# Patient Record
Sex: MALE | Race: WHITE | NOT HISPANIC OR LATINO | Employment: OTHER | URBAN - METROPOLITAN AREA
[De-identification: names, ages, dates, MRNs, and addresses within clinical notes are randomized per-mention and may not be internally consistent; named-entity substitution may affect disease eponyms.]

---

## 2017-01-10 ENCOUNTER — ALLSCRIPTS OFFICE VISIT (OUTPATIENT)
Dept: OTHER | Facility: OTHER | Age: 72
End: 2017-01-10

## 2017-05-18 ENCOUNTER — GENERIC CONVERSION - ENCOUNTER (OUTPATIENT)
Dept: OTHER | Facility: OTHER | Age: 72
End: 2017-05-18

## 2017-07-06 ENCOUNTER — GENERIC CONVERSION - ENCOUNTER (OUTPATIENT)
Dept: OTHER | Facility: OTHER | Age: 72
End: 2017-07-06

## 2017-08-24 ENCOUNTER — HOSPITAL ENCOUNTER (OUTPATIENT)
Dept: RADIOLOGY | Facility: HOSPITAL | Age: 72
Discharge: HOME | End: 2017-08-24
Attending: INTERNAL MEDICINE
Payer: MEDICARE

## 2017-08-24 ENCOUNTER — TRANSCRIBE ORDERS (OUTPATIENT)
Dept: ADMINISTRATIVE | Facility: HOSPITAL | Age: 72
End: 2017-08-24

## 2017-08-24 ENCOUNTER — APPOINTMENT (OUTPATIENT)
Dept: LAB | Facility: HOSPITAL | Age: 72
End: 2017-08-24
Attending: INTERNAL MEDICINE
Payer: MEDICARE

## 2017-08-24 DIAGNOSIS — R06.89 HYPOVENTILATION, IDIOPATHIC: ICD-10-CM

## 2017-08-24 DIAGNOSIS — R33.8 BENIGN LOCALIZED HYPERPLASIA OF PROSTATE WITH URINARY RETENTION: ICD-10-CM

## 2017-08-24 DIAGNOSIS — J44.9 OBSTRUCTIVE CHRONIC BRONCHITIS WITHOUT EXACERBATION (HCC): Primary | ICD-10-CM

## 2017-08-24 DIAGNOSIS — N40.1 BENIGN LOCALIZED HYPERPLASIA OF PROSTATE WITH URINARY RETENTION: ICD-10-CM

## 2017-08-24 DIAGNOSIS — Z79.899 ENCOUNTER FOR LONG-TERM (CURRENT) USE OF OTHER MEDICATIONS: ICD-10-CM

## 2017-08-24 LAB
ALBUMIN SERPL BCP-MCNC: 3.5 G/DL (ref 3.5–5)
ALP SERPL-CCNC: 66 U/L (ref 46–116)
ALT SERPL W P-5'-P-CCNC: 41 U/L (ref 12–78)
ANION GAP SERPL CALCULATED.3IONS-SCNC: 9 MMOL/L (ref 4–13)
AST SERPL W P-5'-P-CCNC: 19 U/L (ref 5–45)
BACTERIA UR QL AUTO: ABNORMAL /HPF
BASOPHILS # BLD AUTO: 0 THOUSANDS/ΜL (ref 0–0.1)
BASOPHILS NFR BLD AUTO: 0 % (ref 0–1)
BILIRUB SERPL-MCNC: 0.3 MG/DL (ref 0.2–1)
BILIRUB UR QL STRIP: NEGATIVE
BUN SERPL-MCNC: 18 MG/DL (ref 5–25)
CALCIUM SERPL-MCNC: 9.1 MG/DL (ref 8.3–10.1)
CHLORIDE SERPL-SCNC: 103 MMOL/L (ref 100–108)
CLARITY UR: CLEAR
CO2 SERPL-SCNC: 28 MMOL/L (ref 21–32)
COLOR UR: YELLOW
CREAT SERPL-MCNC: 1.17 MG/DL (ref 0.6–1.3)
EOSINOPHIL # BLD AUTO: 0.3 THOUSAND/ΜL (ref 0–0.61)
EOSINOPHIL NFR BLD AUTO: 4 % (ref 0–6)
ERYTHROCYTE [DISTWIDTH] IN BLOOD BY AUTOMATED COUNT: 14.6 % (ref 11.6–15.1)
GFR SERPL CREATININE-BSD FRML MDRD: 62 ML/MIN/1.73SQ M
GLUCOSE SERPL-MCNC: 142 MG/DL (ref 65–140)
GLUCOSE UR STRIP-MCNC: ABNORMAL MG/DL
HCT VFR BLD AUTO: 40.4 % (ref 42–52)
HGB BLD-MCNC: 13.3 G/DL (ref 14–18)
HGB UR QL STRIP.AUTO: ABNORMAL
KETONES UR STRIP-MCNC: NEGATIVE MG/DL
LEUKOCYTE ESTERASE UR QL STRIP: NEGATIVE
LYMPHOCYTES # BLD AUTO: 2.1 THOUSANDS/ΜL (ref 0.6–4.47)
LYMPHOCYTES NFR BLD AUTO: 28 % (ref 14–44)
MAGNESIUM SERPL-MCNC: 1.8 MG/DL (ref 1.6–2.6)
MCH RBC QN AUTO: 29.8 PG (ref 27–31)
MCHC RBC AUTO-ENTMCNC: 32.9 G/DL (ref 31.4–37.4)
MCV RBC AUTO: 91 FL (ref 82–98)
MONOCYTES # BLD AUTO: 0.7 THOUSAND/ΜL (ref 0.17–1.22)
MONOCYTES NFR BLD AUTO: 9 % (ref 4–12)
NEUTROPHILS # BLD AUTO: 4.4 THOUSANDS/ΜL (ref 1.85–7.62)
NEUTS SEG NFR BLD AUTO: 58 % (ref 43–75)
NITRITE UR QL STRIP: NEGATIVE
NON-SQ EPI CELLS URNS QL MICRO: ABNORMAL /HPF
NRBC BLD AUTO-RTO: 0 /100 WBCS
PH UR STRIP.AUTO: 5.5 [PH] (ref 5–9)
PLATELET # BLD AUTO: 271 THOUSANDS/UL (ref 130–400)
PMV BLD AUTO: 7.8 FL (ref 8.9–12.7)
POTASSIUM SERPL-SCNC: 4.6 MMOL/L (ref 3.5–5.3)
PROT SERPL-MCNC: 7.2 G/DL (ref 6.4–8.2)
PROT UR STRIP-MCNC: NEGATIVE MG/DL
RBC # BLD AUTO: 4.45 MILLION/UL (ref 4.7–6.1)
RBC #/AREA URNS AUTO: ABNORMAL /HPF
SODIUM SERPL-SCNC: 140 MMOL/L (ref 136–145)
SP GR UR STRIP.AUTO: 1.01 (ref 1–1.03)
UROBILINOGEN UR QL STRIP.AUTO: 0.2 E.U./DL
WBC # BLD AUTO: 7.6 THOUSAND/UL (ref 4.8–10.8)
WBC #/AREA URNS AUTO: ABNORMAL /HPF

## 2017-08-24 PROCEDURE — 80053 COMPREHEN METABOLIC PANEL: CPT | Performed by: INTERNAL MEDICINE

## 2017-08-24 PROCEDURE — 87147 CULTURE TYPE IMMUNOLOGIC: CPT | Performed by: INTERNAL MEDICINE

## 2017-08-24 PROCEDURE — 71020 HB CHEST X-RAY 2VW FRONTAL&LATL: CPT

## 2017-08-24 PROCEDURE — 85025 COMPLETE CBC W/AUTO DIFF WBC: CPT | Performed by: INTERNAL MEDICINE

## 2017-08-24 PROCEDURE — 81001 URINALYSIS AUTO W/SCOPE: CPT | Performed by: INTERNAL MEDICINE

## 2017-08-24 PROCEDURE — 87086 URINE CULTURE/COLONY COUNT: CPT | Performed by: INTERNAL MEDICINE

## 2017-08-24 PROCEDURE — 83735 ASSAY OF MAGNESIUM: CPT | Performed by: INTERNAL MEDICINE

## 2017-08-24 PROCEDURE — 93005 ELECTROCARDIOGRAM TRACING: CPT

## 2017-08-24 PROCEDURE — 36415 COLL VENOUS BLD VENIPUNCTURE: CPT | Performed by: INTERNAL MEDICINE

## 2017-08-25 LAB
ATRIAL RATE: 62 BPM
BACTERIA UR CULT: NORMAL
PR INTERVAL: 256 MS
QRS AXIS: -49 DEGREES
QRSD INTERVAL: 90 MS
QT INTERVAL: 390 MS
QTC INTERVAL: 395 MS
T WAVE AXIS: 61 DEGREES
VENTRICULAR RATE: 62 BPM

## 2018-01-12 VITALS
SYSTOLIC BLOOD PRESSURE: 118 MMHG | OXYGEN SATURATION: 96 % | RESPIRATION RATE: 12 BRPM | HEART RATE: 61 BPM | TEMPERATURE: 98.6 F | HEIGHT: 69 IN | WEIGHT: 265 LBS | BODY MASS INDEX: 39.25 KG/M2 | DIASTOLIC BLOOD PRESSURE: 68 MMHG

## 2018-01-12 NOTE — MISCELLANEOUS
Message  Dr Mary Uribe called; he has AV block (sleep related)  He has histoyr of MEHDI and Dr Mary Uribe is concerned   to know if the pressure on CPAP is correct        Plan  Bradycardia    · CPAP Tubing; Status:Complete;   Done: [id number] 03:49PM    Signatures   Electronically signed by : SUMMER Marti; May 18 2017  3:50PM EST                       (Author)

## 2018-01-16 NOTE — MISCELLANEOUS
Message  Reviewed nocturnal pulse oximetry on CPAP  He needs to have titration study to qualify for supplemental oxygeng  He is not agreeable to have repeat titration study        Signatures   Electronically signed by : SUMMER Davidson; Jul 6 2017  2:19PM EST                       (Author)

## 2018-03-19 ENCOUNTER — APPOINTMENT (OUTPATIENT)
Dept: LAB | Facility: HOSPITAL | Age: 73
End: 2018-03-19
Attending: INTERNAL MEDICINE
Payer: MEDICARE

## 2018-03-19 ENCOUNTER — HOSPITAL ENCOUNTER (OUTPATIENT)
Dept: RADIOLOGY | Facility: HOSPITAL | Age: 73
Discharge: HOME | End: 2018-03-19
Attending: INTERNAL MEDICINE
Payer: MEDICARE

## 2018-03-19 ENCOUNTER — TRANSCRIBE ORDERS (OUTPATIENT)
Dept: ADMINISTRATIVE | Facility: HOSPITAL | Age: 73
End: 2018-03-19

## 2018-03-19 DIAGNOSIS — J44.1 OBSTRUCTIVE CHRONIC BRONCHITIS WITH EXACERBATION (HCC): Primary | ICD-10-CM

## 2018-03-19 DIAGNOSIS — I51.7 CARDIOMEGALY: ICD-10-CM

## 2018-03-19 DIAGNOSIS — I51.9 CARDIAC COMPLICATION: ICD-10-CM

## 2018-03-19 LAB
ALBUMIN SERPL BCP-MCNC: 3.5 G/DL (ref 3.5–5)
ALP SERPL-CCNC: 64 U/L (ref 46–116)
ALT SERPL W P-5'-P-CCNC: 49 U/L (ref 12–78)
ANION GAP SERPL CALCULATED.3IONS-SCNC: 8 MMOL/L (ref 4–13)
AST SERPL W P-5'-P-CCNC: 31 U/L (ref 5–45)
ATRIAL RATE: 62 BPM
BACTERIA UR QL AUTO: NORMAL /HPF
BASOPHILS # BLD AUTO: 0 THOUSANDS/ΜL (ref 0–0.1)
BASOPHILS NFR BLD AUTO: 0 % (ref 0–1)
BILIRUB SERPL-MCNC: 0.4 MG/DL (ref 0.2–1)
BILIRUB UR QL STRIP: NEGATIVE
BUN SERPL-MCNC: 21 MG/DL (ref 5–25)
CALCIUM SERPL-MCNC: 9.1 MG/DL (ref 8.3–10.1)
CHLORIDE SERPL-SCNC: 101 MMOL/L (ref 100–108)
CLARITY UR: CLEAR
CO2 SERPL-SCNC: 30 MMOL/L (ref 21–32)
COLOR UR: YELLOW
CREAT SERPL-MCNC: 1.32 MG/DL (ref 0.6–1.3)
DEPRECATED D DIMER PPP: 900 NG/ML (FEU) (ref 190–520)
EOSINOPHIL # BLD AUTO: 0.3 THOUSAND/ΜL (ref 0–0.61)
EOSINOPHIL NFR BLD AUTO: 3 % (ref 0–6)
ERYTHROCYTE [DISTWIDTH] IN BLOOD BY AUTOMATED COUNT: 14.9 % (ref 11.6–15.1)
GFR SERPL CREATININE-BSD FRML MDRD: 54 ML/MIN/1.73SQ M
GLUCOSE SERPL-MCNC: 202 MG/DL (ref 65–140)
GLUCOSE UR STRIP-MCNC: ABNORMAL MG/DL
HCT VFR BLD AUTO: 40.7 % (ref 42–52)
HGB BLD-MCNC: 13.1 G/DL (ref 14–18)
HGB UR QL STRIP.AUTO: ABNORMAL
KETONES UR STRIP-MCNC: NEGATIVE MG/DL
LEUKOCYTE ESTERASE UR QL STRIP: NEGATIVE
LYMPHOCYTES # BLD AUTO: 2.2 THOUSANDS/ΜL (ref 0.6–4.47)
LYMPHOCYTES NFR BLD AUTO: 24 % (ref 14–44)
MAGNESIUM SERPL-MCNC: 1.9 MG/DL (ref 1.6–2.6)
MCH RBC QN AUTO: 28.9 PG (ref 27–31)
MCHC RBC AUTO-ENTMCNC: 32.2 G/DL (ref 31.4–37.4)
MCV RBC AUTO: 90 FL (ref 82–98)
MONOCYTES # BLD AUTO: 0.6 THOUSAND/ΜL (ref 0.17–1.22)
MONOCYTES NFR BLD AUTO: 7 % (ref 4–12)
NEUTROPHILS # BLD AUTO: 5.8 THOUSANDS/ΜL (ref 1.85–7.62)
NEUTS SEG NFR BLD AUTO: 65 % (ref 43–75)
NITRITE UR QL STRIP: NEGATIVE
NON-SQ EPI CELLS URNS QL MICRO: NORMAL /HPF
NRBC BLD AUTO-RTO: 0 /100 WBCS
NT-PROBNP SERPL-MCNC: 147 PG/ML
P AXIS: 18 DEGREES
PH UR STRIP.AUTO: 5.5 [PH] (ref 5–9)
PLATELET # BLD AUTO: 260 THOUSANDS/UL (ref 130–400)
PMV BLD AUTO: 8.4 FL (ref 8.9–12.7)
POTASSIUM SERPL-SCNC: 4.6 MMOL/L (ref 3.5–5.3)
PROT SERPL-MCNC: 7.2 G/DL (ref 6.4–8.2)
PROT UR STRIP-MCNC: ABNORMAL MG/DL
QRS AXIS: -52 DEGREES
QRSD INTERVAL: 92 MS
QT INTERVAL: 394 MS
QTC INTERVAL: 399 MS
RBC # BLD AUTO: 4.53 MILLION/UL (ref 4.7–6.1)
RBC #/AREA URNS AUTO: NORMAL /HPF
SODIUM SERPL-SCNC: 139 MMOL/L (ref 136–145)
SP GR UR STRIP.AUTO: 1.02 (ref 1–1.03)
T WAVE AXIS: 76 DEGREES
UROBILINOGEN UR QL STRIP.AUTO: 0.2 E.U./DL
VENTRICULAR RATE: 62 BPM
WBC # BLD AUTO: 8.9 THOUSAND/UL (ref 4.8–10.8)
WBC #/AREA URNS AUTO: NORMAL /HPF

## 2018-03-19 PROCEDURE — 93010 ELECTROCARDIOGRAM REPORT: CPT | Performed by: INTERNAL MEDICINE

## 2018-03-19 PROCEDURE — 85025 COMPLETE CBC W/AUTO DIFF WBC: CPT | Performed by: INTERNAL MEDICINE

## 2018-03-19 PROCEDURE — 83735 ASSAY OF MAGNESIUM: CPT | Performed by: INTERNAL MEDICINE

## 2018-03-19 PROCEDURE — 36415 COLL VENOUS BLD VENIPUNCTURE: CPT | Performed by: INTERNAL MEDICINE

## 2018-03-19 PROCEDURE — 71046 X-RAY EXAM CHEST 2 VIEWS: CPT

## 2018-03-19 PROCEDURE — 81001 URINALYSIS AUTO W/SCOPE: CPT | Performed by: INTERNAL MEDICINE

## 2018-03-19 PROCEDURE — 85379 FIBRIN DEGRADATION QUANT: CPT | Performed by: INTERNAL MEDICINE

## 2018-03-19 PROCEDURE — 83880 ASSAY OF NATRIURETIC PEPTIDE: CPT | Performed by: INTERNAL MEDICINE

## 2018-03-19 PROCEDURE — 80053 COMPREHEN METABOLIC PANEL: CPT | Performed by: INTERNAL MEDICINE

## 2018-03-19 PROCEDURE — 93005 ELECTROCARDIOGRAM TRACING: CPT

## 2018-03-20 ENCOUNTER — TRANSCRIBE ORDERS (OUTPATIENT)
Dept: ADMINISTRATIVE | Facility: HOSPITAL | Age: 73
End: 2018-03-20

## 2018-03-20 ENCOUNTER — APPOINTMENT (OUTPATIENT)
Dept: LAB | Facility: HOSPITAL | Age: 73
End: 2018-03-20
Attending: INTERNAL MEDICINE
Payer: MEDICARE

## 2018-03-20 DIAGNOSIS — I51.9 CARDIAC COMPLICATION: ICD-10-CM

## 2018-03-20 DIAGNOSIS — J44.1 OBSTRUCTIVE CHRONIC BRONCHITIS WITH EXACERBATION (HCC): ICD-10-CM

## 2018-03-20 DIAGNOSIS — J44.1 OBSTRUCTIVE CHRONIC BRONCHITIS WITH EXACERBATION (HCC): Primary | ICD-10-CM

## 2018-03-20 PROCEDURE — 87070 CULTURE OTHR SPECIMN AEROBIC: CPT | Performed by: INTERNAL MEDICINE

## 2018-03-20 PROCEDURE — 87205 SMEAR GRAM STAIN: CPT | Performed by: INTERNAL MEDICINE

## 2018-03-22 LAB
BACTERIA SPT RESP CULT: NORMAL
GRAM STN SPEC: NORMAL

## 2018-07-31 ENCOUNTER — OFFICE VISIT (OUTPATIENT)
Dept: PULMONOLOGY | Facility: MEDICAL CENTER | Age: 73
End: 2018-07-31
Payer: MEDICARE

## 2018-07-31 VITALS
SYSTOLIC BLOOD PRESSURE: 126 MMHG | TEMPERATURE: 97.9 F | BODY MASS INDEX: 37.94 KG/M2 | OXYGEN SATURATION: 95 % | HEIGHT: 70 IN | WEIGHT: 265 LBS | DIASTOLIC BLOOD PRESSURE: 82 MMHG | HEART RATE: 89 BPM

## 2018-07-31 DIAGNOSIS — G47.33 OBSTRUCTIVE SLEEP APNEA: ICD-10-CM

## 2018-07-31 DIAGNOSIS — G47.33 OSA (OBSTRUCTIVE SLEEP APNEA): Primary | ICD-10-CM

## 2018-07-31 PROCEDURE — 99214 OFFICE O/P EST MOD 30 MIN: CPT | Performed by: NURSE PRACTITIONER

## 2018-07-31 RX ORDER — TRAZODONE HYDROCHLORIDE 100 MG/1
100 TABLET ORAL
COMMUNITY
End: 2021-02-12

## 2018-07-31 RX ORDER — TRAMADOL HYDROCHLORIDE 50 MG/1
50 TABLET ORAL EVERY 6 HOURS PRN
COMMUNITY
End: 2021-02-12

## 2018-07-31 RX ORDER — BICALUTAMIDE 50 MG/1
TABLET, FILM COATED ORAL
COMMUNITY
Start: 2018-07-29 | End: 2019-10-24 | Stop reason: ALTCHOICE

## 2018-07-31 RX ORDER — OMEPRAZOLE 40 MG/1
40 CAPSULE, DELAYED RELEASE ORAL
COMMUNITY
End: 2018-10-19

## 2018-07-31 NOTE — PROGRESS NOTES
Assessment/Plan:     Problem List Items Addressed This Visit        Respiratory    Obstructive sleep apnea     Artie Krishnan is here today for re-establishment of his diagnosis of obstructive sleep apnea  I did review notes from 2017  It appears that he had initial diagnosis in the year of February 2006  He had severe obstructive sleep apnea with overall apneic hypopnea index is 66 and oxygen bala of 80  He weighed 250 lb at that time  He had a diagnostic home sleep study done September 8, 2015  Again this showed severe MEHDI with apneic hypopnea index of 43 and oxygen bala of 75  He likely is using auto CPAP compliance data was reviewed by Dr Chin at his last visit in January 2017  Average CPAP pressure was 10 cm with apneic hypopnea index less than 5  According to patient he feels well rested in the morning  He sleeps approximately 8-10 hours per night with only 1 interruption due to needing to urinate  He feels refreshed in the morning  Plan includes requesting compliance data and I will be ordering supplies for him  Other Visit Diagnoses     MEHDI (obstructive sleep apnea)    -  Primary    Relevant Orders    PAP DME Resupply/Reorder            Return in about 4 weeks (around 8/28/2018)  All questions are answered to the patient's satisfaction and understanding  He verbalizes understanding  He is encouraged to call with any further questions or concerns  Portions of the record may have been created with voice recognition software  Occasional wrong word or "sound a like" substitutions may have occurred due to the inherent limitations of voice recognition software  Read the chart carefully and recognize, using context, where substitutions have occurred  Electronically Signed by NASH Coombs    ______________________________________________________________________    Chief Complaint:   Chief Complaint   Patient presents with    Sleep Apnea       Patient ID: Artie Krishnan is a 68 y o  y o  male has a past medical history of Arthritis; Cancer ([facility] [address] ); Coronary artery disease; Diabetes mellitus ([facility] [address] ); Hypertension; Psoriasis; and Sleep apnea  7/31/2018  Yogi Ferreira is a 72-year-old male who was here today for re-evaluation of obstructive sleep apnea  He has history of vocal cord paralysis and obstructive sleep apnea  He was last seen in the office in January of 2017 by Dr Amrita Pedraza  Review of notes reports that this gentleman has a history of severe obstructive sleep apnea  Initial diagnostic study was done in February 22, 2006  It showed an overall apneic hypopnea index of 66 events for hour and oxygen bala of 80%  He weighed 250 lb then  He had a diagnostic home sleep study done again September 2015 that showed severe obstructive sleep apnea with AHI of 43 and oxygen bala of 75%  It appears that his current CPAP is 10 cm of water pressure and average apneic hypopnea index is 5  I believe that he likely is on auto CPAP as patient states that he has a range between 8 and 15  He is here today as he needs new supplies  Shortness of Breath   This is a chronic problem  The current episode started more than 1 year ago  The problem occurs daily  The problem has been unchanged  The symptoms are aggravated by exercise  The patient has no known risk factors for DVT/PE  The treatment provided mild relief  His past medical history is significant for asthma  Review of Systems   Constitutional: Negative  HENT: Negative  Eyes: Negative  Respiratory: Positive for shortness of breath  Cardiovascular: Negative  Gastrointestinal: Negative  Endocrine: Negative  Genitourinary: Negative  Musculoskeletal: Negative  Skin: Negative  Allergic/Immunologic: Negative  Neurological: Negative  Hematological: Negative  Psychiatric/Behavioral: Negative  Smoking history: He reports that he has never smoked   He does not have any smokeless tobacco history on file     The following portions of the patient's history were reviewed and updated as appropriate: allergies, current medications, past family history, past medical history, past social history, past surgical history and problem list       There is no immunization history on file for this patient  Current Outpatient Prescriptions   Medication Sig Dispense Refill    traMADol (ULTRAM) 50 mg tablet Take 50 mg by mouth every 6 (six) hours as needed for moderate pain      traZODone (DESYREL) 100 mg tablet Take 50 mg by mouth daily at bedtime      Apremilast (OTEZLA) 30 MG TABS Take by mouth 2 (two) times a day   aspirin 81 MG tablet Take 81 mg by mouth daily   atorvastatin (LIPITOR) 40 mg tablet Take 40 mg by mouth daily   bicalutamide (CASODEX) 50 mg tablet       cholecalciferol (VITAMIN D3) 1,000 units tablet Take by mouth      clopidogrel (PLAVIX) 75 mg tablet Take 75 mg by mouth daily   Empagliflozin 25 MG TABS Take 25 mg by mouth      furosemide (LASIX) 40 mg tablet Take 40 mg by mouth 2 (two) times a day   insulin lispro protamine-insulin lispro (HumaLOG 50-50) 100 units/mL Inject 50 Units under the skin 2 (two) times a day before meals   lisinopril (ZESTRIL) 10 mg tablet Take 10 mg by mouth daily   metFORMIN (GLUMETZA) 1000 MG (MOD) 24 hr tablet Take 1,000 mg by mouth 2 (two) times a day   metoprolol tartrate (LOPRESSOR) 100 mg tablet Take 100 mg by mouth once   omeprazole (PriLOSEC) 40 MG capsule Take 40 mg by mouth      sertraline (ZOLOFT) 50 mg tablet Take 50 mg by mouth daily   UNABLE TO FIND 2 (two) times a day  Med Name: arthrotec      UNABLE TO FIND Take by mouth once  Med Name: vitamin d 3       No current facility-administered medications for this visit        Allergies: Bee venom    Objective:  Vitals:    07/31/18 0952   BP: 126/82   BP Location: Left arm   Patient Position: Sitting   Cuff Size: Large   Pulse: 89   Temp: 97 9 °F (36 6 °C) TempSrc: Oral   SpO2: 95%   Weight: 120 kg (265 lb)   Height: 5' 10" (1 778 m)   Oxygen Therapy  SpO2: 95 %    Wt Readings from Last 3 Encounters:   07/31/18 120 kg (265 lb)   01/10/17 120 kg (265 lb)   04/05/16 122 kg (270 lb)     Body mass index is 38 02 kg/m²  Physical Exam   Constitutional: He is oriented to person, place, and time  He appears well-developed and well-nourished  Morbidly obese   HENT:   Head: Normocephalic and atraumatic  Mallampati 4   Eyes: Conjunctivae are normal  Pupils are equal, round, and reactive to light  Neck: Normal range of motion  Neck supple  Cardiovascular: Normal rate and regular rhythm  Pulmonary/Chest: Effort normal and breath sounds normal    Abdominal: Soft  Musculoskeletal: Normal range of motion  Neurological: He is alert and oriented to person, place, and time  Skin: Skin is warm and dry  Psychiatric: He has a normal mood and affect   His behavior is normal  Thought content normal        Lab Review:   Transcribe Orders on 03/19/2018   Component Date Value    WBC 03/19/2018 8 90     RBC 03/19/2018 4 53*    Hemoglobin 03/19/2018 13 1*    Hematocrit 03/19/2018 40 7*    MCV 03/19/2018 90     MCH 03/19/2018 28 9     MCHC 03/19/2018 32 2     RDW 03/19/2018 14 9     MPV 03/19/2018 8 4*    Platelets 89/00/2371 260     nRBC 03/19/2018 0     Neutrophils Relative 03/19/2018 65     Lymphocytes Relative 03/19/2018 24     Monocytes Relative 03/19/2018 7     Eosinophils Relative 03/19/2018 3     Basophils Relative 03/19/2018 0     Neutrophils Absolute 03/19/2018 5 80     Lymphocytes Absolute 03/19/2018 2 20     Monocytes Absolute 03/19/2018 0 60     Eosinophils Absolute 03/19/2018 0 30     Basophils Absolute 03/19/2018 0 00     Sodium 03/19/2018 139     Potassium 03/19/2018 4 6     Chloride 03/19/2018 101     CO2 03/19/2018 30     Anion Gap 03/19/2018 8     BUN 03/19/2018 21     Creatinine 03/19/2018 1 32*    Glucose 03/19/2018 202*    Calcium 03/19/2018 9 1     AST 03/19/2018 31     ALT 03/19/2018 49     Alkaline Phosphatase 03/19/2018 64     Total Protein 03/19/2018 7 2     Albumin 03/19/2018 3 5     Total Bilirubin 03/19/2018 0 40     eGFR 03/19/2018 54     Magnesium 03/19/2018 1 9     Color, UA 03/19/2018 Yellow     Clarity, UA 03/19/2018 Clear     Specific Gravity, UA 03/19/2018 1 020     pH, UA 03/19/2018 5 5     Leukocytes, UA 03/19/2018 Negative     Nitrite, UA 03/19/2018 Negative     Protein, UA 03/19/2018 100 (2+)*    Glucose, UA 03/19/2018 250 (1/4%)*    Ketones, UA 03/19/2018 Negative     Urobilinogen, UA 03/19/2018 0 2     Bilirubin, UA 03/19/2018 Negative     Blood, UA 03/19/2018 Small*    Sputum Culture 03/20/2018 3+ Growth of      Gram Stain Result 03/20/2018 Rare Polys     Gram Stain Result 03/20/2018 1+ Gram positive cocci in pairs, chains and clusters     Gram Stain Result 03/20/2018 Rare Gram negative rods     NT-proBNP 03/19/2018 147*    D-Dimer, Quant 03/19/2018 900*    Ventricular Rate 03/19/2018 62     Atrial Rate 03/19/2018 62     QRSD Interval 03/19/2018 92     QT Interval 03/19/2018 394     QTC Interval 03/19/2018 399     P Axis 03/19/2018 18     QRS Axis 03/19/2018 -46     T Wave Axis 03/19/2018 76     RBC, UA 03/19/2018 None Seen     WBC, UA 03/19/2018 None Seen     Epithelial Cells 03/19/2018 Occasional     Bacteria, UA 03/19/2018 Occasional        Diagnostics:  I have personally reviewed pertinent reports  Office Spirometry Results:     ESS:    No results found

## 2018-07-31 NOTE — ASSESSMENT & PLAN NOTE
Adriel Hartmann is here today for re-establishment of his diagnosis of obstructive sleep apnea  I did review notes from 2017  It appears that he had initial diagnosis in the year of February 2006  He had severe obstructive sleep apnea with overall apneic hypopnea index is 66 and oxygen bala of 80  He weighed 250 lb at that time  He had a diagnostic home sleep study done September 8, 2015  Again this showed severe MEHDI with apneic hypopnea index of 43 and oxygen bala of 75  He likely is using auto CPAP compliance data was reviewed by Dr Chin at his last visit in January 2017  Average CPAP pressure was 10 cm with apneic hypopnea index less than 5  According to patient he feels well rested in the morning  He sleeps approximately 8-10 hours per night with only 1 interruption due to needing to urinate  He feels refreshed in the morning  Plan includes requesting compliance data and I will be ordering supplies for him

## 2018-07-31 NOTE — PATIENT INSTRUCTIONS
You have diagnosis of severe obstructive sleep apnea  I have reordered supplies for you  From [employer]  I would like you to return to see Respiratory therapist from [email or web address]  He will do mask refit for you  I am also requesting compliance data to make sure that your current setting is adequate

## 2018-10-01 ENCOUNTER — TELEPHONE (OUTPATIENT)
Dept: PAIN MEDICINE | Facility: MEDICAL CENTER | Age: 73
End: 2018-10-01

## 2018-10-01 NOTE — TELEPHONE ENCOUNTER
Pt's wife called to schedule an appt for her   States that a friend recommended Dr Ashley Orantes for pain management  Pt has low back pain and r-sided hip and leg pain  He has not seen prior pain management  Verified we are par with his insurance  Pt has not had any recent imaging  CON scheduled with Dr Ashley Orantes on 10/19 @ 8:00 AM  NP paperwork mailed to patient

## 2018-10-19 ENCOUNTER — CONSULT (OUTPATIENT)
Dept: PAIN MEDICINE | Facility: CLINIC | Age: 73
End: 2018-10-19
Payer: MEDICARE

## 2018-10-19 ENCOUNTER — HOSPITAL ENCOUNTER (OUTPATIENT)
Dept: RADIOLOGY | Facility: HOSPITAL | Age: 73
Discharge: HOME | End: 2018-10-19
Attending: ANESTHESIOLOGY
Payer: MEDICARE

## 2018-10-19 ENCOUNTER — APPOINTMENT (OUTPATIENT)
Dept: LAB | Facility: CLINIC | Age: 73
End: 2018-10-19
Payer: MEDICARE

## 2018-10-19 VITALS
BODY MASS INDEX: 36.36 KG/M2 | TEMPERATURE: 98.7 F | DIASTOLIC BLOOD PRESSURE: 70 MMHG | HEART RATE: 86 BPM | WEIGHT: 254 LBS | HEIGHT: 70 IN | SYSTOLIC BLOOD PRESSURE: 118 MMHG

## 2018-10-19 DIAGNOSIS — M48.062 LUMBAR STENOSIS WITH NEUROGENIC CLAUDICATION: Primary | ICD-10-CM

## 2018-10-19 DIAGNOSIS — M48.062 LUMBAR STENOSIS WITH NEUROGENIC CLAUDICATION: ICD-10-CM

## 2018-10-19 DIAGNOSIS — M47.816 LUMBAR SPONDYLOSIS: ICD-10-CM

## 2018-10-19 PROBLEM — M17.12 DEGENERATIVE ARTHRITIS OF LEFT KNEE: Status: ACTIVE | Noted: 2018-04-24

## 2018-10-19 PROBLEM — I10 ESSENTIAL (PRIMARY) HYPERTENSION: Status: ACTIVE | Noted: 2018-07-30

## 2018-10-19 PROBLEM — E78.5 HYPERLIPIDEMIA: Status: ACTIVE | Noted: 2018-10-19

## 2018-10-19 PROBLEM — I25.10 CORONARY ARTERY DISEASE INVOLVING NATIVE CORONARY ARTERY: Status: ACTIVE | Noted: 2018-07-30

## 2018-10-19 PROBLEM — J38.02 VOCAL CORD PARALYSIS, BILATERAL COMPLETE: Status: ACTIVE | Noted: 2017-01-10

## 2018-10-19 PROBLEM — E11.9 TYPE 2 DIABETES MELLITUS (HCC): Status: ACTIVE | Noted: 2018-10-19

## 2018-10-19 LAB
CRP SERPL QL: 8.5 MG/L
ERYTHROCYTE [SEDIMENTATION RATE] IN BLOOD: 27 MM/HOUR (ref 0–10)
RHEUMATOID FACT SER QL LA: NEGATIVE

## 2018-10-19 PROCEDURE — 86140 C-REACTIVE PROTEIN: CPT

## 2018-10-19 PROCEDURE — 86038 ANTINUCLEAR ANTIBODIES: CPT

## 2018-10-19 PROCEDURE — 86430 RHEUMATOID FACTOR TEST QUAL: CPT

## 2018-10-19 PROCEDURE — 72110 X-RAY EXAM L-2 SPINE 4/>VWS: CPT

## 2018-10-19 PROCEDURE — 36415 COLL VENOUS BLD VENIPUNCTURE: CPT

## 2018-10-19 PROCEDURE — 85652 RBC SED RATE AUTOMATED: CPT

## 2018-10-19 PROCEDURE — 99204 OFFICE O/P NEW MOD 45 MIN: CPT | Performed by: ANESTHESIOLOGY

## 2018-10-19 PROCEDURE — 72200 X-RAY EXAM SI JOINTS: CPT

## 2018-10-19 NOTE — PROGRESS NOTES
Assessment:  1  Lumbar stenosis with neurogenic claudication    2  Lumbar spondylosis        Plan:  The patient's symptoms, history/physical are consistent with pain that is multifactorial in origin  He has a history of spinal stenosis which is the likely etiology of his ongoing lower back and leg complaints  However he also has a history of psoriasis and there may be a component of psoriatic arthritis that is contributing to his very low back pain  At this time, I discussed treatment that will be multimodal in approach  I will order updated imaging of the lumbar sacral spine including x-rays of the lumbar spine, sacroiliac joints and an MRI of the lumbar spine to further evaluate  I will also order an inflammatory blood work panel  To provide him symptomatic relief in the interim, I would like him to begin a course of physical therapy  I advised him I will call with the results of all the studies once they have been completed and discuss treatment moving forward  My impressions and treatment recommendations were discussed in detail with the patient who verbalized understanding and had no further questions  Discharge instructions were provided  I personally saw and examined the patient and I agree with the above discussed plan of care  Orders Placed This Encounter   Procedures    XR spine lumbar minimum 4 views non injury     Standing Status:   Future     Standing Expiration Date:   10/19/2022     Scheduling Instructions:      Bring along any outside films relating to this procedure  Order Specific Question:   Reason for Exam:     Answer:   lbp    XR sacroiliac joints < 3 views     Standing Status:   Future     Standing Expiration Date:   10/19/2022     Scheduling Instructions:      Bring along any outside films relating to this procedure             Order Specific Question:   Reason for Exam:     Answer:   lbp    MRI lumbar spine without contrast     Standing Status:   Future Standing Expiration Date:   10/19/2022     Scheduling Instructions: There is no preparation for this test  Please leave your jewelry and valuables at home, wedding rings are the exception  Please bring your insurance cards, a form of photo ID and a list of your medications with you  Arrive 15 minutes prior to your appointment time in order to register  Please bring any prior CT or MRI studies of this area that were not performed at a [facility]  To schedule this appointment, please contact Central Scheduling at [phone]  Order Specific Question:   What is the patient's sedation requirement? Answer:   No Sedation    KENDALL Screen w/ Reflex to Titer/Pattern     Standing Status:   Future     Standing Expiration Date:   10/19/2019    C-reactive protein     Standing Status:   Future     Standing Expiration Date:   10/19/2019    RF Screen w/ Reflex to Titer     Standing Status:   Future     Standing Expiration Date:   10/19/2019    Sedimentation rate, automated     Standing Status:   Future     Standing Expiration Date:   10/19/2019    Ambulatory referral to Physical Therapy     Standing Status:   Future     Standing Expiration Date:   4/19/2019     Referral Priority:   Routine     Referral Type:   Physical Therapy     Referral Reason:   Specialty Services Required     Requested Specialty:   Physical Therapy     Number of Visits Requested:   1     Expiration Date:   10/19/2019     No orders of the defined types were placed in this encounter  History of Present Illness:    Madiha Nicole is a 68 y o  male who presents for consultation in regards to lower back pain as well as bilateral lower extremity pain  Symptoms have been present for 10 years without any precipitating injury or trauma  Symptoms are moderate to severe rated 7-8/10 on a numeric rating scale  Pain is burning in the lower back with numbness  Symptoms are associated with weakness    Pain is aggravated standing, walking, exercise and decreased with relaxation, coughing, sneezing and bowel movements  Treatment history has included prior epidural steroid injections which provided moderate relief in 2017  Physical therapy has provided no relief  Chiropractic manipulations provided moderate relief  He takes tramadol as needed which provides mild relief  I have personally reviewed and/or updated the patient's past medical history, past surgical history, family history, social history, current medications, allergies, and vital signs today  Review of Systems:    Review of Systems   Constitutional: Positive for unexpected weight change  Negative for fever  HENT: Negative for trouble swallowing  Eyes: Negative for visual disturbance  Respiratory: Positive for shortness of breath and wheezing  Cardiovascular: Negative for chest pain and palpitations  Gastrointestinal: Negative for constipation, diarrhea, nausea and vomiting  Endocrine: Negative for cold intolerance, heat intolerance and polydipsia  Genitourinary: Positive for difficulty urinating  Negative for frequency  Musculoskeletal: Positive for gait problem and joint swelling  Negative for arthralgias and myalgias  Skin: Negative for rash  Neurological: Negative for dizziness, seizures, syncope, weakness and headaches  Hematological: Does not bruise/bleed easily  Psychiatric/Behavioral: Negative for dysphoric mood  All other systems reviewed and are negative        Patient Active Problem List   Diagnosis    Obstructive sleep apnea    Coronary artery disease involving native coronary artery    Degenerative arthritis of left knee    Essential (primary) hypertension    Glottic stenosis    Vocal cord paralysis, bilateral complete    Type 2 diabetes mellitus ([address] )    Hyperlipidemia    Lumbar stenosis with neurogenic claudication    Lumbar spondylosis       Past Medical History:   Diagnosis Date    Arthritis     knees    Cancer [facility])     prostate    Coronary artery disease     two coronary stents    Diabetes mellitus ([facility] [address] )     Hyperlipidemia 10/19/2018    Hypertension     Psoriasis     lower extremities and buttocks    Sleep apnea     Type 2 diabetes mellitus ([facility] [address] ) 10/19/2018       Past Surgical History:   Procedure Laterality Date    CATARACT EXTRACTION Bilateral     with iol's    COLONOSCOPY N/A 4/5/2016    Procedure: COLONOSCOPY snare polypectomy;  Surgeon: Antony Johnson MD;  [facility]; Service:     INSERTION PROSTATE RADIATION SEED      REPLACEMENT TOTAL KNEE Right     TRACHEOSTOMY      x 2  up to 5 years ago    UMBILICAL HERNIA REPAIR         No family history on file  Social History     Occupational History    Not on file  Social History Main Topics    Smoking status: Never Smoker    Smokeless tobacco: Not on file    Alcohol use 2 4 oz/week     4 Cans of beer per week      Comment: daily    Drug use: No    Sexual activity: Not on file       Current Outpatient Prescriptions on File Prior to Visit   Medication Sig    Apremilast (OTEZLA) 30 MG TABS Take by mouth 2 (two) times a day   aspirin 81 MG tablet Take 81 mg by mouth daily   bicalutamide (CASODEX) 50 mg tablet     cholecalciferol (VITAMIN D3) 1,000 units tablet Take by mouth    clopidogrel (PLAVIX) 75 mg tablet Take 75 mg by mouth daily   insulin lispro protamine-insulin lispro (HumaLOG 50-50) 100 units/mL Inject 50 Units under the skin 2 (two) times a day before meals   lisinopril (ZESTRIL) 10 mg tablet Take 10 mg by mouth daily   metFORMIN (GLUMETZA) 1000 MG (MOD) 24 hr tablet Take 1,000 mg by mouth 2 (two) times a day   traMADol (ULTRAM) 50 mg tablet Take 50 mg by mouth every 6 (six) hours as needed for moderate pain    traZODone (DESYREL) 100 mg tablet Take 50 mg by mouth daily at bedtime    [DISCONTINUED] atorvastatin (LIPITOR) 40 mg tablet Take 40 mg by mouth daily      [DISCONTINUED] Empagliflozin 25 MG TABS Take 25 mg by mouth    [DISCONTINUED] furosemide (LASIX) 40 mg tablet Take 40 mg by mouth 2 (two) times a day   [DISCONTINUED] metoprolol tartrate (LOPRESSOR) 100 mg tablet Take 100 mg by mouth once   [DISCONTINUED] omeprazole (PriLOSEC) 40 MG capsule Take 40 mg by mouth    [DISCONTINUED] sertraline (ZOLOFT) 50 mg tablet Take 50 mg by mouth daily   [DISCONTINUED] UNABLE TO FIND 2 (two) times a day  Med Name: arthrotec    [DISCONTINUED] UNABLE TO FIND Take by mouth once  Med Name: vitamin d 3     No current facility-administered medications on file prior to visit  Allergies   Allergen Reactions    Bee Venom Shortness Of Breath       Physical Exam:    /70   Pulse 86   Temp 98 7 °F (37 1 °C) (Oral)   Ht 5' 10" (1 778 m)   Wt 115 kg (254 lb)   BMI 36 45 kg/m²     Constitutional: normal, well developed, well nourished, alert, in no distress and non-toxic and no overt pain behavior   and obese  Eyes: anicteric  HEENT: grossly intact  Neck: supple, symmetric, trachea midline and no masses   Pulmonary:even and unlabored  Cardiovascular:No edema or pitting edema present  Skin:Normal without rashes or lesions and well hydrated  Psychiatric:Mood and affect appropriate  Neurologic:Cranial Nerves II-XII grossly intact  Musculoskeletal:stooped posture and steppage gait     Lumbar Spine Exam  Appearance:  Normal lordosis  Palpation/Tenderness:  left lumbar paraspinal tenderness  right lumbar paraspinal tenderness  left sacroiliac joint tenderness  right sacroiliac joint tenderness  Sensory:  no sensory deficits noted  Range of Motion:  Flexion:  Minimally limited  without pain  Extension:  Moderately limited  with pain  Lateral Flexion - Left:  Moderately limited  with pain  Lateral Flexion - Right:  Moderately limited  with pain  Rotation - Left:  No limitation  without pain  Rotation - Right:  No limitation  without pain  Motor Strength:  Left hip flexion:  5/5  Left hip extension: 5/5  Right hip flexion:  5/5  Right hip extension:  5/5  Left knee flexion:  5/5  Left knee extension:  5/5  Right knee flexion:  5/5  Right knee extension:  5/5  Left foot dorsiflexion:  3/5  Left foot plantar flexion:  5/5  Right foot dorsiflexion:  5/5  Right foot plantar flexion:  5/5  Reflexes:  Left Patellar:  1+   Right Patellar:  1+   Left Achilles:  1+   Right Achilles:  1+   Special Tests:  Left Straight Leg Test:  positive  Right Straight Leg Test:  positive  Left Roberto's Maneuver:  negative  Right Roberto's Maneuver:  negative    Imaging    MRI Lumbar Spine (11/2014)    L1-2:  Central disc protrusion impinging on the thecal sac    L2-3:  Moderate facet hypertrophy    L3-4:  Minimal disc bulge causing mild bilateral foraminal narrowing stenosis left greater than right with asymmetric facet hypertrophy  Left-sided facet synovial cyst 3 mm    L4-5:  Left lateral/far lateral disc bulge with asymmetric facet hypertrophy with moderate to severe left lateral recess neural foraminal stenosis  L5-S1:  Central/left paracentral disc protrusion impinging on thecal sac  Mild to moderate neural foraminal stenosis due to asymmetric facet hypertrophy

## 2018-10-22 ENCOUNTER — TELEPHONE (OUTPATIENT)
Dept: RADIOLOGY | Facility: CLINIC | Age: 73
End: 2018-10-22

## 2018-10-22 DIAGNOSIS — M46.1 SACROILIITIS (HCC): ICD-10-CM

## 2018-10-22 DIAGNOSIS — M47.816 LUMBAR SPONDYLOSIS: Primary | ICD-10-CM

## 2018-10-22 LAB — RYE IGE QN: NEGATIVE

## 2018-10-22 NOTE — TELEPHONE ENCOUNTER
Km Pearson MD at 10/22/2018  1:46 PM     Status: Signed      Patient's inflammatory markers of ESR and CRP were elevated  Other rheum tests were negative  Would like him evaluated but rheumatology for possible psoriatic arthritis    Order placed

## 2018-10-22 NOTE — PROGRESS NOTES
Patient's inflammatory markers of ESR and CRP were elevated  Other rheum tests were negative  Would like him evaluated but rheumatology for possible psoriatic arthritis    Order placed

## 2018-10-29 NOTE — TELEPHONE ENCOUNTER
I informed pt of the elevated ESR and CRP and that FQ would like pt to be seen by Dr Xena Duncan of Rheumatology  Pt given office ph # for Dr Xena Duncan and told to call and make appt for possible psoriatic arthritis  Told pt I would also mail him the referral script  Script mailed  Pt asked if he was to still do the MRI that FQ ordered and I told pt yes  I told pt that FQ will call him with the MRI results

## 2018-11-02 ENCOUNTER — TELEPHONE (OUTPATIENT)
Dept: PAIN MEDICINE | Facility: CLINIC | Age: 73
End: 2018-11-02

## 2018-11-02 NOTE — TELEPHONE ENCOUNTER
Patient is going to be seen by FAIZA Stockton for Rheum at he [place] [address]  office because it's closer to his house  Dr Rachelle Stockton has sooner appts then Dr Ray Mitchell  Her  appt are going into MAY 2019

## 2018-11-13 ENCOUNTER — HOSPITAL ENCOUNTER (OUTPATIENT)
Dept: MRI IMAGING | Facility: HOSPITAL | Age: 73
Discharge: HOME | End: 2018-11-13
Attending: ANESTHESIOLOGY
Payer: MEDICARE

## 2018-11-13 DIAGNOSIS — M48.062 LUMBAR STENOSIS WITH NEUROGENIC CLAUDICATION: ICD-10-CM

## 2018-11-13 PROCEDURE — 72148 MRI LUMBAR SPINE W/O DYE: CPT

## 2018-11-20 ENCOUNTER — TELEPHONE (OUTPATIENT)
Dept: RADIOLOGY | Facility: CLINIC | Age: 73
End: 2018-11-20

## 2018-11-20 NOTE — TELEPHONE ENCOUNTER
Spoke to wife and explained that MRI L-spine shows multilevel spinal stenosis  Would like him to come in for office visit with me to go over results and discuss treatment options  Please schedule with me

## 2018-12-17 ENCOUNTER — TELEPHONE (OUTPATIENT)
Dept: RADIOLOGY | Facility: CLINIC | Age: 73
End: 2018-12-17

## 2018-12-17 ENCOUNTER — OFFICE VISIT (OUTPATIENT)
Dept: PAIN MEDICINE | Facility: CLINIC | Age: 73
End: 2018-12-17
Payer: MEDICARE

## 2018-12-17 VITALS
WEIGHT: 259 LBS | HEIGHT: 70 IN | DIASTOLIC BLOOD PRESSURE: 80 MMHG | SYSTOLIC BLOOD PRESSURE: 132 MMHG | RESPIRATION RATE: 18 BRPM | HEART RATE: 80 BPM | BODY MASS INDEX: 37.08 KG/M2

## 2018-12-17 DIAGNOSIS — M48.062 LUMBAR STENOSIS WITH NEUROGENIC CLAUDICATION: Primary | ICD-10-CM

## 2018-12-17 DIAGNOSIS — M47.816 LUMBAR SPONDYLOSIS: ICD-10-CM

## 2018-12-17 PROCEDURE — 99214 OFFICE O/P EST MOD 30 MIN: CPT | Performed by: ANESTHESIOLOGY

## 2018-12-17 NOTE — PROGRESS NOTES
Assessment:  1  Lumbar stenosis with neurogenic claudication    2  Lumbar spondylosis        Plan:  The patient's symptoms, history/physical are consistent with pain that is multifactorial in origin but predominantly the result of his spinal stenosis that is most prominent at L4-5 leading to left-sided radicular symptoms  At this time, I discussed performing a left L4-5 transforaminal epidural steroid injection to help reduce swelling and inflammation which is leading to his pain symptoms  He was apprised of the most common risks and would like to proceed  He will be scheduled for an upcoming Tuesday or Thursday under fluoroscopic guidance once we get permission to have his Plavix held for 7 days  In the interim, I will have him start physical therapy to focus on back/core strengthening as well as like strengthening since he does have weakness in that left foot  My impressions and treatment recommendations were discussed in detail with the patient who verbalized understanding and had no further questions  Discharge instructions were provided  I personally saw and examined the patient and I agree with the above discussed plan of care  Orders Placed This Encounter   Procedures    FL spine and pain procedure     Standing Status:   Future     Standing Expiration Date:   12/17/2022     Order Specific Question:   Reason for Exam:     Answer:   Left L4-5 TF HELEN     Order Specific Question:   Anticoagulant hold needed?      Answer:   Keyshawn Trujillo Ambulatory referral to Physical Therapy     Standing Status:   Future     Standing Expiration Date:   6/17/2019     Referral Priority:   Routine     Referral Type:   Physical Therapy     Referral Reason:   Specialty Services Required     Referred to Provider:   Provider Not In System     Number of Visits Requested:   1     Expiration Date:   12/17/2019     New Medications Ordered This Visit   Medications    Empagliflozin (JARDIANCE) 25 MG TABS     Sig: Take 25 mg by mouth daily       History of Present Illness:  Joslyn Daniels is a 68 y o  male who presents for a follow up office visit in regards to Back Pain  The patient has a history of lumbar spinal stenosis and lumbar spondylosis and returns for follow-up  He was seen for initial consultation October 19th at which time inflammatory blood work was ordered which showed elevated ESR and CRP and so was referred for Rheumatology  An MRI of the lumbar spine was also ordered which old multilevel stenosis  He continues with ongoing intermittent pain down both legs posteriorly described to be burning with numbness  His appointment with the rheumatologist was rescheduled to next week  I have personally reviewed and/or updated the patient's past medical history, past surgical history, family history, social history, current medications, allergies, and vital signs today  Review of Systems   Respiratory: Positive for shortness of breath  Cardiovascular: Negative for chest pain  Gastrointestinal: Negative for constipation, diarrhea, nausea and vomiting  Musculoskeletal: Positive for gait problem  Negative for arthralgias, joint swelling and myalgias  Skin: Negative for rash  Neurological: Negative for dizziness, seizures and weakness  All other systems reviewed and are negative        Patient Active Problem List   Diagnosis    Obstructive sleep apnea    Coronary artery disease involving native coronary artery    Degenerative arthritis of left knee    Essential (primary) hypertension    Glottic stenosis    Vocal cord paralysis, bilateral complete    Type 2 diabetes mellitus ([address] )    Hyperlipidemia    Lumbar stenosis with neurogenic claudication    Lumbar spondylosis       Past Medical History:   Diagnosis Date    Arthritis     knees    Cancer [facility])     prostate    Coronary artery disease     two coronary stents    Diabetes mellitus [facility]     Hyperlipidemia 10/19/2018    Hypertension     Psoriasis     lower extremities and buttocks    Sleep apnea     Type 2 diabetes mellitus ([facility] ) 10/19/2018       Past Surgical History:   Procedure Laterality Date    CATARACT EXTRACTION Bilateral     with iol's    COLONOSCOPY N/A 4/5/2016    Procedure: COLONOSCOPY snare polypectomy;  Surgeon: Hari Benavides MD;  Location: [facility] GI LAB; Service:     INSERTION PROSTATE RADIATION SEED      REPLACEMENT TOTAL KNEE Right     TRACHEOSTOMY      x 2  up to 5 years ago    UMBILICAL HERNIA REPAIR         No family history on file  Social History     Occupational History    Not on file  Social History Main Topics    Smoking status: Never Smoker    Smokeless tobacco: Never Used    Alcohol use 2 4 oz/week     4 Cans of beer per week      Comment: daily    Drug use: No    Sexual activity: Not on file       Current Outpatient Prescriptions on File Prior to Visit   Medication Sig    Apremilast (OTEZLA) 30 MG TABS Take by mouth 2 (two) times a day   aspirin 81 MG tablet Take 81 mg by mouth daily   bicalutamide (CASODEX) 50 mg tablet     cholecalciferol (VITAMIN D3) 1,000 units tablet Take by mouth    clopidogrel (PLAVIX) 75 mg tablet Take 75 mg by mouth daily   insulin lispro protamine-insulin lispro (HumaLOG 50-50) 100 units/mL Inject 50 Units under the skin 2 (two) times a day before meals   lisinopril (ZESTRIL) 10 mg tablet Take 10 mg by mouth daily   metFORMIN (GLUMETZA) 1000 MG (MOD) 24 hr tablet Take 1,000 mg by mouth 2 (two) times a day   traMADol (ULTRAM) 50 mg tablet Take 50 mg by mouth every 6 (six) hours as needed for moderate pain    traZODone (DESYREL) 100 mg tablet Take 50 mg by mouth daily at bedtime     No current facility-administered medications on file prior to visit          Allergies   Allergen Reactions    Bee Venom Shortness Of Breath       Physical Exam:    /80 (BP Location: Left arm, Patient Position: Sitting)   Pulse 80   Resp 18   Ht 5' 10" (1 778 m) Wt 117 kg (259 lb)   BMI 37 16 kg/m²     Constitutional:normal, well developed, well nourished, alert, in no distress and non-toxic and no overt pain behavior  and obese  Eyes:anicteric  HEENT:grossly intact  Neck:supple, symmetric, trachea midline and no masses   Pulmonary:even and unlabored  Cardiovascular:No edema or pitting edema present  Skin:Normal without rashes or lesions and well hydrated  Psychiatric:Mood and affect appropriate  Neurologic:Cranial Nerves II-XII grossly intact  Musculoskeletal:antalgic     Lumbar Spine Exam  Appearance:  Normal lordosis  Palpation/Tenderness:  left lumbar paraspinal tenderness  left sacroiliac joint tenderness  Sensory:  no sensory deficits noted  Range of Motion:  Flexion:  Minimally limited  with pain  Extension:  Moderately limited  with pain  Lateral Flexion - Left:  Moderately limited  with pain  Lateral Flexion - Right:  No limitation  without pain  Rotation - Left:  No limitation  without pain  Rotation - Right:  No limitation  without pain  Motor Strength:  Left hip flexion:  5/5  Left hip extension:  5/5  Right hip flexion:  5/5  Right hip extension:  5/5  Left knee flexion:  5/5  Left knee extension:  5/5  Right knee flexion:  5/5  Right knee extension:  5/5  Left foot dorsiflexion:  3/5  Left foot plantar flexion:  5/5  Right foot dorsiflexion:  5/5  Right foot plantar flexion:  5/5    Imaging  11/13/18 Lumbar MRI:L1-L2:  Loss of disc height  Mild annular bulging  There is a small slightly superiorly extruded central disc herniation  Mild canal stenosis without foraminal nerve impingement  No change      L2-L3:  Normal disc height and signal   No disc herniation, canal stenosis or foraminal narrowing      L3-L4:  Mild diffuse annular bulging  There is mild to moderate facet hypertrophic degenerative change with trace facet effusions  There is a small synovial cyst arising from the medial aspect of the left facet joint, series 6 image 13    Slight canal stenosis and mild foraminal narrowing, stable      L4-L5:  Diffuse annular bulging  There is a broad-based left foraminal disc protrusion with left greater than right facet hypertrophic degenerative change  Slight worsening of canal stenosis with distortion of the left anterolateral aspect of the   thecal sac  There is stable moderate bilateral foraminal narrowing      L5-S1:  Broad-based central and left paracentral disc protrusion abutting the ventral aspect of the thecal sac  Mild left greater than right facet arthropathy  There is mild canal stenosis with mild left foraminal narrowing, unchanged      IMPRESSION:     Lumbar degenerative disc disease  Central disc extrusion at L1-2 is stable        L3-4 and L4-5 degenerative disc disease with disc protrusions and facet hypertrophic change    Slight worsening of canal stenosis at the L4-5 level with stable moderate bilateral foraminal narrowing

## 2018-12-17 NOTE — TELEPHONE ENCOUNTER
Faxed anti coag hold form to Dr Suly Skinner office for permission to hold Plavix  The office number is [phone]  The form is in [address] [place] [address]

## 2018-12-19 NOTE — TELEPHONE ENCOUNTER
Plavix hold approval received from Dr Andrae Mederos dated 12/18/18  Pt needs Left L4-L5 TFESI scheduled  Looks like there are open slots avail for 1/15/19  I left message with pt's wife for pt to c/b and s/w nurse at Dr Alanis Longoria office regarding an inj  C/B # and OH provided

## 2018-12-24 NOTE — TELEPHONE ENCOUNTER
Pt scheduled for left L4-L5 TFESI for 1/10/19 at 8:30, pt to arrive at 8:15,  needed, light breakfast then NPO 1 Hr prior to opro, c/b needed if sick/abx started prior to opro, Plavix to be held for 7 days prior to opro, last day to take plavix is 1/2/19  Pt verbalized understanding of instr

## 2019-01-04 ENCOUNTER — OFFICE VISIT (OUTPATIENT)
Dept: RHEUMATOLOGY | Facility: CLINIC | Age: 74
End: 2019-01-04
Payer: MEDICARE

## 2019-01-04 ENCOUNTER — APPOINTMENT (OUTPATIENT)
Dept: RADIOLOGY | Facility: CLINIC | Age: 74
End: 2019-01-04
Payer: MEDICARE

## 2019-01-04 VITALS
DIASTOLIC BLOOD PRESSURE: 73 MMHG | HEART RATE: 68 BPM | SYSTOLIC BLOOD PRESSURE: 123 MMHG | WEIGHT: 262.2 LBS | HEIGHT: 70 IN | BODY MASS INDEX: 37.54 KG/M2

## 2019-01-04 DIAGNOSIS — R70.0 ELEVATED SED RATE: ICD-10-CM

## 2019-01-04 DIAGNOSIS — M25.552 BILATERAL HIP PAIN: Primary | ICD-10-CM

## 2019-01-04 DIAGNOSIS — M25.552 BILATERAL HIP PAIN: ICD-10-CM

## 2019-01-04 DIAGNOSIS — M25.551 BILATERAL HIP PAIN: Primary | ICD-10-CM

## 2019-01-04 DIAGNOSIS — R79.82 ELEVATED C-REACTIVE PROTEIN (CRP): ICD-10-CM

## 2019-01-04 DIAGNOSIS — M46.1 SACROILIITIS (HCC): ICD-10-CM

## 2019-01-04 DIAGNOSIS — L40.9 PSORIASIS: ICD-10-CM

## 2019-01-04 DIAGNOSIS — M25.551 BILATERAL HIP PAIN: ICD-10-CM

## 2019-01-04 DIAGNOSIS — M47.816 LUMBAR SPONDYLOSIS: ICD-10-CM

## 2019-01-04 PROCEDURE — 73521 X-RAY EXAM HIPS BI 2 VIEWS: CPT

## 2019-01-04 PROCEDURE — 99214 OFFICE O/P EST MOD 30 MIN: CPT | Performed by: INTERNAL MEDICINE

## 2019-01-04 RX ORDER — LISINOPRIL 5 MG/1
5 TABLET ORAL DAILY
COMMUNITY
Start: 2018-12-26

## 2019-01-04 RX ORDER — MULTIVIT WITH MINERALS/LUTEIN
1000 TABLET ORAL DAILY
COMMUNITY

## 2019-01-04 NOTE — PROGRESS NOTES
Assessment and Plan:   Mr Castro Perera is a 66-year-old  male with history significant for psoriasis, osteoarthritis status post bilateral knee replacements and lumbar degenerative disc disease, who presents for further evaluation of elevated inflammatory markers and concern for psoriatic arthritis  He is currently on apremilast 30 mg twice daily for the psoriasis  Mckayla Aviles presents today for further evaluation of arthralgias involving his low back region and bilateral hips  He was also found to have elevation in his inflammatory markers, and in view of history of psoriasis there was concern for underlying psoriatic arthritis as a cause of his arthralgias  He does not report any additional symptoms concerning for an inflammatory arthritis such as swelling or prolonged morning stiffness  He also does not describe features concerning for inflammatory back pain  Based on his physical examination today, I do not appreciate any evidence of synovitis, and he does not have any specific joint tenderness  He does have evidence of diffuse psoriatic plaques  - At this point of time I discussed with him and his wife extensively that his symptoms seem to be secondary to osteoarthritis, and there is no evidence to suggest an underlying psoriatic arthritis  It is possible the bilateral hip pain he is experiencing could be a result of the lumbar DJD, but I would like to obtain bilateral hip x-rays to further evaluate if there could be ongoing osteoarthritis, and consider a referral to Orthopedics  The concern for elevated inflammatory markers could be multifactorial, and even originate from the underlying psoriasis or falsely elevate with obesity  They are only mildly elevated at this time and can probably be monitored periodically by his primary care physician  - I advised him to continue follow-up with Pain Management for further treatment    - In view of the extensive psoriasis without significant improvement noted with apremilast, I advised him to discuss with his dermatologist if he may benefit from alternate treatment with biologics if indicated  - I will call him with the results of x-rays, and see him back in this office on an as-needed basis  Plan:  Diagnoses and all orders for this visit:    Bilateral hip pain  -     XR hips bilateral 2 vw w pelvis if performed; Future    Lumbar spondylosis  -     Ambulatory referral to Rheumatology    Psoriasis    Sacroiliitis ([facility] )  -     Ambulatory referral to Rheumatology    Elevated sed rate    Elevated C-reactive protein (CRP)    Other orders  -     lisinopril (ZESTRIL) 5 mg tablet;   -     Ascorbic Acid (VITAMIN C) 1000 MG tablet; Take 1,000 mg by mouth daily  -     Multiple Vitamins-Minerals (MULTIVITAMIN ADULT PO); Take by mouth      Activities as tolerated    Diet: low carb/low fat, more greens/vegetables, adequate hydration  Exercise: try to maintain a low impact exercise regimen as much as possible  Walk for 30 minutes a day for at least 3 days a week    Encouraged to maintain good sleep hygiene  Continue other medications as prescribed by PCP and other specialists        RTC PRN  HPI  Mr Joseph Esquivel is a 63-year-old  male with history significant for psoriasis, osteoarthritis status post bilateral knee replacements and lumbar degenerative disc disease, who presents for further evaluation of elevated inflammatory markers and concern for psoriatic arthritis  He is currently on apremilast 30 mg twice daily for the psoriasis  Patient states he has been dealing with chronic pain affecting his low back, bilateral hips and bilateral knees for approximately 15 years now  He states the low back pain is aggravated on standing, and improves with resting  He denies pain in his hands, wrists, elbows, shoulders, ankles or feet  He denies any joint swelling  He denies any morning stiffness  He states his symptoms have been gradually progressive over time  He has had both knees replaced, with the most recent left side being done in August 2018  He did note improvement in the bilateral knee pain following the surgeries  He states following the surgery he has noticed mild weakness of his left foot but has not followed up with Orthopedics in view of this yet  He also follows with Dr Evangelista Davey in view of the lumbar degenerative disc disease and had a recent MRI done which shows degenerative disc disease at multiple levels  There is also canal stenosis noted at L4-L5 level with moderate bilateral foraminal narrowing  An x-ray of his sacroiliac joints was also done and was normal   He is due to get a lumbar epidural injection done in the next few weeks  He states he was previously seen by pain management many years ago and had injections done at that time, but no intervention has been done recently  He also reports a history of psoriasis that he has had for many years now, and follows with a dermatologist in [place]  He has been on the apremilast tablets 30 mg twice daily for at least 4-5 years now  He states that initially the apremilast did help with the scalp psoriasis, but he has not noticed any other significant improvement in his skin rash with this medication  Currently he has extensive plaques on his back, abdomen and extremities  He has not been on any other DMARD medications for the psoriasis  He states with the apremilast he has not noticed any improvement in his joint pains  Of note he denies any family history of autoimmune disease  He had additional testing done in view of the joint complaints which showed an elevated ESR and CRP of 27 and 8 5, respectively  KENDALL screen and rheumatoid factor were negative  He presents for further evaluation of the elevated inflammatory markers, and also to assess if his complaints could be related to psoriatic arthritis      The following portions of the patient's history were reviewed and updated as appropriate: allergies, current medications, past family history, past medical history, past social history, past surgical history and problem list       Review of Systems  Constitutional: Negative for weight change, fevers, chills, night sweats, fatigue  ENT/Mouth: Negative for hearing changes, ear pain, nasal congestion, sinus pain, hoarseness, sore throat, rhinorrhea, swallowing difficulty  Eyes: Negative for pain, redness, discharge, vision changes  Cardiovascular: Negative for chest pain, palpitations  Respiratory:  Positive for cough, wheezing, dyspnea  Gastrointestinal: Negative for nausea, vomiting, diarrhea, constipation, pain, heartburn  Genitourinary: Negative for dysuria, hematuria  Positive for urinary frequency  Musculoskeletal: As per HPI  Skin: Negative for color changes  Positive for skin rash  Neuro: Negative for weakness, numbness, tingling, loss of consciousness  Psych: Negative for anxiety, depression  Heme/Lymph: Negative for easy bruising, bleeding, lymphadenopathy  Past Medical History:   Diagnosis Date    Arthritis     knees    Cancer [facility])     prostate    Coronary artery disease     two coronary stents    Diabetes mellitus ([facility] )     Hyperlipidemia 10/19/2018    Hypertension     Psoriasis     lower extremities and buttocks    Sleep apnea     Type 2 diabetes mellitus ([facility] ) 10/19/2018       Past Surgical History:   Procedure Laterality Date    CATARACT EXTRACTION Bilateral     with iol's    COLONOSCOPY N/A 4/5/2016    Procedure: COLONOSCOPY snare polypectomy;  Surgeon: Roger Skelton MD;  Location: [place] [address] GI LAB;   Service:     INSERTION PROSTATE RADIATION SEED      REPLACEMENT TOTAL KNEE Right     TRACHEOSTOMY      x 2  up to 5 years ago    UMBILICAL HERNIA REPAIR         Social History     Social History    Marital status: /Civil Union     Spouse name: N/A    Number of children: N/A    Years of education: N/A     Occupational History  Not on file  Social History Main Topics    Smoking status: Never Smoker    Smokeless tobacco: Never Used    Alcohol use 2 4 oz/week     4 Cans of beer per week      Comment: daily    Drug use: No    Sexual activity: Not on file     Other Topics Concern    Not on file     Social History Narrative    No narrative on file       History reviewed  No pertinent family history  Allergies   Allergen Reactions    Bee Venom Shortness Of Breath       Current Outpatient Prescriptions:     Apremilast (OTEZLA) 30 MG TABS, Take by mouth 2 (two) times a day , Disp: , Rfl:     Ascorbic Acid (VITAMIN C) 1000 MG tablet, Take 1,000 mg by mouth daily, Disp: , Rfl:     aspirin 81 MG tablet, Take 81 mg by mouth daily  , Disp: , Rfl:     bicalutamide (CASODEX) 50 mg tablet, , Disp: , Rfl:     cholecalciferol (VITAMIN D3) 1,000 units tablet, Take by mouth, Disp: , Rfl:     clopidogrel (PLAVIX) 75 mg tablet, Take 75 mg by mouth daily  , Disp: , Rfl:     Empagliflozin (JARDIANCE) 25 MG TABS, Take 25 mg by mouth daily, Disp: , Rfl:     insulin lispro protamine-insulin lispro (HumaLOG 50-50) 100 units/mL, Inject 50 Units under the skin 2 (two) times a day before meals  , Disp: , Rfl:     lisinopril (ZESTRIL) 5 mg tablet, , Disp: , Rfl:     metFORMIN (GLUMETZA) 1000 MG (MOD) 24 hr tablet, Take 1,000 mg by mouth 2 (two) times a day , Disp: , Rfl:     Multiple Vitamins-Minerals (MULTIVITAMIN ADULT PO), Take by mouth, Disp: , Rfl:     traMADol (ULTRAM) 50 mg tablet, Take 50 mg by mouth every 6 (six) hours as needed for moderate pain, Disp: , Rfl:     traZODone (DESYREL) 100 mg tablet, Take 50 mg by mouth daily at bedtime, Disp: , Rfl:       Objective:    Vitals:    01/04/19 1115   BP: 123/73   BP Location: Left arm   Patient Position: Sitting   Cuff Size: Adult   Pulse: 68   Weight: 119 kg (262 lb 3 2 oz)   Height: 5' 10" (1 778 m)       Physical Exam  General: Well appearing, well nourished, in no distress   Oriented x 3, normal mood and affect  Ambulating without difficulty  Skin: Good turgor, no unusual bruising or prominent lesions  Multiple psoriatic plaques present over his lower extremities, abdomen and low back  Nails: Normal color, no deformities  Mild pitting noted at his right hand 5th digit  HEENT:  Head: Normocephalic, atraumatic  Eyes: Conjunctiva clear, sclera non-icteric, EOM intact  Nose: No external lesions, mucosa non-inflamed  Mouth: Mucous membranes moist, no mucosal lesions  Neck: Supple, thyroid non-enlarged and non-tender  No lymphadenopathy  Extremities: No amputations or deformities, cyanosis, edema  Musculoskeletal:  I do not appreciate any evidence of joint soft tissue swelling or tenderness  He is able to make full fists bilaterally  He has good range of motion in all joints  Bilateral knees are status post replacement surgery  Good range of motion with bilateral hips, although he does have discomfort on manipulation  No spinal tenderness appreciated  Normal lumbar flexion  No enthesitis or dactylitis appreciated  Neurologic: Alert and oriented  Psychiatric: Normal mood and affect  ANGELINA Hernández    Rheumatology

## 2019-01-07 ENCOUNTER — TELEPHONE (OUTPATIENT)
Dept: RHEUMATOLOGY | Facility: CLINIC | Age: 74
End: 2019-01-07

## 2019-01-07 NOTE — TELEPHONE ENCOUNTER
----- Message from Sasha Rueda MD sent at 1/7/2019 10:43 AM EST -----  Please let patient know his x-ray showed osteoarthritis, worse on the left hip  Does he have an orthopedic doctor he can follow up with in regards to this, or would he like to be referred to see one of our doctors at [facility]? Thanks

## 2019-01-08 NOTE — TELEPHONE ENCOUNTER
Spoke with Patient about his XR results as advised  And ask if he has an orthopedic provider  Patient states that he has one in [place]  I offer one of our own in office which I explain is closer and is more convinient he states that he is interested  Then patient that he has upcoming appointment with an othorpedic in [place]  I told him if he needed anything else that he may contact us   Patient was pleased and within understanding

## 2019-01-10 ENCOUNTER — TELEPHONE (OUTPATIENT)
Dept: RADIOLOGY | Facility: CLINIC | Age: 74
End: 2019-01-10

## 2019-01-10 ENCOUNTER — HOSPITAL ENCOUNTER (OUTPATIENT)
Dept: RADIOLOGY | Facility: CLINIC | Age: 74
Discharge: HOME | End: 2019-01-10
Attending: ANESTHESIOLOGY

## 2019-01-10 DIAGNOSIS — M48.062 LUMBAR STENOSIS WITH NEUROGENIC CLAUDICATION: ICD-10-CM

## 2019-01-10 NOTE — TELEPHONE ENCOUNTER
Patient was scheduled for today 1/10/19 but told the nurse that he never held his Plavix at all  The appointment was cancelled and I faxed a new form to Dr Jabari Girard office for permission to hold the Plavix  The office phone is [phone]  The form is at [facility] triage  There would not be sufficient time to schedule for the 7 day hold with the old form before the date

## 2019-01-16 NOTE — TELEPHONE ENCOUNTER
Received Plavix approval from Dr Jeniffer Aburto dated 1/14/19  Left  for pt to c/b  C/B # and OH provided  FYIEl Record said there are still some open slots on 1/31/19

## 2019-01-16 NOTE — TELEPHONE ENCOUNTER
S/w pt's wife and scheduled pt for 2/12 at 1020  Aware to hold plavix starting on 2/5 for a 7 day hold

## 2019-02-12 ENCOUNTER — HOSPITAL ENCOUNTER (OUTPATIENT)
Dept: RADIOLOGY | Facility: CLINIC | Age: 74
Discharge: HOME | End: 2019-02-12
Attending: ANESTHESIOLOGY

## 2019-02-19 ENCOUNTER — TELEPHONE (OUTPATIENT)
Dept: OBGYN CLINIC | Facility: HOSPITAL | Age: 74
End: 2019-02-19

## 2019-03-14 ENCOUNTER — TELEPHONE (OUTPATIENT)
Dept: OBGYN CLINIC | Facility: HOSPITAL | Age: 74
End: 2019-03-14

## 2019-03-14 NOTE — TELEPHONE ENCOUNTER
Call from patients wife, Brenda Stanton  Phone # [phone]  Varinder Cox is requesting the patient have an epidural injection  Dr Jabari Girard office needs to be contacted at [phone] for permission to hold the plavix

## 2019-03-14 NOTE — TELEPHONE ENCOUNTER
S/w pt's wife  Calling to reschedule canceled procedure from   Plavix hold   Advised will send new request and then call to schedule

## 2019-03-18 NOTE — TELEPHONE ENCOUNTER
Received Plavix hold approval from Dr Ram Hem dated 3/18/19  Pt needs to R/S his cx'd Left L4-L5 TFESI from 2/12/19  I left vm at number provided for pt or his wife to c/b  C/B # and OH provided  Jaylan said we can offer the pt the 8:30 or 1:40 slot on 4/2/19

## 2019-03-19 NOTE — TELEPHONE ENCOUNTER
I called patient to reschedule patient TFESI, as we are able to get patient in on 4/2/19 at 830 am  I asked pt to return our call at his best convenience  I provided call back number and office hours

## 2019-03-20 NOTE — TELEPHONE ENCOUNTER
S/W pt and scheduled inj for 4/2/19 at 8:30, arriving at 8:15,  needed, light breakfast then NPO after 7:30, c/b needed if sick/abx, plavix to be held for 7 days prior to inj, last day to take plavix is 3/25  Pt wrote instructions down and understood

## 2019-03-20 NOTE — TELEPHONE ENCOUNTER
Left vm on home number for pt or wife to c/b, nurse would like to schedule his inj and provide pre-procedure instruction  C/B # and OH provided  FYI: pt is interested in the 8:30 slot on 4/2/19

## 2019-04-02 ENCOUNTER — HOSPITAL ENCOUNTER (OUTPATIENT)
Dept: RADIOLOGY | Facility: CLINIC | Age: 74
Discharge: HOME | End: 2019-04-02
Attending: ANESTHESIOLOGY
Payer: MEDICARE

## 2019-04-02 VITALS
OXYGEN SATURATION: 98 % | SYSTOLIC BLOOD PRESSURE: 138 MMHG | TEMPERATURE: 98.1 F | DIASTOLIC BLOOD PRESSURE: 68 MMHG | RESPIRATION RATE: 20 BRPM | HEART RATE: 53 BPM

## 2019-04-02 DIAGNOSIS — M51.16 INTERVERTEBRAL DISC DISORDER WITH RADICULOPATHY OF LUMBAR REGION: ICD-10-CM

## 2019-04-02 PROCEDURE — 64484 NJX AA&/STRD TFRM EPI L/S EA: CPT | Performed by: ANESTHESIOLOGY

## 2019-04-02 PROCEDURE — 64483 NJX AA&/STRD TFRM EPI L/S 1: CPT | Performed by: ANESTHESIOLOGY

## 2019-04-02 RX ORDER — BUPIVACAINE HCL/PF 2.5 MG/ML
10 VIAL (ML) INJECTION ONCE
Status: COMPLETED | OUTPATIENT
Start: 2019-04-02 | End: 2019-04-02

## 2019-04-02 RX ORDER — PIOGLITAZONEHYDROCHLORIDE 15 MG/1
15 TABLET ORAL DAILY
COMMUNITY
End: 2019-10-24 | Stop reason: ALTCHOICE

## 2019-04-02 RX ORDER — PRAVASTATIN SODIUM 40 MG
40 TABLET ORAL DAILY
COMMUNITY

## 2019-04-02 RX ORDER — ACITRETIN 25 MG/1
25 CAPSULE ORAL DAILY
COMMUNITY
End: 2019-09-16

## 2019-04-02 RX ORDER — 0.9 % SODIUM CHLORIDE 0.9 %
10 VIAL (ML) INJECTION ONCE
Status: COMPLETED | OUTPATIENT
Start: 2019-04-02 | End: 2019-04-02

## 2019-04-02 RX ORDER — METHYLPREDNISOLONE ACETATE 80 MG/ML
80 INJECTION, SUSPENSION INTRA-ARTICULAR; INTRALESIONAL; INTRAMUSCULAR; PARENTERAL; SOFT TISSUE ONCE
Status: COMPLETED | OUTPATIENT
Start: 2019-04-02 | End: 2019-04-02

## 2019-04-02 RX ADMIN — METHYLPREDNISOLONE ACETATE 80 MG: 80 INJECTION, SUSPENSION INTRA-ARTICULAR; INTRALESIONAL; INTRAMUSCULAR; PARENTERAL; SOFT TISSUE at 08:43

## 2019-04-02 RX ADMIN — SODIUM CHLORIDE 5 ML: 9 INJECTION, SOLUTION INTRAMUSCULAR; INTRAVENOUS; SUBCUTANEOUS at 08:39

## 2019-04-02 RX ADMIN — BUPIVACAINE HYDROCHLORIDE 2 ML: 2.5 INJECTION, SOLUTION EPIDURAL; INFILTRATION; INTRACAUDAL at 08:43

## 2019-04-02 RX ADMIN — Medication 5 ML: at 08:39

## 2019-04-02 RX ADMIN — IOHEXOL 1 ML: 300 INJECTION, SOLUTION INTRAVENOUS at 08:43

## 2019-04-09 ENCOUNTER — TELEPHONE (OUTPATIENT)
Dept: PAIN MEDICINE | Facility: CLINIC | Age: 74
End: 2019-04-09

## 2019-05-29 ENCOUNTER — TELEPHONE (OUTPATIENT)
Dept: RADIOLOGY | Facility: CLINIC | Age: 74
End: 2019-05-29

## 2019-05-29 ENCOUNTER — TELEPHONE (OUTPATIENT)
Dept: PAIN MEDICINE | Facility: MEDICAL CENTER | Age: 74
End: 2019-05-29

## 2019-06-05 ENCOUNTER — OFFICE VISIT (OUTPATIENT)
Dept: PAIN MEDICINE | Facility: CLINIC | Age: 74
End: 2019-06-05
Payer: MEDICARE

## 2019-06-05 ENCOUNTER — TELEPHONE (OUTPATIENT)
Dept: RADIOLOGY | Facility: CLINIC | Age: 74
End: 2019-06-05

## 2019-06-05 VITALS
WEIGHT: 262 LBS | SYSTOLIC BLOOD PRESSURE: 142 MMHG | BODY MASS INDEX: 37.51 KG/M2 | HEIGHT: 70 IN | DIASTOLIC BLOOD PRESSURE: 70 MMHG | HEART RATE: 48 BPM | RESPIRATION RATE: 16 BRPM

## 2019-06-05 DIAGNOSIS — M48.062 SPINAL STENOSIS OF LUMBAR REGION WITH NEUROGENIC CLAUDICATION: Primary | ICD-10-CM

## 2019-06-05 DIAGNOSIS — M47.816 LUMBAR SPONDYLOSIS: ICD-10-CM

## 2019-06-05 PROCEDURE — 99214 OFFICE O/P EST MOD 30 MIN: CPT | Performed by: NURSE PRACTITIONER

## 2019-07-26 ENCOUNTER — HOSPITAL ENCOUNTER (OUTPATIENT)
Dept: RADIOLOGY | Facility: CLINIC | Age: 74
Discharge: HOME | End: 2019-07-26
Attending: ANESTHESIOLOGY | Admitting: ANESTHESIOLOGY
Payer: MEDICARE

## 2019-07-26 VITALS
DIASTOLIC BLOOD PRESSURE: 60 MMHG | HEART RATE: 39 BPM | RESPIRATION RATE: 18 BRPM | OXYGEN SATURATION: 97 % | TEMPERATURE: 98.2 F | SYSTOLIC BLOOD PRESSURE: 152 MMHG

## 2019-07-26 DIAGNOSIS — M48.062 SPINAL STENOSIS, LUMBAR REGION, WITH NEUROGENIC CLAUDICATION: ICD-10-CM

## 2019-07-26 PROCEDURE — 64484 NJX AA&/STRD TFRM EPI L/S EA: CPT | Performed by: ANESTHESIOLOGY

## 2019-07-26 PROCEDURE — 64483 NJX AA&/STRD TFRM EPI L/S 1: CPT | Performed by: ANESTHESIOLOGY

## 2019-07-26 RX ORDER — USTEKINUMAB 90 MG/ML
INJECTION, SOLUTION SUBCUTANEOUS
COMMUNITY
Start: 2019-07-03

## 2019-07-26 RX ORDER — 0.9 % SODIUM CHLORIDE 0.9 %
10 VIAL (ML) INJECTION ONCE
Status: COMPLETED | OUTPATIENT
Start: 2019-07-26 | End: 2019-07-26

## 2019-07-26 RX ORDER — BUPIVACAINE HCL/PF 2.5 MG/ML
10 VIAL (ML) INJECTION ONCE
Status: COMPLETED | OUTPATIENT
Start: 2019-07-26 | End: 2019-07-26

## 2019-07-26 RX ORDER — METHYLPREDNISOLONE ACETATE 80 MG/ML
80 INJECTION, SUSPENSION INTRA-ARTICULAR; INTRALESIONAL; INTRAMUSCULAR; PARENTERAL; SOFT TISSUE ONCE
Status: COMPLETED | OUTPATIENT
Start: 2019-07-26 | End: 2019-07-26

## 2019-07-26 RX ADMIN — BUPIVACAINE HYDROCHLORIDE 2 ML: 2.5 INJECTION, SOLUTION EPIDURAL; INFILTRATION; INTRACAUDAL at 09:24

## 2019-07-26 RX ADMIN — Medication 5 ML: at 09:21

## 2019-07-26 RX ADMIN — IOHEXOL 1 ML: 300 INJECTION, SOLUTION INTRAVENOUS at 09:23

## 2019-07-26 RX ADMIN — SODIUM CHLORIDE 5 ML: 9 INJECTION, SOLUTION INTRAMUSCULAR; INTRAVENOUS; SUBCUTANEOUS at 09:21

## 2019-07-26 RX ADMIN — METHYLPREDNISOLONE ACETATE 80 MG: 80 INJECTION, SUSPENSION INTRA-ARTICULAR; INTRALESIONAL; INTRAMUSCULAR; PARENTERAL; SOFT TISSUE at 09:24

## 2019-07-26 NOTE — DISCHARGE INSTR - LAB
Epidural Steroid Injection   WHAT YOU NEED TO KNOW:   An epidural steroid injection (HELEN) is a procedure to inject steroid medicine into the epidural space  The epidural space is between your spinal cord and vertebrae  Steroids reduce inflammation and fluid buildup in your spine that may be causing pain  You may be given pain medicine along with the steroids  ACTIVITY  · Do not drive or operate machinery today  · No strenuous activity today - bending, lifting, etc   · You may resume normal activites starting tomorrow - start slowly and as tolerated  · You may shower today, but no tub baths or hot tubs  · You may have numbness for several hours from the local anesthetic  Please use caution and common sense, especially with weight-bearing activities  CARE OF THE INJECTION SITE  · If you have soreness or pain, apply ice to the area today (20 minutes on/20 minutes off)  · Starting tomorrow, you may use warm, moist heat or ice if needed  · You may have an increase or change in your discomfort for 36-48 hours after your treatment  · Apply ice and continue with any pain medication you have been prescribed  · Notify [facility] if you have any of the following: redness, drainage, swelling, headache, stiff neck or fever above 100°F     SPECIAL INSTRUCTIONS  · Our office will contact you in approximately 7 days for a progress report  MEDICATIONS  · Continue to take all routine medications  · Our office may have instructed you to hold some medications  If you have a problem specifically related to your procedure, please call our office at [phone]  Problems not related to your procedure should be directed to your primary care physician

## 2019-07-26 NOTE — PROGRESS NOTES
Pt's HR after procedure, very low 33-41  Pulse irregular  FQ recommended pt go to ER, pt refusing   Will call Dr Xi Crane, pts cardiologist

## 2019-07-26 NOTE — H&P
History of Present Illness: The patient is a 76 y o  male who presents with complaints of left lower back and leg pain secondary to spinal stenosis and is here today for left L4 and left L5 transforaminal epidural steroid injection  Patient Active Problem List   Diagnosis    Obstructive sleep apnea    Coronary artery disease involving native coronary artery    Degenerative arthritis of left knee    Essential (primary) hypertension    Glottic stenosis    Vocal cord paralysis, bilateral complete    Type 2 diabetes mellitus (HCC)    Hyperlipidemia    Lumbar stenosis with neurogenic claudication    Lumbar spondylosis    Intervertebral disc disorder with radiculopathy of lumbar region       Past Medical History:   Diagnosis Date    Arthritis     knees    Cancer ([facility] [address] )     prostate    Coronary artery disease     two coronary stents    Diabetes mellitus ([facility] [address] )     Hyperlipidemia 10/19/2018    Hypertension     Psoriasis     lower extremities and buttocks    Sleep apnea     Type 2 diabetes mellitus ([facility] [address] ) 10/19/2018       Past Surgical History:   Procedure Laterality Date    CATARACT EXTRACTION Bilateral     with iol's    COLONOSCOPY N/A 4/5/2016    Procedure: COLONOSCOPY snare polypectomy;  Surgeon: Hilda Browne MD;  Location: [facility] GI LAB; Service:     INSERTION PROSTATE RADIATION SEED      REPLACEMENT TOTAL KNEE Right     TRACHEOSTOMY      x 2  up to 5 years ago    UMBILICAL HERNIA REPAIR           Current Outpatient Medications:     acitretin (SORIATANE) 25 MG capsule, Take 25 mg by mouth daily, Disp: , Rfl:     Ascorbic Acid (VITAMIN C) 1000 MG tablet, Take 1,000 mg by mouth daily, Disp: , Rfl:     aspirin 81 MG tablet, Take 81 mg by mouth daily  , Disp: , Rfl:     bicalutamide (CASODEX) 50 mg tablet, , Disp: , Rfl:     cholecalciferol (VITAMIN D3) 1,000 units tablet, Take by mouth, Disp: , Rfl:     clopidogrel (PLAVIX) 75 mg tablet, Take 75 mg by mouth daily  , Disp: , Rfl:    Empagliflozin (JARDIANCE) 25 MG TABS, Take 25 mg by mouth daily, Disp: , Rfl:     insulin lispro protamine-insulin lispro (HumaLOG 50-50) 100 units/mL, Inject 50 Units under the skin 2 (two) times a day before meals  , Disp: , Rfl:     lisinopril (ZESTRIL) 5 mg tablet, , Disp: , Rfl:     metFORMIN (GLUMETZA) 1000 MG (MOD) 24 hr tablet, Take 750 mg by mouth 2 (two) times a day , Disp: , Rfl:     Multiple Vitamins-Minerals (MULTIVITAMIN ADULT PO), Take by mouth, Disp: , Rfl:     pioglitazone (ACTOS) 15 mg tablet, Take 15 mg by mouth daily, Disp: , Rfl:     pravastatin (PRAVACHOL) 40 mg tablet, Take 40 mg by mouth daily, Disp: , Rfl:     STELARA 90 MG/ML subcutaneous injection, , Disp: , Rfl:     traMADol (ULTRAM) 50 mg tablet, Take 50 mg by mouth every 6 (six) hours as needed for moderate pain, Disp: , Rfl:     traZODone (DESYREL) 100 mg tablet, Take 50 mg by mouth daily at bedtime, Disp: , Rfl:     Current Facility-Administered Medications:     bupivacaine (PF) (MARCAINE) 0 25 % injection 10 mL, 10 mL, Epidural, Once, Kim Sorenson MD    iohexol (OMNIPAQUE) 300 mg/mL injection 50 mL, 50 mL, Epidural, Once, Kim Sorenson MD    lidocaine (PF) (XYLOCAINE-MPF) 2 % injection 5 mL, 5 mL, Infiltration, Once, Kim Sorenson MD    methylPREDNISolone acetate (DEPO-MEDROL) injection 80 mg, 80 mg, Epidural, Once, Kim Sorenson MD    sodium chloride (PF) 0 9 % injection 10 mL, 10 mL, Infiltration, Once, Kim Sorenson MD    Allergies   Allergen Reactions    Bee Venom Shortness Of Breath       Physical Exam:   Vitals:    07/26/19 0902   BP: 140/59   Pulse: (!) 41   Resp: 20   Temp: 98 2 °F (36 8 °C)   SpO2: 96%     General: Awake, Alert, Oriented x 3, Mood and affect appropriate  Respiratory: Respirations even and unlabored  Cardiovascular: Peripheral pulses intact; no edema  Musculoskeletal Exam:   Left lower back tenderness    ASA Score: 3    Patient/Chart Verification  Patient ID Verified: Verbal  Consents Confirmed: To be obtained in the Pre-Procedure area  H&P( within 30 days) Verified: To be obtained in the Pre-Procedure area  Allergies Reviewed: Yes  Anticoag/NSAID held?: Yes(plavic held 7 days and ASA held 6 days)  Currently on antibiotics?: No    Assessment:   1   Spinal stenosis, lumbar region, with neurogenic claudication        Plan: L L4-L5 TFESI

## 2019-07-26 NOTE — PROGRESS NOTES
Pt runs a low HR, normally in the 40's, cardiologist Dr Chel Guerin of [facility] is aware  Pt asymptomatic

## 2019-07-26 NOTE — PROGRESS NOTES
S/w Dr French Arzate directly, explained situation  He said pt can come to office for an EKG, as long as he comes right there and is not symptomatic

## 2019-09-12 ENCOUNTER — TELEPHONE (OUTPATIENT)
Dept: PAIN MEDICINE | Facility: CLINIC | Age: 74
End: 2019-09-12

## 2019-09-12 NOTE — TELEPHONE ENCOUNTER
S/w pt, he said after his last L L4-5 TFESI #2 in July, he said Dr Gamaliel Tejeda told him that if this injection did not work we can re-group in the office  Pt said the injection only helped for a month  Do you want pt booked on your schedule?

## 2019-09-12 NOTE — TELEPHONE ENCOUNTER
Pts wife Christopher Floyd is calling to request a f/u appt with dr Manriquez  (not with NP)  OK to schedule f/u for possiblly another procedure?  Call back# [phone]

## 2019-09-16 ENCOUNTER — OFFICE VISIT (OUTPATIENT)
Dept: PAIN MEDICINE | Facility: CLINIC | Age: 74
End: 2019-09-16
Payer: MEDICARE

## 2019-09-16 VITALS
HEART RATE: 68 BPM | DIASTOLIC BLOOD PRESSURE: 82 MMHG | HEIGHT: 70 IN | BODY MASS INDEX: 37.51 KG/M2 | TEMPERATURE: 98.6 F | SYSTOLIC BLOOD PRESSURE: 178 MMHG | WEIGHT: 262 LBS

## 2019-09-16 DIAGNOSIS — M51.16 INTERVERTEBRAL DISC DISORDER WITH RADICULOPATHY OF LUMBAR REGION: Primary | ICD-10-CM

## 2019-09-16 DIAGNOSIS — M48.062 LUMBAR STENOSIS WITH NEUROGENIC CLAUDICATION: ICD-10-CM

## 2019-09-16 DIAGNOSIS — M47.816 LUMBAR SPONDYLOSIS: ICD-10-CM

## 2019-09-16 PROCEDURE — 99214 OFFICE O/P EST MOD 30 MIN: CPT | Performed by: ANESTHESIOLOGY

## 2019-09-16 NOTE — PROGRESS NOTES
Assessment:  1  Intervertebral disc disorder with radiculopathy of lumbar region    2  Lumbar stenosis with neurogenic claudication    3  Lumbar spondylosis        Plan:  The patient's symptoms, history/physical are consistent with ongoing pain from his underlying spinal stenosis  Given that he has already undergone 2 lumbar epidural steroid injections with minimal lasting relief, I will order an updated CT of the lumbar spine in anticipation of referral for surgical evaluation  I advised him I will call with the results  In addition, we did discuss about possibly doing medial branch blocks in anticipation of radiofrequency ablation but we will decide after the CT scan  [place] Prescription Drug Monitoring Program report was reviewed and was appropriate     My impressions and treatment recommendations were discussed in detail with the patient who verbalized understanding and had no further questions  Discharge instructions were provided  I personally saw and examined the patient and I agree with the above discussed plan of care  Orders Placed This Encounter   Procedures    CT lumbar spine without contrast     Standing Status:   Future     Standing Expiration Date:   9/16/2023     Scheduling Instructions: There is no prep for this study  Please bring your insurance cards, a form of photo ID and a list of your medications with you  Arrive 15 minutes prior to your appointment time to register  On the day of your test, please bring any prior CT or MRI studies of this area with you that were not performed at a [facility]  To schedule this appointment, please contact Central Scheduling at [phone]  Order Specific Question:   What is the patient's sedation requirement? Answer:   No Sedation     No orders of the defined types were placed in this encounter        History of Present Illness:  Danii Berry is a 76 y o  male who presents for a follow up office visit in regards to Back Pain and Leg Pain  The patient has a history of lumbar spinal stenosis and is here for follow-up  He has undergone an epidural steroid injection in April and then again in July with moderate relief for about a month each with symptoms reccurring  Pain is located in the lower back and radiates into the bilateral buttocks posteriorly into the thighs and legs described to be burning, dull, aching with pins and needles  Of note, the patient had placement of a [employer] pacemaker in August     I have personally reviewed and/or updated the patient's past medical history, past surgical history, family history, social history, current medications, allergies, and vital signs today  Review of Systems   Respiratory: Positive for shortness of breath  Cardiovascular: Negative for chest pain  Gastrointestinal: Negative for constipation, diarrhea, nausea and vomiting  Musculoskeletal: Positive for gait problem and joint swelling  Negative for arthralgias and myalgias  Skin: Negative for rash  Neurological: Negative for dizziness, seizures and weakness  All other systems reviewed and are negative        Patient Active Problem List   Diagnosis    Obstructive sleep apnea    Coronary artery disease involving native coronary artery    Degenerative arthritis of left knee    Essential (primary) hypertension    Glottic stenosis    Vocal cord paralysis, bilateral complete    Type 2 diabetes mellitus ([address] )    Hyperlipidemia    Lumbar stenosis with neurogenic claudication    Lumbar spondylosis    Intervertebral disc disorder with radiculopathy of lumbar region       Past Medical History:   Diagnosis Date    Arthritis     knees    Cancer ([address] )     prostate    Coronary artery disease     two coronary stents    Diabetes mellitus ([address] )     Hyperlipidemia 10/19/2018    Hypertension     Psoriasis     lower extremities and buttocks    Sleep apnea     Type 2 diabetes mellitus ([facility] [address] ) 10/19/2018       Past Surgical History:   Procedure Laterality Date    CATARACT EXTRACTION Bilateral     with iol's    COLONOSCOPY N/A 4/5/2016    Procedure: COLONOSCOPY snare polypectomy;  Surgeon: Olvin Page MD;  Location: [facility] GI LAB; Service:     INSERTION PROSTATE RADIATION SEED      REPLACEMENT TOTAL KNEE Right     TRACHEOSTOMY      x 2  up to 5 years ago    UMBILICAL HERNIA REPAIR         No family history on file  Social History     Occupational History    Not on file   Tobacco Use    Smoking status: Never Smoker    Smokeless tobacco: Never Used   Substance and Sexual Activity    Alcohol use: Yes     Alcohol/week: 4 0 standard drinks     Types: 4 Cans of beer per week     Comment: daily    Drug use: No    Sexual activity: Not on file       Current Outpatient Medications on File Prior to Visit   Medication Sig    Ascorbic Acid (VITAMIN C) 1000 MG tablet Take 1,000 mg by mouth daily    aspirin 81 MG tablet Take 81 mg by mouth daily   bicalutamide (CASODEX) 50 mg tablet     cholecalciferol (VITAMIN D3) 1,000 units tablet Take by mouth    clopidogrel (PLAVIX) 75 mg tablet Take 75 mg by mouth daily   Empagliflozin (JARDIANCE) 25 MG TABS Take 25 mg by mouth daily    insulin lispro protamine-insulin lispro (HumaLOG 50-50) 100 units/mL Inject 50 Units under the skin 2 (two) times a day before meals      lisinopril (ZESTRIL) 5 mg tablet     metFORMIN (GLUMETZA) 1000 MG (MOD) 24 hr tablet Take 750 mg by mouth 2 (two) times a day     Multiple Vitamins-Minerals (MULTIVITAMIN ADULT PO) Take by mouth    pioglitazone (ACTOS) 15 mg tablet Take 15 mg by mouth daily    pravastatin (PRAVACHOL) 40 mg tablet Take 40 mg by mouth daily    STELARA 90 MG/ML subcutaneous injection     traMADol (ULTRAM) 50 mg tablet Take 50 mg by mouth every 6 (six) hours as needed for moderate pain    traZODone (DESYREL) 100 mg tablet Take 50 mg by mouth daily at bedtime    [DISCONTINUED] acitretin (SORIATANE) 25 MG capsule Take 25 mg by mouth daily     No current facility-administered medications on file prior to visit  Allergies   Allergen Reactions    Bee Venom Shortness Of Breath    Lidocaine Shortness Of Breath       Physical Exam:    BP (!) 178/82   Pulse 68   Temp 98 6 °F (37 °C) (Oral)   Ht 5' 10" (1 778 m)   Wt 119 kg (262 lb)   BMI 37 59 kg/m²     Constitutional:normal, well developed, well nourished, alert, in no distress and non-toxic and no overt pain behavior  and obese  Eyes:anicteric  HEENT:grossly intact  Neck:supple, symmetric, trachea midline and no masses   Pulmonary:even and unlabored  Cardiovascular:No edema or pitting edema present  Skin:Normal without rashes or lesions and well hydrated  Psychiatric:Mood and affect appropriate  Neurologic:Cranial Nerves II-XII grossly intact  Musculoskeletal:antalgic     Lumbar Spine Exam  Appearance:  Normal lordosis  Palpation/Tenderness:  left lumbar paraspinal tenderness  right lumbar paraspinal tenderness  Bilateral lumbar facet tenderness at L4-5, L5-S1 with positive facet  Sensory:  no sensory deficits noted  Range of Motion:  Flexion:   Moderately limited  with pain  Extension:  Moderately limited  with pain  Lateral Flexion - Left:  Moderately limited  with pain  Lateral Flexion - Right:  Moderately limited  with pain  Rotation - Left:  Moderately limited  with pain  Rotation - Right:  Moderately limited  with pain  Motor Strength:  Left hip flexion:  5/5  Left hip extension:  5/5  Right hip flexion:  5/5  Right hip extension:  5/5  Left knee flexion:  5/5  Left knee extension:  5/5  Right knee flexion:  5/5  Right knee extension:  5/5  Left foot dorsiflexion:  4/5  Left foot plantar flexion:  5/5  Right foot dorsiflexion:  5/5  Right foot plantar flexion:  5/5    Imaging    MRI LUMBAR SPINE WITHOUT CONTRAST (11/13/2018)     INDICATION: [id number]: Spinal stenosis, lumbar region with neurogenic claudication      COMPARISON: 11/11/2014      TECHNIQUE:  Sagittal T1, sagittal T2, sagittal inversion recovery, axial T1 and axial T2, coronal T2        IMAGE QUALITY:  Diagnostic     FINDINGS:     ALIGNMENT:  Normal alignment of the lumbar spine  No compression fracture  No spondylolysis or spondylolisthesis  No scoliosis      MARROW SIGNAL:  Small L2 hemangioma is are noted within the vertebral body anteriorly      DISTAL CORD AND CONUS:  Normal size and signal within the distal cord and conus  The conus ends at the L1-L2 level      PARASPINAL SOFT TISSUES:  Paraspinal soft tissues are unremarkable      SACRUM:  Normal signal within the sacrum  No evidence of insufficiency or stress fracture      LOWER THORACIC DISC SPACES:  Normal disc height and signal   No disc herniation, canal stenosis or foraminal narrowing      LUMBAR DISC SPACES:     L1-L2:  Loss of disc height  Mild annular bulging  There is a small slightly superiorly extruded central disc herniation  Mild canal stenosis without foraminal nerve impingement  No change      L2-L3:  Normal disc height and signal   No disc herniation, canal stenosis or foraminal narrowing      L3-L4:  Mild diffuse annular bulging  There is mild to moderate facet hypertrophic degenerative change with trace facet effusions  There is a small synovial cyst arising from the medial aspect of the left facet joint, series 6 image 13  Slight canal   stenosis and mild foraminal narrowing, stable      L4-L5:  Diffuse annular bulging  There is a broad-based left foraminal disc protrusion with left greater than right facet hypertrophic degenerative change  Slight worsening of canal stenosis with distortion of the left anterolateral aspect of the   thecal sac  There is stable moderate bilateral foraminal narrowing      L5-S1:  Broad-based central and left paracentral disc protrusion abutting the ventral aspect of the thecal sac  Mild left greater than right facet arthropathy    There is mild canal stenosis with mild left foraminal narrowing, unchanged

## 2019-09-24 ENCOUNTER — TELEPHONE (OUTPATIENT)
Dept: PAIN MEDICINE | Facility: CLINIC | Age: 74
End: 2019-09-24

## 2019-09-24 DIAGNOSIS — M48.062 LUMBAR STENOSIS WITH NEUROGENIC CLAUDICATION: Primary | ICD-10-CM

## 2019-09-24 DIAGNOSIS — M51.16 INTERVERTEBRAL DISC DISORDER WITH RADICULOPATHY OF LUMBAR REGION: ICD-10-CM

## 2019-09-24 DIAGNOSIS — M47.816 LUMBAR SPONDYLOSIS: ICD-10-CM

## 2019-09-24 NOTE — TELEPHONE ENCOUNTER
Pts wife Alaina Bowman is calling to ask Dr Deandra Galvan if he received the patients CT scan results?  It was done at [facility]  Call back# [phone]

## 2019-09-24 NOTE — TELEPHONE ENCOUNTER
Left detailed MOM on [facility] medical records VM for results  Left Christian procedure line to c/b today  Medical records phone # is [phone]

## 2019-09-25 NOTE — TELEPHONE ENCOUNTER
I s/w Gayle Ramos at the radiology dept at [facility] and asked to have lumbar CT report faxed to [phone]

## 2019-09-25 NOTE — TELEPHONE ENCOUNTER
I s/w Katja Alvarez at [address] [place] [address] and said we called twice yest for the CT report and we still haven't received it  I provided [employer] # [phone]  Katja Alvarez said she will f/u with Logan Mehta about our request    I provided the [place] procedure # [phone] to c/b with any questions

## 2019-09-25 NOTE — TELEPHONE ENCOUNTER
I received call from Dragan Seals at [address] [place] [address], she has tried twice yest to fax report to [phone] but it didn't go through  I provided alternat fax # to try [phone]  Dragan Seals can be reached at [phone] if report is not received

## 2019-09-25 NOTE — TELEPHONE ENCOUNTER
CT lumbar spine reviewed with patient which shows multilevel lumbar spondylosis as well as stenosis most prominent at L4-5  Recommended proceeding with bilateral L3-5 medial branch blocks to assess for facet mediated pain  If he gets significant relief he would be a candidate for medial branch radiofrequency ablation  He would like to try the medial branch blocks    Order placed

## 2019-10-02 NOTE — TELEPHONE ENCOUNTER
Pt's wife(Sunita)  Called and would like to schedule her husbands procedure     Pt's wife can be reached at        [phone]

## 2019-10-11 NOTE — TELEPHONE ENCOUNTER
Pt is scheduled for B/L L3-L5 MBB for 10/24  I told wife blood thinners such as ASA and Plavix are not held for this specific procedure so he does not need to stop either of them  She said what should he do if his pain level is < 5 that day? I explained that he should not take any pain medication that morning and if his pain level is low he should do things that normally aggravate it and cause the pain to be increased  Wife said it's only with standing that he gets the pain  I advised then he would need to do a lot of standing that morning until he comes in and if he still doesn't have much pain to contact the office to discuss what to do, I explained the block can't be done if he isn't having pain and we would maybe have to postpone block until the pain is most constant  Wife understood al explanations

## 2019-10-11 NOTE — TELEPHONE ENCOUNTER
Patient's wife Lashawn Villarreal called stating that patient received paperwork in the mail pertaining to his procedure  She states that patient currently takes aspirin & Plavix medication & needs to know instructions on when he needs to stop taking them prior to his procedure date   Please advise, thx    Call back# [phone]

## 2019-10-24 ENCOUNTER — HOSPITAL ENCOUNTER (OUTPATIENT)
Dept: RADIOLOGY | Facility: CLINIC | Age: 74
Discharge: HOME | End: 2019-10-24
Attending: ANESTHESIOLOGY
Payer: MEDICARE

## 2019-10-24 VITALS
RESPIRATION RATE: 20 BRPM | DIASTOLIC BLOOD PRESSURE: 74 MMHG | HEART RATE: 61 BPM | OXYGEN SATURATION: 96 % | SYSTOLIC BLOOD PRESSURE: 124 MMHG | TEMPERATURE: 98.2 F

## 2019-10-24 DIAGNOSIS — M47.816 LUMBAR SPONDYLOSIS: ICD-10-CM

## 2019-10-24 PROCEDURE — 64494 INJ PARAVERT F JNT L/S 2 LEV: CPT | Performed by: ANESTHESIOLOGY

## 2019-10-24 PROCEDURE — 64493 INJ PARAVERT F JNT L/S 1 LEV: CPT | Performed by: ANESTHESIOLOGY

## 2019-10-24 RX ORDER — METFORMIN HYDROCHLORIDE 750 MG/1
750 TABLET, EXTENDED RELEASE ORAL 2 TIMES DAILY
COMMUNITY
Start: 2019-10-22 | End: 2020-02-13 | Stop reason: DRUGHIGH

## 2019-10-24 RX ORDER — BUPIVACAINE HCL/PF 2.5 MG/ML
10 VIAL (ML) INJECTION ONCE
Status: COMPLETED | OUTPATIENT
Start: 2019-10-24 | End: 2019-10-24

## 2019-10-24 RX ADMIN — BUPIVACAINE HYDROCHLORIDE 3 ML: 2.5 INJECTION, SOLUTION EPIDURAL; INFILTRATION; INTRACAUDAL at 09:34

## 2019-10-24 NOTE — H&P
History of Present Illness: The patient is a 76 y o  male who presents with complaints of lower back pain secondary lumbar spondylosis and is here today for bilateral L3-5 medial branch blocks  Patient Active Problem List   Diagnosis    Obstructive sleep apnea    Coronary artery disease involving native coronary artery    Degenerative arthritis of left knee    Essential (primary) hypertension    Glottic stenosis    Vocal cord paralysis, bilateral complete    Type 2 diabetes mellitus (HCC)    Hyperlipidemia    Lumbar stenosis with neurogenic claudication    Lumbar spondylosis    Intervertebral disc disorder with radiculopathy of lumbar region       Past Medical History:   Diagnosis Date    Arthritis     knees    [facility] [address] [facility]     prostate    Coronary artery disease     two coronary stents    Diabetes mellitus [facility] [address] [facility]     Hyperlipidemia 10/19/2018    Hypertension     Psoriasis     lower extremities and buttocks    Sleep apnea     Type 2 diabetes mellitus ([facility] [address] ) 10/19/2018       Past Surgical History:   Procedure Laterality Date    CATARACT EXTRACTION Bilateral     with iol's    COLONOSCOPY N/A 4/5/2016    Procedure: COLONOSCOPY snare polypectomy;  Surgeon: Antonia Washington MD;  [facility]: [facility]; Service:     INSERTION PROSTATE RADIATION SEED      REPLACEMENT TOTAL KNEE Right     TRACHEOSTOMY      x 2  up to 5 years ago    UMBILICAL HERNIA REPAIR           Current Outpatient Medications:     Ascorbic Acid (VITAMIN C) 1000 MG tablet, Take 1,000 mg by mouth daily, Disp: , Rfl:     aspirin 81 MG tablet, Take 81 mg by mouth daily  , Disp: , Rfl:     cholecalciferol (VITAMIN D3) 1,000 units tablet, Take by mouth, Disp: , Rfl:     clopidogrel (PLAVIX) 75 mg tablet, Take 75 mg by mouth daily  , Disp: , Rfl:     Empagliflozin (JARDIANCE) 25 MG TABS, Take 25 mg by mouth daily, Disp: , Rfl:     insulin lispro protamine-insulin lispro (HumaLOG 50-50) 100 units/mL, Inject 50 Units under the skin 2 (two) times a day before meals  , Disp: , Rfl:     lisinopril (ZESTRIL) 5 mg tablet, , Disp: , Rfl:     metFORMIN (GLUCOPHAGE-XR) 750 mg 24 hr tablet, , Disp: , Rfl:     Multiple Vitamins-Minerals (MULTIVITAMIN ADULT PO), Take by mouth, Disp: , Rfl:     pravastatin (PRAVACHOL) 40 mg tablet, Take 40 mg by mouth daily, Disp: , Rfl:     STELARA 90 MG/ML subcutaneous injection, , Disp: , Rfl:     traMADol (ULTRAM) 50 mg tablet, Take 50 mg by mouth every 6 (six) hours as needed for moderate pain, Disp: , Rfl:     traZODone (DESYREL) 100 mg tablet, Take 50 mg by mouth daily at bedtime, Disp: , Rfl:     Current Facility-Administered Medications:     bupivacaine (PF) (MARCAINE) 0 25 % injection 10 mL, 10 mL, Perineural, Once, Summer Severe, MD    Allergies   Allergen Reactions    Bee Venom Shortness Of Breath    Lidocaine Shortness Of Breath       Physical Exam:   Vitals:    10/24/19 0909   BP: 127/71   Pulse: 60   Resp: 20   Temp: 98 2 °F (36 8 °C)   SpO2: 93%     General: Awake, Alert, Oriented x 3, Mood and affect appropriate  Respiratory: Respirations even and unlabored  Cardiovascular: Peripheral pulses intact; no edema  Musculoskeletal Exam:   Lower back tenderness    ASA Score: 3    Patient/Chart Verification  Patient ID Verified: Verbal  ID Band Applied: No  Consents Confirmed: Procedural, To be obtained in the Pre-Procedure area  H&P( within 30 days) Verified: To be obtained in the Pre-Procedure area  Interval H&P(within 24 hr) Complete (required for Outpatients and Surgery Admit only): To be obtained in the Pre-Procedure area  Allergies Reviewed: Yes  Anticoag/NSAID held?: NA  Currently on antibiotics?: No    Assessment:   1   Lumbar spondylosis        Plan: Bilateral L3-5 MBB

## 2019-10-24 NOTE — DISCHARGE INSTR - LAB

## 2019-11-25 ENCOUNTER — TELEPHONE (OUTPATIENT)
Dept: RADIOLOGY | Facility: CLINIC | Age: 74
End: 2019-11-25

## 2019-11-25 ENCOUNTER — HOSPITAL ENCOUNTER (OUTPATIENT)
Dept: RADIOLOGY | Facility: CLINIC | Age: 74
Discharge: HOME | End: 2019-11-25
Attending: ANESTHESIOLOGY | Admitting: ANESTHESIOLOGY
Payer: MEDICARE

## 2019-11-25 VITALS
HEART RATE: 60 BPM | DIASTOLIC BLOOD PRESSURE: 80 MMHG | SYSTOLIC BLOOD PRESSURE: 131 MMHG | OXYGEN SATURATION: 96 % | TEMPERATURE: 97.7 F | RESPIRATION RATE: 20 BRPM

## 2019-11-25 DIAGNOSIS — M47.812 SPONDYLOSIS OF CERVICAL REGION WITHOUT MYELOPATHY OR RADICULOPATHY: ICD-10-CM

## 2019-11-25 PROCEDURE — 64635 DESTROY LUMB/SAC FACET JNT: CPT | Performed by: ANESTHESIOLOGY

## 2019-11-25 PROCEDURE — 64636 DESTROY L/S FACET JNT ADDL: CPT | Performed by: ANESTHESIOLOGY

## 2019-11-25 RX ORDER — BUPIVACAINE HCL/PF 2.5 MG/ML
10 VIAL (ML) INJECTION ONCE
Status: COMPLETED | OUTPATIENT
Start: 2019-11-25 | End: 2019-11-25

## 2019-11-25 RX ORDER — METHYLPREDNISOLONE ACETATE 80 MG/ML
80 INJECTION, SUSPENSION INTRA-ARTICULAR; INTRALESIONAL; INTRAMUSCULAR; PARENTERAL; SOFT TISSUE ONCE
Status: COMPLETED | OUTPATIENT
Start: 2019-11-25 | End: 2019-11-25

## 2019-11-25 RX ORDER — 0.9 % SODIUM CHLORIDE 0.9 %
10 VIAL (ML) INJECTION ONCE
Status: COMPLETED | OUTPATIENT
Start: 2019-11-25 | End: 2019-11-25

## 2019-11-25 RX ADMIN — Medication 8 ML: at 08:39

## 2019-11-25 RX ADMIN — SODIUM CHLORIDE 8 ML: 9 INJECTION, SOLUTION INTRAMUSCULAR; INTRAVENOUS; SUBCUTANEOUS at 08:39

## 2019-11-25 RX ADMIN — BUPIVACAINE HYDROCHLORIDE 3 ML: 2.5 INJECTION, SOLUTION EPIDURAL; INFILTRATION; INTRACAUDAL at 08:51

## 2019-11-25 RX ADMIN — METHYLPREDNISOLONE ACETATE 20 MG: 80 INJECTION, SUSPENSION INTRA-ARTICULAR; INTRALESIONAL; INTRAMUSCULAR; PARENTERAL; SOFT TISSUE at 08:51

## 2019-11-25 NOTE — H&P
History of Present Illness: The patient is a 76 y o  male who presents with complaints of left lower back pain secondary lumbar spondylosis and is here today for left L3-5 medial branch radiofrequency ablation  Patient Active Problem List   Diagnosis    Obstructive sleep apnea    Coronary artery disease involving native coronary artery    Degenerative arthritis of left knee    Essential (primary) hypertension    Glottic stenosis    Vocal cord paralysis, bilateral complete    Type 2 diabetes mellitus (HCC)    Hyperlipidemia    Lumbar stenosis with neurogenic claudication    Lumbar spondylosis    Intervertebral disc disorder with radiculopathy of lumbar region       Past Medical History:   Diagnosis Date    Arthritis     knees    [facility] [address] [facility]     prostate    Coronary artery disease     two coronary stents    Diabetes mellitus ([facility] [address] [facility]     Hyperlipidemia 10/19/2018    Hypertension     Psoriasis     lower extremities and buttocks    Sleep apnea     Type 2 diabetes mellitus ([facility] [address] ) 10/19/2018       Past Surgical History:   Procedure Laterality Date    CATARACT EXTRACTION Bilateral     with iol's    COLONOSCOPY N/A 4/5/2016    Procedure: COLONOSCOPY snare polypectomy;  Surgeon: Violet Jung MD;  Location: [facility]; Service:     INSERTION PROSTATE RADIATION SEED      REPLACEMENT TOTAL KNEE Right     TRACHEOSTOMY      x 2  up to 5 years ago    UMBILICAL HERNIA REPAIR           Current Outpatient Medications:     Ascorbic Acid (VITAMIN C) 1000 MG tablet, Take 1,000 mg by mouth daily, Disp: , Rfl:     aspirin 81 MG tablet, Take 81 mg by mouth daily  , Disp: , Rfl:     cholecalciferol (VITAMIN D3) 1,000 units tablet, Take by mouth, Disp: , Rfl:     clopidogrel (PLAVIX) 75 mg tablet, Take 75 mg by mouth daily  , Disp: , Rfl:     Empagliflozin (JARDIANCE) 25 MG TABS, Take 25 mg by mouth daily, Disp: , Rfl:     insulin lispro protamine-insulin lispro (HumaLOG 50-50) 100 units/mL, Inject 50 Units under the skin 2 (two) times a day before meals  , Disp: , Rfl:     lisinopril (ZESTRIL) 5 mg tablet, , Disp: , Rfl:     metFORMIN (GLUCOPHAGE-XR) 750 mg 24 hr tablet, , Disp: , Rfl:     Multiple Vitamins-Minerals (MULTIVITAMIN ADULT PO), Take by mouth, Disp: , Rfl:     pravastatin (PRAVACHOL) 40 mg tablet, Take 40 mg by mouth daily, Disp: , Rfl:     STELARA 90 MG/ML subcutaneous injection, , Disp: , Rfl:     traMADol (ULTRAM) 50 mg tablet, Take 50 mg by mouth every 6 (six) hours as needed for moderate pain, Disp: , Rfl:     traZODone (DESYREL) 100 mg tablet, Take 50 mg by mouth daily at bedtime, Disp: , Rfl:     Current Facility-Administered Medications:     bupivacaine (PF) (MARCAINE) 0 25 % injection 10 mL, 10 mL, Perineural, Once, Chance Albright MD    lidocaine (PF) (XYLOCAINE-MPF) 2 % injection 10 mL, 10 mL, Infiltration, Once, Chance Albright MD    methylPREDNISolone acetate (DEPO-MEDROL) injection 80 mg, 80 mg, Perineural, Once, Chance Albright MD    sodium chloride (PF) 0 9 % injection 10 mL, 10 mL, Infiltration, Once, Chance Albright MD    Allergies   Allergen Reactions    Bee Venom Shortness Of Breath    Lidocaine Shortness Of Breath       Physical Exam:   Vitals:    11/25/19 0817   BP: 128/60   Pulse: 60   Resp: 20   Temp: 97 7 °F (36 5 °C)   SpO2: 94%     General: Awake, Alert, Oriented x 3, Mood and affect appropriate  Respiratory: Respirations even and unlabored  Cardiovascular: Peripheral pulses intact; no edema  Musculoskeletal Exam:   Left lower back tenderness    ASA Score: 3    Patient/Chart Verification  Patient ID Verified: Verbal  Consents Confirmed: To be obtained in the Pre-Procedure area  H&P( within 30 days) Verified: To be obtained in the Pre-Procedure area  Allergies Reviewed: Yes  Anticoag/NSAID held?: NA  Currently on antibiotics?: No    Assessment:   1   Spondylosis of cervical region without myelopathy or radiculopathy        Plan: L L3-5 RFA

## 2019-11-25 NOTE — DISCHARGE INSTR - LAB

## 2019-11-26 NOTE — TELEPHONE ENCOUNTER
BENJA    S/w pt, he said he is doing really good after his injection and has no pain, just some mild soreness  Pt said he has not had his wife look at his injection sites yet, but will check them after he showers and call us with any problems  Pt denies fevers or sunburn sensation  Confirmed next procedure

## 2019-12-03 LAB — HBA1C MFR BLD HPLC: 7.6 %

## 2019-12-09 ENCOUNTER — TELEPHONE (OUTPATIENT)
Dept: PAIN MEDICINE | Facility: CLINIC | Age: 74
End: 2019-12-09

## 2019-12-09 NOTE — TELEPHONE ENCOUNTER
Pt wife Antonia Lion is returning call and can be reached at [phone]    Attempted to reach procedure   Pt would like to cx appt

## 2019-12-09 NOTE — TELEPHONE ENCOUNTER
Spoke with patient's spouse, requesting to cancel 12/10/19 procedure, attempted to transfer call to procedure ,  assisting another patient      Please follow up with patient cb #  [phone]

## 2020-01-10 ENCOUNTER — TELEPHONE (OUTPATIENT)
Dept: PAIN MEDICINE | Facility: CLINIC | Age: 75
End: 2020-01-10

## 2020-01-10 NOTE — TELEPHONE ENCOUNTER
Patient   [phone]  Dr Dimitris Jimenez    Patient and his wife are calling asking for a call back  They would like to get him scheduled for a procedure

## 2020-01-10 NOTE — TELEPHONE ENCOUNTER
I routed this message to Dr Daja Chu and am waiting for an answer  I called the patient and spoke with Bentley Acosta, his wife and I let her know that I will call her when I hear back from him

## 2020-01-10 NOTE — TELEPHONE ENCOUNTER
PT CALLED NEEDS TO RE-SCHEDULE AN PROCEDURE AS SOON AS POSSIBLE WITH DR COHEN    PT CAN BE REACHED [phone]

## 2020-01-13 NOTE — TELEPHONE ENCOUNTER
Patient's wife is calling back in checking on the status of her  being scheduled for an injection  I let patient's wife know that we are waiting for the Dr to answer us  Then we will place a cll back out to her thank you

## 2020-01-28 ENCOUNTER — TELEPHONE (OUTPATIENT)
Dept: RADIOLOGY | Facility: CLINIC | Age: 75
End: 2020-01-28

## 2020-01-28 ENCOUNTER — HOSPITAL ENCOUNTER (OUTPATIENT)
Dept: RADIOLOGY | Facility: CLINIC | Age: 75
Discharge: HOME | End: 2020-01-28
Attending: ANESTHESIOLOGY
Payer: MEDICARE

## 2020-01-28 VITALS
OXYGEN SATURATION: 95 % | RESPIRATION RATE: 20 BRPM | SYSTOLIC BLOOD PRESSURE: 166 MMHG | TEMPERATURE: 98.1 F | DIASTOLIC BLOOD PRESSURE: 77 MMHG | HEART RATE: 74 BPM

## 2020-01-28 DIAGNOSIS — M47.816 SPONDYLOSIS OF LUMBAR REGION WITHOUT MYELOPATHY OR RADICULOPATHY: ICD-10-CM

## 2020-01-28 PROCEDURE — 64636 DESTROY L/S FACET JNT ADDL: CPT | Performed by: ANESTHESIOLOGY

## 2020-01-28 PROCEDURE — 64635 DESTROY LUMB/SAC FACET JNT: CPT | Performed by: ANESTHESIOLOGY

## 2020-01-28 RX ORDER — BUPIVACAINE HCL/PF 2.5 MG/ML
10 VIAL (ML) INJECTION ONCE
Status: COMPLETED | OUTPATIENT
Start: 2020-01-28 | End: 2020-01-28

## 2020-01-28 RX ORDER — 0.9 % SODIUM CHLORIDE 0.9 %
10 VIAL (ML) INJECTION ONCE
Status: COMPLETED | OUTPATIENT
Start: 2020-01-28 | End: 2020-01-28

## 2020-01-28 RX ORDER — METHYLPREDNISOLONE ACETATE 80 MG/ML
80 INJECTION, SUSPENSION INTRA-ARTICULAR; INTRALESIONAL; INTRAMUSCULAR; PARENTERAL; SOFT TISSUE ONCE
Status: COMPLETED | OUTPATIENT
Start: 2020-01-28 | End: 2020-01-28

## 2020-01-28 RX ADMIN — SODIUM CHLORIDE 8 ML: 9 INJECTION, SOLUTION INTRAMUSCULAR; INTRAVENOUS; SUBCUTANEOUS at 09:07

## 2020-01-28 RX ADMIN — BUPIVACAINE HYDROCHLORIDE 3 ML: 2.5 INJECTION, SOLUTION EPIDURAL; INFILTRATION; INTRACAUDAL at 09:18

## 2020-01-28 RX ADMIN — METHYLPREDNISOLONE ACETATE 20 MG: 80 INJECTION, SUSPENSION INTRA-ARTICULAR; INTRALESIONAL; INTRAMUSCULAR; PARENTERAL; SOFT TISSUE at 09:18

## 2020-01-28 RX ADMIN — Medication 8 ML: at 09:07

## 2020-01-28 NOTE — DISCHARGE INSTR - LAB

## 2020-01-28 NOTE — H&P
History of Present Illness: The patient is a 76 y o  male who presents with complaints of right lower back pain secondary lumbar spondylosis and is here today for right L3-5 medial branch radiofrequency ablation  Patient Active Problem List   Diagnosis    Obstructive sleep apnea    Coronary artery disease involving native coronary artery    Degenerative arthritis of left knee    Essential (primary) hypertension    Glottic stenosis    Vocal cord paralysis, bilateral complete    Type 2 diabetes mellitus (HCC)    Hyperlipidemia    Lumbar stenosis with neurogenic claudication    Lumbar spondylosis    Intervertebral disc disorder with radiculopathy of lumbar region       Past Medical History:   Diagnosis Date    Arthritis     knees    [facility] [address]     prostate    Coronary artery disease     two coronary stents    Diabetes mellitus ([facility] [address] [facility]     Hyperlipidemia 10/19/2018    Hypertension     Psoriasis     lower extremities and buttocks    Sleep apnea     Type 2 diabetes mellitus ([facility] [address] [facility] 10/19/2018       Past Surgical History:   Procedure Laterality Date    CATARACT EXTRACTION Bilateral     with iol's    COLONOSCOPY N/A 4/5/2016    Procedure: COLONOSCOPY snare polypectomy;  Surgeon: Luis Felipe Zee MD;  Location: [facility]; Service:     INSERTION PROSTATE RADIATION SEED      REPLACEMENT TOTAL KNEE Right     TRACHEOSTOMY      x 2  up to 5 years ago    UMBILICAL HERNIA REPAIR           Current Outpatient Medications:     Ascorbic Acid (VITAMIN C) 1000 MG tablet, Take 1,000 mg by mouth daily, Disp: , Rfl:     aspirin 81 MG tablet, Take 81 mg by mouth daily  , Disp: , Rfl:     cholecalciferol (VITAMIN D3) 1,000 units tablet, Take by mouth, Disp: , Rfl:     clopidogrel (PLAVIX) 75 mg tablet, Take 75 mg by mouth daily  , Disp: , Rfl:     Empagliflozin (JARDIANCE) 25 MG TABS, Take 25 mg by mouth daily, Disp: , Rfl:     fluticasone (FLONASE) 50 mcg/act nasal spray, USE 4 SPRAYS DAILY (2 SPRAYS IN EACH NOSTRIL DAILY), Disp: 48 g, Rfl: 4    insulin lispro protamine-insulin lispro (HumaLOG 50-50) 100 units/mL, Inject 50 Units under the skin 2 (two) times a day before meals  , Disp: , Rfl:     lisinopril (ZESTRIL) 5 mg tablet, , Disp: , Rfl:     metFORMIN (GLUCOPHAGE-XR) 750 mg 24 hr tablet, , Disp: , Rfl:     Multiple Vitamins-Minerals (MULTIVITAMIN ADULT PO), Take by mouth, Disp: , Rfl:     pravastatin (PRAVACHOL) 40 mg tablet, Take 40 mg by mouth daily, Disp: , Rfl:     STELARA 90 MG/ML subcutaneous injection, , Disp: , Rfl:     traMADol (ULTRAM) 50 mg tablet, Take 50 mg by mouth every 6 (six) hours as needed for moderate pain, Disp: , Rfl:     traZODone (DESYREL) 100 mg tablet, Take 50 mg by mouth daily at bedtime, Disp: , Rfl:     Current Facility-Administered Medications:     bupivacaine (PF) (MARCAINE) 0 25 % injection 10 mL, 10 mL, Perineural, Once, Lien Talley MD    lidocaine (PF) (XYLOCAINE-MPF) 2 % injection 10 mL, 10 mL, Infiltration, Once, Lien Talley MD    methylPREDNISolone acetate (DEPO-MEDROL) injection 80 mg, 80 mg, Perineural, Once, Lien Talley MD    sodium chloride (PF) 0 9 % injection 10 mL, 10 mL, Infiltration, Once, Lien Talley MD    Allergies   Allergen Reactions    Bee Venom Shortness Of Breath    Lidocaine Shortness Of Breath       Physical Exam:   Vitals:    01/28/20 0844   BP: 169/78   Pulse: 74   Resp: 20   Temp: 98 1 °F (36 7 °C)   SpO2: 95%     General: Awake, Alert, Oriented x 3, Mood and affect appropriate  Respiratory: Respirations even and unlabored  Cardiovascular: Peripheral pulses intact; no edema  Musculoskeletal Exam:   Right lower back tenderness    ASA Score: 3    Patient/Chart Verification  Patient ID Verified: Verbal  ID Band Applied: No  Consents Confirmed: Procedural, To be obtained in the Pre-Procedure area  H&P( within 30 days) Verified: To be obtained in the Pre-Procedure area  Allergies Reviewed:  Yes  Anticoag/NSAID held?: No(plavix not held)  Currently on antibiotics?: No    Assessment:   1   Spondylosis of lumbar region without myelopathy or radiculopathy        Plan: R L3-5 RFA

## 2020-01-29 NOTE — TELEPHONE ENCOUNTER
Pt said he has just a little pain this AM, denies any sunburn like sensations  The band aids are still in place  Pt told it takes 6 wks to see full effect from the ablation  Pt given 6 wk ov post RFA for 3/9/20 at 8:30 w/ FQ

## 2020-02-13 ENCOUNTER — OFFICE VISIT (OUTPATIENT)
Dept: ENDOCRINOLOGY | Facility: CLINIC | Age: 75
End: 2020-02-13
Payer: MEDICARE

## 2020-02-13 VITALS
SYSTOLIC BLOOD PRESSURE: 140 MMHG | WEIGHT: 270.2 LBS | HEART RATE: 86 BPM | DIASTOLIC BLOOD PRESSURE: 72 MMHG | BODY MASS INDEX: 38.68 KG/M2 | HEIGHT: 70 IN

## 2020-02-13 DIAGNOSIS — E78.2 MIXED HYPERLIPIDEMIA: ICD-10-CM

## 2020-02-13 DIAGNOSIS — E11.22 TYPE 2 DIABETES MELLITUS WITH STAGE 2 CHRONIC KIDNEY DISEASE, WITH LONG-TERM CURRENT USE OF INSULIN (HCC): Primary | ICD-10-CM

## 2020-02-13 DIAGNOSIS — G47.33 OBSTRUCTIVE SLEEP APNEA: ICD-10-CM

## 2020-02-13 DIAGNOSIS — N18.2 TYPE 2 DIABETES MELLITUS WITH STAGE 2 CHRONIC KIDNEY DISEASE, WITH LONG-TERM CURRENT USE OF INSULIN (HCC): Primary | ICD-10-CM

## 2020-02-13 DIAGNOSIS — I25.10 CORONARY ARTERY DISEASE INVOLVING NATIVE CORONARY ARTERY OF NATIVE HEART WITHOUT ANGINA PECTORIS: ICD-10-CM

## 2020-02-13 DIAGNOSIS — I10 ESSENTIAL (PRIMARY) HYPERTENSION: ICD-10-CM

## 2020-02-13 DIAGNOSIS — Z79.4 TYPE 2 DIABETES MELLITUS WITH STAGE 2 CHRONIC KIDNEY DISEASE, WITH LONG-TERM CURRENT USE OF INSULIN (HCC): Primary | ICD-10-CM

## 2020-02-13 PROCEDURE — 99204 OFFICE O/P NEW MOD 45 MIN: CPT | Performed by: INTERNAL MEDICINE

## 2020-02-13 RX ORDER — INSULIN LISPRO 100 [IU]/ML
47 INJECTION, SUSPENSION SUBCUTANEOUS 2 TIMES DAILY WITH MEALS
COMMUNITY
Start: 2019-11-14 | End: 2020-04-28 | Stop reason: SDUPTHER

## 2020-02-13 RX ORDER — ICOSAPENT ETHYL 1000 MG/1
CAPSULE ORAL DAILY
COMMUNITY
Start: 2019-12-11

## 2020-02-13 RX ORDER — PIOGLITAZONEHYDROCHLORIDE 30 MG/1
30 TABLET ORAL DAILY
COMMUNITY
Start: 2019-12-11 | End: 2020-02-13

## 2020-02-13 RX ORDER — METFORMIN HYDROCHLORIDE 750 MG/1
TABLET, EXTENDED RELEASE ORAL
COMMUNITY
End: 2021-01-21 | Stop reason: SDUPTHER

## 2020-02-13 NOTE — PATIENT INSTRUCTIONS
Please continue current medications  We will replace  jardiance with Willem Gene when you finish your current prescription   please keep well hydrated  Please do not skip meals as he will be at risk of having low blood sugars   If you notice frequent low blood sugars, please let me know so that we can make adjustments to her medications   Follow-up in 3 months with repeat labs

## 2020-02-13 NOTE — PROGRESS NOTES
Daniel Crain 76 y o  male MRN: [id number]    Encounter: [id number]  Referring Provider  Giovanni Moss [address]    Assessment/Plan     Assessment: This is a 76y o -year-old male with type 2 diabetes mellitus, hypertension, hyperlipidemia, CAD, MEHDI, obesity, neuropathy    Plan:  1  Type 2 diabetes mellitus long-term insulin therapy with hypoglycemia  2  Neuropathy  Well controlled based on blood sugar log  States that he had labs last month at [facility] - results have been requested  Occasional hypoglycemia in the afternoon, likely explained , on days that has either skipped lunch/been more active     Recommend the following at this time  Continue current regimen  - will replace  jardiance with [place] when patient finishes his current prescription  - advised to keep well hydrated  -repeat A1c, BMP, fasting lipid panel in 3 months    - Recommended a consistent carbohydrate diet   - weight control and exercise as discussed ( ideal is 30 min, at least 5 times a week)   - home glucose monitoring and goals emphasized, requested patient bring in glucose monitor or log sheets to next visit   - discussed signs and symptoms of hypoglycemia and how to correct them appropriately  - counseled about the long term complications of uncontrolled diabetes, including, Nephropathy, Neuropathy, CVD, Retinopathy and importance  of adherence to diet, treatment plan and life style modifications   - importance of following up with Opthalmology and podiatry   - glycohemoglobin and other lab monitoring discussed, A1c goal value reviewed  - long term diabetic complications discussed    3  Hyperlipidemia  - continue statin therapy  Repeat fasting lipid panel in 3    4  Hypertension  Blood pressure at goal  - continue ACE-I/ ARB    5  CAD-follow-up with cardiology  6  MEHDI  7   Obesity-diet and lifestyle as discussed    CC: Diabetes    History of Present Illness     HPI:  Daniel Crain is a 76 y o  male presents for a new visit regarding diabetes management  Also has a h/o hypertension, hyperlipidemia, CAD, MEHDI, obesity     DM history:   Diagnosed 30 years ago, on insulin for years  No complications of CVA/CKD  Last Eye exam: 2019- no DR     Current regimen:   Metformin  mg twice a day  Jardiance 25 mg orally daily  Humalog 75/25 47 units before breakfast and right after dinner     Not taking Actos   Jardiance will not be covered by his insurance anymore  Statin:  Pravastatin   ACE-I/ARB:  Lisinopril     RFA L3-L5 on  - had pain since then which he feels is worsening his BG  Energy levels are variable   Appetite is good  Denies recent weight gain/ loss  Has neuropathy in the feet - tingling in the feet  No pain    Denies changes in vision  No known retinopathy  No chest pain  Gets SOB on exertion - h/o tracheostomy and vocal cord paralysis, has had surgeries for it   On stool softners    Exercise:  Walks - hip has been limiting factors    Home glucose monitorin-3 times a day   Before breakfast:   Before lunch:   Before dinner: 118-161  Bedtime: -- -  Lows Few times a month   Hypoglycemia, usually early afternoon, maybe on days that he has skipped lunch   Symptoms of hypoglycemia :  sweating     All other systems were reviewed and are negative  Review of Systems      Historical Information   Past Medical History:   Diagnosis Date    Arthritis     knees    Cancer [facility])     prostate    Coronary artery disease     two coronary stents    [facility]     Hyperlipidemia 10/19/2018    Hypertension     Psoriasis     lower extremities and buttocks    Sleep apnea     Type 2 diabetes mellitus ([facility] [address] [facility] 10/19/2018     Past Surgical History:   Procedure Laterality Date    CATARACT EXTRACTION Bilateral     with iol's    COLONOSCOPY N/A 2016    Procedure: COLONOSCOPY snare polypectomy;  Surgeon: Antonia Washington MD;  Location: [place] [address] GI LAB;   Service:  INSERTION PROSTATE RADIATION SEED      REPLACEMENT TOTAL KNEE Right     TRACHEOSTOMY      x 2  up to 5 years ago    UMBILICAL HERNIA REPAIR       Social History   Social History     Substance and Sexual Activity   Alcohol Use Yes    Alcohol/week: 4 0 standard drinks    Types: 4 Cans of beer per week    Comment: daily     Social History     Substance and Sexual Activity   Drug Use No     Social History     Tobacco Use   Smoking Status Never Smoker   Smokeless Tobacco Never Used     Family History: History reviewed  No pertinent family history  Meds/Allergies   Current Outpatient Medications   Medication Sig Dispense Refill    Ascorbic Acid (VITAMIN C) 1000 MG tablet Take 1,000 mg by mouth daily      aspirin 81 MG tablet Take 81 mg by mouth daily   cholecalciferol (VITAMIN D3) 1,000 units tablet Take 2,000 Units by mouth daily       clopidogrel (PLAVIX) 75 mg tablet Take 75 mg by mouth daily        Empagliflozin (JARDIANCE) 25 MG TABS Take 25 mg by mouth daily      fluticasone (FLONASE) 50 mcg/act nasal spray USE 4 SPRAYS DAILY (2 SPRAYS IN EACH NOSTRIL DAILY) 48 g 4    HUMALOG MIX 75/25 KWIKPEN (75-25) 100 units/mL injection pen Inject 47 Units under the skin 2 (two) times a day with meals       lisinopril (ZESTRIL) 5 mg tablet Take 5 mg by mouth daily       metFORMIN (GLUCOPHAGE-XR) 750 mg 24 hr tablet Take 750 mg by mouth 2 (two) times a day       Multiple Vitamins-Minerals (MULTIVITAMIN ADULT PO) Take by mouth      pioglitazone (ACTOS) 30 mg tablet Take 30 mg by mouth daily      pravastatin (PRAVACHOL) 40 mg tablet Take 40 mg by mouth daily      STELARA 90 MG/ML subcutaneous injection       traMADol (ULTRAM) 50 mg tablet Take 50 mg by mouth every 6 (six) hours as needed for moderate pain      traZODone (DESYREL) 100 mg tablet Take 100 mg by mouth daily at bedtime       VASCEPA 1 g CAPS Take by mouth daily       insulin lispro protamine-insulin lispro (HumaLOG 50-50) 100 units/mL Inject 50 Units under the skin 2 (two) times a day before meals  No current facility-administered medications for this visit  Allergies   Allergen Reactions    Bee Venom Shortness Of Breath    Lidocaine Shortness Of Breath       Objective   Vitals: Blood pressure 140/72, pulse 86, height 5' 10" (1 778 m), weight 123 kg (270 lb 3 2 oz)  Physical Exam   Constitutional: He is oriented to person, place, and time  He appears well-developed and well-nourished  No distress  HENT:   Head: Normocephalic and atraumatic  Eyes: Pupils are equal, round, and reactive to light  Conjunctivae are normal    Neck: Normal range of motion  Neck supple  Cardiovascular: Normal rate, regular rhythm and normal heart sounds  No murmur heard  Pulmonary/Chest: Effort normal and breath sounds normal  No respiratory distress  He has no wheezes  Abdominal: Soft  He exhibits no distension  There is no tenderness  There is no guarding  Obese   Musculoskeletal: He exhibits no edema  Neurological: He is alert and oriented to person, place, and time  Skin: Skin is warm and dry  No rash noted  He is not diaphoretic  No erythema  Psychiatric: He has a normal mood and affect  His behavior is normal  Thought content normal    Vitals reviewed  The history was obtained from the review of the chart, patient and family  Lab Results:            Imaging Studies: I have personally reviewed pertinent reports  Portions of the record may have been created with voice recognition software  Occasional wrong word or "sound a like" substitutions may have occurred due to the inherent limitations of voice recognition software  Read the chart carefully and recognize, using context, where substitutions have occurred

## 2020-03-09 ENCOUNTER — OFFICE VISIT (OUTPATIENT)
Dept: PAIN MEDICINE | Facility: CLINIC | Age: 75
End: 2020-03-09
Payer: MEDICARE

## 2020-03-09 ENCOUNTER — HOSPITAL ENCOUNTER (OUTPATIENT)
Dept: RADIOLOGY | Facility: HOSPITAL | Age: 75
Discharge: HOME | End: 2020-03-09
Attending: ANESTHESIOLOGY
Payer: MEDICARE

## 2020-03-09 VITALS
WEIGHT: 271 LBS | SYSTOLIC BLOOD PRESSURE: 152 MMHG | HEART RATE: 61 BPM | DIASTOLIC BLOOD PRESSURE: 86 MMHG | TEMPERATURE: 98.6 F | BODY MASS INDEX: 38.88 KG/M2

## 2020-03-09 DIAGNOSIS — M16.0 BILATERAL PRIMARY OSTEOARTHRITIS OF HIP: ICD-10-CM

## 2020-03-09 DIAGNOSIS — M51.16 INTERVERTEBRAL DISC DISORDER WITH RADICULOPATHY OF LUMBAR REGION: ICD-10-CM

## 2020-03-09 DIAGNOSIS — M47.816 LUMBAR SPONDYLOSIS: Primary | ICD-10-CM

## 2020-03-09 PROCEDURE — 73502 X-RAY EXAM HIP UNI 2-3 VIEWS: CPT

## 2020-03-09 PROCEDURE — 99214 OFFICE O/P EST MOD 30 MIN: CPT | Performed by: ANESTHESIOLOGY

## 2020-03-09 NOTE — PROGRESS NOTES
Assessment:  1  Lumbar spondylosis    2  Intervertebral disc disorder with radiculopathy of lumbar region    3  Bilateral primary osteoarthritis of hip        Plan:  The patient's low back symptoms are much better following the radiofrequency ablation  However, he is experiencing worsening pain in his right hip  At this time, I will order updated x-rays of the right hip and pelvis to evaluate for worsening arthritis  Depending on what that shows, I advised him that I will likely schedule him for a right hip intra-articular steroid injection to help reduce swelling inflammation from the osteoarthritis  My impressions and treatment recommendations were discussed in detail with the patient who verbalized understanding and had no further questions  Discharge instructions were provided  I personally saw and examined the patient and I agree with the above discussed plan of care  Orders Placed This Encounter   Procedures    XR hip/pelv 2-3 vws right if performed     Worsening right hip pain with (+) KACIE     Standing Status:   Future     Number of Occurrences:   1     Standing Expiration Date:   3/9/2024     Scheduling Instructions:      Bring along any outside films relating to this procedure  No orders of the defined types were placed in this encounter  History of Present Illness:  Jordi Armando is a 76 y o  male who presents for a follow up office visit in regards to Leg Pain (right side/RFA 1/28/20)  The patient has a history of lumbar spondylosis as well as lumbar disc disorder with radiculopathy and stenosis returns for follow-up  He is status post left L3-5 medial branch radiofrequency ablation on November 25th and right L3-5 medial branch radiofrequency ablation on January 28th  He reports significant relief on the left side but continues with right lateral and posterior thigh pain that is intermittently burning and sharp    Symptoms are worse in the morning described to be stiff as well   He states that initially starts to walk with a limp and then is able to walk that off  I have personally reviewed and/or updated the patient's past medical history, past surgical history, family history, social history, current medications, allergies, and vital signs today  Review of Systems   Respiratory: Negative for shortness of breath  Cardiovascular: Negative for chest pain  Gastrointestinal: Negative for constipation, diarrhea, nausea and vomiting  Musculoskeletal: Positive for gait problem  Negative for arthralgias, joint swelling and myalgias  Skin: Negative for rash  Neurological: Negative for dizziness, seizures and weakness  All other systems reviewed and are negative  Patient Active Problem List   Diagnosis    Obstructive sleep apnea    Coronary artery disease involving native coronary artery    Degenerative arthritis of left knee    Essential (primary) hypertension    Glottic stenosis    Vocal cord paralysis, bilateral complete    Type 2 diabetes mellitus (HCC)    Hyperlipidemia    Lumbar stenosis with neurogenic claudication    Lumbar spondylosis    Intervertebral disc disorder with radiculopathy of lumbar region       Past Medical History:   Diagnosis Date    Arthritis     knees    Cancer ([facility] [address] )     prostate    Coronary artery disease     two coronary stents    Diabetes mellitus ([facility] )     Hyperlipidemia 10/19/2018    Hypertension     Psoriasis     lower extremities and buttocks    Sleep apnea     Type 2 diabetes mellitus ([facility] [address] ) 10/19/2018       Past Surgical History:   Procedure Laterality Date    CATARACT EXTRACTION Bilateral     with iol's    COLONOSCOPY N/A 4/5/2016    Procedure: COLONOSCOPY snare polypectomy;  Surgeon: Kristine Feldman MD;  Location: [facility];   Service:     INSERTION PROSTATE RADIATION SEED      REPLACEMENT TOTAL KNEE Right     TRACHEOSTOMY      x 2  up to 5 years ago    UMBILICAL HERNIA REPAIR         No family history on file  Social History     Occupational History    Not on file   Tobacco Use    Smoking status: Never Smoker    Smokeless tobacco: Never Used   Substance and Sexual Activity    Alcohol use: Yes     Alcohol/week: 4 0 standard drinks     Types: 4 Cans of beer per week     Comment: daily    Drug use: No    Sexual activity: Not on file       Current Outpatient Medications on File Prior to Visit   Medication Sig    Ascorbic Acid (VITAMIN C) 1000 MG tablet Take 1,000 mg by mouth daily    aspirin 81 MG tablet Take 81 mg by mouth daily   cholecalciferol (VITAMIN D3) 1,000 units tablet Take 2,000 Units by mouth daily     clopidogrel (PLAVIX) 75 mg tablet Take 75 mg by mouth daily   Dapagliflozin Propanediol (FARXIGA) 10 MG TABS Take 1 tablet (10 mg total) by mouth daily    fluticasone (FLONASE) 50 mcg/act nasal spray USE 4 SPRAYS DAILY (2 SPRAYS IN EACH NOSTRIL DAILY)    HUMALOG MIX 75/25 KWIKPEN (75-25) 100 units/mL injection pen Inject 47 Units under the skin 2 (two) times a day with meals     lisinopril (ZESTRIL) 5 mg tablet Take 5 mg by mouth daily     metFORMIN (GLUCOPHAGE-XR) 750 mg 24 hr tablet Take 750 mg by mouth 2 (two) times a day    Multiple Vitamins-Minerals (MULTIVITAMIN ADULT PO) Take by mouth    pravastatin (PRAVACHOL) 40 mg tablet Take 40 mg by mouth daily    STELARA 90 MG/ML subcutaneous injection     traMADol (ULTRAM) 50 mg tablet Take 50 mg by mouth every 6 (six) hours as needed for moderate pain    traZODone (DESYREL) 100 mg tablet Take 100 mg by mouth daily at bedtime     VASCEPA 1 g CAPS Take by mouth daily      No current facility-administered medications on file prior to visit          Allergies   Allergen Reactions    Bee Venom Shortness Of Breath    Lidocaine Shortness Of Breath       Physical Exam:    /86   Pulse 61   Temp 98 6 °F (37 °C) (Oral)   Wt 123 kg (271 lb)   BMI 38 88 kg/m²     Constitutional:normal, well developed, well nourished, alert, in no distress and non-toxic and no overt pain behavior  and obese  Eyes:anicteric  HEENT:grossly intact  Neck:supple, symmetric, trachea midline and no masses   Pulmonary:even and unlabored  Cardiovascular:No edema or pitting edema present  Skin:Normal without rashes or lesions and well hydrated  Psychiatric:Mood and affect appropriate  Neurologic:Cranial Nerves II-XII grossly intact  Musculoskeletal:normal     Lumbar Spine Exam  Appearance:  Normal lordosis  Palpation/Tenderness:  no tenderness or spasm  Range of Motion:  Flexion:  Minimally limited  with pain  Extension:  Minimally limited  with pain  Lateral Flexion - Left:  No limitation  without pain  Lateral Flexion - Right:  No limitation  without pain  Rotation - Left:  No limitation  without pain  Rotation - Right:  No limitation  without pain  Motor Strength:  Left hip flexion:  5/5  Left hip extension:  5/5  Right hip flexion:  5/5  Right hip extension:  5/5  Left knee flexion:  5/5  Left knee extension:  5/5  Right knee flexion:  5/5  Right knee extension:  5/5  Left foot dorsiflexion:  5/5  Left foot plantar flexion:  5/5  Right foot dorsiflexion:  5/5  Right foot plantar flexion:  5/5  Special Tests:  Left Straight Leg Test:  negative  Right Straight Leg Test:  negative   Left KACIE: negative  Right KACIE: positive    Imaging    MRI LUMBAR SPINE WITHOUT CONTRAST (11/13/2018)     INDICATION: [id number]: Spinal stenosis, lumbar region with neurogenic claudication      COMPARISON:  11/11/2014      TECHNIQUE:  Sagittal T1, sagittal T2, sagittal inversion recovery, axial T1 and axial T2, coronal T2        IMAGE QUALITY:  Diagnostic     FINDINGS:     ALIGNMENT:  Normal alignment of the lumbar spine  No compression fracture  No spondylolysis or spondylolisthesis  No scoliosis      MARROW SIGNAL:  Small L2 hemangioma is are noted within the vertebral body anteriorly      DISTAL CORD AND CONUS:  Normal size and signal within the distal cord and conus    The conus ends at the L1-L2 level      PARASPINAL SOFT TISSUES:  Paraspinal soft tissues are unremarkable      SACRUM:  Normal signal within the sacrum  No evidence of insufficiency or stress fracture      LOWER THORACIC DISC SPACES:  Normal disc height and signal   No disc herniation, canal stenosis or foraminal narrowing      LUMBAR DISC SPACES:     L1-L2:  Loss of disc height  Mild annular bulging  There is a small slightly superiorly extruded central disc herniation  Mild canal stenosis without foraminal nerve impingement  No change      L2-L3:  Normal disc height and signal   No disc herniation, canal stenosis or foraminal narrowing      L3-L4:  Mild diffuse annular bulging  There is mild to moderate facet hypertrophic degenerative change with trace facet effusions  There is a small synovial cyst arising from the medial aspect of the left facet joint, series 6 image 13  Slight canal   stenosis and mild foraminal narrowing, stable      L4-L5:  Diffuse annular bulging  There is a broad-based left foraminal disc protrusion with left greater than right facet hypertrophic degenerative change  Slight worsening of canal stenosis with distortion of the left anterolateral aspect of the   thecal sac  There is stable moderate bilateral foraminal narrowing      L5-S1:  Broad-based central and left paracentral disc protrusion abutting the ventral aspect of the thecal sac  Mild left greater than right facet arthropathy    There is mild canal stenosis with mild left foraminal narrowing, unchanged

## 2020-03-13 ENCOUNTER — TELEPHONE (OUTPATIENT)
Dept: PAIN MEDICINE | Facility: CLINIC | Age: 75
End: 2020-03-13

## 2020-03-13 DIAGNOSIS — M16.11 PRIMARY OSTEOARTHRITIS OF RIGHT HIP: Primary | ICD-10-CM

## 2020-03-13 NOTE — TELEPHONE ENCOUNTER
Pt informed of hip xray results and FQ's rec for Rt hip steroid inj  Pt is agreeable to inj    Pt told  will call him today or early next week to schedule inj

## 2020-03-13 NOTE — TELEPHONE ENCOUNTER
Please let patient know that xrays hip show mild OA bilaterally  Recommend proceeding with right hip intra-articular steroid injection   If amenable, I will place order

## 2020-03-16 ENCOUNTER — TRANSCRIBE ORDERS (OUTPATIENT)
Dept: ADMINISTRATIVE | Facility: HOSPITAL | Age: 75
End: 2020-03-16

## 2020-03-16 DIAGNOSIS — R60.0 LOCALIZED EDEMA: Primary | ICD-10-CM

## 2020-04-28 DIAGNOSIS — E11.22 TYPE 2 DIABETES MELLITUS WITH STAGE 2 CHRONIC KIDNEY DISEASE, WITH LONG-TERM CURRENT USE OF INSULIN (HCC): Primary | ICD-10-CM

## 2020-04-28 DIAGNOSIS — N18.2 TYPE 2 DIABETES MELLITUS WITH STAGE 2 CHRONIC KIDNEY DISEASE, WITH LONG-TERM CURRENT USE OF INSULIN (HCC): Primary | ICD-10-CM

## 2020-04-28 DIAGNOSIS — Z79.4 TYPE 2 DIABETES MELLITUS WITH STAGE 2 CHRONIC KIDNEY DISEASE, WITH LONG-TERM CURRENT USE OF INSULIN (HCC): Primary | ICD-10-CM

## 2020-04-28 RX ORDER — INSULIN LISPRO 100 [IU]/ML
INJECTION, SUSPENSION SUBCUTANEOUS
Qty: 5 PEN | Refills: 3 | Status: SHIPPED | OUTPATIENT
Start: 2020-04-28 | End: 2020-05-18 | Stop reason: SDUPTHER

## 2020-04-28 RX ORDER — PEN NEEDLE, DIABETIC 31 GX5/16"
NEEDLE, DISPOSABLE MISCELLANEOUS
COMMUNITY
Start: 2020-03-10 | End: 2020-12-08 | Stop reason: SDUPTHER

## 2020-04-28 RX ORDER — DOXYCYCLINE HYCLATE 100 MG/1
100 CAPSULE ORAL DAILY
COMMUNITY
Start: 2020-03-11

## 2020-04-29 ENCOUNTER — TELEPHONE (OUTPATIENT)
Dept: ENDOCRINOLOGY | Facility: CLINIC | Age: 75
End: 2020-04-29

## 2020-05-04 NOTE — TELEPHONE ENCOUNTER
Pt called stating he is in pain and we cx his injection   He would like to schedule an injection as soon as possible    Pt can be reached at [phone]

## 2020-05-08 ENCOUNTER — OFFICE VISIT (OUTPATIENT)
Dept: PAIN MEDICINE | Facility: CLINIC | Age: 75
End: 2020-05-08
Payer: MEDICARE

## 2020-05-08 VITALS
BODY MASS INDEX: 39.03 KG/M2 | WEIGHT: 272 LBS | DIASTOLIC BLOOD PRESSURE: 68 MMHG | TEMPERATURE: 98.4 F | HEART RATE: 71 BPM | SYSTOLIC BLOOD PRESSURE: 144 MMHG

## 2020-05-08 DIAGNOSIS — M47.816 LUMBAR SPONDYLOSIS: ICD-10-CM

## 2020-05-08 DIAGNOSIS — M46.1 SACROILIITIS (HCC): Primary | ICD-10-CM

## 2020-05-08 DIAGNOSIS — M70.61 TROCHANTERIC BURSITIS OF RIGHT HIP: ICD-10-CM

## 2020-05-08 PROCEDURE — 99214 OFFICE O/P EST MOD 30 MIN: CPT | Performed by: ANESTHESIOLOGY

## 2020-05-09 LAB
ALBUMIN SERPL-MCNC: 4.4 G/DL (ref 3.7–4.7)
ALBUMIN/GLOB SERPL: 1.7 {RATIO} (ref 1.2–2.2)
ALP SERPL-CCNC: 71 IU/L (ref 39–117)
ALT SERPL-CCNC: 27 IU/L (ref 0–44)
AST SERPL-CCNC: 24 IU/L (ref 0–40)
BILIRUB SERPL-MCNC: 0.4 MG/DL (ref 0–1.2)
BUN SERPL-MCNC: 17 MG/DL (ref 8–27)
BUN/CREAT SERPL: 16 (ref 10–24)
CALCIUM SERPL-MCNC: 9.5 MG/DL (ref 8.6–10.2)
CHLORIDE SERPL-SCNC: 96 MMOL/L (ref 96–106)
CHOLEST SERPL-MCNC: 181 MG/DL (ref 100–199)
CO2 SERPL-SCNC: 26 MMOL/L (ref 20–29)
CREAT SERPL-MCNC: 1.09 MG/DL (ref 0.76–1.27)
EST. AVERAGE GLUCOSE BLD GHB EST-MCNC: 177 MG/DL
GLOBULIN SER-MCNC: 2.6 G/DL (ref 1.5–4.5)
GLUCOSE SERPL-MCNC: 134 MG/DL (ref 65–99)
HBA1C MFR BLD: 7.8 % (ref 4.8–5.6)
HDLC SERPL-MCNC: 44 MG/DL
LDLC SERPL CALC-MCNC: 97 MG/DL (ref 0–99)
POTASSIUM SERPL-SCNC: 4.4 MMOL/L (ref 3.5–5.2)
PROT SERPL-MCNC: 7 G/DL (ref 6–8.5)
SL AMB EGFR AFRICAN AMERICAN: 76 ML/MIN/1.73
SL AMB EGFR NON AFRICAN AMERICAN: 66 ML/MIN/1.73
SL AMB VLDL CHOLESTEROL CALC: 40 MG/DL (ref 5–40)
SODIUM SERPL-SCNC: 138 MMOL/L (ref 134–144)
TRIGL SERPL-MCNC: 200 MG/DL (ref 0–149)

## 2020-05-18 ENCOUNTER — TELEMEDICINE (OUTPATIENT)
Dept: ENDOCRINOLOGY | Facility: CLINIC | Age: 75
End: 2020-05-18
Payer: MEDICARE

## 2020-05-18 DIAGNOSIS — Z79.4 TYPE 2 DIABETES MELLITUS WITH STAGE 2 CHRONIC KIDNEY DISEASE, WITH LONG-TERM CURRENT USE OF INSULIN (HCC): Primary | ICD-10-CM

## 2020-05-18 DIAGNOSIS — I10 ESSENTIAL (PRIMARY) HYPERTENSION: ICD-10-CM

## 2020-05-18 DIAGNOSIS — G47.33 OBSTRUCTIVE SLEEP APNEA: ICD-10-CM

## 2020-05-18 DIAGNOSIS — E11.22 TYPE 2 DIABETES MELLITUS WITH STAGE 2 CHRONIC KIDNEY DISEASE, WITH LONG-TERM CURRENT USE OF INSULIN (HCC): Primary | ICD-10-CM

## 2020-05-18 DIAGNOSIS — I25.10 CORONARY ARTERY DISEASE INVOLVING NATIVE CORONARY ARTERY OF NATIVE HEART WITHOUT ANGINA PECTORIS: ICD-10-CM

## 2020-05-18 DIAGNOSIS — N18.2 TYPE 2 DIABETES MELLITUS WITH STAGE 2 CHRONIC KIDNEY DISEASE, WITH LONG-TERM CURRENT USE OF INSULIN (HCC): Primary | ICD-10-CM

## 2020-05-18 DIAGNOSIS — E78.2 MIXED HYPERLIPIDEMIA: ICD-10-CM

## 2020-05-18 PROCEDURE — 99214 OFFICE O/P EST MOD 30 MIN: CPT | Performed by: INTERNAL MEDICINE

## 2020-05-18 RX ORDER — INSULIN LISPRO 100 [IU]/ML
INJECTION, SUSPENSION SUBCUTANEOUS
Qty: 30 PEN | Refills: 3 | Status: SHIPPED | OUTPATIENT
Start: 2020-05-18 | End: 2021-03-31

## 2020-05-26 ENCOUNTER — HOSPITAL ENCOUNTER (OUTPATIENT)
Dept: RADIOLOGY | Facility: CLINIC | Age: 75
Discharge: HOME | End: 2020-05-26
Attending: ANESTHESIOLOGY | Admitting: ANESTHESIOLOGY
Payer: MEDICARE

## 2020-05-26 VITALS
OXYGEN SATURATION: 96 % | HEART RATE: 61 BPM | RESPIRATION RATE: 20 BRPM | DIASTOLIC BLOOD PRESSURE: 74 MMHG | SYSTOLIC BLOOD PRESSURE: 147 MMHG | TEMPERATURE: 98.4 F

## 2020-05-26 DIAGNOSIS — M70.61 TROCHANTERIC BURSITIS OF RIGHT HIP: ICD-10-CM

## 2020-05-26 DIAGNOSIS — M46.1 SACROILIITIS (HCC): ICD-10-CM

## 2020-05-26 PROCEDURE — 27096 INJECT SACROILIAC JOINT: CPT | Performed by: ANESTHESIOLOGY

## 2020-05-26 PROCEDURE — 20610 DRAIN/INJ JOINT/BURSA W/O US: CPT | Performed by: ANESTHESIOLOGY

## 2020-05-26 PROCEDURE — 77002 NEEDLE LOCALIZATION BY XRAY: CPT | Performed by: ANESTHESIOLOGY

## 2020-05-26 PROCEDURE — 77002 NEEDLE LOCALIZATION BY XRAY: CPT

## 2020-05-26 RX ORDER — METHYLPREDNISOLONE ACETATE 80 MG/ML
80 INJECTION, SUSPENSION INTRA-ARTICULAR; INTRALESIONAL; INTRAMUSCULAR; PARENTERAL; SOFT TISSUE ONCE
Status: COMPLETED | OUTPATIENT
Start: 2020-05-26 | End: 2020-05-26

## 2020-05-26 RX ORDER — BUPIVACAINE HCL/PF 2.5 MG/ML
10 VIAL (ML) INJECTION ONCE
Status: COMPLETED | OUTPATIENT
Start: 2020-05-26 | End: 2020-05-26

## 2020-05-26 RX ORDER — 0.9 % SODIUM CHLORIDE 0.9 %
10 VIAL (ML) INJECTION ONCE
Status: COMPLETED | OUTPATIENT
Start: 2020-05-26 | End: 2020-05-26

## 2020-05-26 RX ADMIN — SODIUM CHLORIDE 5 ML: 9 INJECTION, SOLUTION INTRAMUSCULAR; INTRAVENOUS; SUBCUTANEOUS at 08:20

## 2020-05-26 RX ADMIN — BUPIVACAINE HYDROCHLORIDE 7 ML: 2.5 INJECTION, SOLUTION EPIDURAL; INFILTRATION; INTRACAUDAL at 08:24

## 2020-05-26 RX ADMIN — IOHEXOL 1 ML: 300 INJECTION, SOLUTION INTRAVENOUS at 08:21

## 2020-05-26 RX ADMIN — Medication 5 ML: at 08:20

## 2020-05-26 RX ADMIN — METHYLPREDNISOLONE ACETATE 80 MG: 80 INJECTION, SUSPENSION INTRA-ARTICULAR; INTRALESIONAL; INTRAMUSCULAR; PARENTERAL; SOFT TISSUE at 08:24

## 2020-06-02 ENCOUNTER — TELEPHONE (OUTPATIENT)
Dept: PAIN MEDICINE | Facility: CLINIC | Age: 75
End: 2020-06-02

## 2020-07-19 LAB
BUN SERPL-MCNC: 15 MG/DL (ref 8–27)
BUN/CREAT SERPL: 15 (ref 10–24)
CALCIUM SERPL-MCNC: 9.2 MG/DL (ref 8.6–10.2)
CHLORIDE SERPL-SCNC: 102 MMOL/L (ref 96–106)
CHOLEST SERPL-MCNC: 165 MG/DL (ref 100–199)
CO2 SERPL-SCNC: 25 MMOL/L (ref 20–29)
CREAT SERPL-MCNC: 0.99 MG/DL (ref 0.76–1.27)
EST. AVERAGE GLUCOSE BLD GHB EST-MCNC: 166 MG/DL
GLUCOSE SERPL-MCNC: 134 MG/DL (ref 65–99)
HBA1C MFR BLD: 7.4 % (ref 4.8–5.6)
HDLC SERPL-MCNC: 42 MG/DL
LDLC SERPL CALC-MCNC: 96 MG/DL (ref 0–99)
POTASSIUM SERPL-SCNC: 4.6 MMOL/L (ref 3.5–5.2)
SL AMB EGFR AFRICAN AMERICAN: 86 ML/MIN/1.73
SL AMB EGFR NON AFRICAN AMERICAN: 74 ML/MIN/1.73
SL AMB VLDL CHOLESTEROL CALC: 27 MG/DL (ref 5–40)
SODIUM SERPL-SCNC: 141 MMOL/L (ref 134–144)
TRIGL SERPL-MCNC: 135 MG/DL (ref 0–149)

## 2020-08-06 ENCOUNTER — OFFICE VISIT (OUTPATIENT)
Dept: ENDOCRINOLOGY | Facility: CLINIC | Age: 75
End: 2020-08-06
Payer: MEDICARE

## 2020-08-06 VITALS
HEIGHT: 70 IN | BODY MASS INDEX: 38.74 KG/M2 | WEIGHT: 270.6 LBS | DIASTOLIC BLOOD PRESSURE: 80 MMHG | SYSTOLIC BLOOD PRESSURE: 142 MMHG | HEART RATE: 65 BPM | TEMPERATURE: 97.6 F

## 2020-08-06 DIAGNOSIS — N18.2 TYPE 2 DIABETES MELLITUS WITH STAGE 2 CHRONIC KIDNEY DISEASE, WITH LONG-TERM CURRENT USE OF INSULIN (HCC): Primary | ICD-10-CM

## 2020-08-06 DIAGNOSIS — E11.22 TYPE 2 DIABETES MELLITUS WITH STAGE 2 CHRONIC KIDNEY DISEASE, WITH LONG-TERM CURRENT USE OF INSULIN (HCC): Primary | ICD-10-CM

## 2020-08-06 DIAGNOSIS — I25.10 CORONARY ARTERY DISEASE INVOLVING NATIVE CORONARY ARTERY OF NATIVE HEART WITHOUT ANGINA PECTORIS: ICD-10-CM

## 2020-08-06 DIAGNOSIS — E11.42 TYPE 2 DIABETES MELLITUS WITH PERIPHERAL NEUROPATHY (HCC): ICD-10-CM

## 2020-08-06 DIAGNOSIS — I10 ESSENTIAL (PRIMARY) HYPERTENSION: ICD-10-CM

## 2020-08-06 DIAGNOSIS — Z79.4 TYPE 2 DIABETES MELLITUS WITH STAGE 2 CHRONIC KIDNEY DISEASE, WITH LONG-TERM CURRENT USE OF INSULIN (HCC): Primary | ICD-10-CM

## 2020-08-06 DIAGNOSIS — E78.2 MIXED HYPERLIPIDEMIA: ICD-10-CM

## 2020-08-06 PROCEDURE — 99214 OFFICE O/P EST MOD 30 MIN: CPT | Performed by: INTERNAL MEDICINE

## 2020-08-06 NOTE — PROGRESS NOTES
Moncho Solano 76 y o  male MRN: [id number]    Encounter: [id number]      Assessment/Plan     Assessment: This is a 76y o -year-old male with type 2 diabetes mellitus, hypertension, hyperlipidemia, CAD, MEHDI, obesity, neuropathy    Plan:  1  Type 2 diabetes mellitus long-term insulin therapy with hypoglycemia  2  Neuropathy  Well controlled based on A1c of 7 4% which is an improvement from 7 8% however it is concerning that the patient has had a few episodes of hypoglycemia  Variability in blood sugars is likely diet related and dose of insulin dose administered  Recommend the following at this time  - continue current regimen   Advised to not take more extra insulin based purely on hyperglycemia/eating a high carbohydrate meal as the patient would be at a higher risk of low blood sugars  Ideally to manage hyperglycemia, I recommend short-acting insulin  Patient does not want to switch to long-short-acting insulin as it would mean more injections per day  Advised patient to send blood sugar log for review after he gets his next dose of Stelara cervical decide what appropriate changes need to be made to his medication regimen  -follow-up in 3 months with repeat A1c, BMP    Refused referral to the balance center    3  Hyperlipidemia  - continue statin therapy  Repeat fasting lipid panel in 3 months    4  Hypertension  Blood pressure close to goal  - continue current medication    5  CKD-stable    CC: Diabetes    History of Present Illness     HPI:  Moncho Solano is a 76 y o  male presents for a follow-up visit regarding diabetes management        DM history:   Diagnosed approximately 30 years ago  has complications of CAD  No known CVA/CKD    Current regimen:   farxiga 10 mg orally daily  Metformin  mg twice a day  Humalog 75/25  47 units before breakfast, dinner    Says that he gets stelara once in 3 months for psoriasis, usually notices BG trend up and at that time takes insulin 75/25 47-54 units    Statin:  Pravastatin  ACE-I/ARB: lisinopril     Appetite is good  Denies recent weight gain/ loss  feels the neuroptahy is getting worse, balance issues, no pain  refuses referral to the balance center  Denies changes in vision  No known retinopathy  No chest pain  SOB on exertion since tracheostomy, vocal cord paralysis  No diarrhea/ constipation  No urinary complaints  Home glucose monitoring:  Does not check every day  When he checks 1-2 times  Ranging 113-240 6 mg/dL over the last 2 weeks  And review of blood sugars in June, noted to have a few lows, 66 mg/dL, 78 mg/dL    Symptoms of hypoglycemia :  Sweaty, hungry, eats with improvement   Usually when he has eaten less for the meal before  May also be related to times when he took extra insulin for high blood sugars    All other systems were reviewed and are negative  Review of Systems      Historical Information   Past Medical History:   Diagnosis Date    Arthritis     knees    Cancer [facility])     prostate    Coronary artery disease     two coronary stents    [facility] mellitus ([facility] )     Hyperlipidemia 10/19/2018    Hypertension     Psoriasis     lower extremities and buttocks    Sleep apnea     Type 2 diabetes mellitus ([facility] ) 10/19/2018     Past Surgical History:   Procedure Laterality Date    CATARACT EXTRACTION Bilateral     with iol's    COLONOSCOPY N/A 4/5/2016    Procedure: COLONOSCOPY snare polypectomy;  Surgeon: Luisa Weber MD;  Location: [place] [address] GI LAB;   Service:     INSERTION PROSTATE RADIATION SEED      REPLACEMENT TOTAL KNEE Right     TRACHEOSTOMY      x 2  up to 5 years ago    UMBILICAL HERNIA REPAIR       Social History   Social History     Substance and Sexual Activity   Alcohol Use Yes    Alcohol/week: 4 0 standard drinks    Types: 4 Cans of beer per week    Comment: daily     Social History     Substance and Sexual Activity   Drug Use No     Social History     Tobacco Use   Smoking Status Never Smoker   Smokeless Tobacco Never Used     Family History: No family history on file  Meds/Allergies   Current Outpatient Medications   Medication Sig Dispense Refill    Ascorbic Acid (VITAMIN C) 1000 MG tablet Take 1,000 mg by mouth daily      aspirin 81 MG tablet Take 81 mg by mouth daily   B-D ULTRAFINE III SHORT PEN 31G X 8 MM MISC       cholecalciferol (VITAMIN D3) 1,000 units tablet Take 2,000 Units by mouth daily       clopidogrel (PLAVIX) 75 mg tablet Take 75 mg by mouth daily   Dapagliflozin Propanediol (FARXIGA) 10 MG TABS Take 1 tablet (10 mg total) by mouth daily 90 tablet 3    doxycycline hyclate (VIBRAMYCIN) 100 mg capsule       fluticasone (FLONASE) 50 mcg/act nasal spray USE 4 SPRAYS DAILY (2 SPRAYS IN EACH NOSTRIL DAILY) 48 g 4    HUMALOG MIX 75/25 KWIKPEN (75-25) 100 units/mL injection pen Inject under the skin 47 units before breakfast and dinner 30 pen 3    lisinopril (ZESTRIL) 5 mg tablet Take 5 mg by mouth daily       metFORMIN (GLUCOPHAGE-XR) 750 mg 24 hr tablet Take 750 mg by mouth 2 (two) times a day      Multiple Vitamins-Minerals (MULTIVITAMIN ADULT PO) Take by mouth      pravastatin (PRAVACHOL) 40 mg tablet Take 40 mg by mouth daily      STELARA 90 MG/ML subcutaneous injection       traMADol (ULTRAM) 50 mg tablet Take 50 mg by mouth every 6 (six) hours as needed for moderate pain      traZODone (DESYREL) 100 mg tablet Take 100 mg by mouth daily at bedtime       VASCEPA 1 g CAPS Take by mouth daily        No current facility-administered medications for this visit  Allergies   Allergen Reactions    Bee Venom Shortness Of Breath    Lidocaine Shortness Of Breath     Pt reports he was tested and does not have allergy to Lidocaine       Objective   Vitals: Temperature 97 6 °F (36 4 °C), height 5' 10" (1 778 m), weight 123 kg (270 lb 9 6 oz)  Physical Exam   Constitutional: He is oriented to person, place, and time  He appears well-developed  No distress  HENT:   Head: Normocephalic and atraumatic  Eyes: Pupils are equal, round, and reactive to light  Conjunctivae are normal    Neck: Normal range of motion  Neck supple  Cardiovascular: Normal rate, regular rhythm and normal heart sounds  No murmur heard  Pulmonary/Chest: Effort normal and breath sounds normal  No respiratory distress  He has no wheezes  Abdominal: Soft  He exhibits no distension  There is no abdominal tenderness  There is no guarding  Neurological: He is alert and oriented to person, place, and time  Skin: Skin is warm and dry  No rash noted  He is not diaphoretic  No erythema  Psychiatric: His behavior is normal  Thought content normal    Vitals reviewed  The history was obtained from the review of the chart, patient  Lab Results:   Lab Results   Component Value Date/Time    Hemoglobin A1C 7 4 (H) 07/17/2020 08:48 AM    Hemoglobin A1C 7 8 (H) 05/08/2020 07:11 AM    Hemoglobin A1C 7 6 12/03/2019    Hemoglobin A1C 8 1 (H) 08/08/2019 04:20 AM    BUN 15 07/17/2020 08:48 AM    BUN 17 05/08/2020 07:11 AM    Potassium 4 6 07/17/2020 08:48 AM    Potassium 4 4 05/08/2020 07:11 AM    Chloride 102 07/17/2020 08:48 AM    Chloride 96 05/08/2020 07:11 AM    CO2 25 07/17/2020 08:48 AM    CO2 26 05/08/2020 07:11 AM    Creatinine 0 99 07/17/2020 08:48 AM    Creatinine 1 09 05/08/2020 07:11 AM    AST 24 05/08/2020 07:11 AM    ALT 27 05/08/2020 07:11 AM    Albumin 4 4 05/08/2020 07:11 AM    Globulin, Total 2 6 05/08/2020 07:11 AM    HDL 42 07/17/2020 08:48 AM    HDL 44 05/08/2020 07:11 AM    Triglycerides 135 07/17/2020 08:48 AM    Triglycerides 200 (H) 05/08/2020 07:11 AM         Imaging Studies: I have personally reviewed pertinent reports  Portions of the record may have been created with voice recognition software  Occasional wrong word or "sound a like" substitutions may have occurred due to the inherent limitations of voice recognition software   Read the chart carefully and recognize, using context, where substitutions have occurred

## 2020-08-06 NOTE — PATIENT INSTRUCTIONS
Continue farxiga 10 mg orally daily   continue Metformin  mg twice a day   continue Humalog 75/25  47 units before breakfast, dinner     please try to eat consistent meals as you would be at higher risk of having both high, low blood sugars  If you know you are eating less carbohydrates, take less insulin example Humalog 75/25 43-44 units   When you get your medication for psoriasis, if you notice high blood sugars, unless it is greater than 200 mg/dL, please avoid increasing the dose of insulin    Please send ostial blood sugar log for review so that we can decide if you need any adjustments to your current medications or additional short-acting     follow-up in 3 months with repeat labs

## 2020-08-17 DIAGNOSIS — N18.2 TYPE 2 DIABETES MELLITUS WITH STAGE 2 CHRONIC KIDNEY DISEASE, WITH LONG-TERM CURRENT USE OF INSULIN (HCC): Primary | ICD-10-CM

## 2020-08-17 DIAGNOSIS — E11.22 TYPE 2 DIABETES MELLITUS WITH STAGE 2 CHRONIC KIDNEY DISEASE, WITH LONG-TERM CURRENT USE OF INSULIN (HCC): Primary | ICD-10-CM

## 2020-08-17 DIAGNOSIS — Z79.4 TYPE 2 DIABETES MELLITUS WITH STAGE 2 CHRONIC KIDNEY DISEASE, WITH LONG-TERM CURRENT USE OF INSULIN (HCC): Primary | ICD-10-CM

## 2020-08-17 RX ORDER — BLOOD SUGAR DIAGNOSTIC
STRIP MISCELLANEOUS
Qty: 300 EACH | Refills: 3 | Status: SHIPPED | OUTPATIENT
Start: 2020-08-17 | End: 2020-08-26 | Stop reason: SDUPTHER

## 2020-08-26 ENCOUNTER — TELEPHONE (OUTPATIENT)
Dept: ENDOCRINOLOGY | Facility: CLINIC | Age: 75
End: 2020-08-26

## 2020-08-26 DIAGNOSIS — Z79.4 TYPE 2 DIABETES MELLITUS WITH STAGE 2 CHRONIC KIDNEY DISEASE, WITH LONG-TERM CURRENT USE OF INSULIN (HCC): ICD-10-CM

## 2020-08-26 DIAGNOSIS — N18.2 TYPE 2 DIABETES MELLITUS WITH STAGE 2 CHRONIC KIDNEY DISEASE, WITH LONG-TERM CURRENT USE OF INSULIN (HCC): ICD-10-CM

## 2020-08-26 DIAGNOSIS — E11.22 TYPE 2 DIABETES MELLITUS WITH STAGE 2 CHRONIC KIDNEY DISEASE, WITH LONG-TERM CURRENT USE OF INSULIN (HCC): ICD-10-CM

## 2020-08-26 RX ORDER — BLOOD SUGAR DIAGNOSTIC
STRIP MISCELLANEOUS
Qty: 300 EACH | Refills: 3 | Status: SHIPPED | OUTPATIENT
Start: 2020-08-26 | End: 2021-01-07 | Stop reason: SDUPTHER

## 2020-08-26 NOTE — TELEPHONE ENCOUNTER
Pt called and stated that he did not receive his onetouch verio test strips  [facility] and was told they never received a script  I checked chart and strips were sent to [address] [place] [address] prescription to [facility] and called [facility] and cancelled the prescription

## 2020-12-08 DIAGNOSIS — E11.22 TYPE 2 DIABETES MELLITUS WITH STAGE 2 CHRONIC KIDNEY DISEASE, WITH LONG-TERM CURRENT USE OF INSULIN (HCC): Primary | ICD-10-CM

## 2020-12-08 DIAGNOSIS — N18.2 TYPE 2 DIABETES MELLITUS WITH STAGE 2 CHRONIC KIDNEY DISEASE, WITH LONG-TERM CURRENT USE OF INSULIN (HCC): Primary | ICD-10-CM

## 2020-12-08 DIAGNOSIS — Z79.4 TYPE 2 DIABETES MELLITUS WITH STAGE 2 CHRONIC KIDNEY DISEASE, WITH LONG-TERM CURRENT USE OF INSULIN (HCC): Primary | ICD-10-CM

## 2020-12-08 RX ORDER — PEN NEEDLE, DIABETIC 31 GX5/16"
NEEDLE, DISPOSABLE MISCELLANEOUS
Qty: 100 EACH | Refills: 3 | Status: SHIPPED | OUTPATIENT
Start: 2020-12-08 | End: 2021-01-07 | Stop reason: SDUPTHER

## 2020-12-23 LAB
BUN SERPL-MCNC: 17 MG/DL (ref 8–27)
BUN/CREAT SERPL: 15 (ref 10–24)
CALCIUM SERPL-MCNC: 9.3 MG/DL (ref 8.6–10.2)
CHLORIDE SERPL-SCNC: 100 MMOL/L (ref 96–106)
CHOLEST SERPL-MCNC: 142 MG/DL (ref 100–199)
CO2 SERPL-SCNC: 24 MMOL/L (ref 20–29)
CREAT SERPL-MCNC: 1.16 MG/DL (ref 0.76–1.27)
EST. AVERAGE GLUCOSE BLD GHB EST-MCNC: 194 MG/DL
GLUCOSE SERPL-MCNC: 135 MG/DL (ref 65–99)
HBA1C MFR BLD: 8.4 % (ref 4.8–5.6)
HDLC SERPL-MCNC: 46 MG/DL
LDLC SERPL CALC-MCNC: 72 MG/DL (ref 0–99)
POTASSIUM SERPL-SCNC: 4.6 MMOL/L (ref 3.5–5.2)
SL AMB EGFR AFRICAN AMERICAN: 71 ML/MIN/1.73
SL AMB EGFR NON AFRICAN AMERICAN: 61 ML/MIN/1.73
SL AMB VLDL CHOLESTEROL CALC: 24 MG/DL (ref 5–40)
SODIUM SERPL-SCNC: 139 MMOL/L (ref 134–144)
TRIGL SERPL-MCNC: 136 MG/DL (ref 0–149)

## 2020-12-28 DIAGNOSIS — E11.22 TYPE 2 DIABETES MELLITUS WITH STAGE 2 CHRONIC KIDNEY DISEASE, WITH LONG-TERM CURRENT USE OF INSULIN (HCC): ICD-10-CM

## 2020-12-28 DIAGNOSIS — Z79.4 TYPE 2 DIABETES MELLITUS WITH STAGE 2 CHRONIC KIDNEY DISEASE, WITH LONG-TERM CURRENT USE OF INSULIN (HCC): ICD-10-CM

## 2020-12-28 DIAGNOSIS — N18.2 TYPE 2 DIABETES MELLITUS WITH STAGE 2 CHRONIC KIDNEY DISEASE, WITH LONG-TERM CURRENT USE OF INSULIN (HCC): ICD-10-CM

## 2020-12-28 RX ORDER — DAPAGLIFLOZIN 10 MG/1
TABLET, FILM COATED ORAL
Qty: 90 TABLET | Refills: 0 | Status: SHIPPED | OUTPATIENT
Start: 2020-12-28 | End: 2021-03-29

## 2021-01-07 ENCOUNTER — OFFICE VISIT (OUTPATIENT)
Dept: ENDOCRINOLOGY | Facility: CLINIC | Age: 76
End: 2021-01-07
Payer: MEDICARE

## 2021-01-07 VITALS
HEIGHT: 70 IN | SYSTOLIC BLOOD PRESSURE: 140 MMHG | TEMPERATURE: 96 F | HEART RATE: 60 BPM | BODY MASS INDEX: 39.08 KG/M2 | WEIGHT: 273 LBS | DIASTOLIC BLOOD PRESSURE: 70 MMHG

## 2021-01-07 DIAGNOSIS — E11.22 TYPE 2 DIABETES MELLITUS WITH STAGE 2 CHRONIC KIDNEY DISEASE, WITH LONG-TERM CURRENT USE OF INSULIN (HCC): ICD-10-CM

## 2021-01-07 DIAGNOSIS — E78.2 MIXED HYPERLIPIDEMIA: Primary | ICD-10-CM

## 2021-01-07 DIAGNOSIS — Z79.4 TYPE 2 DIABETES MELLITUS WITH STAGE 2 CHRONIC KIDNEY DISEASE, WITH LONG-TERM CURRENT USE OF INSULIN (HCC): ICD-10-CM

## 2021-01-07 DIAGNOSIS — N18.2 TYPE 2 DIABETES MELLITUS WITH STAGE 2 CHRONIC KIDNEY DISEASE, WITH LONG-TERM CURRENT USE OF INSULIN (HCC): ICD-10-CM

## 2021-01-07 DIAGNOSIS — I10 HYPERTENSION GOAL BP (BLOOD PRESSURE) < 140/90: ICD-10-CM

## 2021-01-07 PROCEDURE — 99214 OFFICE O/P EST MOD 30 MIN: CPT | Performed by: INTERNAL MEDICINE

## 2021-01-07 RX ORDER — CLOPIDOGREL BISULFATE 75 MG/1
TABLET ORAL
COMMUNITY
Start: 2020-11-12

## 2021-01-07 RX ORDER — INSULIN LISPRO 100 [IU]/ML
47 INJECTION, SUSPENSION SUBCUTANEOUS 2 TIMES DAILY
COMMUNITY

## 2021-01-07 RX ORDER — TRAZODONE HYDROCHLORIDE 100 MG/1
TABLET ORAL
COMMUNITY
Start: 2020-11-12

## 2021-01-07 RX ORDER — CODEINE PHOSPHATE AND GUAIFENESIN 10; 100 MG/5ML; MG/5ML
SOLUTION ORAL
COMMUNITY
Start: 2020-11-10 | End: 2021-02-12

## 2021-01-07 RX ORDER — METHYLPREDNISOLONE 4 MG/1
TABLET ORAL
COMMUNITY
Start: 2020-11-10 | End: 2021-02-12

## 2021-01-07 RX ORDER — PRAVASTATIN SODIUM 80 MG/1
TABLET ORAL
COMMUNITY
Start: 2020-10-13

## 2021-01-07 RX ORDER — LEVOCETIRIZINE DIHYDROCHLORIDE 5 MG/1
5 TABLET, FILM COATED ORAL
COMMUNITY
Start: 2020-11-10

## 2021-01-07 RX ORDER — UREA 40 %
CREAM (GRAM) TOPICAL AS NEEDED
COMMUNITY

## 2021-01-07 RX ORDER — LISINOPRIL 5 MG/1
TABLET ORAL
COMMUNITY
Start: 2020-09-17 | End: 2021-01-07 | Stop reason: SDUPTHER

## 2021-01-07 RX ORDER — PEN NEEDLE, DIABETIC 31 GX5/16"
NEEDLE, DISPOSABLE MISCELLANEOUS
Qty: 100 EACH | Refills: 3 | Status: SHIPPED | OUTPATIENT
Start: 2021-01-07 | End: 2021-01-15 | Stop reason: SDUPTHER

## 2021-01-07 RX ORDER — CLOBETASOL PROPIONATE 0.5 MG/G
AEROSOL, FOAM TOPICAL AS NEEDED
COMMUNITY
Start: 2020-11-18

## 2021-01-07 RX ORDER — LEVOCETIRIZINE DIHYDROCHLORIDE 5 MG/1
5 TABLET, FILM COATED ORAL EVERY EVENING
COMMUNITY
Start: 2020-11-10

## 2021-01-07 RX ORDER — TRIAMCINOLONE ACETONIDE 1 MG/G
CREAM TOPICAL AS NEEDED
COMMUNITY
Start: 2020-11-18

## 2021-01-07 RX ORDER — IBUPROFEN 600 MG/1
TABLET ORAL
Qty: 1 EACH | Refills: 0 | Status: SHIPPED | OUTPATIENT
Start: 2021-01-07

## 2021-01-07 RX ORDER — EZETIMIBE 10 MG/1
10 TABLET ORAL
COMMUNITY
Start: 2020-10-07 | End: 2021-01-07 | Stop reason: SDUPTHER

## 2021-01-07 RX ORDER — IPRATROPIUM/ALBUTEROL SULFATE 20-100 MCG
MIST INHALER (GRAM) INHALATION
COMMUNITY
End: 2022-03-08 | Stop reason: ALTCHOICE

## 2021-01-07 RX ORDER — BLOOD SUGAR DIAGNOSTIC
STRIP MISCELLANEOUS
Qty: 300 EACH | Refills: 3 | Status: SHIPPED | OUTPATIENT
Start: 2021-01-07 | End: 2021-02-12

## 2021-01-07 RX ORDER — EZETIMIBE 10 MG/1
TABLET ORAL
COMMUNITY
Start: 2020-12-28

## 2021-01-07 RX ORDER — FLUTICASONE PROPIONATE 220 UG/1
AEROSOL, METERED RESPIRATORY (INHALATION)
COMMUNITY
Start: 2021-01-06 | End: 2021-01-07 | Stop reason: SDUPTHER

## 2021-01-07 RX ORDER — LEVOFLOXACIN 750 MG/1
TABLET ORAL
COMMUNITY
Start: 2020-10-30 | End: 2021-02-12

## 2021-01-07 RX ORDER — FLUTICASONE PROPIONATE 220 UG/1
2 AEROSOL, METERED RESPIRATORY (INHALATION) AS NEEDED
COMMUNITY
Start: 2020-11-10 | End: 2021-11-10

## 2021-01-07 RX ORDER — USTEKINUMAB 90 MG/ML
INJECTION, SOLUTION SUBCUTANEOUS
COMMUNITY
Start: 2020-08-31 | End: 2021-07-13 | Stop reason: ALTCHOICE

## 2021-01-07 RX ORDER — CIPROFLOXACIN AND DEXAMETHASONE 3; 1 MG/ML; MG/ML
SUSPENSION/ DROPS AURICULAR (OTIC)
COMMUNITY
Start: 2020-10-30

## 2021-01-07 NOTE — PROGRESS NOTES
ENDOCRINOLOGY  FOLLOW UP VISIT      Reason for Endocrine Consult/Chief Complaint:  Diabetes follow-up    ? Medical Decision Making:     Impression  1  Type 2 DM  2  HTN  3  HLD      Recommendations:    Reported blood sugars are stable, however A1c increased to 8 4% December 2020, continue 75/25 insulin 47 units before breakfast and dinner and Farxiga 10 mg taken once a day and metformin extended release 750 mg taken twice a day (instructed him to check about the recall with his pharmacist)    Instructed him to send me his blood sugar log in 4 weeks to see if he is having any hyperglycemia throughout the day    Will check fructosamine at next visit given elevated A1c and reportedly good blood sugars at home    Counseled on how to treat hypoglycemia, he has glucose tablets at home, prescribed glucagon kit  Counseled on adverse side effects of Farxiga therapy including urinary frequency, urinary tract infection, yeast infection, Nicole gangrene  He understood these risks and wished to continue therapy  Hyperlipidemia-LDL 72, triglycerides 136 December 2020 continue statin therapy and prescription fish oil and zetia     Hypertension-controlled continue ACE-inhibitor    Return to clinic in 3 months    Homa KENDALL  Endocrinology        History of Present Illness:  Mr Jazmin Diaz is a 79-year-old male who presents for diabetes follow-up    Last saw Dr Katie Henry in August of 2020 for diabetes and was maintained on Farxiga 10 mg once a day, metformin extended release 750 mg taken twice a day and Humalog 75/25 47 units before breakfast and dinner    Also has hyperlipidemia, hypertension, CKD    ? Events since last visit:    remains on above regimen    FBG-reported 120 in the morning  Premeal/qHS BG-checks before dinner 120-130s reports  Hypoglycemia- having low once or twice a month 60s (10PM-2AM)  ?   Review of Systems:     Review of Systems   Constitutional: Negative for appetite change, chills, diaphoresis, fatigue, fever and unexpected weight change  HENT: Negative for congestion, ear pain, hearing loss, rhinorrhea, sinus pressure, sinus pain, sore throat, trouble swallowing and voice change  Eyes: Negative for photophobia, redness and visual disturbance  Respiratory: Negative for apnea, cough, chest tightness, shortness of breath, wheezing and stridor  Cardiovascular: Negative for chest pain, palpitations and leg swelling  Gastrointestinal: Negative for abdominal distention, abdominal pain, constipation, diarrhea, nausea and vomiting  Endocrine: Negative for cold intolerance, heat intolerance, polydipsia, polyphagia and polyuria  Genitourinary: Negative for difficulty urinating, dysuria, flank pain, frequency, hematuria and urgency  Musculoskeletal: Negative for arthralgias, back pain, gait problem, joint swelling and myalgias  Skin: Negative for color change, pallor, rash and wound  Allergic/Immunologic: Negative for immunocompromised state  Neurological: Negative for dizziness, tremors, syncope, weakness, light-headedness and headaches  Hematological: Negative for adenopathy  Does not bruise/bleed easily  Psychiatric/Behavioral: Negative for confusion and sleep disturbance  The patient is not nervous/anxious  ? Patient History:     Past Medical History:   Diagnosis Date    Arthritis     knees    Cancer [facility])     prostate    Coronary artery disease     two coronary stents    [facility]     Hyperlipidemia 10/19/2018    Hypertension     Psoriasis     lower extremities and buttocks    Sleep apnea     Type 2 diabetes mellitus [facility] ) 10/19/2018     Past Surgical History:   Procedure Laterality Date    CATARACT EXTRACTION Bilateral     with iol's    COLONOSCOPY N/A 4/5/2016    Procedure: COLONOSCOPY snare polypectomy;  Surgeon: Ignacio Oconnor MD;  Location: [facility] GI LAB;   Service:     INSERTION PROSTATE RADIATION SEED      REPLACEMENT TOTAL KNEE Right     TRACHEOSTOMY      x 2  up to 5 years ago    UMBILICAL HERNIA REPAIR       Social History     Socioeconomic History    Marital status: /Civil Union     Spouse name: Not on file    Number of children: Not on file    Years of education: Not on file    Highest education level: Not on file   Occupational History    Not on file   Social Needs    Financial resource strain: Not on file    Food insecurity     Worry: Not on file     Inability: Not on file   [employer] needs     Medical: Not on file     Non-medical: Not on file   Tobacco Use    Smoking status: Never Smoker    Smokeless tobacco: Never Used   Substance and Sexual Activity    Alcohol use: Yes     Alcohol/week: 4 0 standard drinks     Types: 4 Cans of beer per week     Comment: daily    Drug use: No    Sexual activity: Not on file   Lifestyle    Physical activity     Days per week: Not on file     Minutes per session: Not on file    Stress: Not on file   Relationships    Social connections     Talks on phone: Not on file     Gets together: Not on file     Attends [employer] service: Not on file     Active member of club or organization: Not on file     Attends meetings of clubs or organizations: Not on file     Relationship status: Not on file    Intimate partner violence     Fear of current or ex partner: Not on file     Emotionally abused: Not on file     Physically abused: Not on file     Forced sexual activity: Not on file   Other Topics Concern    Not on file   Social History Narrative    Not on file     No family history on file  Current Medications: At the time this note was written these were the medications the patient was on  Current Outpatient Medications   Medication Sig Dispense Refill    Ascorbic Acid (VITAMIN C) 1000 MG tablet Take 1,000 mg by mouth daily      aspirin 81 MG tablet Take 81 mg by mouth daily        B-D ULTRAFINE III SHORT PEN 31G X 8 MM MISC Use daily with insulin 100 each 3    cholecalciferol (VITAMIN D3) 1,000 units tablet Take 2,000 Units by mouth daily       clopidogrel (PLAVIX) 75 mg tablet Take 75 mg by mouth daily   doxycycline hyclate (VIBRAMYCIN) 100 mg capsule Take 100 mg by mouth daily       Farxiga 10 MG TABS TAKE 1 TABLET DAILY 90 tablet 0    fluticasone (FLONASE) 50 mcg/act nasal spray USE 4 SPRAYS DAILY (2 SPRAYS IN EACH NOSTRIL DAILY) (Patient taking differently: into each nostril as needed ) 48 g 4    glucose blood (OneTouch Verio) test strip Use to test blood sugar 3 times a day 300 each 3    HUMALOG MIX 75/25 KWIKPEN (75-25) 100 units/mL injection pen Inject under the skin 47 units before breakfast and dinner 30 pen 3    lisinopril (ZESTRIL) 5 mg tablet Take 5 mg by mouth daily       metFORMIN (GLUCOPHAGE-XR) 750 mg 24 hr tablet Take 750 mg by mouth 2 (two) times a day      Multiple Vitamins-Minerals (MULTIVITAMIN ADULT PO) Take by mouth      pravastatin (PRAVACHOL) 40 mg tablet Take 40 mg by mouth daily      STELARA 90 MG/ML subcutaneous injection       traMADol (ULTRAM) 50 mg tablet Take 50 mg by mouth every 6 (six) hours as needed for moderate pain      traZODone (DESYREL) 100 mg tablet Take 100 mg by mouth daily at bedtime       VASCEPA 1 g CAPS Take by mouth daily        No current facility-administered medications for this visit  Allergies: Bee venom and Lidocaine    Physical Exam:   Vital Signs:   /70   Pulse 60   Temp (!) 96 °F (35 6 °C)   Ht 5' 10" (1 778 m)   Wt 124 kg (273 lb)   BMI 39 17 kg/m²     Physical Exam  Vitals signs reviewed  Constitutional:       General: He is not in acute distress  Appearance: Normal appearance  He is not ill-appearing, toxic-appearing or diaphoretic  HENT:      Head: Normocephalic and atraumatic  Right Ear: External ear normal       Left Ear: External ear normal       Nose: Nose normal    Eyes:      General: No scleral icterus  Extraocular Movements: Extraocular movements intact  Conjunctiva/sclera: Conjunctivae normal    Neck:      Musculoskeletal: Normal range of motion and neck supple  No muscular tenderness  Comments: No thyromegaly or nodules  Cardiovascular:      Rate and Rhythm: Normal rate and regular rhythm  Heart sounds: Murmur present  No friction rub  No gallop  Comments: +2/6 FINA aortic area  Pulmonary:      Effort: Pulmonary effort is normal  No respiratory distress  Breath sounds: Normal breath sounds  No stridor  No wheezing, rhonchi or rales  Abdominal:      General: Bowel sounds are normal  There is no distension  Palpations: Abdomen is soft  There is no mass  Tenderness: There is no abdominal tenderness  There is no guarding or rebound  Hernia: No hernia is present  Musculoskeletal: Normal range of motion  General: No swelling  Lymphadenopathy:      Cervical: No cervical adenopathy  Skin:     General: Skin is warm and dry  Coloration: Skin is not pale  Findings: No erythema or rash  Neurological:      General: No focal deficit present  Mental Status: He is alert and oriented to person, place, and time  Psychiatric:         Mood and Affect: Mood normal          Behavior: Behavior normal          Thought Content:  Thought content normal          Judgment: Judgment normal             Labs and Imaging:      Component      Latest Ref Rng & Units 12/22/2020   Glucose, Random      65 - 99 mg/dL 135 (H)   BUN      8 - 27 mg/dL 17   Creatinine      0 76 - 1 27 mg/dL 1 16   eGFR Non       >59 mL/min/1 73 61   eGFR       >59 mL/min/1 73 71   SL AMB BUN/CREATININE RATIO      10 - 24 15   Sodium      134 - 144 mmol/L 139   Potassium      3 5 - 5 2 mmol/L 4 6   Chloride      96 - 106 mmol/L 100   CO2      20 - 29 mmol/L 24   CALCIUM      8 6 - 10 2 mg/dL 9 3   Cholesterol      100 - 199 mg/dL 142   Triglycerides      0 - 149 mg/dL 136   HDL      >39 mg/dL 46   VLDL Cholesterol Alejandro 5 - 40 mg/dL 24   LDL Calculated      0 - 99 mg/dL 72   Hemoglobin A1C      4 8 - 5 6 % 8 4 (H)   eAG, EST AVG Glucose      mg/dL 194

## 2021-01-11 DIAGNOSIS — I10 HYPERTENSION GOAL BP (BLOOD PRESSURE) < 140/90: ICD-10-CM

## 2021-01-11 DIAGNOSIS — E78.2 MIXED HYPERLIPIDEMIA: ICD-10-CM

## 2021-01-11 DIAGNOSIS — Z79.4 TYPE 2 DIABETES MELLITUS WITH STAGE 2 CHRONIC KIDNEY DISEASE, WITH LONG-TERM CURRENT USE OF INSULIN (HCC): ICD-10-CM

## 2021-01-11 DIAGNOSIS — E11.22 TYPE 2 DIABETES MELLITUS WITH STAGE 2 CHRONIC KIDNEY DISEASE, WITH LONG-TERM CURRENT USE OF INSULIN (HCC): ICD-10-CM

## 2021-01-11 DIAGNOSIS — N18.2 TYPE 2 DIABETES MELLITUS WITH STAGE 2 CHRONIC KIDNEY DISEASE, WITH LONG-TERM CURRENT USE OF INSULIN (HCC): ICD-10-CM

## 2021-01-12 RX ORDER — IBUPROFEN 600 MG/1
TABLET ORAL
Qty: 1 EACH | Refills: 13 | OUTPATIENT
Start: 2021-01-12

## 2021-01-15 DIAGNOSIS — Z79.4 TYPE 2 DIABETES MELLITUS WITH STAGE 2 CHRONIC KIDNEY DISEASE, WITH LONG-TERM CURRENT USE OF INSULIN (HCC): ICD-10-CM

## 2021-01-15 DIAGNOSIS — E78.2 MIXED HYPERLIPIDEMIA: ICD-10-CM

## 2021-01-15 DIAGNOSIS — E11.22 TYPE 2 DIABETES MELLITUS WITH STAGE 2 CHRONIC KIDNEY DISEASE, WITH LONG-TERM CURRENT USE OF INSULIN (HCC): ICD-10-CM

## 2021-01-15 DIAGNOSIS — I10 HYPERTENSION GOAL BP (BLOOD PRESSURE) < 140/90: ICD-10-CM

## 2021-01-15 DIAGNOSIS — N18.2 TYPE 2 DIABETES MELLITUS WITH STAGE 2 CHRONIC KIDNEY DISEASE, WITH LONG-TERM CURRENT USE OF INSULIN (HCC): ICD-10-CM

## 2021-01-15 RX ORDER — PEN NEEDLE, DIABETIC 31 GX5/16"
NEEDLE, DISPOSABLE MISCELLANEOUS
Qty: 100 EACH | Refills: 3 | Status: SHIPPED | OUTPATIENT
Start: 2021-01-15 | End: 2021-03-29 | Stop reason: SDUPTHER

## 2021-01-15 NOTE — TELEPHONE ENCOUNTER
Pt's wife called and requested refill for pen needles, refill that was sent on 1/11 for qty: 100 was not enough    He uses 2 needles a day so he needs another 100

## 2021-01-21 DIAGNOSIS — E11.22 TYPE 2 DIABETES MELLITUS WITH STAGE 2 CHRONIC KIDNEY DISEASE, WITH LONG-TERM CURRENT USE OF INSULIN (HCC): Primary | ICD-10-CM

## 2021-01-21 DIAGNOSIS — Z79.4 TYPE 2 DIABETES MELLITUS WITH STAGE 2 CHRONIC KIDNEY DISEASE, WITH LONG-TERM CURRENT USE OF INSULIN (HCC): Primary | ICD-10-CM

## 2021-01-21 DIAGNOSIS — N18.2 TYPE 2 DIABETES MELLITUS WITH STAGE 2 CHRONIC KIDNEY DISEASE, WITH LONG-TERM CURRENT USE OF INSULIN (HCC): Primary | ICD-10-CM

## 2021-01-21 RX ORDER — METFORMIN HYDROCHLORIDE 750 MG/1
TABLET, EXTENDED RELEASE ORAL
Qty: 180 TABLET | Refills: 0 | Status: SHIPPED | OUTPATIENT
Start: 2021-01-21 | End: 2021-04-05

## 2021-01-21 NOTE — TELEPHONE ENCOUNTER
Please remind him to check with the pharmacist about the metformin extended release recall when he picks up his batch

## 2021-02-05 ENCOUNTER — HOSPITAL ENCOUNTER (OUTPATIENT)
Dept: RADIOLOGY | Facility: HOSPITAL | Age: 76
Discharge: HOME | End: 2021-02-05
Attending: OTOLARYNGOLOGY
Payer: MEDICARE

## 2021-02-05 DIAGNOSIS — H92.03 OTALGIA OF BOTH EARS: ICD-10-CM

## 2021-02-05 PROCEDURE — G1004 CDSM NDSC: HCPCS

## 2021-02-05 PROCEDURE — 70486 CT MAXILLOFACIAL W/O DYE: CPT

## 2021-02-12 ENCOUNTER — OFFICE VISIT (OUTPATIENT)
Dept: PAIN MEDICINE | Facility: CLINIC | Age: 76
End: 2021-02-12
Payer: MEDICARE

## 2021-02-12 VITALS
HEART RATE: 75 BPM | BODY MASS INDEX: 38.74 KG/M2 | WEIGHT: 270 LBS | SYSTOLIC BLOOD PRESSURE: 140 MMHG | DIASTOLIC BLOOD PRESSURE: 78 MMHG

## 2021-02-12 DIAGNOSIS — M70.61 TROCHANTERIC BURSITIS OF RIGHT HIP: ICD-10-CM

## 2021-02-12 DIAGNOSIS — M46.1 SACROILIITIS (HCC): Primary | ICD-10-CM

## 2021-02-12 DIAGNOSIS — M47.816 LUMBAR SPONDYLOSIS: ICD-10-CM

## 2021-02-12 PROCEDURE — 99214 OFFICE O/P EST MOD 30 MIN: CPT | Performed by: ANESTHESIOLOGY

## 2021-02-12 NOTE — PROGRESS NOTES
Assessment:  1  Sacroiliitis ([address] )    2  Trochanteric bursitis of right hip    3  Lumbar spondylosis        Plan:   the patient is currently experiencing exacerbation pain from underlying right-sided sacroiliitis and trochanteric bursitis  At this time, I discussed repeating the right-sided sacroiliac joint right-sided trochanteric bursa injection that provided significant relief for more than 6 months  He was apprised of the most common risks and would like to proceed  He will be scheduled for next week under fluoroscopic guidance  He was advised that he will need to wait at least 2 weeks after the injection to have his COVID-19 vaccine  In addition, he may be experiencing recurrence of pain from his underlying lumbar spondylosis  The ablation was performed 1 year ago and that may be wearing off  She does not get substantial relief following the injection then we will repeat the ablation  Complete risks and benefits including bleeding, infection, tissue reaction, nerve injury and allergic reaction were discussed  The approach was demonstrated using models and literature was provided  Verbal and written consent was obtained  My impressions and treatment recommendations were discussed in detail with the patient who verbalized understanding and had no further questions  Discharge instructions were provided  I personally saw and examined the patient and I agree with the above discussed plan of care  Orders Placed This Encounter   Procedures    FL spine and pain procedure     Standing Status:   Future     Standing Expiration Date:   2/12/2025     Order Specific Question:   Reason for Exam:     Answer:   Right SIJ Injection/Right Trochanteric Bursa Tenderness     Order Specific Question:   Anticoagulant hold needed? Answer:   No     No orders of the defined types were placed in this encounter        History of Present Illness:  Greg Hernandez is a 76 y o  male who presents for a follow up office visit in regards to Back Pain and Hip Pain (right hip)  The patient has a history of sacroiliitis, trochanteric bursitis and lumbar spondylosis returns for follow-up  He was last seen in May 2020 at which time he underwent right-sided sacroiliac joint and right-sided trochanteric bursa injection  Prior to that he undergone right L3-5 medial branch radiofrequency ablation in February 2020  He reports primarily right very low back and right-sided hip pain that is dull /aching / throbbing rated 6/10 on numeric rating scale and aggravated by walking and standing  I have personally reviewed and/or updated the patient's past medical history, past surgical history, family history, social history, current medications, allergies, and vital signs today  Review of Systems   Respiratory: Negative for shortness of breath  Cardiovascular: Negative for chest pain  Gastrointestinal: Negative for constipation, diarrhea, nausea and vomiting  Musculoskeletal: Positive for back pain, gait problem and joint swelling  Negative for arthralgias and myalgias  Skin: Negative for rash  Neurological: Positive for weakness  Negative for dizziness and seizures  All other systems reviewed and are negative        Patient Active Problem List   Diagnosis    Obstructive sleep apnea    Coronary artery disease involving native coronary artery    Degenerative arthritis of left knee    Essential (primary) hypertension    Glottic stenosis    Vocal cord paralysis, bilateral complete    Type 2 diabetes mellitus with peripheral neuropathy (HCC)    Mixed hyperlipidemia    Lumbar stenosis with neurogenic claudication    Lumbar spondylosis    Intervertebral disc disorder with radiculopathy of lumbar region    Sacroiliitis (HCC)    Trochanteric bursitis of right hip       Past Medical History:   Diagnosis Date    Arthritis     knees    [facility]     prostate    Coronary artery disease     two coronary stents  Diabetes mellitus ([facility] )     Hyperlipidemia 10/19/2018    Hypertension     Psoriasis     lower extremities and buttocks    Sleep apnea     [facility] 10[facility]19[facility]2018       Past Surgical History:   Procedure Laterality Date    CATARACT EXTRACTION Bilateral     with iol's    COLONOSCOPY N/A 4/5/2016    Procedure: COLONOSCOPY snare polypectomy;  Surgeon: Ann-Marie Bowens MD;  Location: [place] [address] GI LAB; Service:     INSERTION PROSTATE RADIATION SEED      REPLACEMENT TOTAL KNEE Right     TRACHEOSTOMY      x 2  up to 5 years ago    UMBILICAL HERNIA REPAIR         No family history on file  Social History     Occupational History    Not on file   Tobacco Use    Smoking status: Never Smoker    Smokeless tobacco: Never Used   Substance and Sexual Activity    Alcohol use: Yes     Alcohol/week: 4 0 standard drinks     Types: 4 Cans of beer per week     Comment: daily    Drug use: No    Sexual activity: Not on file       Current Outpatient Medications on File Prior to Visit   Medication Sig    Ascorbic Acid (VITAMIN C) 1000 MG tablet Take 1,000 mg by mouth daily    aspirin 81 MG tablet Take 81 mg by mouth daily      B-D ULTRAFINE III SHORT PEN 31G X 8 MM MISC Use daily with insulin    cholecalciferol (VITAMIN D3) 1,000 units tablet Take 2,000 Units by mouth daily     ciprofloxacin-dexamethasone (CIPRODEX) otic suspension INSTILL 4 DROPS INTO EACH EAR TWICE DAILY FOR 7 DAYS    clobetasol (OLUX) 0 05 % topical foam     clopidogrel (PLAVIX) 75 mg tablet TAKE 1 TABLET DAILY    doxycycline hyclate (VIBRAMYCIN) 100 mg capsule Take 100 mg by mouth daily     ezetimibe (ZETIA) 10 mg tablet TAKE 1 TABLET BY MOUTH TIMES A WEEK ON MONDAY WEDNESDAY AND FRIDAY AT 6PM    Farxiga 10 MG TABS TAKE 1 TABLET DAILY    fluticasone (FLONASE) 50 mcg/act nasal spray USE 4 SPRAYS DAILY (2 SPRAYS IN EACH NOSTRIL DAILY) (Patient taking differently: into each nostril as needed )    fluticasone (Flovent HFA) 220 mcg/act inhaler Inhale 2 puffs 2 (two) times a day    Glucagon, rDNA, (Glucagon Emergency) 1 MG KIT Use glucagon kit if you have a low blood sugar and unconscious    HUMALOG MIX 75/25 KWIKPEN (75-25) 100 units/mL injection pen Inject under the skin 47 units before breakfast and dinner    Insulin Lispro Prot & Lispro (Insulin Lispro Prot & Lispro) (75-25) 100 units/mL injection pen Inject 47 Units under the skin 2 (two) times a day    ipratropium-albuterol (Combivent Respimat) inhaler INHALE 1 PUFF 4 TIMES DAILY (MAXIMUM OF 6 PUFFS IN 24 HOURS)    levocetirizine (XYZAL) 5 MG tablet Take 5 mg by mouth    levocetirizine (XYZAL) 5 MG tablet Take 5 mg by mouth every evening    lisinopril (ZESTRIL) 5 mg tablet Take 5 mg by mouth daily     metFORMIN (GLUCOPHAGE-XR) 750 mg 24 hr tablet Take 750 mg by mouth 2 (two) times a day    Multiple Vitamins-Minerals (MULTIVITAMIN ADULT PO) Take by mouth    omeprazole (PriLOSEC) 20 mg delayed release capsule Take 1 capsule (20 mg total) by mouth daily    pravastatin (PRAVACHOL) 40 mg tablet Take 40 mg by mouth daily    pravastatin (PRAVACHOL) 80 mg tablet     STELARA 90 MG/ML subcutaneous injection     traZODone (DESYREL) 100 mg tablet TAKE 1 TABLET AT BEDTIME AS NEEDED FOR SLEEP    triamcinolone (KENALOG) 0 1 % cream     urea (CARMOL) 40 % Apply topically Three times a day    ustekinumab (Stelara) 90 mg/mL subcutaneous injection     VASCEPA 1 g CAPS Take by mouth daily     [DISCONTINUED] glucose blood (OneTouch Verio) test strip Use to test blood sugar 3 times a day    [DISCONTINUED] guaiFENesin-codeine (ROBITUSSIN AC) 100-10 mg/5 mL oral solution TAKE 10 ML BY MOUTH TWICE DAILY AS NEEDED FOR COUGH    [DISCONTINUED] levofloxacin (LEVAQUIN) 750 mg tablet TAKE 1 TABLET BY MOUTH ONCE DAILY FOR 7 DAYS    [DISCONTINUED] methylPREDNISolone 4 MG tablet therapy pack TAKE BY MOUTH AS DIRECTED ON INSIDE OF PACKAGE    [DISCONTINUED] traMADol (ULTRAM) 50 mg tablet Take 50 mg by mouth every 6 (six) hours as needed for moderate pain    [DISCONTINUED] traZODone (DESYREL) 100 mg tablet Take 100 mg by mouth daily at bedtime      No current facility-administered medications on file prior to visit  Allergies   Allergen Reactions    Bee Venom Shortness Of Breath    Lidocaine Shortness Of Breath     Pt reports he was tested and does not have allergy to Lidocaine       Physical Exam:    /78   Pulse 75   Wt 122 kg (270 lb)   BMI 38 74 kg/m²     Constitutional:normal, well developed, well nourished, alert, in no distress and non-toxic and no overt pain behavior   and Morbidly obese  Eyes:anicteric  HEENT:grossly intact  Neck:supple, symmetric, trachea midline and no masses   Pulmonary:even and unlabored  Cardiovascular:No edema or pitting edema present  Skin:Normal without rashes or lesions and well hydrated  Psychiatric:Mood and affect appropriate  Neurologic:Cranial Nerves II-XII grossly intact  Musculoskeletal:antalgic     Lumbar Spine Exam  Appearance:  Normal lordosis  Palpation/Tenderness:  right lumbar paraspinal tenderness  right sacroiliac joint tenderness  Right trochanteric bursa tenderness  Range of Motion:  Flexion:  Minimally limited  with pain  Extension:  Minimally limited  with pain  Lateral Flexion - Left:  Minimally limited  with pain  Lateral Flexion - Right:  Minimally limited  with pain  Motor Strength:  Left hip flexion:  5/5  Left hip extension:  5/5  Right hip flexion:  5/5  Right hip extension:  5/5  Left knee flexion:  5/5  Left knee extension:  5/5  Right knee flexion:  5/5  Right knee extension:  5/5  Left foot dorsiflexion:  4/5  Left foot plantar flexion:  5/5  Right foot dorsiflexion:  5/5  Right foot plantar flexion:  5/5    Imaging    MRI LUMBAR SPINE WITHOUT CONTRAST (11/13/2018)     INDICATION: [id number]: Spinal stenosis, lumbar region with neurogenic claudication      COMPARISON:  11/11/2014      TECHNIQUE:  Sagittal T1, sagittal T2, sagittal inversion recovery, axial T1 and axial T2, coronal T2        IMAGE QUALITY:  Diagnostic     FINDINGS:     ALIGNMENT:  Normal alignment of the lumbar spine  No compression fracture  No spondylolysis or spondylolisthesis  No scoliosis      MARROW SIGNAL:  Small L2 hemangioma is are noted within the vertebral body anteriorly      DISTAL CORD AND CONUS:  Normal size and signal within the distal cord and conus  The conus ends at the L1-L2 level      PARASPINAL SOFT TISSUES:  Paraspinal soft tissues are unremarkable      SACRUM:  Normal signal within the sacrum  No evidence of insufficiency or stress fracture      LOWER THORACIC DISC SPACES:  Normal disc height and signal   No disc herniation, canal stenosis or foraminal narrowing      LUMBAR DISC SPACES:     L1-L2:  Loss of disc height  Mild annular bulging  There is a small slightly superiorly extruded central disc herniation  Mild canal stenosis without foraminal nerve impingement  No change      L2-L3:  Normal disc height and signal   No disc herniation, canal stenosis or foraminal narrowing      L3-L4:  Mild diffuse annular bulging  There is mild to moderate facet hypertrophic degenerative change with trace facet effusions  There is a small synovial cyst arising from the medial aspect of the left facet joint, series 6 image 13  Slight canal   stenosis and mild foraminal narrowing, stable      L4-L5:  Diffuse annular bulging  There is a broad-based left foraminal disc protrusion with left greater than right facet hypertrophic degenerative change  Slight worsening of canal stenosis with distortion of the left anterolateral aspect of the   thecal sac  There is stable moderate bilateral foraminal narrowing      L5-S1:  Broad-based central and left paracentral disc protrusion abutting the ventral aspect of the thecal sac  Mild left greater than right facet arthropathy  There is mild canal stenosis with mild left foraminal narrowing, unchanged

## 2021-02-15 ENCOUNTER — TELEPHONE (OUTPATIENT)
Dept: PAIN MEDICINE | Facility: CLINIC | Age: 76
End: 2021-02-15

## 2021-02-15 NOTE — TELEPHONE ENCOUNTER
Patient is aware he is on the list and he will get a call back to  a procedure soon       Thank you    [phone]

## 2021-02-16 ENCOUNTER — HOSPITAL ENCOUNTER (OUTPATIENT)
Dept: RADIOLOGY | Facility: CLINIC | Age: 76
Discharge: HOME | End: 2021-02-16
Attending: ANESTHESIOLOGY
Payer: MEDICARE

## 2021-02-16 VITALS
SYSTOLIC BLOOD PRESSURE: 137 MMHG | RESPIRATION RATE: 20 BRPM | DIASTOLIC BLOOD PRESSURE: 75 MMHG | OXYGEN SATURATION: 94 % | TEMPERATURE: 97.3 F | HEART RATE: 69 BPM

## 2021-02-16 DIAGNOSIS — M70.61 TROCHANTERIC BURSITIS OF RIGHT HIP: ICD-10-CM

## 2021-02-16 DIAGNOSIS — M46.1 SACROILIITIS (HCC): ICD-10-CM

## 2021-02-16 PROCEDURE — 77002 NEEDLE LOCALIZATION BY XRAY: CPT | Performed by: ANESTHESIOLOGY

## 2021-02-16 PROCEDURE — 20610 DRAIN/INJ JOINT/BURSA W/O US: CPT | Performed by: ANESTHESIOLOGY

## 2021-02-16 PROCEDURE — 27096 INJECT SACROILIAC JOINT: CPT | Performed by: ANESTHESIOLOGY

## 2021-02-16 RX ORDER — 0.9 % SODIUM CHLORIDE 0.9 %
10 VIAL (ML) INJECTION ONCE
Status: COMPLETED | OUTPATIENT
Start: 2021-02-16 | End: 2021-02-16

## 2021-02-16 RX ORDER — METHYLPREDNISOLONE ACETATE 80 MG/ML
80 INJECTION, SUSPENSION INTRA-ARTICULAR; INTRALESIONAL; INTRAMUSCULAR; PARENTERAL; SOFT TISSUE ONCE
Status: COMPLETED | OUTPATIENT
Start: 2021-02-16 | End: 2021-02-16

## 2021-02-16 RX ORDER — BUPIVACAINE HCL/PF 2.5 MG/ML
10 VIAL (ML) INJECTION ONCE
Status: COMPLETED | OUTPATIENT
Start: 2021-02-16 | End: 2021-02-16

## 2021-02-16 RX ADMIN — SODIUM CHLORIDE 5 ML: 9 INJECTION, SOLUTION INTRAMUSCULAR; INTRAVENOUS; SUBCUTANEOUS at 10:51

## 2021-02-16 RX ADMIN — IOHEXOL 1 ML: 300 INJECTION, SOLUTION INTRAVENOUS at 10:52

## 2021-02-16 RX ADMIN — METHYLPREDNISOLONE ACETATE 80 MG: 80 INJECTION, SUSPENSION INTRA-ARTICULAR; INTRALESIONAL; INTRAMUSCULAR; PARENTERAL; SOFT TISSUE at 10:52

## 2021-02-16 RX ADMIN — Medication 5 ML: at 10:51

## 2021-02-16 RX ADMIN — BUPIVACAINE HYDROCHLORIDE 7 ML: 2.5 INJECTION, SOLUTION EPIDURAL; INFILTRATION; INTRACAUDAL at 10:52

## 2021-02-16 NOTE — H&P
History of Present Illness: The patient is a 76 y o  male who presents with complaints of Right lower back and hip pain secondary to sacroiliitis and trochanteric bursitis and is here today for right-sided sacroiliac joint and right-sided trochanteric bursa injection  Patient Active Problem List   Diagnosis    Obstructive sleep apnea    Coronary artery disease involving native coronary artery    Degenerative arthritis of left knee    Essential (primary) hypertension    Glottic stenosis    Vocal cord paralysis, bilateral complete    Type 2 diabetes mellitus with peripheral neuropathy (HCC)    Mixed hyperlipidemia    Lumbar stenosis with neurogenic claudication    Lumbar spondylosis    Intervertebral disc disorder with radiculopathy of lumbar region    Sacroiliitis (HCC)    Trochanteric bursitis of right hip       Past Medical History:   Diagnosis Date    Arthritis     knees    Cancer ([facility] [address] )     prostate    Coronary artery disease     two coronary stents    Diabetes mellitus ([facility] [address] )     Hyperlipidemia 10/19/2018    Hypertension     Psoriasis     lower extremities and buttocks    Sleep apnea     Type 2 diabetes mellitus ([facility] [address] ) 10/19/2018       Past Surgical History:   Procedure Laterality Date    CATARACT EXTRACTION Bilateral     with iol's    COLONOSCOPY N/A 4/5/2016    Procedure: COLONOSCOPY snare polypectomy;  Surgeon: Velia Ennis MD;  Location: [place] [address] GI LAB; Service:     INSERTION PROSTATE RADIATION SEED      REPLACEMENT TOTAL KNEE Right     TRACHEOSTOMY      x 2  up to 5 years ago    UMBILICAL HERNIA REPAIR           Current Outpatient Medications:     Ascorbic Acid (VITAMIN C) 1000 MG tablet, Take 1,000 mg by mouth daily, Disp: , Rfl:     aspirin 81 MG tablet, Take 81 mg by mouth daily  , Disp: , Rfl:     B-D ULTRAFINE III SHORT PEN 31G X 8 MM MISC, Use daily with insulin, Disp: 100 each, Rfl: 3    cholecalciferol (VITAMIN D3) 1,000 units tablet, Take 2,000 Units by mouth daily , Disp: , Rfl:     ciprofloxacin-dexamethasone (CIPRODEX) otic suspension, INSTILL 4 DROPS INTO EACH EAR TWICE DAILY FOR 7 DAYS, Disp: , Rfl:     clobetasol (OLUX) 0 05 % topical foam, , Disp: , Rfl:     clopidogrel (PLAVIX) 75 mg tablet, TAKE 1 TABLET DAILY, Disp: , Rfl:     doxycycline hyclate (VIBRAMYCIN) 100 mg capsule, Take 100 mg by mouth daily , Disp: , Rfl:     ezetimibe (ZETIA) 10 mg tablet, TAKE 1 TABLET BY MOUTH TIMES A WEEK ON MONDAY WEDNESDAY AND FRIDAY AT 6PM, Disp: , Rfl:     Farxiga 10 MG TABS, TAKE 1 TABLET DAILY, Disp: 90 tablet, Rfl: 0    fluticasone (FLONASE) 50 mcg/act nasal spray, USE 4 SPRAYS DAILY (2 SPRAYS IN EACH NOSTRIL DAILY) (Patient taking differently: into each nostril as needed ), Disp: 48 g, Rfl: 4    fluticasone (Flovent HFA) 220 mcg/act inhaler, Inhale 2 puffs 2 (two) times a day, Disp: , Rfl:     Glucagon, rDNA, (Glucagon Emergency) 1 MG KIT, Use glucagon kit if you have a low blood sugar and unconscious, Disp: 1 each, Rfl: 0    HUMALOG MIX 75/25 KWIKPEN (75-25) 100 units/mL injection pen, Inject under the skin 47 units before breakfast and dinner, Disp: 30 pen, Rfl: 3    Insulin Lispro Prot & Lispro (Insulin Lispro Prot & Lispro) (75-25) 100 units/mL injection pen, Inject 47 Units under the skin 2 (two) times a day, Disp: , Rfl:     ipratropium-albuterol (Combivent Respimat) inhaler, INHALE 1 PUFF 4 TIMES DAILY (MAXIMUM OF 6 PUFFS IN 24 HOURS), Disp: , Rfl:     levocetirizine (XYZAL) 5 MG tablet, Take 5 mg by mouth, Disp: , Rfl:     levocetirizine (XYZAL) 5 MG tablet, Take 5 mg by mouth every evening, Disp: , Rfl:     lisinopril (ZESTRIL) 5 mg tablet, Take 5 mg by mouth daily , Disp: , Rfl:     metFORMIN (GLUCOPHAGE-XR) 750 mg 24 hr tablet, Take 750 mg by mouth 2 (two) times a day, Disp: 180 tablet, Rfl: 0    Multiple Vitamins-Minerals (MULTIVITAMIN ADULT PO), Take by mouth, Disp: , Rfl:     omeprazole (PriLOSEC) 20 mg delayed release capsule, Take 1 capsule (20 mg total) by mouth daily, Disp: 30 capsule, Rfl: 11    pravastatin (PRAVACHOL) 40 mg tablet, Take 40 mg by mouth daily, Disp: , Rfl:     pravastatin (PRAVACHOL) 80 mg tablet, , Disp: , Rfl:     STELARA 90 MG/ML subcutaneous injection, , Disp: , Rfl:     traZODone (DESYREL) 100 mg tablet, TAKE 1 TABLET AT BEDTIME AS NEEDED FOR SLEEP, Disp: , Rfl:     triamcinolone (KENALOG) 0 1 % cream, , Disp: , Rfl:     urea (CARMOL) 40 %, Apply topically Three times a day, Disp: , Rfl:     ustekinumab (Stelara) 90 mg/mL subcutaneous injection, , Disp: , Rfl:     VASCEPA 1 g CAPS, Take by mouth daily , Disp: , Rfl:     Current Facility-Administered Medications:     bupivacaine (PF) (MARCAINE) 0 25 % injection 10 mL, 10 mL, Intra-articular, Once, Jj Morales MD    iohexol (OMNIPAQUE) 300 mg/mL injection 50 mL, 50 mL, Intra-articular, Once, Jj Morales MD    lidocaine (PF) (XYLOCAINE-MPF) 2 % injection 5 mL, 5 mL, Infiltration, Once, Jj Morales MD    methylPREDNISolone acetate (DEPO-MEDROL) injection 80 mg, 80 mg, Intra-articular, Once, Jj Morales MD    sodium chloride (PF) 0 9 % injection 10 mL, 10 mL, Infiltration, Once, Jj Morales MD    Allergies   Allergen Reactions    Bee Venom Shortness Of Breath    Lidocaine Shortness Of Breath     Pt reports he was tested and does not have allergy to Lidocaine       Physical Exam:   Vitals:    02/16/21 1031   BP: 146/76   Pulse: 70   Resp: 20   Temp: (!) 97 3 °F (36 3 °C)   SpO2: 94%     General: Awake, Alert, Oriented x 3, Mood and affect appropriate  Respiratory: Respirations even and unlabored  Cardiovascular: Peripheral pulses intact; no edema  Musculoskeletal Exam:   Right lower back tenderness    ASA Score: 3    Patient/Chart Verification  Patient ID Verified: Verbal  Consents Confirmed: To be obtained in the Pre-Procedure area  H&P( within 30 days) Verified:  To be obtained in the Pre-Procedure area  Allergies Reviewed: Yes  Anticoag/NSAID held?: NA  Currently on antibiotics?: No    Assessment:   1  Sacroiliitis ([address] )    2   Trochanteric bursitis of right hip        Plan: Right SIJ Injection/Right Trochanteric Bursa Injection

## 2021-02-16 NOTE — DISCHARGE INSTR - LAB
Steroid Joint Injection   WHAT YOU NEED TO KNOW:   A steroid joint injection is a procedure to inject steroid medicine into a joint  Steroid medicine decreases pain and inflammation  The injection may also contain an anesthetic (numbing medicine) to decrease pain  It may be done to treat conditions such as arthritis, gout, or carpal tunnel syndrome  The injections may be given in your knee, ankle, shoulder, elbow, wrist, ankle or sacroiliac joint  1  Do not apply heat to any area that is numb  If you have discomfort or soreness at the injection site, you may apply ice today, 20 minutes on and 20 minutes off  Tomorrow you may use ice or warm, moist heat  Do not apply ice or heat directly to the skin  2  You may have an increase or change in the discomfort for 36-48 hours after your treatment  Apply ice and continue with any pain medicine you have been prescribed  3  Do not do anything strenuous today  You may shower, but no tub baths or hot tubs today  You may resume your normal activities tomorrow, but do not overdo it  Resume normal activities slowly when you are feeling better  4  If you experience redness, drainage or swelling at the injection site, or if you develop a fever above 100 degrees, please call [facility] at [phone] or go to the Emergency Room  5  Continue to take all routine medicines prescribed by your primary care physician unless otherwise instructed by our staff  Most blood thinners should be started again according to your regularly scheduled dosing  If you have any questions, please give our office a call  If you have a problem specifically related to your procedure, please call our office at [phone]  Problems not related to your procedure should be directed to your primary care physician

## 2021-02-16 NOTE — TELEPHONE ENCOUNTER
Scheduled pt for Rt SIJ & Rt Trochanteric Bursa Injection for 2/16/21  Went over pre-procedure instructions below:  Nothing to eat or drink 1 hr prior to procedure  Need to arrange transportation  Proper clothing for procedure  If ill or placed on antibiotics please call to reschedule  Covid/travel/ and vaccine instructions

## 2021-02-23 ENCOUNTER — TELEPHONE (OUTPATIENT)
Dept: PAIN MEDICINE | Facility: CLINIC | Age: 76
End: 2021-02-23

## 2021-03-11 ENCOUNTER — TRANSCRIBE ORDERS (OUTPATIENT)
Dept: ADMINISTRATIVE | Facility: HOSPITAL | Age: 76
End: 2021-03-11

## 2021-03-11 DIAGNOSIS — C61 PROSTATE CANCER (HCC): Primary | ICD-10-CM

## 2021-03-19 ENCOUNTER — HOSPITAL ENCOUNTER (OUTPATIENT)
Dept: RADIOLOGY | Facility: HOSPITAL | Age: 76
Discharge: HOME | End: 2021-03-19
Attending: UROLOGY
Payer: MEDICARE

## 2021-03-19 DIAGNOSIS — C61 PROSTATE CANCER (HCC): ICD-10-CM

## 2021-03-19 PROCEDURE — G1004 CDSM NDSC: HCPCS

## 2021-03-19 PROCEDURE — A9503 TC99M MEDRONATE: HCPCS

## 2021-03-19 PROCEDURE — 78306 BONE IMAGING WHOLE BODY: CPT

## 2021-03-28 DIAGNOSIS — N18.2 TYPE 2 DIABETES MELLITUS WITH STAGE 2 CHRONIC KIDNEY DISEASE, WITH LONG-TERM CURRENT USE OF INSULIN (HCC): ICD-10-CM

## 2021-03-28 DIAGNOSIS — E11.22 TYPE 2 DIABETES MELLITUS WITH STAGE 2 CHRONIC KIDNEY DISEASE, WITH LONG-TERM CURRENT USE OF INSULIN (HCC): ICD-10-CM

## 2021-03-28 DIAGNOSIS — Z79.4 TYPE 2 DIABETES MELLITUS WITH STAGE 2 CHRONIC KIDNEY DISEASE, WITH LONG-TERM CURRENT USE OF INSULIN (HCC): ICD-10-CM

## 2021-03-29 DIAGNOSIS — I10 HYPERTENSION GOAL BP (BLOOD PRESSURE) < 140/90: ICD-10-CM

## 2021-03-29 DIAGNOSIS — Z79.4 TYPE 2 DIABETES MELLITUS WITH STAGE 2 CHRONIC KIDNEY DISEASE, WITH LONG-TERM CURRENT USE OF INSULIN (HCC): ICD-10-CM

## 2021-03-29 DIAGNOSIS — E11.22 TYPE 2 DIABETES MELLITUS WITH STAGE 2 CHRONIC KIDNEY DISEASE, WITH LONG-TERM CURRENT USE OF INSULIN (HCC): ICD-10-CM

## 2021-03-29 DIAGNOSIS — E78.2 MIXED HYPERLIPIDEMIA: ICD-10-CM

## 2021-03-29 DIAGNOSIS — N18.2 TYPE 2 DIABETES MELLITUS WITH STAGE 2 CHRONIC KIDNEY DISEASE, WITH LONG-TERM CURRENT USE OF INSULIN (HCC): ICD-10-CM

## 2021-03-29 RX ORDER — DAPAGLIFLOZIN 10 MG/1
TABLET, FILM COATED ORAL
Qty: 90 TABLET | Refills: 3 | Status: SHIPPED | OUTPATIENT
Start: 2021-03-29 | End: 2022-03-24

## 2021-03-29 RX ORDER — PEN NEEDLE, DIABETIC 31 GX5/16"
NEEDLE, DISPOSABLE MISCELLANEOUS
Qty: 200 EACH | Refills: 3 | Status: SHIPPED | OUTPATIENT
Start: 2021-03-29 | End: 2022-04-04

## 2021-03-30 LAB
ALBUMIN SERPL-MCNC: 4.4 G/DL (ref 3.7–4.7)
ALBUMIN/GLOB SERPL: 1.6 {RATIO} (ref 1.2–2.2)
ALP SERPL-CCNC: 64 IU/L (ref 39–117)
ALT SERPL-CCNC: 32 IU/L (ref 0–44)
AST SERPL-CCNC: 27 IU/L (ref 0–40)
BILIRUB SERPL-MCNC: 0.3 MG/DL (ref 0–1.2)
BUN SERPL-MCNC: 14 MG/DL (ref 8–27)
BUN/CREAT SERPL: 15 (ref 10–24)
CALCIUM SERPL-MCNC: 9.5 MG/DL (ref 8.6–10.2)
CHLORIDE SERPL-SCNC: 99 MMOL/L (ref 96–106)
CHOLEST SERPL-MCNC: 164 MG/DL (ref 100–199)
CO2 SERPL-SCNC: 25 MMOL/L (ref 20–29)
CREAT SERPL-MCNC: 0.95 MG/DL (ref 0.76–1.27)
FRUCTOSAMINE SERPL-SCNC: 253 UMOL/L (ref 0–285)
GLOBULIN SER-MCNC: 2.8 G/DL (ref 1.5–4.5)
GLUCOSE SERPL-MCNC: 145 MG/DL (ref 65–99)
HBA1C MFR BLD: 7.8 % (ref 4.8–5.6)
HDLC SERPL-MCNC: 65 MG/DL
LDLC SERPL CALC-MCNC: 74 MG/DL (ref 0–99)
POTASSIUM SERPL-SCNC: 4.4 MMOL/L (ref 3.5–5.2)
PROT SERPL-MCNC: 7.2 G/DL (ref 6–8.5)
SL AMB EGFR AFRICAN AMERICAN: 90 ML/MIN/1.73
SL AMB EGFR NON AFRICAN AMERICAN: 78 ML/MIN/1.73
SL AMB VLDL CHOLESTEROL CALC: 25 MG/DL (ref 5–40)
SODIUM SERPL-SCNC: 137 MMOL/L (ref 134–144)
TRIGL SERPL-MCNC: 148 MG/DL (ref 0–149)

## 2021-03-31 DIAGNOSIS — N18.2 TYPE 2 DIABETES MELLITUS WITH STAGE 2 CHRONIC KIDNEY DISEASE, WITH LONG-TERM CURRENT USE OF INSULIN (HCC): ICD-10-CM

## 2021-03-31 DIAGNOSIS — E11.22 TYPE 2 DIABETES MELLITUS WITH STAGE 2 CHRONIC KIDNEY DISEASE, WITH LONG-TERM CURRENT USE OF INSULIN (HCC): ICD-10-CM

## 2021-03-31 DIAGNOSIS — Z79.4 TYPE 2 DIABETES MELLITUS WITH STAGE 2 CHRONIC KIDNEY DISEASE, WITH LONG-TERM CURRENT USE OF INSULIN (HCC): ICD-10-CM

## 2021-03-31 RX ORDER — INSULIN LISPRO 100 [IU]/ML
INJECTION, SUSPENSION SUBCUTANEOUS
Qty: 90 ML | Refills: 2 | Status: SHIPPED | OUTPATIENT
Start: 2021-03-31 | End: 2022-01-13

## 2021-04-03 DIAGNOSIS — Z79.4 TYPE 2 DIABETES MELLITUS WITH STAGE 2 CHRONIC KIDNEY DISEASE, WITH LONG-TERM CURRENT USE OF INSULIN (HCC): ICD-10-CM

## 2021-04-03 DIAGNOSIS — N18.2 TYPE 2 DIABETES MELLITUS WITH STAGE 2 CHRONIC KIDNEY DISEASE, WITH LONG-TERM CURRENT USE OF INSULIN (HCC): ICD-10-CM

## 2021-04-03 DIAGNOSIS — E11.22 TYPE 2 DIABETES MELLITUS WITH STAGE 2 CHRONIC KIDNEY DISEASE, WITH LONG-TERM CURRENT USE OF INSULIN (HCC): ICD-10-CM

## 2021-04-05 RX ORDER — METFORMIN HYDROCHLORIDE 750 MG/1
TABLET, EXTENDED RELEASE ORAL
Qty: 180 TABLET | Refills: 3 | Status: SHIPPED | OUTPATIENT
Start: 2021-04-05 | End: 2022-04-01

## 2021-04-05 RX ORDER — GUSELKUMAB 100 MG/ML
INJECTION SUBCUTANEOUS
COMMUNITY
Start: 2021-03-18

## 2021-04-09 ENCOUNTER — OFFICE VISIT (OUTPATIENT)
Dept: ENDOCRINOLOGY | Facility: CLINIC | Age: 76
End: 2021-04-09
Payer: MEDICARE

## 2021-04-09 VITALS
DIASTOLIC BLOOD PRESSURE: 76 MMHG | HEART RATE: 61 BPM | TEMPERATURE: 99 F | BODY MASS INDEX: 39.37 KG/M2 | HEIGHT: 70 IN | WEIGHT: 275 LBS | SYSTOLIC BLOOD PRESSURE: 140 MMHG

## 2021-04-09 DIAGNOSIS — I25.10 CORONARY ARTERY DISEASE INVOLVING NATIVE CORONARY ARTERY OF NATIVE HEART WITHOUT ANGINA PECTORIS: ICD-10-CM

## 2021-04-09 DIAGNOSIS — E11.22 TYPE 2 DIABETES MELLITUS WITH STAGE 2 CHRONIC KIDNEY DISEASE, WITH LONG-TERM CURRENT USE OF INSULIN (HCC): Primary | ICD-10-CM

## 2021-04-09 DIAGNOSIS — E55.9 VITAMIN D DEFICIENCY: ICD-10-CM

## 2021-04-09 DIAGNOSIS — I10 ESSENTIAL (PRIMARY) HYPERTENSION: ICD-10-CM

## 2021-04-09 DIAGNOSIS — E78.2 MIXED HYPERLIPIDEMIA: ICD-10-CM

## 2021-04-09 DIAGNOSIS — Z79.4 TYPE 2 DIABETES MELLITUS WITH STAGE 2 CHRONIC KIDNEY DISEASE, WITH LONG-TERM CURRENT USE OF INSULIN (HCC): Primary | ICD-10-CM

## 2021-04-09 DIAGNOSIS — R25.2 CRAMPS OF LOWER EXTREMITY: ICD-10-CM

## 2021-04-09 DIAGNOSIS — N18.2 TYPE 2 DIABETES MELLITUS WITH STAGE 2 CHRONIC KIDNEY DISEASE, WITH LONG-TERM CURRENT USE OF INSULIN (HCC): Primary | ICD-10-CM

## 2021-04-09 PROCEDURE — 99215 OFFICE O/P EST HI 40 MIN: CPT | Performed by: INTERNAL MEDICINE

## 2021-04-09 NOTE — PATIENT INSTRUCTIONS
Decrease insulin 75/25 to 45 units at breakfast, continue 47 units with dinner   continue farxiga and metformin at current dose   Check Blood sugars at lunch   Send log for review in the next 2-3 weeks     Follow up in 3 months with repeat labs

## 2021-04-09 NOTE — PROGRESS NOTES
Nadeen Casey 76 y o  male MRN: [id number]    Encounter: [id number]      Assessment/Plan     1  Type 2 diabetes mellitus long-term insulin therapy   Well-controlled based on fructosamine level of 253  which corresponds to a1c of 6% and review of blood sugar log measured A1c 7 8%   occasionally tight blood sugars at dinner    Recommend the following at this time  Decrease insulin 75/25 to 45 units at breakfast, continue 47 units with dinner   continue farxiga and metformin at current dose   Check Blood sugars at lunch   Send log for review in the next 2-3 weeks   -  Repeat A1c, BMP, fructosamine level in 3 months    2  Hyperlipidemia -  At goal   LDL 74, total cholesterol 164  - continue statin therapy   repeat fasting lipid panel in 6 months    3  Hypertension  Blood pressure close to goal  - continue  Current medications     4  Vitamin-D deficiency- continue supplementation   Check vitamin-D level     5  History of CAD-follow up with Cardiology    CC: Diabetes    History of Present Illness     HPI:  Nadeen Casey is a 76 y o  male presents for a follow-up visit regarding diabetes management  Met Dr Gregorio Edmonds in 2021    DM history:   Diagnosed approx 30 years ago    Has complications of CAD  No CVA/CKD  Last Eye exam: has DR and following up with retina specialists    Current regimen:   Insulin 75/25 47 units before breakfast, dinner  Farxiga 10 mg orally daily   Metformin  mg twice a day       Statin: Zetia, pravastatin, fish oil  ACE-I/ARB:  lisinopril    Appetite is good  Denies recent weight gain/ loss  Denies numbness or tingling in the hands or feet  Gets occasional cramps in the leg   Denies changes in vision  No known retinopathy  No chest pain  Has SOB from vocal cord injury  No diarrhea/ constipation  No urinary complaints  Exercise:  walking    Home glucose monitorin-2 times a day, does not check beofre lunch     105-149 mg/dl   Had a low BG to 60 mg/dl - not often, once a month or less frequently  Usually overnight   Symptoms of hypoglycemia :  Queasy feeling in the stomach, sweating   Taking vitamin D supplements      All other systems were reviewed and are negative  Review of Systems      Historical Information   Past Medical History:   Diagnosis Date    Arthritis     knees    Cancer [facility])     prostate    Coronary artery disease     two coronary stents    Diabetes mellitus ([facility] [address] )     Hyperlipidemia 10/19/2018    Hypertension     Psoriasis     lower extremities and buttocks    Sleep apnea     [facility] 10/19/2018     Past Surgical History:   Procedure Laterality Date    CATARACT EXTRACTION Bilateral     with iol's    COLONOSCOPY N/A 4/5/2016    Procedure: COLONOSCOPY snare polypectomy;  Surgeon: Geno Machuca MD;  Location: [facility]; Service:     INSERTION PROSTATE RADIATION SEED      REPLACEMENT TOTAL KNEE Right     TRACHEOSTOMY      x 2  up to 5 years ago    UMBILICAL HERNIA REPAIR       Social History   Social History     Substance and Sexual Activity   Alcohol Use Yes    Alcohol/week: 4 0 standard drinks    Types: 4 Cans of beer per week    Comment: daily     Social History     Substance and Sexual Activity   Drug Use No     Social History     Tobacco Use   Smoking Status Never Smoker   Smokeless Tobacco Never Used     Family History: No family history on file  Meds/Allergies   Current Outpatient Medications   Medication Sig Dispense Refill    Ascorbic Acid (VITAMIN C) 1000 MG tablet Take 1,000 mg by mouth daily      aspirin 81 MG tablet Take 81 mg by mouth daily        B-D ULTRAFINE III SHORT PEN 31G X 8 MM MISC Use with insulin twice a day 200 each 3    cholecalciferol (VITAMIN D3) 1,000 units tablet Take 2,000 Units by mouth daily       ciprofloxacin-dexamethasone (CIPRODEX) otic suspension INSTILL 4 DROPS INTO EACH EAR TWICE DAILY FOR 7 DAYS      clobetasol (OLUX) 0 05 % topical foam       clopidogrel (PLAVIX) 75 mg tablet TAKE 1 TABLET DAILY      doxycycline hyclate (VIBRAMYCIN) 100 mg capsule Take 100 mg by mouth daily       ezetimibe (ZETIA) 10 mg tablet TAKE 1 TABLET BY MOUTH TIMES A WEEK ON MONDAY WEDNESDAY AND FRIDAY AT 6PM      Farxiga 10 MG TABS TAKE 1 TABLET DAILY 90 tablet 3    fluticasone (FLONASE) 50 mcg/act nasal spray USE 4 SPRAYS DAILY (2 SPRAYS IN EACH NOSTRIL DAILY) 48 g 3    fluticasone (Flovent HFA) 220 mcg/act inhaler Inhale 2 puffs 2 (two) times a day      Glucagon, rDNA, (Glucagon Emergency) 1 MG KIT Use glucagon kit if you have a low blood sugar and unconscious 1 each 0    HumaLOG Mix 75/25 KwikPen (75-25) 100 units/mL injection pen INJECT 47 UNITS UNDER THE SKIN BEFORE BREAKFAST AND DINNER 90 mL 2    Insulin Lispro Prot & Lispro (Insulin Lispro Prot & Lispro) (75-25) 100 units/mL injection pen Inject 47 Units under the skin 2 (two) times a day      ipratropium-albuterol (Combivent Respimat) inhaler INHALE 1 PUFF 4 TIMES DAILY (MAXIMUM OF 6 PUFFS IN 24 HOURS)      levocetirizine (XYZAL) 5 MG tablet Take 5 mg by mouth      levocetirizine (XYZAL) 5 MG tablet Take 5 mg by mouth every evening      lisinopril (ZESTRIL) 5 mg tablet Take 5 mg by mouth daily       metFORMIN (GLUCOPHAGE-XR) 750 mg 24 hr tablet TAKE 1 TABLET TWICE A  tablet 3    Multiple Vitamins-Minerals (MULTIVITAMIN ADULT PO) Take by mouth      omeprazole (PriLOSEC) 20 mg delayed release capsule Take 1 capsule (20 mg total) by mouth daily 30 capsule 11    pravastatin (PRAVACHOL) 40 mg tablet Take 40 mg by mouth daily      pravastatin (PRAVACHOL) 80 mg tablet       STELARA 90 MG/ML subcutaneous injection       traZODone (DESYREL) 100 mg tablet TAKE 1 TABLET AT BEDTIME AS NEEDED FOR SLEEP      Tremfya 100 MG/ML SOSY       triamcinolone (KENALOG) 0 1 % cream       urea (CARMOL) 40 % Apply topically Three times a day      ustekinumab (Stelara) 90 mg/mL subcutaneous injection       VASCEPA 1 g CAPS Take by mouth daily No current facility-administered medications for this visit  Allergies   Allergen Reactions    Bee Venom Shortness Of Breath    Lidocaine Shortness Of Breath     Pt reports he was tested and does not have allergy to Lidocaine       Objective   Vitals: There were no vitals taken for this visit  Physical Exam  Constitutional:       General: He is not in acute distress  Appearance: He is well-developed  He is not diaphoretic  HENT:      Head: Normocephalic and atraumatic  Eyes:      Conjunctiva/sclera: Conjunctivae normal       Pupils: Pupils are equal, round, and reactive to light  Neck:      Musculoskeletal: Normal range of motion and neck supple  Cardiovascular:      Rate and Rhythm: Normal rate and regular rhythm  Heart sounds: Normal heart sounds  No murmur  Pulmonary:      Effort: Pulmonary effort is normal  No respiratory distress  Breath sounds: Normal breath sounds  No wheezing  Abdominal:      General: There is no distension  Palpations: Abdomen is soft  Tenderness: There is no abdominal tenderness  There is no guarding  Musculoskeletal:      Comments: Trace edema bilaterally   Skin:     General: Skin is warm and dry  Findings: No erythema or rash  Neurological:      Mental Status: He is alert and oriented to person, place, and time  Psychiatric:         Behavior: Behavior normal          Thought Content: Thought content normal          The history was obtained from the review of the chart, patient      Lab Results:   Lab Results   Component Value Date/Time    Hemoglobin A1C 7 8 (H) 03/29/2021 07:26 AM    Hemoglobin A1C 8 4 (H) 12/22/2020 07:10 AM    Hemoglobin A1C 7 4 (H) 07/17/2020 08:48 AM    BUN 14 03/29/2021 07:26 AM    BUN 17 12/22/2020 07:10 AM    BUN 15 07/17/2020 08:48 AM    Potassium 4 4 03/29/2021 07:26 AM    Potassium 4 6 12/22/2020 07:10 AM    Potassium 4 6 07/17/2020 08:48 AM    Chloride 99 03/29/2021 07:26 AM    Chloride 100 12/22/2020 07:10 AM    Chloride 102 07/17/2020 08:48 AM    CO2 25 03/29/2021 07:26 AM    CO2 24 12/22/2020 07:10 AM    CO2 25 07/17/2020 08:48 AM    Creatinine 0 95 03/29/2021 07:26 AM    Creatinine 1 16 12/22/2020 07:10 AM    Creatinine 0 99 07/17/2020 08:48 AM    AST 27 03/29/2021 07:26 AM    AST 24 05/08/2020 07:11 AM    ALT 32 03/29/2021 07:26 AM    ALT 27 05/08/2020 07:11 AM    Albumin 4 4 03/29/2021 07:26 AM    Albumin 4 4 05/08/2020 07:11 AM    Globulin, Total 2 8 03/29/2021 07:26 AM    Globulin, Total 2 6 05/08/2020 07:11 AM    HDL 65 03/29/2021 07:26 AM    HDL 46 12/22/2020 07:10 AM    HDL 42 07/17/2020 08:48 AM    Triglycerides 148 03/29/2021 07:26 AM    Triglycerides 136 12/22/2020 07:10 AM    Triglycerides 135 07/17/2020 08:48 AM     Component      Latest Ref Rng & Units 7/17/2020 12/22/2020 3/29/2021   Hemoglobin A1C      4 8 - 5 6 % 7 4 (H) 8 4 (H) 7 8 (H)   eAG, EST AVG Glucose      mg/dL 166 194    FRUCTOSAMINE      0 - 285 umol/L   253       Imaging Studies: I have personally reviewed pertinent reports  Portions of the record may have been created with voice recognition software  Occasional wrong word or "sound a like" substitutions may have occurred due to the inherent limitations of voice recognition software  Read the chart carefully and recognize, using context, where substitutions have occurred

## 2021-05-17 ENCOUNTER — TELEPHONE (OUTPATIENT)
Dept: PAIN MEDICINE | Facility: CLINIC | Age: 76
End: 2021-05-17

## 2021-05-18 NOTE — TELEPHONE ENCOUNTER
S/w pt, states he is having pain in BL hips, legs, worse on right side  Pt states pain is the same as what he had experienced prior to R troch bursa, R SIJ inj on 2/16/21  Pt reports he did receive relief from these injections and would like to know if repeating is a possibility  Please advise, thank you

## 2021-05-19 NOTE — TELEPHONE ENCOUNTER
Scheduled pt for Rt SIJ and Rt Trochanteric bursa injection for 7/13/21  Pt is having another injection withanotheer provider on 5/23/21 so asked to space them out since last time he "blew a blood vessel"  Pt had J&J vaccine in April  Went over pre-procedure instructions below:  Nothing to eat or drink 1 hr prior to procedure  Need to arrange transportation  Proper clothing for procedure  If ill or placed on antibiotics please call to reschedule  Covid/travel/ and vaccine instructions

## 2021-06-07 ENCOUNTER — TRANSCRIBE ORDERS (OUTPATIENT)
Dept: ADMINISTRATIVE | Facility: HOSPITAL | Age: 76
End: 2021-06-07

## 2021-06-07 ENCOUNTER — HOSPITAL ENCOUNTER (OUTPATIENT)
Dept: RADIOLOGY | Facility: HOSPITAL | Age: 76
Discharge: HOME | End: 2021-06-07
Payer: MEDICARE

## 2021-06-07 DIAGNOSIS — J41.0 SIMPLE CHRONIC BRONCHITIS (HCC): Primary | ICD-10-CM

## 2021-06-07 DIAGNOSIS — J41.0 SIMPLE CHRONIC BRONCHITIS (HCC): ICD-10-CM

## 2021-06-07 PROCEDURE — 71046 X-RAY EXAM CHEST 2 VIEWS: CPT

## 2021-07-01 ENCOUNTER — TELEPHONE (OUTPATIENT)
Dept: ENDOCRINOLOGY | Facility: CLINIC | Age: 76
End: 2021-07-01

## 2021-07-01 NOTE — TELEPHONE ENCOUNTER
----- Message from Vira Townsend sent at 7/1/2021  9:03 AM EDT -----    ----- Message -----  From: Master Barnes  Sent: 7/1/2021   8:37 AM EDT  To: , #

## 2021-07-06 LAB
25(OH)D3+25(OH)D2 SERPL-MCNC: 36.6 NG/ML (ref 30–100)
BUN SERPL-MCNC: 20 MG/DL (ref 8–27)
BUN/CREAT SERPL: 18 (ref 10–24)
CALCIUM SERPL-MCNC: 9.2 MG/DL (ref 8.6–10.2)
CHLORIDE SERPL-SCNC: 100 MMOL/L (ref 96–106)
CO2 SERPL-SCNC: 24 MMOL/L (ref 20–29)
CREAT SERPL-MCNC: 1.11 MG/DL (ref 0.76–1.27)
EST. AVERAGE GLUCOSE BLD GHB EST-MCNC: 194 MG/DL
FRUCTOSAMINE SERPL-SCNC: 270 UMOL/L (ref 0–285)
GLUCOSE SERPL-MCNC: 191 MG/DL (ref 65–99)
HBA1C MFR BLD: 8.4 % (ref 4.8–5.6)
POTASSIUM SERPL-SCNC: 4.7 MMOL/L (ref 3.5–5.2)
SL AMB EGFR AFRICAN AMERICAN: 74 ML/MIN/1.73
SL AMB EGFR NON AFRICAN AMERICAN: 64 ML/MIN/1.73
SODIUM SERPL-SCNC: 137 MMOL/L (ref 134–144)

## 2021-07-09 ENCOUNTER — OFFICE VISIT (OUTPATIENT)
Dept: ENDOCRINOLOGY | Facility: CLINIC | Age: 76
End: 2021-07-09
Payer: MEDICARE

## 2021-07-09 VITALS
DIASTOLIC BLOOD PRESSURE: 76 MMHG | HEIGHT: 70 IN | WEIGHT: 274 LBS | SYSTOLIC BLOOD PRESSURE: 130 MMHG | TEMPERATURE: 98.4 F | BODY MASS INDEX: 39.22 KG/M2 | HEART RATE: 68 BPM

## 2021-07-09 DIAGNOSIS — E78.2 MIXED HYPERLIPIDEMIA: ICD-10-CM

## 2021-07-09 DIAGNOSIS — E11.22 TYPE 2 DIABETES MELLITUS WITH STAGE 2 CHRONIC KIDNEY DISEASE, WITH LONG-TERM CURRENT USE OF INSULIN (HCC): Primary | ICD-10-CM

## 2021-07-09 DIAGNOSIS — I25.10 CORONARY ARTERY DISEASE INVOLVING NATIVE CORONARY ARTERY OF NATIVE HEART WITHOUT ANGINA PECTORIS: ICD-10-CM

## 2021-07-09 DIAGNOSIS — I10 ESSENTIAL (PRIMARY) HYPERTENSION: ICD-10-CM

## 2021-07-09 DIAGNOSIS — G47.33 OBSTRUCTIVE SLEEP APNEA: ICD-10-CM

## 2021-07-09 DIAGNOSIS — N18.2 TYPE 2 DIABETES MELLITUS WITH STAGE 2 CHRONIC KIDNEY DISEASE, WITH LONG-TERM CURRENT USE OF INSULIN (HCC): Primary | ICD-10-CM

## 2021-07-09 DIAGNOSIS — E66.01 CLASS 2 SEVERE OBESITY DUE TO EXCESS CALORIES WITH SERIOUS COMORBIDITY AND BODY MASS INDEX (BMI) OF 38.0 TO 38.9 IN ADULT (HCC): ICD-10-CM

## 2021-07-09 DIAGNOSIS — N18.2 STAGE 2 CHRONIC KIDNEY DISEASE: ICD-10-CM

## 2021-07-09 DIAGNOSIS — Z79.4 TYPE 2 DIABETES MELLITUS WITH STAGE 2 CHRONIC KIDNEY DISEASE, WITH LONG-TERM CURRENT USE OF INSULIN (HCC): Primary | ICD-10-CM

## 2021-07-09 PROBLEM — E66.812 CLASS 2 SEVERE OBESITY DUE TO EXCESS CALORIES WITH SERIOUS COMORBIDITY AND BODY MASS INDEX (BMI) OF 38.0 TO 38.9 IN ADULT (HCC): Status: ACTIVE | Noted: 2021-07-09

## 2021-07-09 PROCEDURE — 99214 OFFICE O/P EST MOD 30 MIN: CPT | Performed by: INTERNAL MEDICINE

## 2021-07-09 NOTE — PATIENT INSTRUCTIONS
Continue current regimen   If your blood sugars go up after you get the epidural, for mild elevations, increase insulin 75/25 to 49 units with meals twice a day  Continue metformin, Farxiga at current dose   If sugars are higher, please let me know and I will prescribe a short-acting insulin to correct the high blood sugars   Follow-up in 3 months with repeat labs

## 2021-07-09 NOTE — PROGRESS NOTES
Konrad Busby 68 y o  male MRN: [id number]    Encounter: [id number]      Assessment/Plan     1  Type 2 diabetes mellitus on long term insulin therapy  2  CKD -stable  Last A1c 8 4% However fructosamine level within normal limits, blood sugars at goal on review    Recommend the following at this time   -continue current regimen   -repeat A1c, fructosamine, BMP in 3 months    Advised patient to let me know if blood sugars post of Adilene epidural injection are high, will prescribe short-acting insulin  May use additional 2 units of insulin 75/25 for few days    3  Hyperlipidemia  4  CAD   5  Hypertension  - continue statin therapy, Zetia, vascepa   check fasting lipid panel in 3 months  Blood pressure at goal  - continue ACE-I/ ARB     6  Vitamin-D deficiency -vitamin-D level at goal   Continue supplementation  Follow-up in 3-4 months      CC: Diabetes    History of Present Illness     HPI:  Konrad Busby is a 68 y o  male presents for a follow-up visit regarding diabetes management  Also has  History of CAD, hypertension, hyperlipidemia, retinopathy, neuropathy, MEHDI    Last seen in 2021  Last Eye exam: 2021- goes every 3 months  Podiatry - goes every 3 months     - had a URI , took antibiotics for 10 days   During that time had high BG , now feels well   no SOB/CP/ fevers  Will be getting epidural injection on Tuesday for the back   Even eith epidirals in the past BG max upto 210 mg/dl     No exercise   No diarrhea/ constipation   No urinary complaints     Current regimen:    insulin 75/25  47 units at breakfast , 47 with dinner    Farxiga 10 mg orally daily   Metformin  mg twice a day    Statin:  Pravastatin, vascepa, zetia   ACE-I/ARB: lisinopril     Home glucose monitorin-175 mg/dl,  Occasional 200s  No hypoglycemia      Taking D3 100 IU daily     All other systems were reviewed and are negative      Review of Systems      Historical Information   Past Medical History: Diagnosis Date    Arthritis     knees    Cancer [facility])     prostate    Coronary artery disease     two coronary stents    Diabetes mellitus ([facility] [address] [facility]     Hyperlipidemia 10/19/2018    Hypertension     Psoriasis     lower extremities and buttocks    Sleep apnea     Type 2 diabetes mellitus ([facility] ) 10/19/2018     Past Surgical History:   Procedure Laterality Date    CATARACT EXTRACTION Bilateral     with iol's    COLONOSCOPY N/A 4/5/2016    Procedure: COLONOSCOPY snare polypectomy;  Surgeon: Alanna Anderson MD;  Location: [facility] GI LAB; Service:     INSERTION PROSTATE RADIATION SEED      REPLACEMENT TOTAL KNEE Right     TRACHEOSTOMY      x 2  up to 5 years ago    UMBILICAL HERNIA REPAIR       Social History   Social History     Substance and Sexual Activity   Alcohol Use Yes    Alcohol/week: 4 0 standard drinks    Types: 4 Cans of beer per week    Comment: daily     Social History     Substance and Sexual Activity   Drug Use No     Social History     Tobacco Use   Smoking Status Never Smoker   Smokeless Tobacco Never Used     Family History: History reviewed  No pertinent family history  Meds/Allergies   Current Outpatient Medications   Medication Sig Dispense Refill    Ascorbic Acid (VITAMIN C) 1000 MG tablet Take 1,000 mg by mouth daily      aspirin 81 MG tablet Take 81 mg by mouth daily        B-D ULTRAFINE III SHORT PEN 31G X 8 MM MISC Use with insulin twice a day 200 each 3    cholecalciferol (VITAMIN D3) 1,000 units tablet Take 2,000 Units by mouth daily       clobetasol (OLUX) 0 05 % topical foam       clopidogrel (PLAVIX) 75 mg tablet TAKE 1 TABLET DAILY      doxycycline hyclate (VIBRAMYCIN) 100 mg capsule Take 100 mg by mouth daily       ezetimibe (ZETIA) 10 mg tablet TAKE 1 TABLET BY MOUTH TIMES A WEEK ON MONDAY WEDNESDAY AND FRIDAY AT 6PM      Farxiga 10 MG TABS TAKE 1 TABLET DAILY 90 tablet 3    fluticasone (Flovent HFA) 220 mcg/act inhaler Inhale 2 puffs as needed       Glucagon, rDNA, (Glucagon Emergency) 1 MG KIT Use glucagon kit if you have a low blood sugar and unconscious 1 each 0    HumaLOG Mix 75/25 KwikPen (75-25) 100 units/mL injection pen INJECT 47 UNITS UNDER THE SKIN BEFORE BREAKFAST AND DINNER 90 mL 2    ipratropium-albuterol (Combivent Respimat) inhaler INHALE 1 PUFF 4 TIMES DAILY (MAXIMUM OF 6 PUFFS IN 24 HOURS)      levocetirizine (XYZAL) 5 MG tablet Take 5 mg by mouth      lisinopril (ZESTRIL) 5 mg tablet Take 5 mg by mouth daily       metFORMIN (GLUCOPHAGE-XR) 750 mg 24 hr tablet TAKE 1 TABLET TWICE A  tablet 3    Multiple Vitamins-Minerals (MULTIVITAMIN ADULT PO) Take by mouth      omeprazole (PriLOSEC) 20 mg delayed release capsule Take 1 capsule (20 mg total) by mouth daily 30 capsule 11    pravastatin (PRAVACHOL) 80 mg tablet       STELARA 90 MG/ML subcutaneous injection       traZODone (DESYREL) 100 mg tablet TAKE 1 TABLET AT BEDTIME AS NEEDED FOR SLEEP      Tremfya 100 MG/ML SOSY       triamcinolone (KENALOG) 0 1 % cream Apply topically as needed Twice a week      urea (CARMOL) 40 % Apply topically as needed       VASCEPA 1 g CAPS Take by mouth daily       ciprofloxacin-dexamethasone (CIPRODEX) otic suspension INSTILL 4 DROPS INTO EACH EAR TWICE DAILY FOR 7 DAYS (Patient not taking: Reported on 7/9/2021)      fluticasone (FLONASE) 50 mcg/act nasal spray USE 4 SPRAYS DAILY (2 SPRAYS IN EACH NOSTRIL DAILY) (Patient not taking: Reported on 4/9/2021) 48 g 3    Insulin Lispro Prot & Lispro (Insulin Lispro Prot & Lispro) (75-25) 100 units/mL injection pen Inject 47 Units under the skin 2 (two) times a day      levocetirizine (XYZAL) 5 MG tablet Take 5 mg by mouth every evening      pravastatin (PRAVACHOL) 40 mg tablet Take 40 mg by mouth daily (Patient not taking: Reported on 7/9/2021)      ustekinumab (Stelara) 90 mg/mL subcutaneous injection  (Patient not taking: Reported on 7/9/2021)       No current facility-administered medications for this visit  Allergies   Allergen Reactions    Bee Venom Shortness Of Breath    Lidocaine Shortness Of Breath     Pt reports he was tested and does not have allergy to Lidocaine       Objective   Vitals: Temperature 98 4 °F (36 9 °C), height 5' 10" (1 778 m), weight 124 kg (274 lb)  Physical Exam  Constitutional:       General: He is not in acute distress  Appearance: He is well-developed  He is not diaphoretic  HENT:      Head: Normocephalic and atraumatic  Eyes:      Conjunctiva/sclera: Conjunctivae normal       Pupils: Pupils are equal, round, and reactive to light  Cardiovascular:      Rate and Rhythm: Normal rate and regular rhythm  Heart sounds: Normal heart sounds  No murmur heard  Pulmonary:      Effort: Pulmonary effort is normal  No respiratory distress  Breath sounds: Normal breath sounds  No wheezing  Abdominal:      General: There is no distension  Palpations: Abdomen is soft  Tenderness: There is no abdominal tenderness  There is no guarding  Musculoskeletal:      Cervical back: Normal range of motion and neck supple  Right lower leg: No edema  Left lower leg: No edema  Skin:     General: Skin is warm and dry  Findings: No erythema or rash  Neurological:      Mental Status: He is alert and oriented to person, place, and time  Psychiatric:         Behavior: Behavior normal          Thought Content: Thought content normal          The history was obtained from the review of the chart, patient      Lab Results:   Lab Results   Component Value Date/Time    Hemoglobin A1C 8 4 (H) 06/30/2021 07:26 AM    Hemoglobin A1C 8 4 06/30/2021 12:00 AM    Hemoglobin A1C 7 8 (H) 03/29/2021 07:26 AM    Hemoglobin A1C 8 4 (H) 12/22/2020 07:10 AM    BUN 20 06/30/2021 07:26 AM    BUN 14 03/29/2021 07:26 AM    BUN 17 12/22/2020 07:10 AM    Potassium 4 7 06/30/2021 07:26 AM    Potassium 4 4 03/29/2021 07:26 AM    Potassium 4 6 12/22/2020 07:10 AM Chloride 100 06/30/2021 07:26 AM    Chloride 99 03/29/2021 07:26 AM    Chloride 100 12/22/2020 07:10 AM    CO2 24 06/30/2021 07:26 AM    CO2 25 03/29/2021 07:26 AM    CO2 24 12/22/2020 07:10 AM    Creatinine 1 11 06/30/2021 07:26 AM    Creatinine 0 95 03/29/2021 07:26 AM    Creatinine 1 16 12/22/2020 07:10 AM    AST 27 03/29/2021 07:26 AM    ALT 32 03/29/2021 07:26 AM    Albumin 4 4 03/29/2021 07:26 AM    Globulin, Total 2 8 03/29/2021 07:26 AM    HDL 65 03/29/2021 07:26 AM    HDL 46 12/22/2020 07:10 AM    HDL 42 07/17/2020 08:48 AM    Triglycerides 148 03/29/2021 07:26 AM    Triglycerides 136 12/22/2020 07:10 AM    Triglycerides 135 07/17/2020 08:48 AM         Imaging Studies: I have personally reviewed pertinent reports  Portions of the record may have been created with voice recognition software  Occasional wrong word or "sound a like" substitutions may have occurred due to the inherent limitations of voice recognition software  Read the chart carefully and recognize, using context, where substitutions have occurred

## 2021-07-12 NOTE — TELEPHONE ENCOUNTER
Please reach back out to patients wife  She would like to know if her  needs to stop his Plavxis due to him having an injection  Tomorrow   Please follow up

## 2021-07-12 NOTE — TELEPHONE ENCOUNTER
RN informed pt's wife that for the type of inj her husb is scheduled for tomorrow it does not require him to stop his Plavix  Pt is scheduled for SIJ and Bursa inj

## 2021-07-13 ENCOUNTER — HOSPITAL ENCOUNTER (OUTPATIENT)
Dept: RADIOLOGY | Facility: CLINIC | Age: 76
Discharge: HOME | End: 2021-07-13
Admitting: ANESTHESIOLOGY
Payer: MEDICARE

## 2021-07-13 VITALS
TEMPERATURE: 98.1 F | OXYGEN SATURATION: 95 % | RESPIRATION RATE: 20 BRPM | DIASTOLIC BLOOD PRESSURE: 78 MMHG | SYSTOLIC BLOOD PRESSURE: 145 MMHG | HEART RATE: 61 BPM

## 2021-07-13 DIAGNOSIS — M46.1 SACROILIITIS, NOT ELSEWHERE CLASSIFIED (HCC): ICD-10-CM

## 2021-07-13 DIAGNOSIS — M70.61 TROCHANTERIC BURSITIS OF RIGHT HIP: ICD-10-CM

## 2021-07-13 PROCEDURE — 27096 INJECT SACROILIAC JOINT: CPT | Performed by: ANESTHESIOLOGY

## 2021-07-13 PROCEDURE — 77002 NEEDLE LOCALIZATION BY XRAY: CPT | Performed by: ANESTHESIOLOGY

## 2021-07-13 PROCEDURE — 20610 DRAIN/INJ JOINT/BURSA W/O US: CPT | Performed by: ANESTHESIOLOGY

## 2021-07-13 RX ORDER — BUPIVACAINE HCL/PF 2.5 MG/ML
10 VIAL (ML) INJECTION ONCE
Status: COMPLETED | OUTPATIENT
Start: 2021-07-13 | End: 2021-07-13

## 2021-07-13 RX ORDER — 0.9 % SODIUM CHLORIDE 0.9 %
10 VIAL (ML) INJECTION ONCE
Status: COMPLETED | OUTPATIENT
Start: 2021-07-13 | End: 2021-07-13

## 2021-07-13 RX ORDER — METHYLPREDNISOLONE ACETATE 80 MG/ML
80 INJECTION, SUSPENSION INTRA-ARTICULAR; INTRALESIONAL; INTRAMUSCULAR; PARENTERAL; SOFT TISSUE ONCE
Status: COMPLETED | OUTPATIENT
Start: 2021-07-13 | End: 2021-07-13

## 2021-07-13 RX ADMIN — IOHEXOL 2 ML: 300 INJECTION, SOLUTION INTRAVENOUS at 08:39

## 2021-07-13 RX ADMIN — METHYLPREDNISOLONE ACETATE 80 MG: 80 INJECTION, SUSPENSION INTRA-ARTICULAR; INTRALESIONAL; INTRAMUSCULAR; PARENTERAL; SOFT TISSUE at 08:41

## 2021-07-13 RX ADMIN — SODIUM CHLORIDE 4 ML: 9 INJECTION, SOLUTION INTRAMUSCULAR; INTRAVENOUS; SUBCUTANEOUS at 08:41

## 2021-07-13 RX ADMIN — BUPIVACAINE HYDROCHLORIDE 7 ML: 2.5 INJECTION, SOLUTION EPIDURAL; INFILTRATION; INTRACAUDAL at 08:39

## 2021-07-13 RX ADMIN — Medication 4 ML: at 08:41

## 2021-07-13 NOTE — H&P
History of Present Illness: The patient is a 68 y o  male who presents with complaints of right lower back and hip pain secondary to sacroiliitis trochanteric bursitis and is here today for right-sided sacroiliac joint right-sided trochanteric bursa injection    Patient Active Problem List   Diagnosis    Obstructive sleep apnea    Coronary artery disease involving native coronary artery    Degenerative arthritis of left knee    Essential (primary) hypertension    Glottic stenosis    Vocal cord paralysis, bilateral complete    Type 2 diabetes mellitus with peripheral neuropathy (HCC)    Mixed hyperlipidemia    Lumbar stenosis with neurogenic claudication    Lumbar spondylosis    Intervertebral disc disorder with radiculopathy of lumbar region    Sacroiliitis (HCC)    Trochanteric bursitis of right hip    Class 2 severe obesity due to excess calories with serious comorbidity and body mass index (BMI) of 38 0 to 38 9 in [facility])    Stage 2 chronic kidney disease       Past Medical History:   Diagnosis Date    Arthritis     knees    Cancer ([facility] [address] )     prostate    Coronary artery disease     two coronary stents    Diabetes mellitus ([facility] [address] )     Hyperlipidemia 10/19/2018    Hypertension     Psoriasis     lower extremities and buttocks    Sleep apnea     Type 2 diabetes mellitus ([facility] [address] ) 10/19/2018       Past Surgical History:   Procedure Laterality Date    CATARACT EXTRACTION Bilateral     with iol's    COLONOSCOPY N/A 4/5/2016    Procedure: COLONOSCOPY snare polypectomy;  Surgeon: Gwendolyn Cheadle, MD;  Location: [place] [address] GI LAB; Service:     INSERTION PROSTATE RADIATION SEED      REPLACEMENT TOTAL KNEE Right     TRACHEOSTOMY      x 2  up to 5 years ago    UMBILICAL HERNIA REPAIR           Current Outpatient Medications:     Ascorbic Acid (VITAMIN C) 1000 MG tablet, Take 1,000 mg by mouth daily, Disp: , Rfl:     aspirin 81 MG tablet, Take 81 mg by mouth daily  , Disp: , Rfl:     B-D ULTRAFINE III SHORT PEN 31G X 8 MM MISC, Use with insulin twice a day, Disp: 200 each, Rfl: 3    cholecalciferol (VITAMIN D3) 1,000 units tablet, Take 2,000 Units by mouth daily , Disp: , Rfl:     ciprofloxacin-dexamethasone (CIPRODEX) otic suspension, INSTILL 4 DROPS INTO EACH EAR TWICE DAILY FOR 7 DAYS (Patient not taking: Reported on 7/9/2021), Disp: , Rfl:     clobetasol (OLUX) 0 05 % topical foam, , Disp: , Rfl:     clopidogrel (PLAVIX) 75 mg tablet, TAKE 1 TABLET DAILY, Disp: , Rfl:     doxycycline hyclate (VIBRAMYCIN) 100 mg capsule, Take 100 mg by mouth daily , Disp: , Rfl:     ezetimibe (ZETIA) 10 mg tablet, TAKE 1 TABLET BY MOUTH TIMES A WEEK ON MONDAY WEDNESDAY AND FRIDAY AT 6PM, Disp: , Rfl:     Farxiga 10 MG TABS, TAKE 1 TABLET DAILY, Disp: 90 tablet, Rfl: 3    fluticasone (FLONASE) 50 mcg/act nasal spray, USE 4 SPRAYS DAILY (2 SPRAYS IN EACH NOSTRIL DAILY) (Patient not taking: Reported on 4/9/2021), Disp: 48 g, Rfl: 3    fluticasone (Flovent HFA) 220 mcg/act inhaler, Inhale 2 puffs as needed , Disp: , Rfl:     Glucagon, rDNA, (Glucagon Emergency) 1 MG KIT, Use glucagon kit if you have a low blood sugar and unconscious, Disp: 1 each, Rfl: 0    HumaLOG Mix 75/25 KwikPen (75-25) 100 units/mL injection pen, INJECT 47 UNITS UNDER THE SKIN BEFORE BREAKFAST AND DINNER, Disp: 90 mL, Rfl: 2    Insulin Lispro Prot & Lispro (Insulin Lispro Prot & Lispro) (75-25) 100 units/mL injection pen, Inject 47 Units under the skin 2 (two) times a day, Disp: , Rfl:     ipratropium-albuterol (Combivent Respimat) inhaler, INHALE 1 PUFF 4 TIMES DAILY (MAXIMUM OF 6 PUFFS IN 24 HOURS), Disp: , Rfl:     levocetirizine (XYZAL) 5 MG tablet, Take 5 mg by mouth, Disp: , Rfl:     levocetirizine (XYZAL) 5 MG tablet, Take 5 mg by mouth every evening, Disp: , Rfl:     lisinopril (ZESTRIL) 5 mg tablet, Take 5 mg by mouth daily , Disp: , Rfl:     metFORMIN (GLUCOPHAGE-XR) 750 mg 24 hr tablet, TAKE 1 TABLET TWICE A DAY, Disp: 180 tablet, Rfl: 3    Multiple Vitamins-Minerals (MULTIVITAMIN ADULT PO), Take by mouth, Disp: , Rfl:     omeprazole (PriLOSEC) 20 mg delayed release capsule, Take 1 capsule (20 mg total) by mouth daily, Disp: 30 capsule, Rfl: 11    pravastatin (PRAVACHOL) 40 mg tablet, Take 40 mg by mouth daily (Patient not taking: Reported on 7/9/2021), Disp: , Rfl:     pravastatin (PRAVACHOL) 80 mg tablet, , Disp: , Rfl:     STELARA 90 MG/ML subcutaneous injection, , Disp: , Rfl:     traZODone (DESYREL) 100 mg tablet, TAKE 1 TABLET AT BEDTIME AS NEEDED FOR SLEEP, Disp: , Rfl:     Tremfya 100 MG/ML SOSY, , Disp: , Rfl:     triamcinolone (KENALOG) 0 1 % cream, Apply topically as needed Twice a week, Disp: , Rfl:     urea (CARMOL) 40 %, Apply topically as needed , Disp: , Rfl:     VASCEPA 1 g CAPS, Take by mouth daily , Disp: , Rfl:     Current Facility-Administered Medications:     bupivacaine (PF) (MARCAINE) 0 25 % injection 10 mL, 10 mL, Intra-articular, Once, Cas Orlando MD    iohexol (OMNIPAQUE) 300 mg/mL injection 50 mL, 50 mL, Intra-articular, Once, Cas Orlando MD    lidocaine (PF) (XYLOCAINE-MPF) 2 % injection 5 mL, 5 mL, Infiltration, Once, Cas Orlando MD    methylPREDNISolone acetate (DEPO-MEDROL) injection 80 mg, 80 mg, Intra-articular, Once, Cas Orlando MD    sodium chloride (PF) 0 9 % injection 10 mL, 10 mL, Infiltration, Once, Cas Orlando MD    Allergies   Allergen Reactions    Bee Venom Shortness Of Breath    Lidocaine Shortness Of Breath     Pt reports he was tested and does not have allergy to Lidocaine       Physical Exam:   Vitals:    07/13/21 0807   BP: 125/74   Pulse: 62   Resp: 20   Temp: 98 1 °F (36 7 °C)   SpO2: 93%     General: Awake, Alert, Oriented x 3, Mood and affect appropriate  Respiratory: Respirations even and unlabored  Cardiovascular: Peripheral pulses intact; no edema  Musculoskeletal Exam:  Right lower back and hip pain    ASA Score: 3    Patient/Chart Verification  Patient ID Verified: Verbal  Consents Confirmed: To be obtained in the Pre-Procedure area  H&P( within 30 days) Verified: To be obtained in the Pre-Procedure area  Interval H&P(within 24 hr) Complete (required for Outpatients and Surgery Admit only): To be obtained in the Pre-Procedure area  Allergies Reviewed: Yes  Anticoag/NSAID held?: NA  Currently on antibiotics?: No    Assessment:   1  Trochanteric bursitis of right hip    2   Sacroiliitis, not elsewhere classified ([facility] )        Plan: Rt trochanteric bursa inj, Rt SIJ

## 2021-07-13 NOTE — DISCHARGE INSTR - LAB

## 2021-07-20 ENCOUNTER — TELEPHONE (OUTPATIENT)
Dept: PAIN MEDICINE | Facility: CLINIC | Age: 76
End: 2021-07-20

## 2021-08-03 NOTE — TELEPHONE ENCOUNTER
Patient states that he is feeling better. Patient states that he has a lot of pain when he stands.     40% relief    Pain scale is 2/10

## 2021-09-24 ENCOUNTER — OFFICE VISIT (OUTPATIENT)
Dept: PAIN MEDICINE | Facility: CLINIC | Age: 76
End: 2021-09-24
Payer: MEDICARE

## 2021-09-24 VITALS
DIASTOLIC BLOOD PRESSURE: 66 MMHG | BODY MASS INDEX: 39.31 KG/M2 | WEIGHT: 274 LBS | HEART RATE: 63 BPM | SYSTOLIC BLOOD PRESSURE: 165 MMHG

## 2021-09-24 DIAGNOSIS — M70.61 TROCHANTERIC BURSITIS OF RIGHT HIP: ICD-10-CM

## 2021-09-24 DIAGNOSIS — M46.1 SACROILIITIS (HCC): ICD-10-CM

## 2021-09-24 DIAGNOSIS — M48.062 LUMBAR STENOSIS WITH NEUROGENIC CLAUDICATION: Primary | ICD-10-CM

## 2021-09-24 DIAGNOSIS — M51.16 INTERVERTEBRAL DISC DISORDER WITH RADICULOPATHY OF LUMBAR REGION: ICD-10-CM

## 2021-09-24 PROCEDURE — 99214 OFFICE O/P EST MOD 30 MIN: CPT | Performed by: ANESTHESIOLOGY

## 2021-09-24 PROCEDURE — 1124F ACP DISCUSS-NO DSCNMKR DOCD: CPT | Performed by: ANESTHESIOLOGY

## 2021-09-24 RX ORDER — FUROSEMIDE 40 MG/1
40 TABLET ORAL DAILY
COMMUNITY
Start: 2021-09-01

## 2021-09-24 NOTE — PROGRESS NOTES
Assessment:  1  Lumbar stenosis with neurogenic claudication    2  Intervertebral disc disorder with radiculopathy of lumbar region    3  Sacroiliitis ([address] )    4  Trochanteric bursitis of right hip        Plan:  The patient is experiencing pain consistent with worsening stenosis in the lumbar spine at L4-5 and L5-S1  We will obtain a Plavix hold and then have him scheduled for bilateral L4 transforaminal epidural steroid injections  Complete risks and benefits including bleeding, infection, tissue reaction, nerve injury and allergic reaction were discussed  The approach was demonstrated using models and literature was provided  Verbal and written consent was obtained  My impressions and treatment recommendations were discussed in detail with the patient who verbalized understanding and had no further questions  Discharge instructions were provided  I personally saw and examined the patient and I agree with the above discussed plan of care  Orders Placed This Encounter   Procedures    FL spine and pain procedure     Standing Status:   Future     Standing Expiration Date:   9/24/2025     Order Specific Question:   Reason for Exam:     Answer:   Bilateral L4 TF HELEN     Order Specific Question:   Anticoagulant hold needed? Answer:   Yes-Plavix     New Medications Ordered This Visit   Medications    furosemide (LASIX) 40 mg tablet     Sig: Take 40 mg by mouth daily       History of Present Illness:  Odalis Aranda is a 68 y o  male who presents for a follow up office visit in regards to Hip Pain (right sided)  The patient reports worsening pain symptoms in the bilateral lower extremity over the last 2 months  Symptoms are slightly worse on the right rated 8/10 on a numeric rating scale  Pain is worse in the morning and causes him difficulty with getting into bed as well as with morning activities and walking   He saw Dr Abdulaziz Marino at [facility] who ordered an updated CT scan of the lumbar spine which showed worsening stenosis at L4-5 and L5-S1  I have personally reviewed and/or updated the patient's past medical history, past surgical history, family history, social history, current medications, allergies, and vital signs today  Review of Systems   Respiratory: Negative for shortness of breath  Cardiovascular: Negative for chest pain  Gastrointestinal: Negative for constipation, diarrhea, nausea and vomiting  Musculoskeletal: Positive for back pain, gait problem and joint swelling  Negative for arthralgias and myalgias  Skin: Negative for rash  Neurological: Positive for weakness  Negative for dizziness and seizures  All other systems reviewed and are negative        Patient Active Problem List   Diagnosis    Obstructive sleep apnea    Coronary artery disease involving native coronary artery    Degenerative arthritis of left knee    Essential (primary) hypertension    Glottic stenosis    Vocal cord paralysis, bilateral complete    Type 2 diabetes mellitus with peripheral neuropathy (HCC)    Mixed hyperlipidemia    Lumbar stenosis with neurogenic claudication    Lumbar spondylosis    Intervertebral disc disorder with radiculopathy of lumbar region    Sacroiliitis (HCC)    Trochanteric bursitis of right hip    Class 2 severe obesity due to excess calories with serious comorbidity and body mass index (BMI) of 38 0 to 38 9 in adult [facility])    Stage 2 chronic kidney disease       Past Medical History:   Diagnosis Date    Arthritis     knees    Cancer [facility] )     prostate    Coronary artery disease     two coronary stents    Diabetes mellitus ([facility] )     Hyperlipidemia 10/19/2018    Hypertension     Psoriasis     lower extremities and buttocks    Sleep apnea     Type 2 diabetes mellitus ([facility] ) 10/19/2018       Past Surgical History:   Procedure Laterality Date    CATARACT EXTRACTION Bilateral     with iol's    COLONOSCOPY N/A 4/5/2016    Procedure: COLONOSCOPY snare polypectomy;  Surgeon: Dakota Hull MD;  Location: [facility]; Service:     INSERTION PROSTATE RADIATION SEED      REPLACEMENT TOTAL KNEE Right     TRACHEOSTOMY      x 2  up to 5 years ago    UMBILICAL HERNIA REPAIR         No family history on file  Social History     Occupational History    Not on file   Tobacco Use    Smoking status: Never Smoker    Smokeless tobacco: Never Used   Vaping Use    Vaping Use: Never used   Substance and Sexual Activity    Alcohol use: Yes     Alcohol/week: 4 0 standard drinks     Types: 4 Cans of beer per week     Comment: daily    Drug use: No    Sexual activity: Not on file       Current Outpatient Medications on File Prior to Visit   Medication Sig    Ascorbic Acid (VITAMIN C) 1000 MG tablet Take 1,000 mg by mouth daily    aspirin 81 MG tablet Take 81 mg by mouth daily      B-D ULTRAFINE III SHORT PEN 31G X 8 MM MISC Use with insulin twice a day    cholecalciferol (VITAMIN D3) 1,000 units tablet Take 2,000 Units by mouth daily     clobetasol (OLUX) 0 05 % topical foam     clopidogrel (PLAVIX) 75 mg tablet TAKE 1 TABLET DAILY    doxycycline hyclate (VIBRAMYCIN) 100 mg capsule Take 100 mg by mouth daily     ezetimibe (ZETIA) 10 mg tablet TAKE 1 TABLET BY MOUTH TIMES A WEEK ON MONDAY WEDNESDAY AND FRIDAY AT 6PM    Farxiga 10 MG TABS TAKE 1 TABLET DAILY    fluticasone (Flovent HFA) 220 mcg/act inhaler Inhale 2 puffs as needed     furosemide (LASIX) 40 mg tablet Take 40 mg by mouth daily    Glucagon, rDNA, (Glucagon Emergency) 1 MG KIT Use glucagon kit if you have a low blood sugar and unconscious    glucose blood (OneTouch Verio) test strip USE TO TEST BLOOD SUGAR THREE TIMES A DAY    HumaLOG Mix 75/25 KwikPen (75-25) 100 units/mL injection pen INJECT 47 UNITS UNDER THE SKIN BEFORE BREAKFAST AND DINNER    Insulin Lispro Prot & Lispro (Insulin Lispro Prot & Lispro) (75-25) 100 units/mL injection pen Inject 47 Units under the skin 2 (two) times a day    ipratropium-albuterol (Combivent Respimat) inhaler INHALE 1 PUFF 4 TIMES DAILY (MAXIMUM OF 6 PUFFS IN 24 HOURS)    levocetirizine (XYZAL) 5 MG tablet Take 5 mg by mouth    levocetirizine (XYZAL) 5 MG tablet Take 5 mg by mouth every evening    lisinopril (ZESTRIL) 5 mg tablet Take 5 mg by mouth daily     metFORMIN (GLUCOPHAGE-XR) 750 mg 24 hr tablet TAKE 1 TABLET TWICE A DAY    Multiple Vitamins-Minerals (MULTIVITAMIN ADULT PO) Take by mouth    omeprazole (PriLOSEC) 20 mg delayed release capsule Take 1 capsule (20 mg total) by mouth daily    pravastatin (PRAVACHOL) 80 mg tablet     STELARA 90 MG/ML subcutaneous injection     traZODone (DESYREL) 100 mg tablet TAKE 1 TABLET AT BEDTIME AS NEEDED FOR SLEEP    Tremfya 100 MG/ML SOSY     triamcinolone (KENALOG) 0 1 % cream Apply topically as needed Twice a week    urea (CARMOL) 40 % Apply topically as needed     VASCEPA 1 g CAPS Take by mouth daily     ciprofloxacin-dexamethasone (CIPRODEX) otic suspension INSTILL 4 DROPS INTO EACH EAR TWICE DAILY FOR 7 DAYS (Patient not taking: Reported on 7/9/2021)    fluticasone (FLONASE) 50 mcg/act nasal spray USE 4 SPRAYS DAILY (2 SPRAYS IN EACH NOSTRIL DAILY) (Patient not taking: Reported on 4/9/2021)    pravastatin (PRAVACHOL) 40 mg tablet Take 40 mg by mouth daily (Patient not taking: Reported on 7/9/2021)     No current facility-administered medications on file prior to visit  Allergies   Allergen Reactions    Bee Venom Shortness Of Breath    Lidocaine Shortness Of Breath     Pt reports he was tested and does not have allergy to Lidocaine       Physical Exam:    /66   Pulse 63   Wt 124 kg (274 lb)   BMI 39 31 kg/m²     Constitutional:normal, well developed, well nourished, alert, in no distress and non-toxic and no overt pain behavior   and obese  Eyes:anicteric  HEENT:grossly intact  Neck:supple, symmetric, trachea midline and no masses   Pulmonary:even and unlabored  Cardiovascular:No edema or pitting edema present  Skin:Normal without rashes or lesions and well hydrated  Psychiatric:Mood and affect appropriate  Neurologic:Cranial Nerves II-XII grossly intact  Musculoskeletal:antalgic     Lumbar Spine Exam  Appearance:  Normal lordosis  Palpation/Tenderness:  left lumbar paraspinal tenderness  right lumbar paraspinal tenderness  Range of Motion:  Flexion:  Minimally limited  with pain  Extension:  Moderately limited  with pain  Motor Strength:  Left hip flexion:  5/5  Left hip extension:  5/5  Right hip flexion:  5/5  Right hip extension:  5/5  Left knee flexion:  5/5  Left knee extension:  5/5  Right knee flexion:  5/5  Right knee extension:  5/5  Left foot dorsiflexion:  4/5  Left foot plantar flexion:  5/5  Right foot dorsiflexion:  5/5  Right foot plantar flexion:  5/5    Imaging    CT Lumbar Spine (8/11/2021)    Reason for exam (per EHR order): Chronic back pain  Evaluate for spinal stenosis     TECHNIQUE:   Contiguous axial CT images of the lumbar spine was performed without contrast   and subsequently multiplanar reformats were constructed        COMPARISON: 4/27/2011  FINDINGS:     Mild levoscoliosis centered at L3-L4  There is a mild retrolisthesis of L4 over   L5     Vertebral body heights are maintained  No acute fracture or dislocation  No   destructive osteoblastic or osteolytic lesion is identified  There is multilevel   intervertebral disc degeneration including vacuum disc phenomenon at L4-L5 and   L5-S1, new since 2011  L1-L2: There is a disc bulge with mild ligamentum flavum thickening and mild   facet arthropathy  Mild to moderate spinal canal stenosis  Mild neural foraminal   narrowing  Similar findings on the 2011 exam      L2-L3: Small disc bulge with mild ligamentum flavum thickening  Mild spinal   canal narrowing no significant neural foraminal narrowing   Similar findings in   2011     L3-L4: Moderate disc bulge with moderate left and mild to moderate right facet arthropathy moderate spinal canal stenosis moderate right and severe left neural   foraminal stenoses neural foraminal narrowing on the left minimally increased   since the prior examination     L4-L5: Question of a right-sided facetectomy and hemilaminotomy clinical   correlation is requested Moderate disc bulge with mild ligamentum flavum   thickening and advanced facet degeneration on the left  Moderate to severe   spinal canal stenosis  Bilateral severe neural foraminal stenoses  Degenerative   findings are similar compared to 2011     L5-S1 there is a disc osteophyte complex eccentric to the left  Mild spinal   canal narrowing  Mild right and severe left neural foraminal stenoses  Similar   to the prior examination     There is atherosclerotic calcification of the intraabdominal aorta and its major   branches  1 cm exophytic hyperdense renal cortical lesion partially imaged possibly   representing an hemorrhagic cyst mildly increased in size since 2011 and likely   benign given its slow growth     The current study does not evaluate for ligamentous laxity or injury   Evaluation   of contents of the spinal canal is suboptimal

## 2021-09-28 ENCOUNTER — TELEPHONE (OUTPATIENT)
Dept: PAIN MEDICINE | Facility: CLINIC | Age: 76
End: 2021-09-28

## 2021-09-30 NOTE — TELEPHONE ENCOUNTER
Scheduled pt for B/L L4 tfesi for 10/15/21  Pt is taking Plavix and a 7 day hold was approved by Dr Darylene Cure  Instructed pt to hold Plavix for 7 days with last dose on 10/7/21  Pt was also told he did not need to hold his 81mg aspirin for this procedure    Went over pre-procedure instructions below:  Nothing to eat or drink 1 hr prior to procedure  Need to arrange transportation  Proper clothing for procedure  If ill or placed on antibiotics please call to reschedule  Covid/travel/ and vaccine instructions

## 2021-10-15 ENCOUNTER — HOSPITAL ENCOUNTER (OUTPATIENT)
Dept: RADIOLOGY | Facility: CLINIC | Age: 76
Discharge: HOME | End: 2021-10-15
Attending: ANESTHESIOLOGY | Admitting: ANESTHESIOLOGY
Payer: MEDICARE

## 2021-10-15 VITALS
DIASTOLIC BLOOD PRESSURE: 80 MMHG | SYSTOLIC BLOOD PRESSURE: 149 MMHG | OXYGEN SATURATION: 95 % | TEMPERATURE: 97.1 F | RESPIRATION RATE: 20 BRPM | HEART RATE: 81 BPM

## 2021-10-15 DIAGNOSIS — M51.16 INTERVERTEBRAL DISC DISORDER WITH RADICULOPATHY OF LUMBAR REGION: ICD-10-CM

## 2021-10-15 PROCEDURE — 64483 NJX AA&/STRD TFRM EPI L/S 1: CPT | Performed by: ANESTHESIOLOGY

## 2021-10-15 RX ORDER — 0.9 % SODIUM CHLORIDE 0.9 %
4 VIAL (ML) INJECTION ONCE
Status: COMPLETED | OUTPATIENT
Start: 2021-10-15 | End: 2021-10-15

## 2021-10-15 RX ORDER — BUPIVACAINE HCL/PF 2.5 MG/ML
2 VIAL (ML) INJECTION ONCE
Status: COMPLETED | OUTPATIENT
Start: 2021-10-15 | End: 2021-10-15

## 2021-10-15 RX ORDER — METHYLPREDNISOLONE ACETATE 80 MG/ML
80 INJECTION, SUSPENSION INTRA-ARTICULAR; INTRALESIONAL; INTRAMUSCULAR; PARENTERAL; SOFT TISSUE ONCE
Status: COMPLETED | OUTPATIENT
Start: 2021-10-15 | End: 2021-10-15

## 2021-10-15 RX ADMIN — Medication 4 ML: at 10:46

## 2021-10-15 RX ADMIN — BUPIVACAINE HYDROCHLORIDE 2 ML: 2.5 INJECTION, SOLUTION EPIDURAL; INFILTRATION; INTRACAUDAL at 10:47

## 2021-10-15 RX ADMIN — IOHEXOL 1 ML: 300 INJECTION, SOLUTION INTRAVENOUS at 10:47

## 2021-10-15 RX ADMIN — METHYLPREDNISOLONE ACETATE 80 MG: 80 INJECTION, SUSPENSION INTRA-ARTICULAR; INTRALESIONAL; INTRAMUSCULAR; PARENTERAL; SOFT TISSUE at 10:47

## 2021-10-22 ENCOUNTER — TELEPHONE (OUTPATIENT)
Dept: PODIATRY | Facility: CLINIC | Age: 76
End: 2021-10-22

## 2021-10-22 ENCOUNTER — TELEPHONE (OUTPATIENT)
Dept: PAIN MEDICINE | Facility: CLINIC | Age: 76
End: 2021-10-22

## 2021-10-22 LAB
ALBUMIN/CREAT UR: 64 MG/G CREAT (ref 0–29)
BUN SERPL-MCNC: 19 MG/DL (ref 8–27)
BUN/CREAT SERPL: 17 (ref 10–24)
CALCIUM SERPL-MCNC: 9.3 MG/DL (ref 8.6–10.2)
CHLORIDE SERPL-SCNC: 96 MMOL/L (ref 96–106)
CHOLEST SERPL-MCNC: 149 MG/DL (ref 100–199)
CO2 SERPL-SCNC: 25 MMOL/L (ref 20–29)
CREAT SERPL-MCNC: 1.1 MG/DL (ref 0.76–1.27)
CREAT UR-MCNC: 67 MG/DL
EST. AVERAGE GLUCOSE BLD GHB EST-MCNC: 200 MG/DL
FRUCTOSAMINE SERPL-SCNC: 285 UMOL/L (ref 0–285)
GLUCOSE SERPL-MCNC: 186 MG/DL (ref 65–99)
HBA1C MFR BLD: 8.6 % (ref 4.8–5.6)
HDLC SERPL-MCNC: 48 MG/DL
LDLC SERPL CALC-MCNC: 71 MG/DL (ref 0–99)
MICROALBUMIN UR-MCNC: 43 UG/ML
POTASSIUM SERPL-SCNC: 4.3 MMOL/L (ref 3.5–5.2)
SL AMB EGFR AFRICAN AMERICAN: 75 ML/MIN/1.73
SL AMB EGFR NON AFRICAN AMERICAN: 65 ML/MIN/1.73
SL AMB VLDL CHOLESTEROL CALC: 30 MG/DL (ref 5–40)
SODIUM SERPL-SCNC: 134 MMOL/L (ref 134–144)
TRIGL SERPL-MCNC: 181 MG/DL (ref 0–149)

## 2021-10-29 ENCOUNTER — TELEMEDICINE (OUTPATIENT)
Dept: ENDOCRINOLOGY | Facility: CLINIC | Age: 76
End: 2021-10-29
Payer: MEDICARE

## 2021-10-29 VITALS — WEIGHT: 272 LBS | HEIGHT: 70 IN | BODY MASS INDEX: 38.94 KG/M2

## 2021-10-29 DIAGNOSIS — N18.2 TYPE 2 DIABETES MELLITUS WITH STAGE 2 CHRONIC KIDNEY DISEASE, WITH LONG-TERM CURRENT USE OF INSULIN (HCC): Primary | ICD-10-CM

## 2021-10-29 DIAGNOSIS — E11.22 TYPE 2 DIABETES MELLITUS WITH STAGE 2 CHRONIC KIDNEY DISEASE, WITH LONG-TERM CURRENT USE OF INSULIN (HCC): Primary | ICD-10-CM

## 2021-10-29 DIAGNOSIS — Z79.4 TYPE 2 DIABETES MELLITUS WITH STAGE 2 CHRONIC KIDNEY DISEASE, WITH LONG-TERM CURRENT USE OF INSULIN (HCC): Primary | ICD-10-CM

## 2021-10-29 DIAGNOSIS — I25.10 CORONARY ARTERY DISEASE INVOLVING NATIVE CORONARY ARTERY OF NATIVE HEART WITHOUT ANGINA PECTORIS: ICD-10-CM

## 2021-10-29 DIAGNOSIS — E55.9 VITAMIN D DEFICIENCY: ICD-10-CM

## 2021-10-29 DIAGNOSIS — E78.2 MIXED HYPERLIPIDEMIA: ICD-10-CM

## 2021-10-29 DIAGNOSIS — I10 ESSENTIAL (PRIMARY) HYPERTENSION: ICD-10-CM

## 2021-10-29 PROCEDURE — 99214 OFFICE O/P EST MOD 30 MIN: CPT | Performed by: INTERNAL MEDICINE

## 2021-11-08 ENCOUNTER — TELEPHONE (OUTPATIENT)
Dept: ENDOCRINOLOGY | Facility: CLINIC | Age: 76
End: 2021-11-08

## 2021-11-11 ENCOUNTER — TELEPHONE (OUTPATIENT)
Dept: RADIOLOGY | Facility: CLINIC | Age: 76
End: 2021-11-11

## 2021-11-11 DIAGNOSIS — M70.61 TROCHANTERIC BURSITIS OF RIGHT HIP: ICD-10-CM

## 2021-11-11 DIAGNOSIS — M46.1 SACROILIITIS, NOT ELSEWHERE CLASSIFIED (HCC): Primary | ICD-10-CM

## 2021-12-03 ENCOUNTER — HOSPITAL ENCOUNTER (OUTPATIENT)
Dept: RADIOLOGY | Facility: CLINIC | Age: 76
Discharge: HOME | End: 2021-12-03
Attending: ANESTHESIOLOGY
Payer: MEDICARE

## 2021-12-03 VITALS
HEART RATE: 98 BPM | OXYGEN SATURATION: 91 % | TEMPERATURE: 97.9 F | RESPIRATION RATE: 20 BRPM | DIASTOLIC BLOOD PRESSURE: 80 MMHG | SYSTOLIC BLOOD PRESSURE: 155 MMHG

## 2021-12-03 DIAGNOSIS — M70.61 TROCHANTERIC BURSITIS OF RIGHT HIP: ICD-10-CM

## 2021-12-03 DIAGNOSIS — M46.1 SACROILIITIS, NOT ELSEWHERE CLASSIFIED (HCC): ICD-10-CM

## 2021-12-03 PROCEDURE — 27096 INJECT SACROILIAC JOINT: CPT | Performed by: ANESTHESIOLOGY

## 2021-12-03 PROCEDURE — 20610 DRAIN/INJ JOINT/BURSA W/O US: CPT | Performed by: ANESTHESIOLOGY

## 2021-12-03 PROCEDURE — 77002 NEEDLE LOCALIZATION BY XRAY: CPT | Performed by: ANESTHESIOLOGY

## 2021-12-03 RX ORDER — 0.9 % SODIUM CHLORIDE 0.9 %
4 VIAL (ML) INJECTION ONCE
Status: COMPLETED | OUTPATIENT
Start: 2021-12-03 | End: 2021-12-03

## 2021-12-03 RX ORDER — METHYLPREDNISOLONE ACETATE 80 MG/ML
80 INJECTION, SUSPENSION INTRA-ARTICULAR; INTRALESIONAL; INTRAMUSCULAR; PARENTERAL; SOFT TISSUE ONCE
Status: COMPLETED | OUTPATIENT
Start: 2021-12-03 | End: 2021-12-03

## 2021-12-03 RX ORDER — BUPIVACAINE HCL/PF 2.5 MG/ML
7 VIAL (ML) INJECTION ONCE
Status: COMPLETED | OUTPATIENT
Start: 2021-12-03 | End: 2021-12-03

## 2021-12-03 RX ADMIN — Medication 4 ML: at 10:54

## 2021-12-03 RX ADMIN — METHYLPREDNISOLONE ACETATE 80 MG: 80 INJECTION, SUSPENSION INTRA-ARTICULAR; INTRALESIONAL; INTRAMUSCULAR; PARENTERAL; SOFT TISSUE at 10:55

## 2021-12-03 RX ADMIN — BUPIVACAINE HYDROCHLORIDE 7 ML: 2.5 INJECTION, SOLUTION EPIDURAL; INFILTRATION; INTRACAUDAL at 10:55

## 2021-12-03 RX ADMIN — IOHEXOL 2 ML: 300 INJECTION, SOLUTION INTRAVENOUS at 10:55

## 2021-12-10 ENCOUNTER — TELEPHONE (OUTPATIENT)
Dept: PAIN MEDICINE | Facility: CLINIC | Age: 76
End: 2021-12-10

## 2021-12-10 NOTE — TELEPHONE ENCOUNTER
Pt reports 50% improvement post inj   He said his pain feels good this morning  Pt aware I will call next week for an update

## 2021-12-28 LAB
25(OH)D3+25(OH)D2 SERPL-MCNC: 35.7 NG/ML (ref 30–100)
BUN SERPL-MCNC: 15 MG/DL (ref 8–27)
BUN/CREAT SERPL: 15 (ref 10–24)
CALCIUM SERPL-MCNC: 9.4 MG/DL (ref 8.6–10.2)
CHLORIDE SERPL-SCNC: 98 MMOL/L (ref 96–106)
CO2 SERPL-SCNC: 24 MMOL/L (ref 20–29)
CREAT SERPL-MCNC: 1.01 MG/DL (ref 0.76–1.27)
EST. AVERAGE GLUCOSE BLD GHB EST-MCNC: 189 MG/DL
FRUCTOSAMINE SERPL-SCNC: 266 UMOL/L (ref 0–285)
GLUCOSE SERPL-MCNC: 121 MG/DL (ref 65–99)
HBA1C MFR BLD: 8.2 % (ref 4.8–5.6)
POTASSIUM SERPL-SCNC: 4.4 MMOL/L (ref 3.5–5.2)
SL AMB EGFR AFRICAN AMERICAN: 83 ML/MIN/1.73
SL AMB EGFR NON AFRICAN AMERICAN: 72 ML/MIN/1.73
SODIUM SERPL-SCNC: 138 MMOL/L (ref 134–144)

## 2022-01-13 ENCOUNTER — OFFICE VISIT (OUTPATIENT)
Dept: ENDOCRINOLOGY | Facility: CLINIC | Age: 77
End: 2022-01-13
Payer: MEDICARE

## 2022-01-13 VITALS
HEIGHT: 70 IN | BODY MASS INDEX: 39.41 KG/M2 | HEART RATE: 86 BPM | SYSTOLIC BLOOD PRESSURE: 148 MMHG | WEIGHT: 275.3 LBS | DIASTOLIC BLOOD PRESSURE: 80 MMHG

## 2022-01-13 DIAGNOSIS — E55.9 VITAMIN D DEFICIENCY: ICD-10-CM

## 2022-01-13 DIAGNOSIS — E66.01 CLASS 2 SEVERE OBESITY DUE TO EXCESS CALORIES WITH SERIOUS COMORBIDITY AND BODY MASS INDEX (BMI) OF 38.0 TO 38.9 IN ADULT (HCC): ICD-10-CM

## 2022-01-13 DIAGNOSIS — E78.2 MIXED HYPERLIPIDEMIA: ICD-10-CM

## 2022-01-13 DIAGNOSIS — E11.22 TYPE 2 DIABETES MELLITUS WITH STAGE 2 CHRONIC KIDNEY DISEASE, WITH LONG-TERM CURRENT USE OF INSULIN (HCC): Primary | ICD-10-CM

## 2022-01-13 DIAGNOSIS — I10 ESSENTIAL (PRIMARY) HYPERTENSION: ICD-10-CM

## 2022-01-13 DIAGNOSIS — N18.2 TYPE 2 DIABETES MELLITUS WITH STAGE 2 CHRONIC KIDNEY DISEASE, WITH LONG-TERM CURRENT USE OF INSULIN (HCC): Primary | ICD-10-CM

## 2022-01-13 DIAGNOSIS — I25.10 CORONARY ARTERY DISEASE INVOLVING NATIVE CORONARY ARTERY OF NATIVE HEART WITHOUT ANGINA PECTORIS: ICD-10-CM

## 2022-01-13 DIAGNOSIS — Z79.4 TYPE 2 DIABETES MELLITUS WITH STAGE 2 CHRONIC KIDNEY DISEASE, WITH LONG-TERM CURRENT USE OF INSULIN (HCC): Primary | ICD-10-CM

## 2022-01-13 PROCEDURE — 99214 OFFICE O/P EST MOD 30 MIN: CPT | Performed by: INTERNAL MEDICINE

## 2022-01-13 RX ORDER — INSULIN LISPRO 100 [IU]/ML
INJECTION, SUSPENSION SUBCUTANEOUS
Qty: 90 ML | Refills: 2 | Status: SHIPPED | OUTPATIENT
Start: 2022-01-13

## 2022-01-13 NOTE — PATIENT INSTRUCTIONS
Continue metformin, Farxiga at current dose   Continue insulin 75/2547 units with breakfast, decrease to 45 units with dinner if you continue to have low/ tight blood sugars overnight, please reduce dinner dose further to 42 units   Check blood sugars before lunch   Follow-up in 3 months with repeat labs   Follow-up with Ophthalmology

## 2022-01-13 NOTE — PROGRESS NOTES
Alesia Fields 68 y o  male MRN: [id number]    Encounter: [id number]      Assessment/Plan     1  Type 2 diabetes mellitus long-term insulin therapy  2  CKD   3  Obesity   Well controlled based on fructosamine level, review of blood sugars  Some tight blood sugars on history  A1c appears to be discordant, fructosamine corresponds better with check blood sugars     Recommend the following at this time  Continue metformin, Farxiga at current dose   Continue insulin  units with breakfast, decrease to 45 units with dinner  - Advised to reduce dinner dose further to 42 units if patient continues to have tight blood sugars  -  Repeat fructosamine level, BMP in 3 months  -  Follow-up with Ophthalmology     3  Hyperlipidemia  - continue statin therapy    4  Hypertension  5  CAD  Systolic blood pressure mildly elevated in clinic today, was at goal during cardiology follow-up in December  - continue ACE-I/ ARB   follow-up with Cardiology, primary care     6  Vitamin-D deficiency - levels at goal   Continue supplementation     CC: Diabetes    History of Present Illness     HPI:  Alesia Fields is a 68 y o  male presents for a follow-up visit regarding diabetes management  Also has  Hypertension, hyperlipidemia, CAD, CKD    Last seen in  10/2021  Last Eye exam: 2021 - Has not followed up with the retina specialist ; H/o retinopathy and getting anti-VEGF injections   will follow up with Dr Tera Vera     Current regimen:    insulin 75/25  47 units twice a day  Farxiga 10 mg orally daily   Metformin  mg twice a day     Statin:   Pravastatin,Zetia, Vascepa  ACE-I/ARB:  lisinopril    Ear infection , on medication - getting better   OA- has joint pain   No CP, has SOB , will be following up with cardiology   Often does not eat lunch     Home glucose monitorin-2 times a day    mg/dl     Feels hungry when he wakes up overnight- hascracker, ice tea   At times BG is in the 70- 80  Taking D3  And zinc    All other systems were reviewed and are negative  Review of Systems      Historical Information   Past Medical History:   Diagnosis Date    Arthritis     knees    Cancer [facility])     prostate    Coronary artery disease     two coronary stents    Diabetes mellitus ([facility] [address] )     Hyperlipidemia 10/19/2018    Hypertension     Psoriasis     lower extremities and buttocks    Sleep apnea     Type 2 diabetes mellitus ([facility] ) 10/19/2018     Past Surgical History:   Procedure Laterality Date    CATARACT EXTRACTION Bilateral     with iol's    COLONOSCOPY N/A 4/5/2016    Procedure: COLONOSCOPY snare polypectomy;  Surgeon: Polly Rogers MD;  Location: [place] [address] GI LAB; Service:     INSERTION PROSTATE RADIATION SEED      REPLACEMENT TOTAL KNEE Right     TRACHEOSTOMY      x 2  up to 5 years ago    UMBILICAL HERNIA REPAIR       Social History   Social History     Substance and Sexual Activity   Alcohol Use Yes    Alcohol/week: 4 0 standard drinks    Types: 4 Cans of beer per week    Comment: daily     Social History     Substance and Sexual Activity   Drug Use No     Social History     Tobacco Use   Smoking Status Never Smoker   Smokeless Tobacco Never Used     Family History: No family history on file  Meds/Allergies   Current Outpatient Medications   Medication Sig Dispense Refill    aspirin 81 MG tablet Take 81 mg by mouth daily        B-D ULTRAFINE III SHORT PEN 31G X 8 MM MISC Use with insulin twice a day 200 each 3    cholecalciferol (VITAMIN D3) 1,000 units tablet Take 2,000 Units by mouth daily       clobetasol (OLUX) 0 05 % topical foam as needed       clopidogrel (PLAVIX) 75 mg tablet TAKE 1 TABLET DAILY      doxycycline hyclate (VIBRAMYCIN) 100 mg capsule Take 100 mg by mouth daily       ezetimibe (ZETIA) 10 mg tablet TAKE 1 TABLET BY MOUTH TIMES A WEEK ON MONDAY WEDNESDAY AND FRIDAY AT 6PM      Farxiga 10 MG TABS TAKE 1 TABLET DAILY 90 tablet 3    furosemide (LASIX) 40 mg tablet Take 40 mg by mouth daily Twice a week on Mon & Thur       glucose blood (OneTouch Verio) test strip USE TO TEST BLOOD SUGAR THREE TIMES A  strip 3    HumaLOG Mix 75/25 KwikPen (75-25) 100 units/mL injection pen INJECT 47 UNITS UNDER THE SKIN BEFORE BREAKFAST AND DINNER 90 mL 2    ipratropium-albuterol (Combivent Respimat) inhaler INHALE 1 PUFF 4 TIMES DAILY (MAXIMUM OF 6 PUFFS IN 24 HOURS)      levocetirizine (XYZAL) 5 MG tablet Take 5 mg by mouth      lisinopril (ZESTRIL) 5 mg tablet Take 5 mg by mouth daily       metFORMIN (GLUCOPHAGE-XR) 750 mg 24 hr tablet TAKE 1 TABLET TWICE A  tablet 3    Multiple Vitamins-Minerals (MULTIVITAMIN ADULT PO) Take by mouth      omeprazole (PriLOSEC) 20 mg delayed release capsule Take 1 capsule (20 mg total) by mouth daily 30 capsule 11    pravastatin (PRAVACHOL) 80 mg tablet       traZODone (DESYREL) 100 mg tablet TAKE 1 TABLET AT BEDTIME AS NEEDED FOR SLEEP      triamcinolone (KENALOG) 0 1 % cream Apply topically as needed Twice a week      urea (CARMOL) 40 % Apply topically as needed       VASCEPA 1 g CAPS Take by mouth daily       Ascorbic Acid (VITAMIN C) 1000 MG tablet Take 1,000 mg by mouth daily      ciprofloxacin-dexamethasone (CIPRODEX) otic suspension INSTILL 4 DROPS INTO EACH EAR TWICE DAILY FOR 7 DAYS (Patient not taking: Reported on 7/9/2021)      fluticasone (FLONASE) 50 mcg/act nasal spray USE 4 SPRAYS DAILY (2 SPRAYS IN EACH NOSTRIL DAILY) (Patient not taking: Reported on 4/9/2021) 48 g 3    fluticasone (Flovent HFA) 220 mcg/act inhaler Inhale 2 puffs as needed       Glucagon, rDNA, (Glucagon Emergency) 1 MG KIT Use glucagon kit if you have a low blood sugar and unconscious (Patient not taking: Reported on 1/13/2022 ) 1 each 0    Insulin Lispro Prot & Lispro (Insulin Lispro Prot & Lispro) (75-25) 100 units/mL injection pen Inject 47 Units under the skin 2 (two) times a day (Patient not taking: Reported on 10/29/2021)      levocetirizine (XYZAL) 5 MG tablet Take 5 mg by mouth every evening (Patient not taking: Reported on 10/29/2021)      pravastatin (PRAVACHOL) 40 mg tablet Take 40 mg by mouth daily (Patient not taking: Reported on 7/9/2021)      STELARA 90 MG/ML subcutaneous injection  (Patient not taking: Reported on 10/29/2021)      Tremfya 100 MG/ML SOSY        No current facility-administered medications for this visit  Allergies   Allergen Reactions    Bee Venom Shortness Of Breath    Lidocaine Shortness Of Breath     Pt reports he was tested and does not have allergy to Lidocaine       Objective   Vitals: Blood pressure 148/80, pulse 86, height 5' 10" (1 778 m), weight 125 kg (275 lb 4 8 oz)  Physical Exam  Constitutional:       General: He is not in acute distress  Appearance: He is well-developed  He is not diaphoretic  HENT:      Head: Normocephalic and atraumatic  Eyes:      Conjunctiva/sclera: Conjunctivae normal       Pupils: Pupils are equal, round, and reactive to light  Cardiovascular:      Rate and Rhythm: Normal rate and regular rhythm  Heart sounds: Normal heart sounds  No murmur heard  Pulmonary:      Effort: Pulmonary effort is normal  No respiratory distress  Breath sounds: Normal breath sounds  No wheezing  Abdominal:      General: There is no distension  Palpations: Abdomen is soft  Tenderness: There is no abdominal tenderness  There is no guarding  Musculoskeletal:      Cervical back: Normal range of motion and neck supple  Skin:     General: Skin is warm and dry  Findings: No erythema or rash  Neurological:      Mental Status: He is alert and oriented to person, place, and time  Psychiatric:         Behavior: Behavior normal          Thought Content: Thought content normal          The history was obtained from the review of the chart, patient      Lab Results:   Lab Results   Component Value Date/Time    Hemoglobin A1C 8 2 (H) 12/27/2021 07:29 AM    Hemoglobin A1C 8 6 (H) 10/20/2021 07:19 AM    Hemoglobin A1C 8 4 (H) 06/30/2021 07:26 AM    BUN 15 12/27/2021 07:29 AM    BUN 19 10/20/2021 07:19 AM    BUN 20 06/30/2021 07:26 AM    Potassium 4 4 12/27/2021 07:29 AM    Potassium 4 3 10/20/2021 07:19 AM    Potassium 4 7 06/30/2021 07:26 AM    Chloride 98 12/27/2021 07:29 AM    Chloride 96 10/20/2021 07:19 AM    Chloride 100 06/30/2021 07:26 AM    CO2 24 12/27/2021 07:29 AM    CO2 25 10/20/2021 07:19 AM    CO2 24 06/30/2021 07:26 AM    Creatinine 1 01 12/27/2021 07:29 AM    Creatinine 1 10 10/20/2021 07:19 AM    Creatinine 1 11 06/30/2021 07:26 AM    AST 27 03/29/2021 07:26 AM    ALT 32 03/29/2021 07:26 AM    Albumin 4 4 03/29/2021 07:26 AM    Globulin, Total 2 8 03/29/2021 07:26 AM    HDL 48 10/20/2021 07:19 AM    HDL 65 03/29/2021 07:26 AM    Triglycerides 181 (H) 10/20/2021 07:19 AM    Triglycerides 148 03/29/2021 07:26 AM         Imaging Studies: I have personally reviewed pertinent reports  Portions of the record may have been created with voice recognition software  Occasional wrong word or "sound a like" substitutions may have occurred due to the inherent limitations of voice recognition software  Read the chart carefully and recognize, using context, where substitutions have occurred

## 2022-02-09 ENCOUNTER — TELEPHONE (OUTPATIENT)
Dept: PAIN MEDICINE | Facility: CLINIC | Age: 77
End: 2022-02-09

## 2022-02-09 NOTE — TELEPHONE ENCOUNTER
S/w pt, states he is having pain down back of legs which causes great difficulty standing  Pt states pain is now starting to radiate down front of legs as well  Pt states it is the same pain he had prior to R SIJ, R troch bursa injections (12/2, 7/13, 2/16)  Pt states he saw Dr Luisana Hernandez with ortho surgery and was advised that he is not a surgical candidate, but recommended possibility of RFA or SCS  Pt would like to know what FQ recommends as next step  He states he cannot wait until next available OV to further discuss

## 2022-02-09 NOTE — TELEPHONE ENCOUNTER
Pt says he wants to come in to speak with FQ for other options or another inj  Offered appt with NP and pt declined  Next OV with FQ is into march, pt said he wants to be seen asap   Ph# [phone]

## 2022-02-10 NOTE — TELEPHONE ENCOUNTER
Based on pain pattern, suspect more of his pain is related to stenosis  Would recommend proceeding with bilateral L4 transforaminal epidural steroid injections    Can discuss SCS, RFA with him at that time

## 2022-02-10 NOTE — TELEPHONE ENCOUNTER
Called to s/w pt and wife said he was not home  She will have him c/b on Friday  C/B # and [place] provided

## 2022-02-11 ENCOUNTER — TELEPHONE (OUTPATIENT)
Dept: RADIOLOGY | Facility: CLINIC | Age: 77
End: 2022-02-11

## 2022-02-11 DIAGNOSIS — M48.062 LUMBAR STENOSIS WITH NEUROGENIC CLAUDICATION: ICD-10-CM

## 2022-02-11 DIAGNOSIS — M46.1 SACROILIITIS, NOT ELSEWHERE CLASSIFIED (HCC): Primary | ICD-10-CM

## 2022-02-11 NOTE — TELEPHONE ENCOUNTER
Pt informed of  rec for B/L L4 TFESI and aware that  will discuss SCS and RFA at upcoming inj  Pt would like inj done ASAP      Robert Miranda, pt said he will be at a  today and won't be home until after 1 pm

## 2022-02-11 NOTE — TELEPHONE ENCOUNTER
Pt calling back   Pt eas told to call back and talk to REHANA MATTHEW [facility] after 8:00    Pt # [phone]

## 2022-02-14 NOTE — TELEPHONE ENCOUNTER
Scheduled pt for B/L L4 Tfesi for 3/8/22   Pt is taking Plavix and a 7 day hold was approved by Dr Lundy   Pt was instructed to hold for 7 days with last dose on 2/28/22  Went over pre-procedure instructions below:  Nothing to eat or drink 1 hr prior to procedure  Need to arrange transportation  Proper clothing for procedure  If ill or placed on antibiotics please call to reschedule  Covid/travel/ and vaccine instructions

## 2022-03-02 ENCOUNTER — TELEPHONE (OUTPATIENT)
Dept: GASTROENTEROLOGY | Facility: CLINIC | Age: 77
End: 2022-03-02

## 2022-03-02 NOTE — TELEPHONE ENCOUNTER
Recall call went out via [employer] in 4/2021 with no return calls from pt to schedule  Pt is due for a colon with Dr Edwige Guerrero for hx of polyps  I called and spoke to pt whom informed he had done already with another doctor

## 2022-03-08 ENCOUNTER — HOSPITAL ENCOUNTER (OUTPATIENT)
Dept: RADIOLOGY | Facility: CLINIC | Age: 77
Discharge: HOME | End: 2022-03-08
Attending: ANESTHESIOLOGY | Admitting: ANESTHESIOLOGY
Payer: MEDICARE

## 2022-03-08 VITALS
SYSTOLIC BLOOD PRESSURE: 145 MMHG | HEART RATE: 71 BPM | OXYGEN SATURATION: 98 % | RESPIRATION RATE: 20 BRPM | TEMPERATURE: 98.5 F | DIASTOLIC BLOOD PRESSURE: 79 MMHG

## 2022-03-08 DIAGNOSIS — M48.062 LUMBAR STENOSIS WITH NEUROGENIC CLAUDICATION: ICD-10-CM

## 2022-03-08 PROCEDURE — 64483 NJX AA&/STRD TFRM EPI L/S 1: CPT | Performed by: ANESTHESIOLOGY

## 2022-03-08 RX ORDER — BUPIVACAINE HCL/PF 2.5 MG/ML
2 VIAL (ML) INJECTION ONCE
Status: COMPLETED | OUTPATIENT
Start: 2022-03-08 | End: 2022-03-08

## 2022-03-08 RX ORDER — 0.9 % SODIUM CHLORIDE 0.9 %
4 VIAL (ML) INJECTION ONCE
Status: COMPLETED | OUTPATIENT
Start: 2022-03-08 | End: 2022-03-08

## 2022-03-08 RX ORDER — METHYLPREDNISOLONE ACETATE 80 MG/ML
80 INJECTION, SUSPENSION INTRA-ARTICULAR; INTRALESIONAL; INTRAMUSCULAR; PARENTERAL; SOFT TISSUE ONCE
Status: COMPLETED | OUTPATIENT
Start: 2022-03-08 | End: 2022-03-08

## 2022-03-08 RX ADMIN — IOHEXOL 1 ML: 300 INJECTION, SOLUTION INTRAVENOUS at 14:04

## 2022-03-08 RX ADMIN — BUPIVACAINE HYDROCHLORIDE 2 ML: 2.5 INJECTION, SOLUTION EPIDURAL; INFILTRATION; INTRACAUDAL at 14:04

## 2022-03-08 RX ADMIN — METHYLPREDNISOLONE ACETATE 80 MG: 80 INJECTION, SUSPENSION INTRA-ARTICULAR; INTRALESIONAL; INTRAMUSCULAR; PARENTERAL; SOFT TISSUE at 14:04

## 2022-03-08 RX ADMIN — Medication 4 ML: at 14:03

## 2022-03-08 NOTE — H&P
History of Present Illness: The patient is a 68 y o  male who presents with complaints of lower back and hip pain and is here today for bilateral L4 TF HELEN    Patient Active Problem List   Diagnosis    Obstructive sleep apnea    Coronary artery disease involving native coronary artery    Degenerative arthritis of left knee    Essential (primary) hypertension    Glottic stenosis    Vocal cord paralysis, bilateral complete    Type 2 diabetes mellitus with stage 2 chronic kidney disease, with long-term current use of insulin (HCC)    Mixed hyperlipidemia    Lumbar stenosis with neurogenic claudication    Lumbar spondylosis    Intervertebral disc disorder with radiculopathy of lumbar region    Sacroiliitis (HCC)    Trochanteric bursitis of right hip    Class 2 severe obesity due to excess calories with serious comorbidity and body mass index (BMI) of 38 0 to 38 9 in adult ([facility] [address]    Stage 2 chronic kidney disease    Vitamin D deficiency       Past Medical History:   Diagnosis Date    Arthritis     knees    Cancer ([facility] [address] [facility]     prostate    Coronary artery disease     two coronary stents    Diabetes mellitus ([facility] [address] [facility]     Hyperlipidemia 10/19/2018    Hypertension     Psoriasis     lower extremities and buttocks    Sleep apnea     Type 2 diabetes mellitus ([facility] [address] ) 10/19/2018       Past Surgical History:   Procedure Laterality Date    CATARACT EXTRACTION Bilateral     with iol's    COLONOSCOPY N/A 4/5/2016    Procedure: COLONOSCOPY snare polypectomy;  Surgeon: Dulce Frias MD;  Location: [facility] GI LAB; Service:     INSERTION PROSTATE RADIATION SEED      REPLACEMENT TOTAL KNEE Right     TRACHEOSTOMY      x 2  up to 5 years ago    UMBILICAL HERNIA REPAIR           Current Outpatient Medications:     Ascorbic Acid (VITAMIN C) 1000 MG tablet, Take 1,000 mg by mouth daily, Disp: , Rfl:     aspirin 81 MG tablet, Take 81 mg by mouth daily  , Disp: , Rfl:     B-D ULTRAFINE III SHORT PEN 31G X 8 MM MISC, Use with insulin twice a day, Disp: 200 each, Rfl: 3    cholecalciferol (VITAMIN D3) 1,000 units tablet, Take 2,000 Units by mouth daily , Disp: , Rfl:     ciprofloxacin-dexamethasone (CIPRODEX) otic suspension, INSTILL 4 DROPS INTO EACH EAR TWICE DAILY FOR 7 DAYS (Patient not taking: Reported on 7/9/2021), Disp: , Rfl:     clobetasol (OLUX) 0 05 % topical foam, as needed , Disp: , Rfl:     clopidogrel (PLAVIX) 75 mg tablet, TAKE 1 TABLET DAILY, Disp: , Rfl:     doxycycline hyclate (VIBRAMYCIN) 100 mg capsule, Take 100 mg by mouth daily , Disp: , Rfl:     ezetimibe (ZETIA) 10 mg tablet, TAKE 1 TABLET BY MOUTH TIMES A WEEK ON MONDAY WEDNESDAY AND FRIDAY AT 6PM, Disp: , Rfl:     Farxiga 10 MG TABS, TAKE 1 TABLET DAILY, Disp: 90 tablet, Rfl: 3    fluticasone (Flovent HFA) 220 mcg/act inhaler, Inhale 2 puffs as needed , Disp: , Rfl:     furosemide (LASIX) 40 mg tablet, Take 40 mg by mouth daily Twice a week on Mon & Thur , Disp: , Rfl:     Glucagon, rDNA, (Glucagon Emergency) 1 MG KIT, Use glucagon kit if you have a low blood sugar and unconscious (Patient not taking: Reported on 1/13/2022 ), Disp: 1 each, Rfl: 0    glucose blood (OneTouch Verio) test strip, USE TO TEST BLOOD SUGAR THREE TIMES A DAY, Disp: 300 strip, Rfl: 3    HumaLOG Mix 75/25 KwikPen (75-25) 100 units/mL injection pen, INJECT 47 UNITS UNDER THE SKIN BEFORE BREAKFAST AND 45 BEFORE DINNER, Disp: 90 mL, Rfl: 2    Insulin Lispro Prot & Lispro (Insulin Lispro Prot & Lispro) (75-25) 100 units/mL injection pen, Inject 47 Units under the skin 2 (two) times a day (Patient not taking: Reported on 10/29/2021), Disp: , Rfl:     levocetirizine (XYZAL) 5 MG tablet, Take 5 mg by mouth, Disp: , Rfl:     levocetirizine (XYZAL) 5 MG tablet, Take 5 mg by mouth every evening (Patient not taking: Reported on 10/29/2021), Disp: , Rfl:     lisinopril (ZESTRIL) 5 mg tablet, Take 5 mg by mouth daily , Disp: , Rfl:     metFORMIN (GLUCOPHAGE-XR) 750 mg 24 hr tablet, TAKE 1 TABLET TWICE A DAY, Disp: 180 tablet, Rfl: 3    Multiple Vitamins-Minerals (MULTIVITAMIN ADULT PO), Take by mouth, Disp: , Rfl:     omeprazole (PriLOSEC) 20 mg delayed release capsule, Take 1 capsule (20 mg total) by mouth daily, Disp: 30 capsule, Rfl: 11    pravastatin (PRAVACHOL) 40 mg tablet, Take 40 mg by mouth daily (Patient not taking: Reported on 7/9/2021), Disp: , Rfl:     pravastatin (PRAVACHOL) 80 mg tablet, , Disp: , Rfl:     STELARA 90 MG/ML subcutaneous injection, , Disp: , Rfl:     traZODone (DESYREL) 100 mg tablet, TAKE 1 TABLET AT BEDTIME AS NEEDED FOR SLEEP, Disp: , Rfl:     Tremfya 100 MG/ML SOSY, , Disp: , Rfl:     triamcinolone (KENALOG) 0 1 % cream, Apply topically as needed Twice a week, Disp: , Rfl:     urea (CARMOL) 40 %, Apply topically as needed , Disp: , Rfl:     VASCEPA 1 g CAPS, Take by mouth daily , Disp: , Rfl:     Current Facility-Administered Medications:     bupivacaine (PF) (MARCAINE) 0 25 % injection 2 mL, 2 mL, Epidural, Once, Luis Schulz MD    iohexol (OMNIPAQUE) 300 mg/mL injection 1 mL, 1 mL, Epidural, Once, Luis Schulz MD    lidocaine (PF) (XYLOCAINE-MPF) 2 % injection 4 mL, 4 mL, Infiltration, Once, Luis Schulz MD    methylPREDNISolone acetate (DEPO-MEDROL) injection 80 mg, 80 mg, Epidural, Once, Luis Schulz MD    sodium chloride (PF) 0 9 % injection 4 mL, 4 mL, Infiltration, Once, Luis Schulz MD    Allergies   Allergen Reactions    Bee Venom Shortness Of Breath    Lidocaine Shortness Of Breath     Pt reports he was tested and does not have allergy to Lidocaine       Physical Exam:   Vitals:    03/08/22 1348   BP: 144/76   Pulse: 66   Resp: 20   Temp: 98 5 °F (36 9 °C)   SpO2: 95%     General: Awake, Alert, Oriented x 3, Mood and affect appropriate  Respiratory: Respirations even and unlabored  Cardiovascular: Peripheral pulses intact; no edema  Musculoskeletal Exam: lower back pain    ASA Score: 3    Patient/Chart Verification  Patient ID Verified: Verbal  Consents Confirmed: To be obtained in the Pre-Procedure area  Interval H&P(within 24 hr) Complete (required for Outpatients and Surgery Admit only): To be obtained in the Pre-Procedure area  Allergies Reviewed: Yes  Anticoag/NSAID held?: Yes (plavix stopped on 2/28/22)  Currently on antibiotics?: No    Assessment:   1   Lumbar stenosis with neurogenic claudication        Plan: Bilateral L4 TF HELEN

## 2022-03-08 NOTE — DISCHARGE INSTR - LAB
Epidural Steroid Injection   WHAT YOU NEED TO KNOW:   An epidural steroid injection (HELEN) is a procedure to inject steroid medicine into the epidural space  The epidural space is between your spinal cord and vertebrae  Steroids reduce inflammation and fluid buildup in your spine that may be causing pain  You may be given pain medicine along with the steroids  ACTIVITY  Do not drive or operate machinery today  No strenuous activity today - bending, lifting, etc   You may resume normal activites starting tomorrow - start slowly and as tolerated  You may shower today, but no tub baths or hot tubs  You may have numbness for several hours from the local anesthetic  Please use caution and common sense, especially with weight-bearing activities  CARE OF THE INJECTION SITE  If you have soreness or pain, apply ice to the area today (20 minutes on/20 minutes off)  Starting tomorrow, you may use warm, moist heat or ice if needed  You may have an increase or change in your discomfort for 36-48 hours after your treatment  Apply ice and continue with any pain medication you have been prescribed  [facility] if you have any of the following: redness, drainage, swelling, headache, stiff neck or fever above 100°F     SPECIAL INSTRUCTIONS  Our office will contact you in approximately 7 days for a progress report  MEDICATIONS  Continue to take all routine medications  Our office may have instructed you to hold some medications  As no general anesthesia was used in today's procedure, you should not experience any side effects related to anesthesia  If you have a problem specifically related to your procedure, please call our office at [phone]  Problems not related to your procedure should be directed to your primary care physician

## 2022-03-15 ENCOUNTER — TELEPHONE (OUTPATIENT)
Dept: PAIN MEDICINE | Facility: CLINIC | Age: 77
End: 2022-03-15

## 2022-03-15 NOTE — TELEPHONE ENCOUNTER
Pt reports 50% improvement post inj   Pain level 4-5/10  Pt aware I will call next week for an update

## 2022-03-24 DIAGNOSIS — N18.2 TYPE 2 DIABETES MELLITUS WITH STAGE 2 CHRONIC KIDNEY DISEASE, WITH LONG-TERM CURRENT USE OF INSULIN (HCC): ICD-10-CM

## 2022-03-24 DIAGNOSIS — Z79.4 TYPE 2 DIABETES MELLITUS WITH STAGE 2 CHRONIC KIDNEY DISEASE, WITH LONG-TERM CURRENT USE OF INSULIN (HCC): ICD-10-CM

## 2022-03-24 DIAGNOSIS — E11.22 TYPE 2 DIABETES MELLITUS WITH STAGE 2 CHRONIC KIDNEY DISEASE, WITH LONG-TERM CURRENT USE OF INSULIN (HCC): ICD-10-CM

## 2022-03-24 RX ORDER — DAPAGLIFLOZIN 10 MG/1
TABLET, FILM COATED ORAL
Qty: 90 TABLET | Refills: 3 | Status: SHIPPED | OUTPATIENT
Start: 2022-03-24

## 2022-03-31 DIAGNOSIS — E11.22 TYPE 2 DIABETES MELLITUS WITH STAGE 2 CHRONIC KIDNEY DISEASE, WITH LONG-TERM CURRENT USE OF INSULIN (HCC): ICD-10-CM

## 2022-03-31 DIAGNOSIS — N18.2 TYPE 2 DIABETES MELLITUS WITH STAGE 2 CHRONIC KIDNEY DISEASE, WITH LONG-TERM CURRENT USE OF INSULIN (HCC): ICD-10-CM

## 2022-03-31 DIAGNOSIS — Z79.4 TYPE 2 DIABETES MELLITUS WITH STAGE 2 CHRONIC KIDNEY DISEASE, WITH LONG-TERM CURRENT USE OF INSULIN (HCC): ICD-10-CM

## 2022-04-01 RX ORDER — METFORMIN HYDROCHLORIDE 750 MG/1
TABLET, EXTENDED RELEASE ORAL
Qty: 180 TABLET | Refills: 3 | Status: SHIPPED | OUTPATIENT
Start: 2022-04-01

## 2022-04-04 DIAGNOSIS — N18.2 TYPE 2 DIABETES MELLITUS WITH STAGE 2 CHRONIC KIDNEY DISEASE, WITH LONG-TERM CURRENT USE OF INSULIN (HCC): ICD-10-CM

## 2022-04-04 DIAGNOSIS — E78.2 MIXED HYPERLIPIDEMIA: ICD-10-CM

## 2022-04-04 DIAGNOSIS — I10 HYPERTENSION GOAL BP (BLOOD PRESSURE) < 140/90: ICD-10-CM

## 2022-04-04 DIAGNOSIS — E11.22 TYPE 2 DIABETES MELLITUS WITH STAGE 2 CHRONIC KIDNEY DISEASE, WITH LONG-TERM CURRENT USE OF INSULIN (HCC): ICD-10-CM

## 2022-04-04 DIAGNOSIS — Z79.4 TYPE 2 DIABETES MELLITUS WITH STAGE 2 CHRONIC KIDNEY DISEASE, WITH LONG-TERM CURRENT USE OF INSULIN (HCC): ICD-10-CM

## 2022-04-04 RX ORDER — PEN NEEDLE, DIABETIC 31 GX5/16"
NEEDLE, DISPOSABLE MISCELLANEOUS
Qty: 180 EACH | Refills: 3 | Status: SHIPPED | OUTPATIENT
Start: 2022-04-04

## 2022-06-13 ENCOUNTER — TELEPHONE (OUTPATIENT)
Dept: PAIN MEDICINE | Facility: CLINIC | Age: 77
End: 2022-06-13

## 2022-06-13 NOTE — TELEPHONE ENCOUNTER
Patients wife called stating that the patient would like to beging the process for a stimulator- thank you        [phone]

## 2022-07-18 NOTE — TELEPHONE ENCOUNTER
Spoke to pt regarding pain in bilateral lower back radiating into bilateral legs  Pt described pain as "it hurts"   Pt stated he cant walk for long distance due to pain  Pt inquiring if he can repeat injection he had on 3/8/22 TFESI  due to giving him 4 weeks of relief   Pt last OV 9/24/21

## 2022-07-18 NOTE — TELEPHONE ENCOUNTER
Patients wife called to schedule a repeat bilateral L4 TF HELEN instead of the patient having the stimulator    -pain level 7-8/10     [phone]

## 2022-07-18 NOTE — TELEPHONE ENCOUNTER
PT scheduled for 7/29  Not diabetic   On plavix - faxed anticoag hold to Dr Debra Henson at [facility] cardiology     Gave verbal instructions for procedure, sent to [facility] as well   Pt understood

## 2022-07-20 ENCOUNTER — TELEPHONE (OUTPATIENT)
Dept: PAIN MEDICINE | Facility: CLINIC | Age: 77
End: 2022-07-20

## 2022-07-20 NOTE — TELEPHONE ENCOUNTER
Called patient to make sure he was clear on when to stop the Plavix  Told to stop taking Plavix 7/21/22 as his last day to take it  Patient and wife understood

## 2022-07-29 ENCOUNTER — HOSPITAL ENCOUNTER (OUTPATIENT)
Dept: RADIOLOGY | Facility: CLINIC | Age: 77
Discharge: HOME | End: 2022-07-29
Admitting: ANESTHESIOLOGY
Payer: MEDICARE

## 2022-07-29 VITALS
HEART RATE: 78 BPM | SYSTOLIC BLOOD PRESSURE: 150 MMHG | OXYGEN SATURATION: 94 % | DIASTOLIC BLOOD PRESSURE: 79 MMHG | TEMPERATURE: 97.4 F | RESPIRATION RATE: 18 BRPM

## 2022-07-29 DIAGNOSIS — M48.062 SPINAL STENOSIS, LUMBAR REGION WITH NEUROGENIC CLAUDICATION: ICD-10-CM

## 2022-07-29 PROCEDURE — 64483 NJX AA&/STRD TFRM EPI L/S 1: CPT | Performed by: ANESTHESIOLOGY

## 2022-07-29 PROCEDURE — A9585 GADOBUTROL INJECTION: HCPCS | Performed by: ANESTHESIOLOGY

## 2022-07-29 RX ORDER — METHYLPREDNISOLONE ACETATE 80 MG/ML
80 INJECTION, SUSPENSION INTRA-ARTICULAR; INTRALESIONAL; INTRAMUSCULAR; PARENTERAL; SOFT TISSUE ONCE
Status: COMPLETED | OUTPATIENT
Start: 2022-07-29 | End: 2022-07-29

## 2022-07-29 RX ORDER — 0.9 % SODIUM CHLORIDE 0.9 %
4 VIAL (ML) INJECTION ONCE
Status: COMPLETED | OUTPATIENT
Start: 2022-07-29 | End: 2022-07-29

## 2022-07-29 RX ORDER — BUPIVACAINE HCL/PF 2.5 MG/ML
2 VIAL (ML) INJECTION ONCE
Status: COMPLETED | OUTPATIENT
Start: 2022-07-29 | End: 2022-07-29

## 2022-07-29 RX ADMIN — Medication 4 ML: at 15:19

## 2022-07-29 RX ADMIN — GADOBUTROL 1 ML: 604.72 INJECTION INTRAVENOUS at 15:20

## 2022-07-29 RX ADMIN — BUPIVACAINE HYDROCHLORIDE 2 ML: 2.5 INJECTION, SOLUTION EPIDURAL; INFILTRATION; INTRACAUDAL at 15:22

## 2022-07-29 RX ADMIN — METHYLPREDNISOLONE ACETATE 80 MG: 80 INJECTION, SUSPENSION INTRA-ARTICULAR; INTRALESIONAL; INTRAMUSCULAR; PARENTERAL; SOFT TISSUE at 15:22

## 2022-07-29 NOTE — H&P
History of Present Illness: The patient is a 68 y o  male who presents with complaints of lower back and leg pain is here today for bilateral L4 transforaminal epidural steroid injection    Patient Active Problem List   Diagnosis    Obstructive sleep apnea    Coronary artery disease involving native coronary artery    Degenerative arthritis of left knee    Essential (primary) hypertension    Glottic stenosis    Vocal cord paralysis, bilateral complete    Type 2 diabetes mellitus with stage 2 chronic kidney disease, with long-term current use of insulin (HCC)    Mixed hyperlipidemia    Lumbar stenosis with neurogenic claudication    Lumbar spondylosis    Intervertebral disc disorder with radiculopathy of lumbar region    Sacroiliitis (HCC)    Trochanteric bursitis of right hip    Class 2 severe obesity due to excess calories with serious comorbidity and body mass index (BMI) of 38 0 to 38 9 in adult ([facility] [address]    Stage 2 chronic kidney disease    Vitamin D deficiency       Past Medical History:   Diagnosis Date    Arthritis     knees    Cancer ([facility] [address] [facility]     prostate    Coronary artery disease     two coronary stents    Diabetes mellitus ([facility] [address] [facility]     Hyperlipidemia 10/19/2018    Hypertension     Psoriasis     lower extremities and buttocks    Sleep apnea     Type 2 diabetes mellitus ([facility] [address] [facility] 10/19/2018       Past Surgical History:   Procedure Laterality Date    CATARACT EXTRACTION Bilateral     with iol's    COLONOSCOPY N/A 4/5/2016    Procedure: COLONOSCOPY snare polypectomy;  Surgeon: Cheyenne Canchola MD;  Location: [facility] GI LAB; Service:     INSERTION PROSTATE RADIATION SEED      REPLACEMENT TOTAL KNEE Right     TRACHEOSTOMY      x 2  up to 5 years ago    UMBILICAL HERNIA REPAIR           Current Outpatient Medications:     Ascorbic Acid (VITAMIN C) 1000 MG tablet, Take 1,000 mg by mouth daily, Disp: , Rfl:     aspirin 81 MG tablet, Take 81 mg by mouth daily  , Disp: , Rfl:     B-D ULTRAFINE III SHORT PEN 31G X 8 MM MISC, USE WITH INSULIN TWICE A DAY, Disp: 180 each, Rfl: 3    cholecalciferol (VITAMIN D3) 1,000 units tablet, Take 2,000 Units by mouth daily , Disp: , Rfl:     ciprofloxacin-dexamethasone (CIPRODEX) otic suspension, INSTILL 4 DROPS INTO EACH EAR TWICE DAILY FOR 7 DAYS (Patient not taking: Reported on 7/9/2021), Disp: , Rfl:     clobetasol (OLUX) 0 05 % topical foam, as needed , Disp: , Rfl:     clopidogrel (PLAVIX) 75 mg tablet, TAKE 1 TABLET DAILY, Disp: , Rfl:     doxycycline hyclate (VIBRAMYCIN) 100 mg capsule, Take 100 mg by mouth daily , Disp: , Rfl:     ezetimibe (ZETIA) 10 mg tablet, TAKE 1 TABLET BY MOUTH TIMES A WEEK ON MONDAY WEDNESDAY AND FRIDAY AT 6PM, Disp: , Rfl:     Farxiga 10 MG TABS, TAKE 1 TABLET DAILY, Disp: 90 tablet, Rfl: 3    fluticasone (Flovent HFA) 220 mcg/act inhaler, Inhale 2 puffs as needed , Disp: , Rfl:     furosemide (LASIX) 40 mg tablet, Take 40 mg by mouth daily Twice a week on Mon & Thur , Disp: , Rfl:     Glucagon, rDNA, (Glucagon Emergency) 1 MG KIT, Use glucagon kit if you have a low blood sugar and unconscious (Patient not taking: Reported on 1/13/2022 ), Disp: 1 each, Rfl: 0    glucose blood (OneTouch Verio) test strip, USE TO TEST BLOOD SUGAR THREE TIMES A DAY, Disp: 300 strip, Rfl: 3    HumaLOG Mix 75/25 KwikPen (75-25) 100 units/mL injection pen, INJECT 47 UNITS UNDER THE SKIN BEFORE BREAKFAST AND 45 BEFORE DINNER, Disp: 90 mL, Rfl: 2    Insulin Lispro Prot & Lispro (Insulin Lispro Prot & Lispro) (75-25) 100 units/mL injection pen, Inject 47 Units under the skin 2 (two) times a day (Patient not taking: Reported on 10/29/2021), Disp: , Rfl:     levocetirizine (XYZAL) 5 MG tablet, Take 5 mg by mouth, Disp: , Rfl:     levocetirizine (XYZAL) 5 MG tablet, Take 5 mg by mouth every evening (Patient not taking: Reported on 10/29/2021), Disp: , Rfl:     lisinopril (ZESTRIL) 5 mg tablet, Take 5 mg by mouth daily , Disp: , Rfl:     metFORMIN (GLUCOPHAGE-XR) 750 mg 24 hr tablet, TAKE 1 TABLET TWICE A DAY, Disp: 180 tablet, Rfl: 3    Multiple Vitamins-Minerals (MULTIVITAMIN ADULT PO), Take by mouth, Disp: , Rfl:     omeprazole (PriLOSEC) 20 mg delayed release capsule, Take 1 capsule (20 mg total) by mouth daily, Disp: 30 capsule, Rfl: 11    pravastatin (PRAVACHOL) 40 mg tablet, Take 40 mg by mouth daily (Patient not taking: Reported on 7/9/2021), Disp: , Rfl:     pravastatin (PRAVACHOL) 80 mg tablet, , Disp: , Rfl:     STELARA 90 MG/ML subcutaneous injection, , Disp: , Rfl:     traZODone (DESYREL) 100 mg tablet, TAKE 1 TABLET AT BEDTIME AS NEEDED FOR SLEEP, Disp: , Rfl:     Tremfya 100 MG/ML SOSY, , Disp: , Rfl:     triamcinolone (KENALOG) 0 1 % cream, Apply topically as needed Twice a week, Disp: , Rfl:     urea (CARMOL) 40 %, Apply topically as needed , Disp: , Rfl:     VASCEPA 1 g CAPS, Take by mouth daily , Disp: , Rfl:     Current Facility-Administered Medications:     bupivacaine (PF) (MARCAINE) 0 25 % injection 2 mL, 2 mL, Epidural, Once, Alyssa Frausto MD    Gadobutrol injection (SINGLE-DOSE) SOLN 1 mL, 1 mL, Other, Once, Alyssa Frausto MD    lidocaine (PF) (XYLOCAINE-MPF) 2 % injection 4 mL, 4 mL, Infiltration, Once, Alyssa Frausto MD    methylPREDNISolone acetate (DEPO-MEDROL) injection 80 mg, 80 mg, Epidural, Once, Alyssa Frausto MD    sodium chloride (PF) 0 9 % injection 4 mL, 4 mL, Infiltration, Once, Alyssa Frausto MD    Allergies   Allergen Reactions    Bee Venom Shortness Of Breath    Lidocaine Shortness Of Breath     Pt reports he was tested and does not have allergy to Lidocaine       Physical Exam:   Vitals:    07/29/22 1507   BP: 138/79   Pulse: 79   Resp: 18   Temp: (!) 97 4 °F (36 3 °C)   SpO2: 90%     General: Awake, Alert, Oriented x 3, Mood and affect appropriate  Respiratory: Respirations even and unlabored  Cardiovascular: Peripheral pulses intact; no edema  Musculoskeletal Exam:  Lower back pain    ASA Score: 3    Patient/Chart Verification  Patient ID Verified: Verbal  ID Band Applied: No  Consents Confirmed: Procedural, To be obtained in the Pre-Procedure area  H&P( within 30 days) Verified: To be obtained in the Pre-Procedure area  Interval H&P(within 24 hr) Complete (required for Outpatients and Surgery Admit only): To be obtained in the Pre-Procedure area  Allergies Reviewed: Yes  Anticoag/NSAID held?: Yes  Currently on antibiotics?: No    Assessment:   1   Spinal stenosis, lumbar region with neurogenic claudication        Plan: Bilateral L4 TF HELEN

## 2022-07-29 NOTE — DISCHARGE INSTR - LAB
Epidural Steroid Injection   WHAT YOU NEED TO KNOW:   An epidural steroid injection (HELEN) is a procedure to inject steroid medicine into the epidural space  The epidural space is between your spinal cord and vertebrae  Steroids reduce inflammation and fluid buildup in your spine that may be causing pain  You may be given pain medicine along with the steroids  ACTIVITY  Do not drive or operate machinery today  No strenuous activity today - bending, lifting, etc   You may resume normal activites starting tomorrow - start slowly and as tolerated  You may shower today, but no tub baths or hot tubs  You may have numbness for several hours from the local anesthetic  Please use caution and common sense, especially with weight-bearing activities  CARE OF THE INJECTION SITE  If you have soreness or pain, apply ice to the area today (20 minutes on/20 minutes off)  Starting tomorrow, you may use warm, moist heat or ice if needed  You may have an increase or change in your discomfort for 36-48 hours after your treatment  Apply ice and continue with any pain medication you have been prescribed  Notify the [facility] if you have any of the following: redness, drainage, swelling, headache, stiff neck or fever above 100°F     SPECIAL INSTRUCTIONS  Our office will contact you in approximately 7 days for a progress report  MEDICATIONS  Continue to take all routine medications  Our office may have instructed you to hold some medications  As no general anesthesia was used in today's procedure, you should not experience any side effects related to anesthesia  If you are diabetic, the steroids used in today's injection may temporarily increase your blood sugar levels after the first few days after your injection  Please keep a close eye on your sugars and alert the doctor who manages your diabetes if your sugars are significantly high from your baseline or you are symptomatic       If you have a problem specifically related to your procedure, please call our office at [phone]  Problems not related to your procedure should be directed to your primary care physician

## 2022-08-05 ENCOUNTER — TELEPHONE (OUTPATIENT)
Dept: PAIN MEDICINE | Facility: CLINIC | Age: 77
End: 2022-08-05

## 2022-10-11 DIAGNOSIS — Z79.4 TYPE 2 DIABETES MELLITUS WITH STAGE 2 CHRONIC KIDNEY DISEASE, WITH LONG-TERM CURRENT USE OF INSULIN (HCC): Primary | ICD-10-CM

## 2022-10-11 DIAGNOSIS — E78.2 MIXED HYPERLIPIDEMIA: ICD-10-CM

## 2022-10-11 DIAGNOSIS — E11.22 TYPE 2 DIABETES MELLITUS WITH STAGE 2 CHRONIC KIDNEY DISEASE, WITH LONG-TERM CURRENT USE OF INSULIN (HCC): Primary | ICD-10-CM

## 2022-10-11 DIAGNOSIS — I10 HYPERTENSION GOAL BP (BLOOD PRESSURE) < 140/90: ICD-10-CM

## 2022-10-11 DIAGNOSIS — N18.2 TYPE 2 DIABETES MELLITUS WITH STAGE 2 CHRONIC KIDNEY DISEASE, WITH LONG-TERM CURRENT USE OF INSULIN (HCC): Primary | ICD-10-CM

## 2022-10-19 LAB
ALBUMIN SERPL-MCNC: 4.3 G/DL (ref 3.7–4.7)
ALBUMIN/GLOB SERPL: 1.7 {RATIO} (ref 1.2–2.2)
ALP SERPL-CCNC: 73 IU/L (ref 44–121)
ALT SERPL-CCNC: 24 IU/L (ref 0–44)
AST SERPL-CCNC: 20 IU/L (ref 0–40)
BILIRUB SERPL-MCNC: 0.5 MG/DL (ref 0–1.2)
BUN SERPL-MCNC: 18 MG/DL (ref 8–27)
BUN/CREAT SERPL: 17 (ref 10–24)
CALCIUM SERPL-MCNC: 9.1 MG/DL (ref 8.6–10.2)
CHLORIDE SERPL-SCNC: 100 MMOL/L (ref 96–106)
CHOLEST SERPL-MCNC: 145 MG/DL (ref 100–199)
CO2 SERPL-SCNC: 23 MMOL/L (ref 20–29)
CREAT SERPL-MCNC: 1.09 MG/DL (ref 0.76–1.27)
EGFR: 70 ML/MIN/1.73
FRUCTOSAMINE SERPL-SCNC: 248 UMOL/L (ref 0–285)
GLOBULIN SER-MCNC: 2.5 G/DL (ref 1.5–4.5)
GLUCOSE SERPL-MCNC: 112 MG/DL (ref 70–99)
HBA1C MFR BLD: 7.2 % (ref 4.8–5.6)
HDLC SERPL-MCNC: 63 MG/DL
LDLC SERPL CALC-MCNC: 67 MG/DL (ref 0–99)
POTASSIUM SERPL-SCNC: 4.5 MMOL/L (ref 3.5–5.2)
PROT SERPL-MCNC: 6.8 G/DL (ref 6–8.5)
SODIUM SERPL-SCNC: 138 MMOL/L (ref 134–144)
TRIGL SERPL-MCNC: 74 MG/DL (ref 0–149)

## 2022-10-26 ENCOUNTER — OFFICE VISIT (OUTPATIENT)
Dept: PAIN MEDICINE | Facility: CLINIC | Age: 77
End: 2022-10-26
Payer: MEDICARE

## 2022-10-26 ENCOUNTER — OFFICE VISIT (OUTPATIENT)
Dept: ENDOCRINOLOGY | Facility: CLINIC | Age: 77
End: 2022-10-26
Payer: MEDICARE

## 2022-10-26 VITALS
WEIGHT: 280.5 LBS | OXYGEN SATURATION: 97 % | BODY MASS INDEX: 40.16 KG/M2 | HEART RATE: 94 BPM | HEIGHT: 70 IN | SYSTOLIC BLOOD PRESSURE: 130 MMHG | DIASTOLIC BLOOD PRESSURE: 86 MMHG

## 2022-10-26 VITALS
WEIGHT: 275 LBS | BODY MASS INDEX: 39.46 KG/M2 | HEART RATE: 67 BPM | DIASTOLIC BLOOD PRESSURE: 81 MMHG | SYSTOLIC BLOOD PRESSURE: 159 MMHG

## 2022-10-26 DIAGNOSIS — I25.10 CORONARY ARTERY DISEASE INVOLVING NATIVE CORONARY ARTERY OF NATIVE HEART WITHOUT ANGINA PECTORIS: ICD-10-CM

## 2022-10-26 DIAGNOSIS — I10 ESSENTIAL (PRIMARY) HYPERTENSION: ICD-10-CM

## 2022-10-26 DIAGNOSIS — N18.2 TYPE 2 DIABETES MELLITUS WITH STAGE 2 CHRONIC KIDNEY DISEASE, WITH LONG-TERM CURRENT USE OF INSULIN (HCC): Primary | ICD-10-CM

## 2022-10-26 DIAGNOSIS — N18.2 STAGE 2 CHRONIC KIDNEY DISEASE: ICD-10-CM

## 2022-10-26 DIAGNOSIS — E78.2 MIXED HYPERLIPIDEMIA: ICD-10-CM

## 2022-10-26 DIAGNOSIS — Z79.4 TYPE 2 DIABETES MELLITUS WITH RETINOPATHY, WITH LONG-TERM CURRENT USE OF INSULIN, MACULAR EDEMA PRESENCE UNSPECIFIED, UNSPECIFIED LATERALITY, UNSPECIFIED RETINOPATHY SEVERITY (HCC): ICD-10-CM

## 2022-10-26 DIAGNOSIS — E66.01 CLASS 2 SEVERE OBESITY DUE TO EXCESS CALORIES WITH SERIOUS COMORBIDITY AND BODY MASS INDEX (BMI) OF 38.0 TO 38.9 IN ADULT (HCC): ICD-10-CM

## 2022-10-26 DIAGNOSIS — M51.16 INTERVERTEBRAL DISC DISORDER WITH RADICULOPATHY OF LUMBAR REGION: ICD-10-CM

## 2022-10-26 DIAGNOSIS — M48.062 LUMBAR STENOSIS WITH NEUROGENIC CLAUDICATION: ICD-10-CM

## 2022-10-26 DIAGNOSIS — E11.22 TYPE 2 DIABETES MELLITUS WITH STAGE 2 CHRONIC KIDNEY DISEASE, WITH LONG-TERM CURRENT USE OF INSULIN (HCC): Primary | ICD-10-CM

## 2022-10-26 DIAGNOSIS — E11.319 TYPE 2 DIABETES MELLITUS WITH RETINOPATHY, WITH LONG-TERM CURRENT USE OF INSULIN, MACULAR EDEMA PRESENCE UNSPECIFIED, UNSPECIFIED LATERALITY, UNSPECIFIED RETINOPATHY SEVERITY (HCC): ICD-10-CM

## 2022-10-26 DIAGNOSIS — G89.4 CHRONIC PAIN SYNDROME: Primary | ICD-10-CM

## 2022-10-26 DIAGNOSIS — Z79.4 TYPE 2 DIABETES MELLITUS WITH STAGE 2 CHRONIC KIDNEY DISEASE, WITH LONG-TERM CURRENT USE OF INSULIN (HCC): Primary | ICD-10-CM

## 2022-10-26 PROCEDURE — 99214 OFFICE O/P EST MOD 30 MIN: CPT | Performed by: INTERNAL MEDICINE

## 2022-10-26 PROCEDURE — 99214 OFFICE O/P EST MOD 30 MIN: CPT | Performed by: ANESTHESIOLOGY

## 2022-10-26 RX ORDER — HYDROCODONE BITARTRATE AND ACETAMINOPHEN 5; 325 MG/1; MG/1
1 TABLET ORAL 2 TIMES DAILY PRN
Qty: 30 TABLET | Refills: 0 | Status: SHIPPED | OUTPATIENT
Start: 2022-10-26

## 2022-10-26 RX ORDER — ROSUVASTATIN CALCIUM 40 MG/1
40 TABLET, COATED ORAL DAILY
COMMUNITY

## 2022-10-26 NOTE — PROGRESS NOTES
Roberta Burgos 68 y o  male MRN: [id number]    Encounter: [id number]      Assessment/Plan     1  Type 2 diabetes mellitus with long-term insulin therapy   2  CKD   3  Retinopathy   4  Obesity   Well controlled for age and comorbidities with Last A1c 7 2% which has improved    Recommend the following at this time  Decrease insulin 75/25 to 45 in the morning, Continue 47 units in the evening   Continue current dose of metformin, Farxiga  Recommend stagger checking blood sugars 1-2 times a day before meals and at bedtime, send log for review in 2-3 weeks   Strongly recommend following up with Ophthalmology  Follow-up in 3 months with repeat labs     5  Hyperlipidemia  - continue statin therapy    6  Hypertension  7  CAD   Blood pressure at goal   - continue current medications     CC: Diabetes    History of Present Illness     HPI:  Roberta Burgos is a 68 y o  male presents for a follow-up visit regarding diabetes management  Also has hypertension, hyperlipidemia, CKD, CAD, vitamin-D deficiency, obesity    Last seen in 01/2022  Last Eye exam: needs to follow up - has a h/o retinopathy> 1 year    Current regimen:   Insulin 75/25 47, 47 urine with dinner  Metformin  mg orally twice a day   Farxiga 10 mg every day    Says that he enetersto dose was increased a few weeks ago and statin medication chaged  Patient has noticed improvement in BG since   Still has SOB since vocal cord paralysis ? worsening   No CP   No numbness/tingling   Occasional constipation   No recent vision changes      Statin:  Vascepa, Crestor 40   ACE-I/ARB:  Lisinopril    Has chronic pain syndrome -  Following up with     Home glucose monitoring:  Log will be scanned into chart   Before breakfast:   Before dinner:  85, 96    Symptoms of hypoglycemia :  Lowest 70-72 mg/dl - hungry and sweating  when he has eaten less carbs; mostly morning     All other systems were reviewed and are negative      Review of Systems      Historical Information   Past Medical History:   Diagnosis Date   • Arthritis     knees   • Cancer [facility])     prostate   • Coronary artery disease     two coronary stents   • Diabetes mellitus ([facility] [address] )    • Hyperlipidemia 10/19/2018   • Hypertension    • Psoriasis     lower extremities and buttocks   • Sleep apnea    • [facility] 2 [facility] ) 10/19/2018     Past Surgical History:   Procedure Laterality Date   • CATARACT EXTRACTION Bilateral     with iol's   • COLONOSCOPY N/A 4/5/2016    Procedure: COLONOSCOPY snare polypectomy;  Surgeon: Perfecto Rose MD;  Location: [facility] GI LAB; Service:    • INSERTION PROSTATE RADIATION SEED     • REPLACEMENT TOTAL KNEE Right    • TRACHEOSTOMY      x 2  up to 5 years ago   • UMBILICAL HERNIA REPAIR       Social History   Social History     Substance and Sexual Activity   Alcohol Use Yes   • Alcohol/week: 4 0 standard drinks   • Types: 4 Cans of beer per week    Comment: daily     Social History     Substance and Sexual Activity   Drug Use No     Social History     Tobacco Use   Smoking Status Never Smoker   Smokeless Tobacco Never Used     Family History: History reviewed  No pertinent family history  Meds/Allergies   Current Outpatient Medications   Medication Sig Dispense Refill   • Ascorbic Acid (VITAMIN C) 1000 MG tablet Take 1,000 mg by mouth daily     • aspirin 81 MG tablet Take 81 mg by mouth daily       • B-D ULTRAFINE III SHORT PEN 31G X 8 MM MISC USE WITH INSULIN TWICE A  each 3   • cholecalciferol (VITAMIN D3) 1,000 units tablet Take 2,000 Units by mouth daily      • ciprofloxacin-dexamethasone (CIPRODEX) otic suspension INSTILL 4 DROPS INTO EACH EAR TWICE DAILY FOR 7 DAYS     • clobetasol (OLUX) 0 05 % topical foam as needed      • clopidogrel (PLAVIX) 75 mg tablet TAKE 1 TABLET DAILY     • doxycycline hyclate (VIBRAMYCIN) 100 mg capsule Take 100 mg by mouth daily      • ezetimibe (ZETIA) 10 mg tablet TAKE 1 TABLET BY MOUTH TIMES A WEEK ON MONDAY [facility] AND FRIDAY AT 6PM     • Farxiga 10 MG TABS TAKE 1 TABLET DAILY 90 tablet 3   • furosemide (LASIX) 40 mg tablet Take 40 mg by mouth daily Twice a week on Mon & Thur      • Glucagon, rDNA, (Glucagon Emergency) 1 MG KIT Use glucagon kit if you have a low blood sugar and unconscious 1 each 0   • glucose blood (OneTouch Verio) test strip USE TO TEST BLOOD SUGAR THREE TIMES A  strip 3   • HumaLOG Mix 75/25 KwikPen (75-25) 100 units/mL injection pen INJECT 47 UNITS UNDER THE SKIN BEFORE BREAKFAST AND 45 BEFORE DINNER 90 mL 2   • HYDROcodone-acetaminophen (NORCO) 5-325 mg per tablet Take 1 tablet by mouth 2 (two) times a day as needed for pain Max Daily Amount: 2 tablets 30 tablet 0   • Insulin Lispro Prot & Lispro (Insulin Lispro Prot & Lispro) (75-25) 100 units/mL injection pen Inject 47 Units under the skin 2 (two) times a day     • levocetirizine (XYZAL) 5 MG tablet Take 5 mg by mouth     • levocetirizine (XYZAL) 5 MG tablet Take 5 mg by mouth every evening     • lisinopril (ZESTRIL) 5 mg tablet Take 5 mg by mouth daily      • metFORMIN (GLUCOPHAGE-XR) 750 mg 24 hr tablet TAKE 1 TABLET TWICE A  tablet 3   • Multiple Vitamins-Minerals (MULTIVITAMIN ADULT PO) Take by mouth     • omeprazole (PriLOSEC) 20 mg delayed release capsule Take 1 capsule (20 mg total) by mouth daily 30 capsule 11   • pravastatin (PRAVACHOL) 40 mg tablet Take 40 mg by mouth daily     • pravastatin (PRAVACHOL) 80 mg tablet      • rosuvastatin (CRESTOR) 40 MG tablet Take 40 mg by mouth daily 1/2 pill at night     • STELARA 90 MG/ML subcutaneous injection      • traZODone (DESYREL) 100 mg tablet TAKE 1 TABLET AT BEDTIME AS NEEDED FOR SLEEP     • Tremfya 100 MG/ML SOSY      • triamcinolone (KENALOG) 0 1 % cream Apply topically as needed Twice a week     • urea (CARMOL) 40 % Apply topically as needed      • VASCEPA 1 g CAPS Take by mouth daily      • fluticasone (Flovent HFA) 220 mcg/act inhaler Inhale 2 puffs as needed        No current facility-administered medications for this visit  Allergies   Allergen Reactions   • Bee Venom Shortness Of Breath   • Lidocaine Shortness Of Breath     Pt reports he was tested and does not have allergy to Lidocaine       Objective   Vitals: Blood pressure 130/86, pulse 94, height 5' 10" (1 778 m), weight 127 kg (280 lb 8 oz), SpO2 97 %  Physical Exam  Constitutional:       General: He is not in acute distress  Appearance: He is well-developed  He is not diaphoretic  HENT:      Head: Normocephalic and atraumatic  Eyes:      Conjunctiva/sclera: Conjunctivae normal       Pupils: Pupils are equal, round, and reactive to light  Cardiovascular:      Rate and Rhythm: Normal rate and regular rhythm  Heart sounds: Normal heart sounds  No murmur heard  Pulmonary:      Effort: Pulmonary effort is normal  No respiratory distress  Breath sounds: Normal breath sounds  No wheezing  Abdominal:      General: There is no distension  Palpations: Abdomen is soft  Tenderness: There is no abdominal tenderness  There is no guarding  Musculoskeletal:      Cervical back: Normal range of motion and neck supple  Skin:     General: Skin is warm and dry  Findings: No erythema or rash  Neurological:      Mental Status: He is alert and oriented to person, place, and time  Psychiatric:         Behavior: Behavior normal          Thought Content: Thought content normal          The history was obtained from the review of the chart, patient      Lab Results:   Lab Results   Component Value Date/Time    Hemoglobin A1C 7 2 (H) 10/18/2022 07:28 AM    Hemoglobin A1C 8 2 (H) 12/27/2021 07:29 AM    BUN 18 10/18/2022 07:28 AM    BUN 15 12/27/2021 07:29 AM    Potassium 4 5 10/18/2022 07:28 AM    Potassium 4 4 12/27/2021 07:29 AM    Chloride 100 10/18/2022 07:28 AM    Chloride 98 12/27/2021 07:29 AM    CO2 23 10/18/2022 07:28 AM    CO2 24 12/27/2021 07:29 AM    Creatinine 1 09 10/18/2022 07:28 AM Creatinine 1 01 12/27/2021 07:29 AM    AST 20 10/18/2022 07:28 AM    ALT 24 10/18/2022 07:28 AM    Albumin 4 3 10/18/2022 07:28 AM    Globulin, Total 2 5 10/18/2022 07:28 AM    HDL 63 10/18/2022 07:28 AM    Triglycerides 74 10/18/2022 07:28 AM         Imaging Studies: I have personally reviewed pertinent reports  Portions of the record may have been created with voice recognition software  Occasional wrong word or "sound a like" substitutions may have occurred due to the inherent limitations of voice recognition software  Read the chart carefully and recognize, using context, where substitutions have occurred

## 2022-10-26 NOTE — PATIENT INSTRUCTIONS
Decrease insulin 75/25 to 45 in the morning, Continue 47 units in the evening   Continue current dose of metformin, Farxiga   Please staggered check blood sugars 1-2 times a day before meals and at bedtime, send log for review in 2-3 weeks   Follow-up in 3 months with repeat labs   Strongly recommend following up with Ophthalmology

## 2022-10-26 NOTE — PROGRESS NOTES
Assessment:  1  Chronic pain syndrome    2  Lumbar stenosis with neurogenic claudication    3  Intervertebral disc disorder with radiculopathy of lumbar region      Plan:  The patient's symptoms, history/physical are consistent with pain as result of his multilevel spinal stenosis which is leading to radicular symptoms into his legs  At this time, I discussed that he would be an excellent candidate for spinal cord stimulator to treat his chronic lower back and radiating leg pain  Spinal cord stimulation was discussed with the patient  I explained how stimulation provides relief of lower back and leg symptoms by stimulating the dorsal columns  Initially a trial would be performed where a spinal cord stimulator lead would be placed percutaneously through an epidural needle  The patient would then have 3-6 days to evaluate whether the stimulator provides relief of the painful areas  If the patient gets significant relief of greater than 50% during the trial, the patient would then be referred for permanent implantation  In order to do the trial, the patient will be referred for psychological evaluation prior to obtaining insurance preauthorization  The patient will also be set up for education with our [employer] device representative  For now, I will start him on hydrocodone 5/325 which he can take twice daily as needed  He was apprised of the most common side effects including constipation and sedation  My impressions and treatment recommendations were discussed in detail with the patient who verbalized understanding and had no further questions  Discharge instructions were provided  I personally saw and examined the patient and I agree with the above discussed plan of care  Orders Placed This Encounter   Procedures   • MRI thoracic spine wo contrast     Pre-scs trial clearance     Standing Status:   Future     Standing Expiration Date:   10/26/2026     Scheduling Instructions:       There is no preparation for this test  Please leave your jewelry and valuables at home, wedding rings are the exception  Please bring your physician order, insurance cards, a form of photo ID and a list of your medications with you  Arrive 15 minutes prior to your appointment time in order to       register  Please bring any prior CT or MRI studies of this area that were not performed at a [facility]  To schedule this appointment, please contact Central Scheduling at [phone]  Order Specific Question:   What is the patient's sedation requirement? Answer:   No Sedation   • MRI lumbar spine without contrast     Standing Status:   Future     Standing Expiration Date:   10/26/2026     Scheduling Instructions: There is no preparation for this test  Please leave your jewelry and valuables at home, wedding rings are the exception  All patients will be required to change into a hospital gown and pants  Street clothes are not permitted in the MRI  Magnetic nail polish must be removed prior to arrival for your test  Please bring your insurance cards, a form of photo ID and a list of your medications with you  Arrive 15 minutes prior to your appointment time in order to register  Please bring any prior CT or MRI studies of this area that were not performed at a [facility]  To schedule this appointment, please contact Central Scheduling at [phone]  Prior to your appointment, please make sure you complete the MRI Screening Form when you e-Check in for your appointment  This will be available starting 7 days before your appointment in [address]  You may receive an e-mail with an activation code if you do not have a [employer] account  If you do not have access to a device, we will complete your screening at your appointment  Order Specific Question:   What is the patient's sedation requirement?      Answer:   No Sedation     Order Specific Question:   Release to patient through [employer]     Answer:   Immediate     Order Specific Question:   Is order priority selected as STAT? Answer:   No     Order Specific Question:   Reason for Exam (FREE TEXT)     Answer: Worsening lower back and bilateral hip pain   • Ambulatory Referral to Psychology     Standing Status:   Future     Standing Expiration Date:   10/26/2023     Referral Priority:   Routine     Referral [facility]   [facility]     Referral Reason:   Specialty Services Required     Requested Specialty:   Psychology     Number of Visits Requested:   1     Expiration Date:   10/26/2023     New Medications Ordered This Visit   Medications   • HYDROcodone-acetaminophen (NORCO) 5-325 mg per tablet     Sig: Take 1 tablet by mouth 2 (two) times a day as needed for pain Max Daily Amount: 2 tablets     Dispense:  30 tablet     Refill:  0       History of Present Illness:  Puja Mckeon is a 68 y o  male who presents for a follow up office visit in regards to Back Pain  The patient has a history of lumbar spinal stenosis and returns for follow-up  He reports ongoing pain in the lower back radiating to the hips and legs that is moderate-to-severe limits his ability to walk for any length of time  He had been taking tramadol but this was not effective  He is interested in talking about spinal cord stimulation which have previously been discussed with him  He does have weakness in the left foot  I have personally reviewed and/or updated the patient's past medical history, past surgical history, family history, social history, current medications, allergies, and vital signs today  Review of Systems   Respiratory: Negative for shortness of breath  Cardiovascular: Negative for chest pain  Gastrointestinal: Negative for constipation, diarrhea, nausea and vomiting  Musculoskeletal: Positive for back pain, gait problem and joint swelling  Negative for arthralgias and myalgias  Skin: Negative for rash  Neurological: Positive for weakness and numbness  Negative for dizziness and seizures  All other systems reviewed and are negative  Patient Active Problem List   Diagnosis   • Obstructive sleep apnea   • Coronary artery disease involving native coronary artery   • Degenerative arthritis of left knee   • Essential (primary) hypertension   • Glottic stenosis   • Vocal cord paralysis, bilateral complete   • Type 2 diabetes mellitus with stage 2 chronic kidney disease, with long-term current use of insulin (HCC)   • Mixed hyperlipidemia   • Lumbar stenosis with neurogenic claudication   • Lumbar spondylosis   • Intervertebral disc disorder with radiculopathy of lumbar region   • Sacroiliitis (HCC)   • Trochanteric bursitis of right hip   • Class 2 severe obesity due to excess calories with serious comorbidity and body mass index (BMI) of 38 0 to 38 9 in [facility])   • Stage 2 chronic kidney disease   • Vitamin D deficiency       Past Medical History:   Diagnosis Date   • Arthritis     knees   • Cancer ([facility] [address] )     prostate   • Coronary artery disease     two coronary stents   • Diabetes mellitus ([facility] [address] )    • Hyperlipidemia 10/19/2018   • Hypertension    • Psoriasis     lower extremities and buttocks   • Sleep apnea    • Type 2 diabetes mellitus ([facility] [address] ) 10/19/2018       Past Surgical History:   Procedure Laterality Date   • CATARACT EXTRACTION Bilateral     with iol's   • COLONOSCOPY N/A 4/5/2016    Procedure: COLONOSCOPY snare polypectomy;  Surgeon: Valorie Sawyer MD;  Location: [facility]; Service:    • INSERTION PROSTATE RADIATION SEED     • REPLACEMENT TOTAL KNEE Right    • TRACHEOSTOMY      x 2  up to 5 years ago   • UMBILICAL HERNIA REPAIR         No family history on file  Social History     Occupational History   • Not on file   Tobacco Use   • Smoking status: Never Smoker   • Smokeless tobacco: Never Used   Vaping Use   • Vaping Use: Never used   Substance and Sexual Activity   • Alcohol use:  Yes Alcohol/week: 4 0 standard drinks     Types: 4 Cans of beer per week     Comment: daily   • Drug use: No   • Sexual activity: Not on file       Current Outpatient Medications on File Prior to Visit   Medication Sig   • Ascorbic Acid (VITAMIN C) 1000 MG tablet Take 1,000 mg by mouth daily   • aspirin 81 MG tablet Take 81 mg by mouth daily     • B-D ULTRAFINE III SHORT PEN 31G X 8 MM MISC USE WITH INSULIN TWICE A DAY   • cholecalciferol (VITAMIN D3) 1,000 units tablet Take 2,000 Units by mouth daily    • ciprofloxacin-dexamethasone (CIPRODEX) otic suspension INSTILL 4 DROPS INTO EACH EAR TWICE DAILY FOR 7 DAYS (Patient not taking: Reported on 7/9/2021)   • clobetasol (OLUX) 0 05 % topical foam as needed    • clopidogrel (PLAVIX) 75 mg tablet TAKE 1 TABLET DAILY   • doxycycline hyclate (VIBRAMYCIN) 100 mg capsule Take 100 mg by mouth daily    • ezetimibe (ZETIA) 10 mg tablet TAKE 1 TABLET BY MOUTH TIMES A WEEK ON MONDAY WEDNESDAY AND FRIDAY AT 6PM   • Farxiga 10 MG TABS TAKE 1 TABLET DAILY   • fluticasone (Flovent HFA) 220 mcg/act inhaler Inhale 2 puffs as needed    • furosemide (LASIX) 40 mg tablet Take 40 mg by mouth daily Twice a week on Mon & Thur    • Glucagon, rDNA, (Glucagon Emergency) 1 MG KIT Use glucagon kit if you have a low blood sugar and unconscious (Patient not taking: Reported on 1/13/2022 )   • glucose blood (OneTouch Verio) test strip USE TO TEST BLOOD SUGAR THREE TIMES A DAY   • HumaLOG Mix 75/25 KwikPen (75-25) 100 units/mL injection pen INJECT 47 UNITS UNDER THE SKIN BEFORE BREAKFAST AND 45 BEFORE DINNER   • Insulin Lispro Prot & Lispro (Insulin Lispro Prot & Lispro) (75-25) 100 units/mL injection pen Inject 47 Units under the skin 2 (two) times a day (Patient not taking: Reported on 10/29/2021)   • levocetirizine (XYZAL) 5 MG tablet Take 5 mg by mouth   • levocetirizine (XYZAL) 5 MG tablet Take 5 mg by mouth every evening (Patient not taking: Reported on 10/29/2021)   • lisinopril (ZESTRIL) 5 mg tablet Take 5 mg by mouth daily    • metFORMIN (GLUCOPHAGE-XR) 750 mg 24 hr tablet TAKE 1 TABLET TWICE A DAY   • Multiple Vitamins-Minerals (MULTIVITAMIN ADULT PO) Take by mouth   • omeprazole (PriLOSEC) 20 mg delayed release capsule Take 1 capsule (20 mg total) by mouth daily   • pravastatin (PRAVACHOL) 40 mg tablet Take 40 mg by mouth daily (Patient not taking: Reported on 7/9/2021)   • pravastatin (PRAVACHOL) 80 mg tablet    • STELARA 90 MG/ML subcutaneous injection  (Patient not taking: Reported on 10/29/2021)   • traZODone (DESYREL) 100 mg tablet TAKE 1 TABLET AT BEDTIME AS NEEDED FOR SLEEP   • Tremfya 100 MG/ML SOSY    • triamcinolone (KENALOG) 0 1 % cream Apply topically as needed Twice a week   • urea (CARMOL) 40 % Apply topically as needed    • VASCEPA 1 g CAPS Take by mouth daily      No current facility-administered medications on file prior to visit  Allergies   Allergen Reactions   • Bee Venom Shortness Of Breath   • Lidocaine Shortness Of Breath     Pt reports he was tested and does not have allergy to Lidocaine       Physical Exam:    /81   Pulse 67   Wt 125 kg (275 lb)   BMI 39 46 kg/m²     Constitutional:normal, well developed, well nourished, alert, in no distress and non-toxic and no overt pain behavior   and obese  Eyes:anicteric  HEENT:grossly intact  Neck:supple, symmetric, trachea midline and no masses   Pulmonary:even and unlabored  Cardiovascular:No edema or pitting edema present  Skin:Normal without rashes or lesions and well hydrated  Psychiatric:Mood and affect appropriate  Neurologic:Cranial Nerves II-XII grossly intact  Musculoskeletal:antalgic and ambulates with cane     Lumbar Spine Exam  Appearance:  Normal lordosis  Palpation/Tenderness:  left lumbar paraspinal tenderness  right lumbar paraspinal tenderness  Range of Motion: deferred  Motor Strength:  Left hip flexion:  4/5  Left hip extension:  5/5  Right hip flexion:  5/5  Right hip extension:  5/5  Left knee flexion:  5/5  Left knee extension:  5/5  Right knee flexion:  5/5  Right knee extension:  5/5  Left foot dorsiflexion:  3/5  Left foot plantar flexion:  5/5  Right foot dorsiflexion:  5/5  Right foot plantar flexion:  5/5    Imaging    MRI LUMBAR SPINE WITHOUT CONTRAST     INDICATION: [id number]: Spinal stenosis, lumbar region with neurogenic claudication      COMPARISON:  11/11/2014      TECHNIQUE:  Sagittal T1, sagittal T2, sagittal inversion recovery, axial T1 and axial T2, coronal T2        IMAGE QUALITY:  Diagnostic     FINDINGS:     ALIGNMENT:  Normal alignment of the lumbar spine  No compression fracture  No spondylolysis or spondylolisthesis  No scoliosis      MARROW SIGNAL:  Small L2 hemangioma is are noted within the vertebral body anteriorly      DISTAL CORD AND CONUS:  Normal size and signal within the distal cord and conus  The conus ends at the L1-L2 level      PARASPINAL SOFT TISSUES:  Paraspinal soft tissues are unremarkable      SACRUM:  Normal signal within the sacrum  No evidence of insufficiency or stress fracture      LOWER THORACIC DISC SPACES:  Normal disc height and signal   No disc herniation, canal stenosis or foraminal narrowing      LUMBAR DISC SPACES:     L1-L2:  Loss of disc height  Mild annular bulging  There is a small slightly superiorly extruded central disc herniation  Mild canal stenosis without foraminal nerve impingement  No change      L2-L3:  Normal disc height and signal   No disc herniation, canal stenosis or foraminal narrowing      L3-L4:  Mild diffuse annular bulging  There is mild to moderate facet hypertrophic degenerative change with trace facet effusions  There is a small synovial cyst arising from the medial aspect of the left facet joint, series 6 image 13  Slight canal   stenosis and mild foraminal narrowing, stable      L4-L5:  Diffuse annular bulging    There is a broad-based left foraminal disc protrusion with left greater than right facet hypertrophic degenerative change  Slight worsening of canal stenosis with distortion of the left anterolateral aspect of the   thecal sac  There is stable moderate bilateral foraminal narrowing      L5-S1:  Broad-based central and left paracentral disc protrusion abutting the ventral aspect of the thecal sac  Mild left greater than right facet arthropathy    There is mild canal stenosis with mild left foraminal narrowing, unchanged

## 2022-11-22 DIAGNOSIS — E11.22 TYPE 2 DIABETES MELLITUS WITH STAGE 2 CHRONIC KIDNEY DISEASE, WITH LONG-TERM CURRENT USE OF INSULIN (HCC): ICD-10-CM

## 2022-11-22 DIAGNOSIS — N18.2 TYPE 2 DIABETES MELLITUS WITH STAGE 2 CHRONIC KIDNEY DISEASE, WITH LONG-TERM CURRENT USE OF INSULIN (HCC): ICD-10-CM

## 2022-11-22 DIAGNOSIS — Z79.4 TYPE 2 DIABETES MELLITUS WITH STAGE 2 CHRONIC KIDNEY DISEASE, WITH LONG-TERM CURRENT USE OF INSULIN (HCC): ICD-10-CM

## 2022-11-22 RX ORDER — INSULIN LISPRO 100 [IU]/ML
INJECTION, SUSPENSION SUBCUTANEOUS
Qty: 90 ML | Refills: 2 | Status: SHIPPED | OUTPATIENT
Start: 2022-11-22

## 2022-12-06 ENCOUNTER — HOSPITAL ENCOUNTER (OUTPATIENT)
Dept: RADIOLOGY | Facility: HOSPITAL | Age: 77
Discharge: HOME | End: 2022-12-06

## 2022-12-07 ENCOUNTER — TRANSCRIBE ORDERS (OUTPATIENT)
Dept: PAIN MEDICINE | Facility: CLINIC | Age: 77
End: 2022-12-07

## 2022-12-07 ENCOUNTER — TELEPHONE (OUTPATIENT)
Dept: PAIN MEDICINE | Facility: CLINIC | Age: 77
End: 2022-12-07

## 2022-12-07 NOTE — TELEPHONE ENCOUNTER
Psych vicki scanned into chart, please review  Pt was scheduled for MRIs yesterday, per chart- cancelled bc he was sick but did not reschedule  I will call pt to make sure he is going to get those completed

## 2022-12-27 ENCOUNTER — HOSPITAL ENCOUNTER (OUTPATIENT)
Dept: RADIOLOGY | Facility: HOSPITAL | Age: 77
Discharge: HOME | End: 2022-12-27

## 2022-12-27 DIAGNOSIS — Z95.0 MRI SAFE CARDIAC PACEMAKER IN SITU: ICD-10-CM

## 2022-12-27 DIAGNOSIS — M48.062 LUMBAR STENOSIS WITH NEUROGENIC CLAUDICATION: ICD-10-CM

## 2022-12-27 DIAGNOSIS — G89.4 CHRONIC PAIN SYNDROME: ICD-10-CM

## 2022-12-27 DIAGNOSIS — M51.16 INTERVERTEBRAL DISC DISORDER WITH RADICULOPATHY OF LUMBAR REGION: ICD-10-CM

## 2022-12-27 NOTE — NURSING NOTE
Device interrogation for MRI done by KAMILLA Lujan clinical specialist  Kristina Argueta is a [employer] pacemaker  Device set to MRI safe mode at 85 bpm      MRI thoracic spine and lumbar spine without contrast complete  Pt tolerated well  VSS throughout scan  Pt alert and oriented x4 on room air  No complaints or visible signs of distress  Device reprogrammed to prior settings per cardiology by Naeem Rodriguez, RN

## 2022-12-27 NOTE — NURSING NOTE
Device interrogation for MRI  Normal device function prior to MRI  Leads and device meet all requirements per policy for MRI  Device programmed DOO 85bpm per Cardiology for MRI  Patient has no complaints  Vital signs monitored throughout by A  Marine Mejia RN  Normal device function post MRI  Device reprogrammed to prior settings per Cardiology

## 2023-01-03 ENCOUNTER — TELEPHONE (OUTPATIENT)
Dept: PAIN MEDICINE | Facility: CLINIC | Age: 78
End: 2023-01-03

## 2023-01-03 DIAGNOSIS — M47.14 THORACIC SPONDYLOSIS WITH CORD COMPRESSION: Primary | ICD-10-CM

## 2023-01-03 NOTE — TELEPHONE ENCOUNTER
Patient's thoracic MRI shows multilevel stenosis there as well areas of cord compression at T6-7, T7-8 and T9-10      Would like him to be evaluated by the Neurosurgery team  Referral placed to see Dr Renea Tucker    SCS trial on hold

## 2023-01-03 NOTE — TELEPHONE ENCOUNTER
Pt said he won't see the neurosurgeon, wants to speak to Dr Sandor Rankin first   Pls call pt to schedule ov

## 2023-01-03 NOTE — TELEPHONE ENCOUNTER
S/w pt, informed MRI result and recommendation  Pt states he would like to talk to FQ first, he would like an ov scheduled  Pls advise

## 2023-02-07 ENCOUNTER — OFFICE VISIT (OUTPATIENT)
Dept: ENDOCRINOLOGY | Facility: CLINIC | Age: 78
End: 2023-02-07

## 2023-02-07 VITALS
BODY MASS INDEX: 40.52 KG/M2 | HEART RATE: 61 BPM | DIASTOLIC BLOOD PRESSURE: 72 MMHG | SYSTOLIC BLOOD PRESSURE: 120 MMHG | WEIGHT: 283 LBS | HEIGHT: 70 IN

## 2023-02-07 DIAGNOSIS — Z79.4 TYPE 2 DIABETES MELLITUS WITH STAGE 2 CHRONIC KIDNEY DISEASE, WITH LONG-TERM CURRENT USE OF INSULIN (HCC): Primary | ICD-10-CM

## 2023-02-07 DIAGNOSIS — E78.2 MIXED HYPERLIPIDEMIA: ICD-10-CM

## 2023-02-07 DIAGNOSIS — E11.22 TYPE 2 DIABETES MELLITUS WITH STAGE 2 CHRONIC KIDNEY DISEASE, WITH LONG-TERM CURRENT USE OF INSULIN (HCC): Primary | ICD-10-CM

## 2023-02-07 DIAGNOSIS — N18.2 TYPE 2 DIABETES MELLITUS WITH STAGE 2 CHRONIC KIDNEY DISEASE, WITH LONG-TERM CURRENT USE OF INSULIN (HCC): Primary | ICD-10-CM

## 2023-02-07 LAB — SL AMB POCT HEMOGLOBIN AIC: 7.5 (ref ?–6.5)

## 2023-02-07 RX ORDER — SACUBITRIL AND VALSARTAN 97; 103 MG/1; MG/1
TABLET, FILM COATED ORAL
COMMUNITY
Start: 2022-12-09

## 2023-02-07 RX ORDER — AZELASTINE HYDROCHLORIDE, FLUTICASONE PROPIONATE 137; 50 UG/1; UG/1
SPRAY, METERED NASAL
COMMUNITY

## 2023-02-07 RX ORDER — ROSUVASTATIN CALCIUM 40 MG/1
20 TABLET, COATED ORAL DAILY
COMMUNITY
Start: 2022-10-03 | End: 2023-10-03

## 2023-02-07 RX ORDER — FLUTICASONE FUROATE, UMECLIDINIUM BROMIDE AND VILANTEROL TRIFENATATE 200; 62.5; 25 UG/1; UG/1; UG/1
1 POWDER RESPIRATORY (INHALATION) DAILY
COMMUNITY

## 2023-02-07 RX ORDER — CEFDINIR 300 MG/1
CAPSULE ORAL
COMMUNITY

## 2023-02-07 RX ORDER — BENZONATATE 200 MG/1
CAPSULE ORAL
COMMUNITY
Start: 2022-12-07

## 2023-02-07 RX ORDER — TRAMADOL HYDROCHLORIDE 50 MG/1
TABLET ORAL
COMMUNITY
Start: 2022-11-21

## 2023-02-07 RX ORDER — SACUBITRIL AND VALSARTAN 97; 103 MG/1; MG/1
1 TABLET, FILM COATED ORAL 2 TIMES DAILY
COMMUNITY
Start: 2022-10-03

## 2023-02-07 RX ORDER — CETIRIZINE HYDROCHLORIDE 10 MG/1
10 TABLET ORAL DAILY
COMMUNITY
Start: 2022-11-10

## 2023-02-07 RX ORDER — METOPROLOL SUCCINATE 25 MG/1
25 TABLET, EXTENDED RELEASE ORAL DAILY
COMMUNITY
Start: 2023-01-06 | End: 2024-01-06

## 2023-02-07 RX ORDER — AMOXICILLIN AND CLAVULANATE POTASSIUM 500; 125 MG/1; MG/1
TABLET, FILM COATED ORAL
COMMUNITY

## 2023-02-07 RX ORDER — PREDNISONE 50 MG/1
50 TABLET ORAL DAILY
COMMUNITY
Start: 2022-11-29

## 2023-02-07 RX ORDER — NEOMYCIN SULFATE, POLYMYXIN B SULFATE, HYDROCORTISONE 3.5; 10000; 1 MG/ML; [USP'U]/ML; MG/ML
SOLUTION/ DROPS AURICULAR (OTIC)
COMMUNITY

## 2023-02-07 RX ORDER — BENZONATATE 200 MG/1
200 CAPSULE ORAL
COMMUNITY
Start: 2022-12-07

## 2023-02-07 RX ORDER — CODEINE PHOSPHATE AND GUAIFENESIN 10; 100 MG/5ML; MG/5ML
SOLUTION ORAL
COMMUNITY
Start: 2022-11-11

## 2023-02-07 RX ORDER — LEVOFLOXACIN 750 MG/1
750 TABLET ORAL DAILY
COMMUNITY
Start: 2022-11-29

## 2023-02-07 RX ORDER — INSULIN LISPRO 100 [IU]/ML
INJECTION, SUSPENSION SUBCUTANEOUS
Qty: 90 ML | Refills: 2 | Status: SHIPPED | OUTPATIENT
Start: 2023-02-07

## 2023-02-07 RX ORDER — PREDNISONE 50 MG/1
50 TABLET ORAL DAILY
COMMUNITY
Start: 2022-11-10

## 2023-02-07 RX ORDER — METOPROLOL SUCCINATE 25 MG/1
TABLET, EXTENDED RELEASE ORAL
COMMUNITY
Start: 2023-01-06

## 2023-02-07 RX ORDER — GABAPENTIN 300 MG/1
1 CAPSULE ORAL 3 TIMES DAILY
COMMUNITY

## 2023-02-07 RX ORDER — FLUTICASONE FUROATE, UMECLIDINIUM BROMIDE AND VILANTEROL TRIFENATATE 200; 62.5; 25 UG/1; UG/1; UG/1
POWDER RESPIRATORY (INHALATION)
COMMUNITY
Start: 2022-12-09

## 2023-02-07 RX ORDER — ALBUTEROL SULFATE 90 UG/1
AEROSOL, METERED RESPIRATORY (INHALATION)
COMMUNITY

## 2023-02-07 RX ORDER — ALBUTEROL SULFATE 90 UG/1
2 AEROSOL, METERED RESPIRATORY (INHALATION) EVERY 6 HOURS PRN
COMMUNITY

## 2023-02-07 NOTE — PROGRESS NOTES
2/7/2023    Assessment/Plan      Diagnoses and all orders for this visit:    Type 2 diabetes mellitus with stage 2 chronic kidney disease, with long-term current use of insulin (HCC)  -     Hemoglobin A1C; Future  -     Comprehensive metabolic panel; Future  -     Lipid Panel with Direct LDL reflex; Future  -     CBC and differential; Future  -     Microalbumin / creatinine urine ratio; Future  -     TSH, 3rd generation; Future    Mixed hyperlipidemia  -     Hemoglobin A1C; Future  -     Comprehensive metabolic panel; Future  -     Lipid Panel with Direct LDL reflex; Future  -     CBC and differential; Future  -     Microalbumin / creatinine urine ratio; Future  -     TSH, 3rd generation; Future    Other orders  -     albuterol (PROVENTIL HFA,VENTOLIN HFA) 90 mcg/act inhaler; Inhale 2 puffs every 6 (six) hours as needed (Patient not taking: Reported on 2/7/2023)  -     albuterol (PROVENTIL HFA,VENTOLIN HFA) 90 mcg/act inhaler; albuterol sulfate HFA 90 mcg/actuation aerosol inhaler   INHALE 2 PUFFS BY MOUTH EVERY 6 HOURS AS NEEDED FOR WHEEZING  -     amoxicillin-clavulanate (AUGMENTIN) 500-125 mg per tablet; amoxicillin 500 mg-potassium clavulanate 125 mg tablet (Patient not taking: Reported on 2/7/2023)  -     Azelastine-Fluticasone 137-50 MCG/ACT SUSP; azelastine-fluticasone 137 mcg-50 mcg/spray nasal spray   USE 1 SPRAY(S) INTO EACH NOSTRIL TWICE DAILY  -     benzonatate (TESSALON) 200 MG capsule; TAKE 1 CAPSULE BY MOUTH EVERY 8 HOURS AS NEEDED FOR COUGH (Patient not taking: Reported on 2/7/2023)  -     benzonatate (TESSALON) 200 MG capsule; Take 200 mg by mouth (Patient not taking: Reported on 2/7/2023)  -     cefdinir (OMNICEF) 300 mg capsule; cefdinir 300 mg capsule (Patient not taking: Reported on 2/7/2023)  -     cetirizine (ZyrTEC) 10 mg tablet;  Take 10 mg by mouth daily (Patient not taking: Reported on 2/7/2023)  -     Trelegy Ellipta 200-62 5-25 MCG/ACT AEPB inhaler;  (Patient not taking: Reported on 2/7/2023)  -     fluticasone-umeclidinium-vilanterol (Trelegy Ellipta) 200-62 5-25 mcg/actuation AEPB inhaler; Inhale 1 puff daily (Patient not taking: Reported on 2/7/2023)  -     gabapentin (NEURONTIN) 300 mg capsule; Take 1 capsule by mouth Three times a day (Patient not taking: Reported on 2/7/2023)  -     guaiFENesin-codeine (ROBITUSSIN AC) 100-10 mg/5 mL oral solution; TAKE 5 ML BY MOUTH EVERY 8 HOURS AS NEEDED FOR COUGH FOR UP TO 5 DAYS (Patient not taking: Reported on 2/7/2023)  -     levofloxacin (LEVAQUIN) 750 mg tablet; Take 750 mg by mouth daily  -     metoprolol succinate (TOPROL-XL) 25 mg 24 hr tablet  -     metoprolol succinate (TOPROL-XL) 25 mg 24 hr tablet; Take 25 mg by mouth daily  -     neomycin-polymyxin-hydrocortisone (CORTISPORIN) 1 % SOLN; neomycin-polymyxin-hydrocort 3 5 mg/mL-10,000 unit/mL-1 % ear solution   INSTILL 3 DROPS INTO EACH EAR 4 TIMES DAILY FOR 10 DAYS  -     predniSONE 50 mg tablet; Take 50 mg by mouth daily (Patient not taking: Reported on 2/7/2023)  -     predniSONE 50 mg tablet; Take 50 mg by mouth daily (Patient not taking: Reported on 2/7/2023)  -     sacubitril-valsartan (Entresto)  MG TABS; Take 1 tablet by mouth 2 (two) times a day  -     Entresto  MG TABS  -     traMADol (ULTRAM) 50 mg tablet  -     rosuvastatin (CRESTOR) 40 MG tablet; Take 20 mg by mouth daily        Assessment/Plan:  #1 type 2 diabetes with long-term insulin use: A1c in the office today was 7 5  He was on prednisone around the holidays  For occasional overnight lows, decrease 75/25 insulin dose to 45 units of breakfast and 40 units with dinner  Continue metformin and Farxiga  He will send in blood sugar logs for review  Follow-up in 4 months with labs just prior  CC: Diabetes follow-up    History of Present Illness     HPI: Kim Morin is a 68y o  year old male with type 2 diabetes who presents for a follow up    He is on oral agents and insulin at home and takes 75/25 insulin 47 units with breakfast and 45 units with dinner, metformin  mg twice daily, Farxiga 10 mg daily  She denies any polyuria, polydipsia, nocturia and blurry vision  Diabetes is complicated by history of hypertension, hyperlipidemia, CKD, CAD  He also per records has a history of retinopathy  Hypoglycemic episodes: No not recently but does report waking up about 2 times per month with sweats around 2 AM      The patient is due for an eye exam and encouraged him to update this  Blood Sugar/Glucometer/Pump/CGM review: Blood sugars checked about once or twice daily show fasting sugars typically 90-1 30  Blood sugars later in the day are typically 100-1 50  Hypertension: Regimen includes lisinopril  Hyperlipidemia: Continues on rosuvastatin  Review of Systems   Constitutional: Negative for fatigue  HENT: Negative for trouble swallowing and voice change  Eyes: Negative for visual disturbance  Respiratory: Negative for shortness of breath  Cardiovascular: Negative for palpitations and leg swelling  Gastrointestinal: Negative for abdominal pain, nausea and vomiting  Endocrine: Negative for polydipsia and polyuria  Musculoskeletal: Negative for arthralgias and myalgias  Skin: Negative for rash  Neurological: Negative for dizziness, tremors and weakness  Hematological: Negative for adenopathy  Psychiatric/Behavioral: Negative for agitation and confusion         Historical Information   Past Medical History:   Diagnosis Date   • Arthritis     knees   • Cancer [facility])     prostate   • Coronary artery disease     two coronary stents   • [facility] )    • Hyperlipidemia 10/19/2018   • Hypertension    • Psoriasis     lower extremities and buttocks   • Sleep apnea    • Type 2 diabetes mellitus ([facility] ) 10/19/2018     Past Surgical History:   Procedure Laterality Date   • CATARACT EXTRACTION Bilateral     with iol's   • COLONOSCOPY N/A 4/5/2016    Procedure: COLONOSCOPY snare polypectomy;  Surgeon: Nazanin Ozuna MD;  Location: [facility] GI LAB; Service:    • INSERTION PROSTATE RADIATION SEED     • REPLACEMENT TOTAL KNEE Right    • TRACHEOSTOMY      x 2  up to 5 years ago   • UMBILICAL HERNIA REPAIR       Social History   Social History     Substance and Sexual Activity   Alcohol Use Yes   • Alcohol/week: 4 0 standard drinks   • Types: 4 Cans of beer per week    Comment: daily     Social History     Substance and Sexual Activity   Drug Use No     Social History     Tobacco Use   Smoking Status Never   Smokeless Tobacco Never     Family History: History reviewed  No pertinent family history  Meds/Allergies   Current Outpatient Medications   Medication Sig Dispense Refill   • Ascorbic Acid (VITAMIN C) 1000 MG tablet Take 1,000 mg by mouth daily     • aspirin 81 MG tablet Take 81 mg by mouth daily       • Azelastine-Fluticasone 137-50 MCG/ACT SUSP azelastine-fluticasone 137 mcg-50 mcg/spray nasal spray   USE 1 SPRAY(S) INTO EACH NOSTRIL TWICE DAILY     • B-D ULTRAFINE III SHORT PEN 31G X 8 MM MISC USE WITH INSULIN TWICE A  each 3   • cholecalciferol (VITAMIN D3) 1,000 units tablet Take 2,000 Units by mouth daily      • ciprofloxacin-dexamethasone (CIPRODEX) otic suspension INSTILL 4 DROPS INTO EACH EAR TWICE DAILY FOR 7 DAYS     • clobetasol (OLUX) 0 05 % topical foam as needed      • clopidogrel (PLAVIX) 75 mg tablet TAKE 1 TABLET DAILY     • Entresto  MG TABS      • ezetimibe (ZETIA) 10 mg tablet TAKE 1 TABLET BY MOUTH TIMES A WEEK ON MONDAY WEDNESDAY AND FRIDAY AT 6PM     • Farxiga 10 MG TABS TAKE 1 TABLET DAILY 90 tablet 3   • fluticasone (Flovent HFA) 220 mcg/act inhaler Inhale 2 puffs as needed      • Glucagon, rDNA, (Glucagon Emergency) 1 MG KIT Use glucagon kit if you have a low blood sugar and unconscious 1 each 0   • glucose blood (OneTouch Verio) test strip USE TO TEST BLOOD SUGAR THREE TIMES A  strip 3   • HumaLOG Mix 75/25 KwikPen (75-25) 100 units/mL injection pen INJECT 47 UNITS UNDER THE SKIN BEFORE BREAKFAST AND 45 UNITS BEFORE DINNER 90 mL 2   • HYDROcodone-acetaminophen (NORCO) 5-325 mg per tablet Take 1 tablet by mouth 2 (two) times a day as needed for pain Max Daily Amount: 2 tablets 30 tablet 0   • levofloxacin (LEVAQUIN) 750 mg tablet Take 750 mg by mouth daily     • lisinopril (ZESTRIL) 5 mg tablet Take 5 mg by mouth daily      • metFORMIN (GLUCOPHAGE-XR) 750 mg 24 hr tablet TAKE 1 TABLET TWICE A  tablet 3   • metoprolol succinate (TOPROL-XL) 25 mg 24 hr tablet      • metoprolol succinate (TOPROL-XL) 25 mg 24 hr tablet Take 25 mg by mouth daily     • Multiple Vitamins-Minerals (MULTIVITAMIN ADULT PO) Take by mouth     • neomycin-polymyxin-hydrocortisone (CORTISPORIN) 1 % SOLN neomycin-polymyxin-hydrocort 3 5 mg/mL-10,000 unit/mL-1 % ear solution   INSTILL 3 DROPS INTO EACH EAR 4 TIMES DAILY FOR 10 DAYS     • omeprazole (PriLOSEC) 20 mg delayed release capsule Take 1 capsule (20 mg total) by mouth daily 30 capsule 11   • rosuvastatin (CRESTOR) 40 MG tablet Take 40 mg by mouth daily 1/2 pill at night     • rosuvastatin (CRESTOR) 40 MG tablet Take 20 mg by mouth daily     • sacubitril-valsartan (Entresto)  MG TABS Take 1 tablet by mouth 2 (two) times a day     • traMADol (ULTRAM) 50 mg tablet      • traZODone (DESYREL) 100 mg tablet TAKE 1 TABLET AT BEDTIME AS NEEDED FOR SLEEP     • Tremfya 100 MG/ML SOSY      • triamcinolone (KENALOG) 0 1 % cream Apply topically as needed Twice a week     • albuterol (PROVENTIL HFA,VENTOLIN HFA) 90 mcg/act inhaler Inhale 2 puffs every 6 (six) hours as needed (Patient not taking: Reported on 2/7/2023)     • albuterol (PROVENTIL HFA,VENTOLIN HFA) 90 mcg/act inhaler albuterol sulfate HFA 90 mcg/actuation aerosol inhaler   INHALE 2 PUFFS BY MOUTH EVERY 6 HOURS AS NEEDED FOR WHEEZING     • amoxicillin-clavulanate (AUGMENTIN) 500-125 mg per tablet amoxicillin 500 mg-potassium clavulanate 125 mg tablet (Patient not taking: Reported on 2/7/2023)     • benzonatate (TESSALON) 200 MG capsule TAKE 1 CAPSULE BY MOUTH EVERY 8 HOURS AS NEEDED FOR COUGH (Patient not taking: Reported on 2/7/2023)     • benzonatate (TESSALON) 200 MG capsule Take 200 mg by mouth (Patient not taking: Reported on 2/7/2023)     • cefdinir (OMNICEF) 300 mg capsule cefdinir 300 mg capsule (Patient not taking: Reported on 2/7/2023)     • cetirizine (ZyrTEC) 10 mg tablet Take 10 mg by mouth daily (Patient not taking: Reported on 2/7/2023)     • doxycycline hyclate (VIBRAMYCIN) 100 mg capsule Take 100 mg by mouth daily  (Patient not taking: Reported on 2/7/2023)     • fluticasone-umeclidinium-vilanterol (Trelegy Ellipta) 200-62 5-25 mcg/actuation AEPB inhaler Inhale 1 puff daily (Patient not taking: Reported on 2/7/2023)     • furosemide (LASIX) 40 mg tablet Take 40 mg by mouth daily Twice a week on Mon & Thur  (Patient not taking: Reported on 2/7/2023)     • gabapentin (NEURONTIN) 300 mg capsule Take 1 capsule by mouth Three times a day (Patient not taking: Reported on 2/7/2023)     • guaiFENesin-codeine (ROBITUSSIN AC) 100-10 mg/5 mL oral solution TAKE 5 ML BY MOUTH EVERY 8 HOURS AS NEEDED FOR COUGH FOR UP TO 5 DAYS (Patient not taking: Reported on 2/7/2023)     • levocetirizine (XYZAL) 5 MG tablet Take 5 mg by mouth (Patient not taking: Reported on 2/7/2023)     • levocetirizine (XYZAL) 5 MG tablet Take 5 mg by mouth every evening (Patient not taking: Reported on 2/7/2023)     • predniSONE 50 mg tablet Take 50 mg by mouth daily (Patient not taking: Reported on 2/7/2023)     • predniSONE 50 mg tablet Take 50 mg by mouth daily (Patient not taking: Reported on 2/7/2023)     • STELARA 90 MG/ML subcutaneous injection  (Patient not taking: Reported on 2/7/2023)     • Trelegy Ellipta 200-62 5-25 MCG/ACT AEPB inhaler  (Patient not taking: Reported on 2/7/2023)     • urea (CARMOL) 40 % Apply topically as needed  (Patient not taking: Reported on 2/7/2023) • VASCEPA 1 g CAPS Take by mouth daily  (Patient not taking: Reported on 2/7/2023)       No current facility-administered medications for this visit  Allergies   Allergen Reactions   • Bee Venom Shortness Of Breath   • Lidocaine Shortness Of Breath     Pt reports he was tested and does not have allergy to Lidocaine       Objective   Vitals: Blood pressure 120/72, pulse 61, height 5' 10" (1 778 m), weight 128 kg (283 lb)  Invasive Devices     None                 Physical Exam  Vitals reviewed  Constitutional:       General: He is not in acute distress  Appearance: He is well-developed  He is not diaphoretic  HENT:      Head: Normocephalic and atraumatic  Eyes:      Conjunctiva/sclera: Conjunctivae normal       Pupils: Pupils are equal, round, and reactive to light  Neck:      Thyroid: No thyromegaly  Cardiovascular:      Rate and Rhythm: Normal rate and regular rhythm  Pulmonary:      Effort: Pulmonary effort is normal  No respiratory distress  Breath sounds: Normal breath sounds  Abdominal:      General: Bowel sounds are normal       Palpations: Abdomen is soft  Musculoskeletal:         General: Normal range of motion  Cervical back: Normal range of motion and neck supple  Skin:     General: Skin is warm and dry  Findings: No rash  Neurological:      Mental Status: He is alert and oriented to person, place, and time  Motor: No abnormal muscle tone  Psychiatric:         Behavior: Behavior normal          The history was obtained from the review of the chart and from the patient      Lab Results:    Most recent Alc is  Lab Results   Component Value Date    HGBA1C 7 2 (H) 10/18/2022           No components found for: HA1C  No components found for: GLU    Lab Results   Component Value Date    CREATININE 1 09 10/18/2022    CREATININE 1 01 12/27/2021    CREATININE 1 10 10/20/2021    BUN 18 10/18/2022    K 4 5 10/18/2022     10/18/2022    CO2 23 10/18/2022     eGFR Date Value Ref Range Status   10/18/2022 70 >59 mL/min/1 73 Final   03/19/2018 54 ml/min/1 73sq m Final     No components found for: [facility]    Lab Results   Component Value Date    HDL 63 10/18/2022    TRIG 74 10/18/2022       Lab Results   Component Value Date    ALT 24 10/18/2022    AST 20 10/18/2022    ALKPHOS 64 03/19/2018       No results found for: TSH, Dolphus Castellani        Future Appointments   Date Time Provider Juanita Roblero   2/13/2023  8:15 AM Anaya Fuentes MD SP [facility]   2/15/2023 11:00 AM Karthikeyan Busby MD NE[facility]       Portions of the record may have been created with voice recognition software  Occasional wrong word or "sound a like" substitutions may have occurred due to the inherent limitations of voice recognition software  Read the chart carefully and recognize, using context, where substitutions have occurred

## 2023-02-13 ENCOUNTER — OFFICE VISIT (OUTPATIENT)
Dept: PAIN MEDICINE | Facility: CLINIC | Age: 78
End: 2023-02-13

## 2023-02-13 VITALS
SYSTOLIC BLOOD PRESSURE: 185 MMHG | DIASTOLIC BLOOD PRESSURE: 79 MMHG | BODY MASS INDEX: 40.61 KG/M2 | WEIGHT: 283 LBS | HEART RATE: 72 BPM

## 2023-02-13 DIAGNOSIS — M48.062 LUMBAR STENOSIS WITH NEUROGENIC CLAUDICATION: ICD-10-CM

## 2023-02-13 DIAGNOSIS — G89.4 CHRONIC PAIN SYNDROME: Primary | ICD-10-CM

## 2023-02-13 DIAGNOSIS — R26.81 GAIT INSTABILITY: ICD-10-CM

## 2023-02-13 DIAGNOSIS — M47.14 DEGENERATIVE ARTHRITIS OF THORACIC SPINE WITH CORD COMPRESSION: ICD-10-CM

## 2023-02-13 NOTE — PROGRESS NOTES
Pt started on abs 3-4 days ago for bronchitis, FQ aware , procedure cx'd by FQ

## 2023-02-13 NOTE — PROGRESS NOTES
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    3  Lumbar stenosis with neurogenic claudication    4  Gait instability        Plan:  I advised the patient that due to the stenosis that he has in his thoracic spine he would not be a candidate for percutaneous placement of spinal cord stimulator  He also has significant cord compression which is likely the etiology of his gait instability  We will await Dr Bernabe Covert recommendation in regards to the thoracic stenosis  Patient is well aware of his multiple risk factors for surgery  Advised him that if he is not a surgical candidate we will try and continue to manage lower back symptoms both with interventions and with medications  My impressions and treatment recommendations were discussed in detail with the patient who verbalized understanding and had no further questions  Discharge instructions were provided  I personally saw and examined the patient and I agree with the above discussed plan of care  History of Present Illness:  Isela Pardo is a 68 y o  male who presents for a follow up office visit in regards to Back Pain  The patient has a history of chronic pain secondary to lumbar stenosis who returns for follow-up  The patient was being evaluated for a spinal cord stimulator trial   However, MRI thoracic spine revealed multiple disc herniations and cord compression of the thoracic spine at T6-7 and T7-8  He is denying any mid back pain but does have gait instability  His main pain continues to be his lower back worse on the right that radiates down the legs right worse than left  Symptoms can be severe rated 10/10 on a numeric rating scale  I have personally reviewed and/or updated the patient's past medical history, past surgical history, family history, social history, current medications, allergies, and vital signs today       Review of Systems   Respiratory: Negative for shortness of breath  Cardiovascular: Negative for chest pain  Gastrointestinal: Negative for constipation, diarrhea, nausea and vomiting  Musculoskeletal: Positive for back pain, gait problem and joint swelling  Negative for arthralgias and myalgias  Skin: Negative for rash  Neurological: Negative for dizziness, seizures and weakness  All other systems reviewed and are negative  Patient Active Problem List   Diagnosis   • Obstructive sleep apnea   • Coronary artery disease involving native coronary artery   • Degenerative arthritis of left knee   • Essential (primary) hypertension   • Glottic stenosis   • Vocal cord paralysis, bilateral complete   • Type 2 diabetes mellitus with retinopathy, with long-term current use of insulin (HCC)   • Mixed hyperlipidemia   • Lumbar stenosis with neurogenic claudication   • Lumbar spondylosis   • Intervertebral disc disorder with radiculopathy of lumbar region   • Sacroiliitis (HCC)   • Trochanteric bursitis of right hip   • Class 2 severe obesity due to excess calories with serious comorbidity and body mass index (BMI) of 38 0 to 38 9 in [facility])   • Stage 2 chronic kidney disease   • Vitamin D deficiency       Past Medical History:   Diagnosis Date   • Arthritis     knees   • Cancer (HCC)     prostate   • Coronary artery disease     two coronary stents   • Diabetes mellitus ([address] )    • Hyperlipidemia 10/19/2018   • Hypertension    • Psoriasis     lower extremities and buttocks   • Sleep apnea    • Type 2 diabetes mellitus ([address] ) 10/19/2018       Past Surgical History:   Procedure Laterality Date   • CATARACT EXTRACTION Bilateral     with iol's   • COLONOSCOPY N/A 4/5/2016    Procedure: COLONOSCOPY snare polypectomy;  Surgeon: María Fields MD;  Location: [facility];   Service:    • INSERTION PROSTATE RADIATION SEED     • REPLACEMENT TOTAL KNEE Right    • TRACHEOSTOMY      x 2  up to 5 years ago   • UMBILICAL HERNIA REPAIR         No family history on file     Social History     Occupational History   • Not on file   Tobacco Use   • Smoking status: Never   • Smokeless tobacco: Never   Vaping Use   • Vaping Use: Never used   Substance and Sexual Activity   • Alcohol use: Yes     Alcohol/week: 4 0 standard drinks     Types: 4 Cans of beer per week     Comment: daily   • Drug use: No   • Sexual activity: Not on file       Current Outpatient Medications on File Prior to Visit   Medication Sig   • albuterol (PROVENTIL HFA,VENTOLIN HFA) 90 mcg/act inhaler Inhale 2 puffs every 6 (six) hours as needed (Patient not taking: Reported on 2/7/2023)   • albuterol (PROVENTIL HFA,VENTOLIN HFA) 90 mcg/act inhaler albuterol sulfate HFA 90 mcg/actuation aerosol inhaler   INHALE 2 PUFFS BY MOUTH EVERY 6 HOURS AS NEEDED FOR WHEEZING   • amoxicillin-clavulanate (AUGMENTIN) 500-125 mg per tablet amoxicillin 500 mg-potassium clavulanate 125 mg tablet (Patient not taking: Reported on 2/7/2023)   • Ascorbic Acid (VITAMIN C) 1000 MG tablet Take 1,000 mg by mouth daily   • aspirin 81 MG tablet Take 81 mg by mouth daily     • Azelastine-Fluticasone 137-50 MCG/ACT SUSP azelastine-fluticasone 137 mcg-50 mcg/spray nasal spray   USE 1 SPRAY(S) INTO EACH NOSTRIL TWICE DAILY   • B-D ULTRAFINE III SHORT PEN 31G X 8 MM MISC USE WITH INSULIN TWICE A DAY   • benzonatate (TESSALON) 200 MG capsule TAKE 1 CAPSULE BY MOUTH EVERY 8 HOURS AS NEEDED FOR COUGH (Patient not taking: Reported on 2/7/2023)   • benzonatate (TESSALON) 200 MG capsule Take 200 mg by mouth (Patient not taking: Reported on 2/7/2023)   • cefdinir (OMNICEF) 300 mg capsule cefdinir 300 mg capsule (Patient not taking: Reported on 2/7/2023)   • cetirizine (ZyrTEC) 10 mg tablet Take 10 mg by mouth daily (Patient not taking: Reported on 2/7/2023)   • cholecalciferol (VITAMIN D3) 1,000 units tablet Take 2,000 Units by mouth daily    • ciprofloxacin-dexamethasone (CIPRODEX) otic suspension INSTILL 4 DROPS INTO EACH EAR TWICE DAILY FOR 7 DAYS   • clobetasol (OLUX) 0 05 % topical foam as needed    • clopidogrel (PLAVIX) 75 mg tablet TAKE 1 TABLET DAILY   • doxycycline hyclate (VIBRAMYCIN) 100 mg capsule Take 100 mg by mouth daily  (Patient not taking: Reported on 2/7/2023)   • Entresto  MG TABS    • ezetimibe (ZETIA) 10 mg tablet TAKE 1 TABLET BY MOUTH TIMES A WEEK ON MONDAY WEDNESDAY AND FRIDAY AT 6PM   • Farxiga 10 MG TABS TAKE 1 TABLET DAILY   • fluticasone (Flovent HFA) 220 mcg/act inhaler Inhale 2 puffs as needed    • fluticasone-umeclidinium-vilanterol (Trelegy Ellipta) 200-62 5-25 mcg/actuation AEPB inhaler Inhale 1 puff daily (Patient not taking: Reported on 2/7/2023)   • furosemide (LASIX) 40 mg tablet Take 40 mg by mouth daily Twice a week on Mon & [place] [place]  (Patient not taking: Reported on 2/7/2023)   • gabapentin (NEURONTIN) 300 mg capsule Take 1 capsule by mouth Three times a day (Patient not taking: Reported on 2/7/2023)   • Glucagon, rDNA, (Glucagon Emergency) 1 MG KIT Use glucagon kit if you have a low blood sugar and unconscious   • glucose blood (OneTouch Verio) test strip USE TO TEST BLOOD SUGAR THREE TIMES A DAY   • guaiFENesin-codeine (ROBITUSSIN AC) 100-10 mg/5 mL oral solution TAKE 5 ML BY MOUTH EVERY 8 HOURS AS NEEDED FOR COUGH FOR UP TO 5 DAYS (Patient not taking: Reported on 2/7/2023)   • HumaLOG Mix 75/25 KwikPen (75-25) 100 units/mL injection pen INJECT 45 UNITS UNDER THE SKIN BEFORE BREAKFAST AND 40 UNITS BEFORE DINNER  Dose change     • HYDROcodone-acetaminophen (NORCO) 5-325 mg per tablet Take 1 tablet by mouth 2 (two) times a day as needed for pain Max Daily Amount: 2 tablets   • levocetirizine (XYZAL) 5 MG tablet Take 5 mg by mouth (Patient not taking: Reported on 2/7/2023)   • levocetirizine (XYZAL) 5 MG tablet Take 5 mg by mouth every evening (Patient not taking: Reported on 2/7/2023)   • levofloxacin (LEVAQUIN) 750 mg tablet Take 750 mg by mouth daily   • lisinopril (ZESTRIL) 5 mg tablet Take 5 mg by mouth daily    • metFORMIN (GLUCOPHAGE-XR) 750 mg 24 hr tablet TAKE 1 TABLET TWICE A DAY   • metoprolol succinate (TOPROL-XL) 25 mg 24 hr tablet    • metoprolol succinate (TOPROL-XL) 25 mg 24 hr tablet Take 25 mg by mouth daily   • Multiple Vitamins-Minerals (MULTIVITAMIN ADULT PO) Take by mouth   • neomycin-polymyxin-hydrocortisone (CORTISPORIN) 1 % SOLN neomycin-polymyxin-hydrocort 3 5 mg/mL-10,000 unit/mL-1 % ear solution   INSTILL 3 DROPS INTO EACH EAR 4 TIMES DAILY FOR 10 DAYS   • omeprazole (PriLOSEC) 20 mg delayed release capsule Take 1 capsule (20 mg total) by mouth daily   • predniSONE 50 mg tablet Take 50 mg by mouth daily (Patient not taking: Reported on 2/7/2023)   • predniSONE 50 mg tablet Take 50 mg by mouth daily (Patient not taking: Reported on 2/7/2023)   • rosuvastatin (CRESTOR) 40 MG tablet Take 40 mg by mouth daily 1/2 pill at night   • rosuvastatin (CRESTOR) 40 MG tablet Take 20 mg by mouth daily   • sacubitril-valsartan (Entresto)  MG TABS Take 1 tablet by mouth 2 (two) times a day   • STELARA 90 MG/ML subcutaneous injection  (Patient not taking: Reported on 2/7/2023)   • traMADol (ULTRAM) 50 mg tablet    • traZODone (DESYREL) 100 mg tablet TAKE 1 TABLET AT BEDTIME AS NEEDED FOR SLEEP   • Trelegy Ellipta 200-62 5-25 MCG/ACT AEPB inhaler  (Patient not taking: Reported on 2/7/2023)   • Tremfya 100 MG/ML SOSY    • triamcinolone (KENALOG) 0 1 % cream Apply topically as needed Twice a week   • urea (CARMOL) 40 % Apply topically as needed  (Patient not taking: Reported on 2/7/2023)   • VASCEPA 1 g CAPS Take by mouth daily  (Patient not taking: Reported on 2/7/2023)     No current facility-administered medications on file prior to visit         Allergies   Allergen Reactions   • Bee Venom Shortness Of Breath   • Lidocaine Shortness Of Breath     Pt reports he was tested and does not have allergy to Lidocaine       Physical Exam:    BP (!) 185/79   Pulse 72   Wt 128 kg (283 lb)   BMI 40 61 kg/m² Constitutional:normal, well developed, well nourished, alert, in no distress and non-toxic and no overt pain behavior  Eyes:anicteric  HEENT:grossly intact  Neck:supple, symmetric, trachea midline and no masses   Pulmonary:even and unlabored  Cardiovascular:No edema or pitting edema present  Skin:Normal without rashes or lesions and well hydrated  Psychiatric:Mood and affect appropriate  Neurologic:Cranial Nerves II-XII grossly intact  Musculoskeletal:Waddling gait     Lumbar Spine Exam  Appearance:  Normal lordosis  Palpation/Tenderness:  right lumbar paraspinal tenderness  right sacroiliac joint tenderness  Range of Motion:  Flexion:  Minimally limited  with pain  Extension:  Moderately limited  with pain  Motor Strength:  Left hip flexion:  5/5  Left hip extension:  5/5  Right hip flexion:  5/5  Right hip extension:  5/5  Left knee flexion:  5/5  Left knee extension:  5/5  Right knee flexion:  5/5  Right knee extension:  5/5  Left foot dorsiflexion:  2/5  Left foot plantar flexion:  4/5  Right foot dorsiflexion:  4/5  Right foot plantar flexion:  5/5  Reflexes:  Left Patellar:  1+   Right Patellar:  1+   Left Achilles:  1+   Right Achilles:  1+     Imaging    MRI LUMBAR SPINE WITHOUT CONTRAST (12/27/2022)     INDICATION: [id number]: Spinal stenosis, lumbar region with neurogenic claudication      COMPARISON:  11/13/2018     TECHNIQUE:  Multiplanar, multisequence imaging of the lumbar spine was performed             IMAGE QUALITY:  Diagnostic     FINDINGS:     VERTEBRAL BODIES:  There are 5 lumbar type vertebral bodies  Normal alignment of the lumbar spine  No spondylolysis or spondylolisthesis  No scoliosis  No compression fracture  No suspicious marrow signal abnormality identified  There is a stable   hemangioma within the L2 vertebral body      SACRUM:  Normal signal within the sacrum   No evidence of insufficiency or stress fracture      DISTAL CORD AND CONUS:  Normal size and signal within the distal cord and conus  Conus medullaris terminates at L1      PARASPINAL SOFT TISSUES:  Paraspinal soft tissues are unremarkable      LOWER THORACIC DISC SPACES:  Normal disc height and signal   No disc herniation, canal stenosis or foraminal narrowing      LUMBAR DISC SPACES:     L1-L2:  There is a bulging annulus with a small superimposed central disc extrusion  There is facet arthrosis  There is mild mass effect on thecal sac  There is no significant foraminal narrowing  Findings are stable      L2-L3:  There is facet arthrosis  There is no significant canal stenosis or foraminal narrowing      L3-L4:  There is a bulging annulus  There is fluid within the facet joints bilaterally  There is mild canal stenosis  There is a tiny synovial cyst arising from the left facet joint again noted and similar to the prior study  There is moderate left   foraminal narrowing with contact of the exiting nerve root  There is mild to moderate right foraminal narrowing  Findings are overall stable  There is mild canal stenosis      L4-L5:  There is a bulging annulus with a small superimposed left paracentral/foraminal disc protrusion  There is facet arthrosis  There is mild canal stenosis  There is moderate to severe there is moderate right foraminal narrowing with impingement   the exiting L4 nerve root  Left foraminal narrowing with impingement of the exiting nerve root      L5-S1:  There is a bulging annulus with a superimposed central/left paracentral disc protrusion  There is mild mass effect on thecal sac  There is facet arthrosis    There is moderate left foraminal narrowing with contact of the exiting nerve root        OTHER FINDINGS:  None      IMPRESSION:     Degenerative changes of the lumbar spine, as described above and overall not significantly changed since the prior study      Moderate to severe left and moderate right foraminal narrowing is noted at L4-L5 with impingement of the exiting L4 nerve roots      MRI THORACIC SPINE WITHOUT CONTRAST     INDICATION: G89 4: Chronic pain syndrome  M51 16: Intervertebral disc disorders with radiculopathy, lumbar region  M48 [id number]: Spinal stenosis, lumbar region with neurogenic claudication      COMPARISON:  None      TECHNIQUE:  Multiplanar, multisequence imaging of the thoracic spine was performed          IMAGE QUALITY: Diagnostic      FINDINGS:     ALIGNMENT: Normal alignment of the thoracic spine  No compression fracture  No subluxation  No scoliosis      MARROW SIGNAL:  Normal marrow signal is identified within the visualized bony structures  No discrete marrow lesion      THORACIC CORD: Normal signal within the thoracic cord      PARAVERTEBRAL SOFT TISSUES:  Normal      THORACIC DEGENERATIVE CHANGE:      There is a left paracentral disc protrusion at T2-T3 with mild to moderate foraminal narrowing  There is no significant canal stenosis      There is a right paracentral disc protrusion at T3-T4  There is mild effacement of the right aspect of the thecal sac  There is no significant foraminal narrowing      There is disc space narrowing at T4-T5  There is Modic type II endplate degenerative change at this level  There is a bulging annulus  There is facet arthrosis  Mild canal stenosis and mild foraminal narrowing      There is a left paracentral disc protrusion at T5-T6 with effacement of the left ventral aspect of thecal sac and minimal flattening of the left lateral aspect of the cord  There is mild left foraminal narrowing      There is a central and left paracentral disc protrusion at T6-T7  There is ligamentum flavum thickening/calcification  There is overall moderate to severe canal stenosis with underlying cord compression  There is no significant foraminal narrowing      There is a superiorly extruded disc fragment at T7-T8 along the left paracentral aspect of the T7 vertebral body    This results in moderate cord compression      At T8-T9 there is a central disc extrusion with mild mass effect on the thecal sac  There is no significant foraminal narrowing      At T9-T10 there is a left paracentral disc extrusion demonstrating cranial migration and results in mild cord compression along the left aspect of the cord      At T10-T11 there is a bulging annulus with a small superimposed right paracentral disc protrusion  There is mild canal stenosis  There is no significant foraminal narrowing      There is a mild bulge at T11-T12 without significant canal stenosis or foraminal narrowing      OTHER FINDINGS:  None      IMPRESSION:     Multilevel degenerative changes of the lumbar spine, as described above      Most notable level is at T6-T7 where multifactorial disease results in moderate to severe canal stenosis and cord compression   Moderate compression of the cord secondary to a superiorly extruded disc fragment at T7-T8 is also noted      Left paracentral disc extrusion at T9-T10 results in mild cord compression

## 2023-02-15 ENCOUNTER — CONSULT (OUTPATIENT)
Dept: NEUROSURGERY | Facility: CLINIC | Age: 78
End: 2023-02-15

## 2023-02-15 VITALS
BODY MASS INDEX: 40.52 KG/M2 | TEMPERATURE: 98.1 F | SYSTOLIC BLOOD PRESSURE: 122 MMHG | WEIGHT: 283 LBS | RESPIRATION RATE: 18 BRPM | HEART RATE: 84 BPM | DIASTOLIC BLOOD PRESSURE: 84 MMHG | HEIGHT: 70 IN

## 2023-02-15 DIAGNOSIS — E11.319 TYPE 2 DIABETES MELLITUS WITH RETINOPATHY, WITH LONG-TERM CURRENT USE OF INSULIN (HCC): ICD-10-CM

## 2023-02-15 DIAGNOSIS — M47.14 THORACIC SPONDYLOSIS WITH CORD COMPRESSION: Primary | ICD-10-CM

## 2023-02-15 DIAGNOSIS — Z79.4 TYPE 2 DIABETES MELLITUS WITH RETINOPATHY, WITH LONG-TERM CURRENT USE OF INSULIN (HCC): ICD-10-CM

## 2023-02-15 DIAGNOSIS — M48.062 LUMBAR STENOSIS WITH NEUROGENIC CLAUDICATION: ICD-10-CM

## 2023-02-15 DIAGNOSIS — E66.01 CLASS 2 SEVERE OBESITY DUE TO EXCESS CALORIES WITH SERIOUS COMORBIDITY AND BODY MASS INDEX (BMI) OF 38.0 TO 38.9 IN ADULT (HCC): ICD-10-CM

## 2023-02-15 DIAGNOSIS — M47.816 LUMBAR SPONDYLOSIS: ICD-10-CM

## 2023-02-15 DIAGNOSIS — J38.02 VOCAL CORD PARALYSIS, BILATERAL COMPLETE: ICD-10-CM
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
## 2023-02-22 ENCOUNTER — TELEPHONE (OUTPATIENT)
Dept: PAIN MEDICINE | Facility: CLINIC | Age: 78
End: 2023-02-22

## 2023-02-22 DIAGNOSIS — G89.4 CHRONIC PAIN SYNDROME: ICD-10-CM

## 2023-02-22 DIAGNOSIS — M51.16 INTERVERTEBRAL DISC DISORDER WITH RADICULOPATHY OF LUMBAR REGION: Primary | ICD-10-CM

## 2023-02-22 NOTE — TELEPHONE ENCOUNTER
S/W pt and his wife  He called b/c he wants to schedule an HELEN w/ [facility]  He saw Dr Renea Tucker and he told him he won't operate on him  He told pt that he thinks he should be on tylenol and gabapentin  Wife said he took the last pill of the Norco this morning and that prescription lasted 4 months  He has taken tylenol 500 mg occasionally  They are asking about a script for gabapentin, norco, how much tylenol he should take and they want to schedule the HELEN  Pls review [facility] w/ Dr Renea Tucker on 2/15/23  Last Norco Rx was prescribed 10/26/22  Uses [facility] on file

## 2023-02-22 NOTE — TELEPHONE ENCOUNTER
Evy Coyne contacted Call Center requested refill of their medication  Evy Coyne also would like to schedule pt's procedure  Medication Name: Norco      Dosage of Med: 5 - 325 mg      Frequency of Med: Take 1 tablet by mouth 2 (two) times a day as needed for pain Max Daily Amount: 2 tablets      Remaining Medication: 1      Pharmacy and Location: [facility] DR AMELIE BOYER on file         Pt  Preferred Callback Phone Number:  [phone] Lester Los - wife)    Thank you

## 2023-02-23 RX ORDER — HYDROCODONE BITARTRATE AND ACETAMINOPHEN 5; 325 MG/1; MG/1
1 TABLET ORAL 2 TIMES DAILY PRN
Qty: 30 TABLET | Refills: 0 | Status: SHIPPED | OUTPATIENT
Start: 2023-02-23

## 2023-02-23 NOTE — TELEPHONE ENCOUNTER
Refill sent for the Norco   He may take Tylenol 500 mg 4 times daily    Order placed for right L4-5 transforaminal epidural steroid injection with Plavix hold

## 2023-02-23 NOTE — TELEPHONE ENCOUNTER
Caller:  Jeff Hurtado    Doctor: Carin Cummings    Reason for call: caller need to know what patient is seen for     Call back#: [phone]

## 2023-02-23 NOTE — TELEPHONE ENCOUNTER
S/w pharmacist, states Beverley Goel provided answers to his questions and nothing further needed from clinical at this time

## 2023-03-20 DIAGNOSIS — N18.2 TYPE 2 DIABETES MELLITUS WITH STAGE 2 CHRONIC KIDNEY DISEASE, WITH LONG-TERM CURRENT USE OF INSULIN (HCC): ICD-10-CM

## 2023-03-20 DIAGNOSIS — Z79.4 TYPE 2 DIABETES MELLITUS WITH STAGE 2 CHRONIC KIDNEY DISEASE, WITH LONG-TERM CURRENT USE OF INSULIN (HCC): ICD-10-CM

## 2023-03-20 DIAGNOSIS — E11.22 TYPE 2 DIABETES MELLITUS WITH STAGE 2 CHRONIC KIDNEY DISEASE, WITH LONG-TERM CURRENT USE OF INSULIN (HCC): ICD-10-CM

## 2023-03-20 RX ORDER — DAPAGLIFLOZIN 10 MG/1
TABLET, FILM COATED ORAL
Qty: 90 TABLET | Refills: 3 | Status: SHIPPED | OUTPATIENT
Start: 2023-03-20

## 2023-03-27 DIAGNOSIS — Z79.4 TYPE 2 DIABETES MELLITUS WITH STAGE 2 CHRONIC KIDNEY DISEASE, WITH LONG-TERM CURRENT USE OF INSULIN (HCC): ICD-10-CM

## 2023-03-27 DIAGNOSIS — N18.2 TYPE 2 DIABETES MELLITUS WITH STAGE 2 CHRONIC KIDNEY DISEASE, WITH LONG-TERM CURRENT USE OF INSULIN (HCC): ICD-10-CM

## 2023-03-27 DIAGNOSIS — E11.22 TYPE 2 DIABETES MELLITUS WITH STAGE 2 CHRONIC KIDNEY DISEASE, WITH LONG-TERM CURRENT USE OF INSULIN (HCC): ICD-10-CM

## 2023-03-27 RX ORDER — METFORMIN HYDROCHLORIDE 750 MG/1
TABLET, EXTENDED RELEASE ORAL
Qty: 180 TABLET | Refills: 3 | Status: SHIPPED | OUTPATIENT
Start: 2023-03-27

## 2023-03-30 ENCOUNTER — HOSPITAL ENCOUNTER (OUTPATIENT)
Dept: RADIOLOGY | Facility: CLINIC | Age: 78
End: 2023-03-30

## 2023-03-30 VITALS
HEART RATE: 79 BPM | SYSTOLIC BLOOD PRESSURE: 157 MMHG | OXYGEN SATURATION: 95 % | RESPIRATION RATE: 20 BRPM | DIASTOLIC BLOOD PRESSURE: 71 MMHG | TEMPERATURE: 97.8 F

## 2023-03-30 DIAGNOSIS — Z79.4 TYPE 2 DIABETES MELLITUS WITH STAGE 2 CHRONIC KIDNEY DISEASE, WITH LONG-TERM CURRENT USE OF INSULIN (HCC): ICD-10-CM

## 2023-03-30 DIAGNOSIS — I10 HYPERTENSION GOAL BP (BLOOD PRESSURE) < 140/90: ICD-10-CM

## 2023-03-30 DIAGNOSIS — M51.16 INTERVERTEBRAL DISC DISORDER WITH RADICULOPATHY OF LUMBAR REGION: ICD-10-CM

## 2023-03-30 DIAGNOSIS — E11.22 TYPE 2 DIABETES MELLITUS WITH STAGE 2 CHRONIC KIDNEY DISEASE, WITH LONG-TERM CURRENT USE OF INSULIN (HCC): ICD-10-CM

## 2023-03-30 DIAGNOSIS — N18.2 TYPE 2 DIABETES MELLITUS WITH STAGE 2 CHRONIC KIDNEY DISEASE, WITH LONG-TERM CURRENT USE OF INSULIN (HCC): ICD-10-CM

## 2023-03-30 DIAGNOSIS — E78.2 MIXED HYPERLIPIDEMIA: ICD-10-CM

## 2023-03-30 RX ORDER — 0.9 % SODIUM CHLORIDE 0.9 %
4 VIAL (ML) INJECTION ONCE
Status: COMPLETED | OUTPATIENT
Start: 2023-03-30 | End: 2023-03-30

## 2023-03-30 RX ORDER — PEN NEEDLE, DIABETIC 31 GX5/16"
NEEDLE, DISPOSABLE MISCELLANEOUS
Qty: 180 EACH | Refills: 3 | Status: SHIPPED | OUTPATIENT
Start: 2023-03-30

## 2023-03-30 RX ORDER — BUPIVACAINE HCL/PF 2.5 MG/ML
2 VIAL (ML) INJECTION ONCE
Status: COMPLETED | OUTPATIENT
Start: 2023-03-30 | End: 2023-03-30

## 2023-03-30 RX ORDER — METHYLPREDNISOLONE ACETATE 80 MG/ML
80 INJECTION, SUSPENSION INTRA-ARTICULAR; INTRALESIONAL; INTRAMUSCULAR; PARENTERAL; SOFT TISSUE ONCE
Status: COMPLETED | OUTPATIENT
Start: 2023-03-30 | End: 2023-03-30

## 2023-03-30 RX ADMIN — Medication 4 ML: at 10:03

## 2023-03-30 RX ADMIN — METHYLPREDNISOLONE ACETATE 80 MG: 80 INJECTION, SUSPENSION INTRA-ARTICULAR; INTRALESIONAL; INTRAMUSCULAR; PARENTERAL; SOFT TISSUE at 10:05

## 2023-03-30 RX ADMIN — IOHEXOL 1 ML: 300 INJECTION, SOLUTION INTRAVENOUS at 10:05

## 2023-03-30 RX ADMIN — BUPIVACAINE HYDROCHLORIDE 2 ML: 2.5 INJECTION, SOLUTION EPIDURAL; INFILTRATION; INTRACAUDAL at 10:05

## 2023-03-30 NOTE — H&P
History of Present Illness: The patient is a 68 y o  male who presents with complaints of right lower back and leg pain and is here today for right-sided L3 and L4 transforaminal epidural steroid injection    Past Medical History:   Diagnosis Date   • Arthritis     knees   • Cancer ([facility] [address] )     prostate   • Coronary artery disease     two coronary stents   • Diabetes mellitus ([facility] [address] )    • Hyperlipidemia 10/19/2018   • Hypertension    • Psoriasis     lower extremities and buttocks   • Sleep apnea    • Type 2 diabetes mellitus ([facility] [address] ) 10/19/2018       Past Surgical History:   Procedure Laterality Date   • CATARACT EXTRACTION Bilateral     with iol's   • COLONOSCOPY N/A 4/5/2016    Procedure: COLONOSCOPY snare polypectomy;  Surgeon: Jessica Hutchison MD;  Location: [facility] GI LAB; Service:    • INSERTION PROSTATE RADIATION SEED     • REPLACEMENT TOTAL KNEE Right    • TRACHEOSTOMY      x 2  up to 5 years ago   • UMBILICAL HERNIA REPAIR           Current Outpatient Medications:   •  albuterol (PROVENTIL HFA,VENTOLIN HFA) 90 mcg/act inhaler, Inhale 2 puffs every 6 (six) hours as needed (Patient not taking: Reported on 2/15/2023), Disp: , Rfl:   •  albuterol (PROVENTIL HFA,VENTOLIN HFA) 90 mcg/act inhaler, albuterol sulfate HFA 90 mcg/actuation aerosol inhaler  INHALE 2 PUFFS BY MOUTH EVERY 6 HOURS AS NEEDED FOR WHEEZING, Disp: , Rfl:   •  Ascorbic Acid (VITAMIN C) 1000 MG tablet, Take 1,000 mg by mouth daily, Disp: , Rfl:   •  aspirin 81 MG tablet, Take 81 mg by mouth daily  , Disp: , Rfl:   •  Azelastine-Fluticasone 137-50 MCG/ACT SUSP, azelastine-fluticasone 137 mcg-50 mcg/spray nasal spray  USE 1 SPRAY(S) INTO EACH NOSTRIL TWICE DAILY, Disp: , Rfl:   •  B-D ULTRAFINE III SHORT PEN 31G X 8 MM MISC, USE WITH INSULIN TWICE A DAY, Disp: 180 each, Rfl: 3  •  benzonatate (TESSALON) 200 MG capsule, , Disp: , Rfl:   •  benzonatate (TESSALON) 200 MG capsule, Take 200 mg by mouth, Disp: , Rfl:   •  cefdinir (OMNICEF) 300 mg capsule, cefdinir 300 mg capsule (Patient not taking: Reported on 2/7/2023), Disp: , Rfl:   •  cetirizine (ZyrTEC) 10 mg tablet, Take 10 mg by mouth daily (Patient not taking: Reported on 2/7/2023), Disp: , Rfl:   •  cholecalciferol (VITAMIN D3) 1,000 units tablet, Take 2,000 Units by mouth daily , Disp: , Rfl:   •  ciprofloxacin-dexamethasone (CIPRODEX) otic suspension, PRN, Disp: , Rfl:   •  clobetasol (OLUX) 0 05 % topical foam, as needed , Disp: , Rfl:   •  clopidogrel (PLAVIX) 75 mg tablet, TAKE 1 TABLET DAILY, Disp: , Rfl:   •  doxycycline hyclate (VIBRAMYCIN) 100 mg capsule, Take 100 mg by mouth daily  (Patient not taking: Reported on 2/7/2023), Disp: , Rfl:   •  Entresto  MG TABS, , Disp: , Rfl:   •  ezetimibe (ZETIA) 10 mg tablet, TAKE 1 TABLET BY MOUTH TIMES A WEEK ON MONDAY WEDNESDAY AND FRIDAY AT 6PM, Disp: , Rfl:   •  Farxiga 10 MG tablet, TAKE 1 TABLET DAILY, Disp: 90 tablet, Rfl: 3  •  fluticasone (Flovent HFA) 220 mcg/act inhaler, Inhale 2 puffs as needed  (Patient not taking: Reported on 2/15/2023), Disp: , Rfl:   •  fluticasone-umeclidinium-vilanterol (Trelegy Ellipta) 200-62 5-25 mcg/actuation AEPB inhaler, Inhale 1 puff daily (Patient not taking: Reported on 2/7/2023), Disp: , Rfl:   •  furosemide (LASIX) 40 mg tablet, Take 40 mg by mouth daily Twice a week on Mon & Thur--PRN, Disp: , Rfl:   •  gabapentin (NEURONTIN) 300 mg capsule, Take 1 capsule by mouth Three times a day (Patient not taking: Reported on 2/7/2023), Disp: , Rfl:   •  Glucagon, rDNA, (Glucagon Emergency) 1 MG KIT, Use glucagon kit if you have a low blood sugar and unconscious, Disp: 1 each, Rfl: 0  •  glucose blood (OneTouch Verio) test strip, USE TO TEST BLOOD SUGAR THREE TIMES A DAY, Disp: 300 strip, Rfl: 3  •  guaiFENesin-codeine (ROBITUSSIN AC) 100-10 mg/5 mL oral solution, TAKE 5 ML BY MOUTH EVERY 8 HOURS AS NEEDED FOR COUGH FOR UP TO 5 DAYS (Patient not taking: Reported on 2/7/2023), Disp: , Rfl:   •  HumaLOG Mix 75/25 KwikPen (75-25) 100 units/mL injection pen, INJECT 45 UNITS UNDER THE SKIN BEFORE BREAKFAST AND 40 UNITS BEFORE DINNER   Dose change , Disp: 90 mL, Rfl: 2  •  HYDROcodone-acetaminophen (NORCO) 5-325 mg per tablet, Take 1 tablet by mouth 2 (two) times a day as needed for pain Max Daily Amount: 2 tablets, Disp: 30 tablet, Rfl: 0  •  levocetirizine (XYZAL) 5 MG tablet, Take 5 mg by mouth (Patient not taking: Reported on 2/7/2023), Disp: , Rfl:   •  levocetirizine (XYZAL) 5 MG tablet, Take 5 mg by mouth every evening (Patient not taking: Reported on 2/7/2023), Disp: , Rfl:   •  levofloxacin (LEVAQUIN) 750 mg tablet, Take 750 mg by mouth daily (Patient not taking: Reported on 2/15/2023), Disp: , Rfl:   •  lisinopril (ZESTRIL) 5 mg tablet, Take 5 mg by mouth daily , Disp: , Rfl:   •  metFORMIN (GLUCOPHAGE-XR) 750 mg 24 hr tablet, TAKE 1 TABLET TWICE A DAY, Disp: 180 tablet, Rfl: 3  •  metoprolol succinate (TOPROL-XL) 25 mg 24 hr tablet, , Disp: , Rfl:   •  metoprolol succinate (TOPROL-XL) 25 mg 24 hr tablet, Take 25 mg by mouth daily, Disp: , Rfl:   •  Multiple Vitamins-Minerals (MULTIVITAMIN ADULT PO), Take by mouth, Disp: , Rfl:   •  neomycin-polymyxin-hydrocortisone (CORTISPORIN) 1 % SOLN, neomycin-polymyxin-hydrocort 3 5 mg/mL-10,000 unit/mL-1 % ear solution  INSTILL 3 DROPS INTO EACH EAR 4 TIMES DAILY FOR 10 DAYS (Patient not taking: Reported on 2/15/2023), Disp: , Rfl:   •  omeprazole (PriLOSEC) 20 mg delayed release capsule, Take 1 capsule (20 mg total) by mouth daily (Patient not taking: Reported on 2/15/2023), Disp: 30 capsule, Rfl: 11  •  predniSONE 50 mg tablet, Take 50 mg by mouth daily (Patient not taking: Reported on 2/7/2023), Disp: , Rfl:   •  predniSONE 50 mg tablet, Take 50 mg by mouth daily (Patient not taking: Reported on 2/7/2023), Disp: , Rfl:   •  rosuvastatin (CRESTOR) 40 MG tablet, Take 40 mg by mouth daily 1/2 pill at night, Disp: , Rfl:   •  rosuvastatin (CRESTOR) 40 MG tablet, Take 20 mg by mouth daily, Disp: , Rfl:   •  sacubitril-valsartan (Entresto)  MG TABS, Take 1 tablet by mouth 2 (two) times a day, Disp: , Rfl:   •  STELARA 90 MG/ML subcutaneous injection, , Disp: , Rfl:   •  traMADol (ULTRAM) 50 mg tablet, , Disp: , Rfl:   •  traZODone (DESYREL) 100 mg tablet, TAKE 1 TABLET AT BEDTIME AS NEEDED FOR SLEEP, Disp: , Rfl:   •  Trelegy Ellipta 200-62 5-25 MCG/ACT AEPB inhaler, , Disp: , Rfl:   •  Tremfya 100 MG/ML SOSY, , Disp: , Rfl:   •  triamcinolone (KENALOG) 0 1 % cream, Apply topically as needed Twice a week (Patient not taking: Reported on 2/15/2023), Disp: , Rfl:   •  urea (CARMOL) 40 %, Apply topically as needed  (Patient not taking: Reported on 2/7/2023), Disp: , Rfl:   •  VASCEPA 1 g CAPS, Take by mouth daily  (Patient not taking: Reported on 2/7/2023), Disp: , Rfl:     Current Facility-Administered Medications:   •  bupivacaine (PF) (MARCAINE) 0 25 % injection 2 mL, 2 mL, Epidural, Once, Leonardo Nava MD  •  iohexol (OMNIPAQUE) 300 mg/mL injection 1 mL, 1 mL, Epidural, Once, Leonardo Nava MD  •  lidocaine (PF) (XYLOCAINE-MPF) 2 % injection 4 mL, 4 mL, Infiltration, Once, Leonardo Nava MD  •  methylPREDNISolone acetate (DEPO-MEDROL) injection 80 mg, 80 mg, Epidural, Once, Leonardo Nava MD  •  sodium chloride (PF) 0 9 % injection 4 mL, 4 mL, Infiltration, Once, Leonardo Nava MD    Allergies   Allergen Reactions   • Bee Venom Shortness Of Breath   • Lidocaine Shortness Of Breath     Pt reports he was tested and does not have allergy to Lidocaine       Physical Exam:   Vitals:    03/30/23 0948   BP: 131/68   Pulse: 74   Resp: 20   Temp: 97 8 °F (36 6 °C)   SpO2: 94%     General: Awake, Alert, Oriented x 3, Mood and affect appropriate  Respiratory: Respirations even and unlabored  Cardiovascular: Peripheral pulses intact; no edema  Musculoskeletal Exam: Right lower back pain    ASA Score: 3    Patient/Chart Verification  Patient ID Verified: Verbal  ID Band Applied: No  Consents Confirmed: Procedural, To be obtained in the Pre-Procedure area  H&P( within 30 days) Verified: To be obtained in the Pre-Procedure area  Interval H&P(within 24 hr) Complete (required for Outpatients and Surgery Admit only): To be obtained in the Pre-Procedure area  Allergies Reviewed: Yes  Anticoag/NSAID held?: Yes (LD 3/21/23 HS 3/22/23 Plavix)  Currently on antibiotics?: No    Assessment:   1   Intervertebral disc disorder with radiculopathy of lumbar region        Plan: Right L3-4 TF HELEN

## 2023-03-30 NOTE — DISCHARGE INSTRUCTIONS
Epidural Steroid Injection   WHAT YOU NEED TO KNOW:   An epidural steroid injection (HELEN) is a procedure to inject steroid medicine into the epidural space  The epidural space is between your spinal cord and vertebrae  Steroids reduce inflammation and fluid buildup in your spine that may be causing pain  You may be given pain medicine along with the steroids  ACTIVITY  Do not drive or operate machinery today  No strenuous activity today - bending, lifting, etc   You may resume normal activites starting tomorrow - start slowly and as tolerated  You may shower today, but no tub baths or hot tubs  You may have numbness for several hours from the local anesthetic  Please use caution and common sense, especially with weight-bearing activities  CARE OF THE INJECTION SITE  If you have soreness or pain, apply ice to the area today (20 minutes on/20 minutes off)  Starting tomorrow, you may use warm, moist heat or ice if needed  You may have an increase or change in your discomfort for 36-48 hours after your treatment  Apply ice and continue with any pain medication you have been prescribed  Notify [facility] if you have any of the following: redness, drainage, swelling, headache, stiff neck or fever above 100°F     SPECIAL INSTRUCTIONS  Our office will contact you in approximately 7 days for a progress report  MEDICATIONS  Continue to take all routine medications  Our office may have instructed you to hold some medications  Please resume your Plavix tomorrow 3/31/23 at your normal scheduled dosing time  As no general anesthesia was used in today's procedure, you should not experience any side effects related to anesthesia  If you are diabetic, the steroids used in today's injection may temporarily increase your blood sugar levels after the first few days after your injection   Please keep a close eye on your sugars and alert the doctor who manages your diabetes if your sugars are significantly high from your baseline or you are symptomatic  If you have a problem specifically related to your procedure, please call our office at [phone]  Problems not related to your procedure should be directed to your primary care physician

## 2023-04-06 ENCOUNTER — TELEPHONE (OUTPATIENT)
Dept: RADIOLOGY | Facility: MEDICAL CENTER | Age: 78
End: 2023-04-06

## 2023-05-23 LAB
ALBUMIN SERPL-MCNC: 4.5 G/DL (ref 3.7–4.7)
ALBUMIN/CREAT UR: 435 MG/G CREAT (ref 0–29)
ALBUMIN/GLOB SERPL: 1.8 {RATIO} (ref 1.2–2.2)
ALP SERPL-CCNC: 69 IU/L (ref 44–121)
ALT SERPL-CCNC: 29 IU/L (ref 0–44)
AST SERPL-CCNC: 23 IU/L (ref 0–40)
BASOPHILS # BLD AUTO: 0 X10E3/UL (ref 0–0.2)
BASOPHILS NFR BLD AUTO: 0 %
BILIRUB SERPL-MCNC: 0.5 MG/DL (ref 0–1.2)
BUN SERPL-MCNC: 19 MG/DL (ref 8–27)
BUN/CREAT SERPL: 17 (ref 10–24)
CALCIUM SERPL-MCNC: 9.1 MG/DL (ref 8.6–10.2)
CHLORIDE SERPL-SCNC: 99 MMOL/L (ref 96–106)
CHOLEST SERPL-MCNC: 136 MG/DL (ref 100–199)
CO2 SERPL-SCNC: 20 MMOL/L (ref 20–29)
CREAT SERPL-MCNC: 1.15 MG/DL (ref 0.76–1.27)
CREAT UR-MCNC: 28 MG/DL
EGFR: 65 ML/MIN/1.73
EOSINOPHIL # BLD AUTO: 0.3 X10E3/UL (ref 0–0.4)
EOSINOPHIL NFR BLD AUTO: 4 %
ERYTHROCYTE [DISTWIDTH] IN BLOOD BY AUTOMATED COUNT: 14.3 % (ref 11.6–15.4)
GLOBULIN SER-MCNC: 2.5 G/DL (ref 1.5–4.5)
GLUCOSE SERPL-MCNC: 146 MG/DL (ref 70–99)
HBA1C MFR BLD: 8.4 % (ref 4.8–5.6)
HCT VFR BLD AUTO: 47.4 % (ref 37.5–51)
HDLC SERPL-MCNC: 54 MG/DL
HGB BLD-MCNC: 15.5 G/DL (ref 13–17.7)
IMM GRANULOCYTES # BLD: 0 X10E3/UL (ref 0–0.1)
IMM GRANULOCYTES NFR BLD: 0 %
LDLC SERPL CALC-MCNC: 49 MG/DL (ref 0–99)
LYMPHOCYTES # BLD AUTO: 2.1 X10E3/UL (ref 0.7–3.1)
LYMPHOCYTES NFR BLD AUTO: 29 %
MCH RBC QN AUTO: 29.3 PG (ref 26.6–33)
MCHC RBC AUTO-ENTMCNC: 32.7 G/DL (ref 31.5–35.7)
MCV RBC AUTO: 90 FL (ref 79–97)
MICROALBUMIN UR-MCNC: 121.7 UG/ML
MONOCYTES # BLD AUTO: 0.7 X10E3/UL (ref 0.1–0.9)
MONOCYTES NFR BLD AUTO: 10 %
NEUTROPHILS # BLD AUTO: 4.2 X10E3/UL (ref 1.4–7)
NEUTROPHILS NFR BLD AUTO: 57 %
PLATELET # BLD AUTO: 211 X10E3/UL (ref 150–450)
POTASSIUM SERPL-SCNC: 4.6 MMOL/L (ref 3.5–5.2)
PROT SERPL-MCNC: 7 G/DL (ref 6–8.5)
RBC # BLD AUTO: 5.29 X10E6/UL (ref 4.14–5.8)
SODIUM SERPL-SCNC: 137 MMOL/L (ref 134–144)
TRIGL SERPL-MCNC: 207 MG/DL (ref 0–149)
TSH SERPL DL<=0.005 MIU/L-ACNC: 4.33 UIU/ML (ref 0.45–4.5)
WBC # BLD AUTO: 7.5 X10E3/UL (ref 3.4–10.8)

## 2023-05-31 ENCOUNTER — OFFICE VISIT (OUTPATIENT)
Dept: ENDOCRINOLOGY | Facility: CLINIC | Age: 78
End: 2023-05-31

## 2023-05-31 VITALS
DIASTOLIC BLOOD PRESSURE: 82 MMHG | WEIGHT: 283 LBS | BODY MASS INDEX: 40.52 KG/M2 | HEART RATE: 61 BPM | SYSTOLIC BLOOD PRESSURE: 130 MMHG | HEIGHT: 70 IN

## 2023-05-31 DIAGNOSIS — E78.2 MIXED HYPERLIPIDEMIA: ICD-10-CM

## 2023-05-31 DIAGNOSIS — E11.22 TYPE 2 DIABETES MELLITUS WITH STAGE 2 CHRONIC KIDNEY DISEASE, WITH LONG-TERM CURRENT USE OF INSULIN (HCC): Primary | ICD-10-CM

## 2023-05-31 DIAGNOSIS — N18.2 TYPE 2 DIABETES MELLITUS WITH STAGE 2 CHRONIC KIDNEY DISEASE, WITH LONG-TERM CURRENT USE OF INSULIN (HCC): Primary | ICD-10-CM

## 2023-05-31 DIAGNOSIS — Z79.4 TYPE 2 DIABETES MELLITUS WITH STAGE 2 CHRONIC KIDNEY DISEASE, WITH LONG-TERM CURRENT USE OF INSULIN (HCC): Primary | ICD-10-CM

## 2023-05-31 DIAGNOSIS — I25.10 CORONARY ARTERY DISEASE INVOLVING NATIVE CORONARY ARTERY OF NATIVE HEART WITHOUT ANGINA PECTORIS: ICD-10-CM

## 2023-05-31 DIAGNOSIS — I10 HYPERTENSION GOAL BP (BLOOD PRESSURE) < 140/90: ICD-10-CM

## 2023-05-31 DIAGNOSIS — E66.01 CLASS 2 SEVERE OBESITY DUE TO EXCESS CALORIES WITH SERIOUS COMORBIDITY AND BODY MASS INDEX (BMI) OF 38.0 TO 38.9 IN ADULT (HCC): ICD-10-CM

## 2023-05-31 DIAGNOSIS — I50.41 HYPERTENSIVE HEART DISEASE WITH ACUTE COMBINED SYSTOLIC AND DIASTOLIC CONGESTIVE HEART FAILURE (HCC): ICD-10-CM

## 2023-05-31 DIAGNOSIS — I11.0 HYPERTENSIVE HEART DISEASE WITH ACUTE COMBINED SYSTOLIC AND DIASTOLIC CONGESTIVE HEART FAILURE (HCC): ICD-10-CM

## 2023-05-31 DIAGNOSIS — I10 ESSENTIAL (PRIMARY) HYPERTENSION: ICD-10-CM

## 2023-05-31 PROBLEM — L40.9 PSORIASIS: Status: ACTIVE | Noted: 2023-05-31

## 2023-05-31 RX ORDER — BLOOD-GLUCOSE METER
EACH MISCELLANEOUS
Qty: 1 KIT | Refills: 0 | Status: SHIPPED | OUTPATIENT
Start: 2023-05-31

## 2023-05-31 RX ORDER — SPIRONOLACTONE 25 MG/1
TABLET ORAL
COMMUNITY
Start: 2023-04-18

## 2023-05-31 NOTE — PROGRESS NOTES
Seamus Burleson 66 y o  male MRN: [id number]    Encounter: [id number]      Assessment/Plan     1  Type 2 diabetes mellitus long-term insulin therapy with hyperglycemia  2  Obesity  3  CKD-stable  Poorly controlled with last A1c 8 4% which has trended up however this is not completely corresponding with fingerstick blood sugars unless patient is having undocumented highs  In the past, fructosamine level has corresponded more closely with check blood sugars    Recommend the following at this time  Continue current regimen  Check blood sugars more often -  qAC and HS   Blood sugar log for review in 2 to 3 weeks  Will request continuous glucose monitor-Freestyle alfonzo  Follow-up with ophthalmology for a diabetic eye exam, following up regularly with podiatry  -Repeat labs in 3 months including fructosamine level    Will consider GLP-1 agonist in follow up   No h/o pancreatitis/ MEN syndrome/ Medullary thyroid cancer   Discussed different medications that can be used for glycemic management-oral hypoglycemic agents as well as injectable insulin and non-insulin medications (GLP 1 agonist), Tirzepatide (GLP1 Agonist/GIP) along with risks, benefits, side effect profile  4  Hyperlipidemia  - continue statin therapy    5  Hypertension  6  CHF  Blood pressure at goal   - continue current medications including ACE-I/ ARB  Follow up with cardiology     CC: Diabetes    History of Present Illness     HPI:  Seamus Burleson is a 66 y o  male presents for a follow-up visit regarding diabetes management     Also has hypertension, hyperlipidemia, obesity, CKD, CAD, retinopathy    Last seen in   Last Eye exam:  Due for exam     Current regimen:   Insulin 75/25 43 units with breakfast, 43 with dinner  Metformin  mg twice daily  Farxiga 10 mg orally daily    Statin: crestor   ACE-I/ARB: valsartan and lisinopril ( managed by cardiologist)      following up podiatry regularly   SOB on exertion, h o vocal cord paralysis, feels lit is getting worse   Gained weight over the last few years   Not as active as before, using a cane, has spoinal; stenossis     Home glucose monitoring: are performed regularly - BID  Before breakfast: 116-128, 132, 1542, 129, 137  Before lunch: 123, 146  Before dinner: 173, 147  Bedtime:189, 225, 146, 184, 194, 177    Symptoms of hypoglycemia :  Sweating, nausea, when Bg are in the 790-80s, not often   Not occurred recently     All other systems were reviewed and are negative  Review of Systems      Historical Information   Past Medical History:   Diagnosis Date   • Arthritis     knees   • Cancer [facility])     prostate   • Coronary artery disease     two coronary stents   • Diabetes mellitus ([facility] )    • Hyperlipidemia 10/19/2018   • Hypertension    • Psoriasis     lower extremities and buttocks   • Sleep apnea    • Type 2 diabetes mellitus ([facility] ) 10/19/2018     Past Surgical History:   Procedure Laterality Date   • CATARACT EXTRACTION Bilateral     with iol's   • COLONOSCOPY N/A 4/5/2016    Procedure: COLONOSCOPY snare polypectomy;  Surgeon: Albaro Kyle MD;  Location: [place] [address] GI LAB; Service:    • INSERTION PROSTATE RADIATION SEED     • REPLACEMENT TOTAL KNEE Right    • TRACHEOSTOMY      x 2  up to 5 years ago   • UMBILICAL HERNIA REPAIR       Social History   Social History     Substance and Sexual Activity   Alcohol Use Yes   • Alcohol/week: 4 0 standard drinks of alcohol   • Types: 4 Cans of beer per week    Comment: daily     Social History     Substance and Sexual Activity   Drug Use No     Social History     Tobacco Use   Smoking Status Never   Smokeless Tobacco Never     Family History: History reviewed  No pertinent family history      Meds/Allergies   Current Outpatient Medications   Medication Sig Dispense Refill   • albuterol (PROVENTIL HFA,VENTOLIN HFA) 90 mcg/act inhaler albuterol sulfate HFA 90 mcg/actuation aerosol inhaler   INHALE 2 PUFFS BY MOUTH EVERY 6 HOURS AS NEEDED FOR WHEEZING     • Ascorbic Acid (VITAMIN C) 1000 MG tablet Take 1,000 mg by mouth daily     • aspirin 81 MG tablet Take 81 mg by mouth daily  • B-D ULTRAFINE III SHORT PEN 31G X 8 MM MISC USE WITH INSULIN TWICE A  each 3   • cholecalciferol (VITAMIN D3) 1,000 units tablet Take 2,000 Units by mouth daily      • clobetasol (OLUX) 0 05 % topical foam as needed      • clopidogrel (PLAVIX) 75 mg tablet TAKE 1 TABLET DAILY     • Entresto  MG TABS      • ezetimibe (ZETIA) 10 mg tablet TAKE 1 TABLET BY MOUTH TIMES A WEEK ON MONDAY WEDNESDAY AND FRIDAY AT 6PM     • Farxiga 10 MG tablet TAKE 1 TABLET DAILY 90 tablet 3   • furosemide (LASIX) 40 mg tablet Take 40 mg by mouth daily Twice a week on Mon & Thur--PRN     • Glucagon, rDNA, (Glucagon Emergency) 1 MG KIT Use glucagon kit if you have a low blood sugar and unconscious 1 each 0   • glucose blood (OneTouch Verio) test strip USE TO TEST BLOOD SUGAR THREE TIMES A  strip 3   • HumaLOG Mix 75/25 KwikPen (75-25) 100 units/mL injection pen INJECT 45 UNITS UNDER THE SKIN BEFORE BREAKFAST AND 40 UNITS BEFORE DINNER  Dose change   90 mL 2   • HYDROcodone-acetaminophen (NORCO) 5-325 mg per tablet Take 1 tablet by mouth 2 (two) times a day as needed for pain Max Daily Amount: 2 tablets 30 tablet 0   • lisinopril (ZESTRIL) 5 mg tablet Take 5 mg by mouth daily      • metFORMIN (GLUCOPHAGE-XR) 750 mg 24 hr tablet TAKE 1 TABLET TWICE A  tablet 3   • metoprolol succinate (TOPROL-XL) 25 mg 24 hr tablet      • metoprolol succinate (TOPROL-XL) 25 mg 24 hr tablet Take 25 mg by mouth daily     • Multiple Vitamins-Minerals (MULTIVITAMIN ADULT PO) Take by mouth     • rosuvastatin (CRESTOR) 40 MG tablet Take 40 mg by mouth daily 1/2 pill at night     • rosuvastatin (CRESTOR) 40 MG tablet Take 20 mg by mouth daily     • sacubitril-valsartan (Entresto)  MG TABS Take 1 tablet by mouth 2 (two) times a day     • spironolactone (ALDACTONE) 25 mg tablet      • traZODone (DESYREL) 100 mg tablet TAKE 1 TABLET AT BEDTIME AS NEEDED FOR SLEEP     • Tremfya 100 MG/ML SOSY      • albuterol (PROVENTIL HFA,VENTOLIN HFA) 90 mcg/act inhaler Inhale 2 puffs every 6 (six) hours as needed (Patient not taking: Reported on 2/15/2023)     • Azelastine-Fluticasone 137-50 MCG/ACT SUSP azelastine-fluticasone 137 mcg-50 mcg/spray nasal spray   USE 1 SPRAY(S) INTO EACH NOSTRIL TWICE DAILY (Patient not taking: Reported on 5/31/2023)     • benzonatate (TESSALON) 200 MG capsule  (Patient not taking: Reported on 5/31/2023)     • benzonatate (TESSALON) 200 MG capsule Take 200 mg by mouth (Patient not taking: Reported on 5/31/2023)     • cefdinir (OMNICEF) 300 mg capsule cefdinir 300 mg capsule (Patient not taking: Reported on 2/7/2023)     • cetirizine (ZyrTEC) 10 mg tablet Take 10 mg by mouth daily (Patient not taking: Reported on 2/7/2023)     • ciprofloxacin-dexamethasone (CIPRODEX) otic suspension PRN (Patient not taking: Reported on 5/31/2023)     • doxycycline hyclate (VIBRAMYCIN) 100 mg capsule Take 100 mg by mouth daily  (Patient not taking: Reported on 2/7/2023)     • fluticasone (Flovent HFA) 220 mcg/act inhaler Inhale 2 puffs as needed  (Patient not taking: Reported on 2/15/2023)     • fluticasone-umeclidinium-vilanterol (Trelegy Ellipta) 200-62 5-25 mcg/actuation AEPB inhaler Inhale 1 puff daily (Patient not taking: Reported on 2/7/2023)     • gabapentin (NEURONTIN) 300 mg capsule Take 1 capsule by mouth Three times a day (Patient not taking: Reported on 2/7/2023)     • guaiFENesin-codeine (ROBITUSSIN AC) 100-10 mg/5 mL oral solution TAKE 5 ML BY MOUTH EVERY 8 HOURS AS NEEDED FOR COUGH FOR UP TO 5 DAYS (Patient not taking: Reported on 2/7/2023)     • levocetirizine (XYZAL) 5 MG tablet Take 5 mg by mouth (Patient not taking: Reported on 2/7/2023)     • levocetirizine (XYZAL) 5 MG tablet Take 5 mg by mouth every evening (Patient not taking: Reported on 2/7/2023)     • levofloxacin (LEVAQUIN) 750 mg tablet Take 750 mg by "mouth daily (Patient not taking: Reported on 2/15/2023)     • neomycin-polymyxin-hydrocortisone (CORTISPORIN) 1 % SOLN neomycin-polymyxin-hydrocort 3 5 mg/mL-10,000 unit/mL-1 % ear solution   INSTILL 3 DROPS INTO EACH EAR 4 TIMES DAILY FOR 10 DAYS (Patient not taking: Reported on 2/15/2023)     • omeprazole (PriLOSEC) 20 mg delayed release capsule Take 1 capsule (20 mg total) by mouth daily (Patient not taking: Reported on 2/15/2023) 30 capsule 11   • predniSONE 50 mg tablet Take 50 mg by mouth daily (Patient not taking: Reported on 2/7/2023)     • predniSONE 50 mg tablet Take 50 mg by mouth daily (Patient not taking: Reported on 2/7/2023)     • STELARA 90 MG/ML subcutaneous injection  (Patient not taking: Reported on 2/7/2023)     • traMADol (ULTRAM) 50 mg tablet  (Patient not taking: Reported on 5/31/2023)     • Trelegy Ellipta 200-62 5-25 MCG/ACT AEPB inhaler  (Patient not taking: Reported on 2/7/2023)     • triamcinolone (KENALOG) 0 1 % cream Apply topically as needed Twice a week (Patient not taking: Reported on 2/15/2023)     • urea (CARMOL) 40 % Apply topically as needed  (Patient not taking: Reported on 2/7/2023)     • VASCEPA 1 g CAPS Take by mouth daily  (Patient not taking: Reported on 2/7/2023)       No current facility-administered medications for this visit  Allergies   Allergen Reactions   • Bee Venom Shortness Of Breath   • Lidocaine Shortness Of Breath     Pt reports he was tested and does not have allergy to Lidocaine       Objective   Vitals: Blood pressure 130/82, pulse 61, height 5' 10\" (1 778 m), weight 128 kg (283 lb)  Physical Exam  Constitutional:       General: He is not in acute distress  Appearance: He is well-developed  He is not diaphoretic  HENT:      Head: Normocephalic and atraumatic  Eyes:      Conjunctiva/sclera: Conjunctivae normal       Pupils: Pupils are equal, round, and reactive to light  Cardiovascular:      Rate and Rhythm: Normal rate and regular rhythm      " Heart sounds: Normal heart sounds  No murmur heard  Pulmonary:      Effort: Pulmonary effort is normal  No respiratory distress  Breath sounds: Normal breath sounds  No wheezing  Abdominal:      General: There is no distension  Palpations: Abdomen is soft  Tenderness: There is no abdominal tenderness  There is no guarding  Musculoskeletal:      Cervical back: Normal range of motion and neck supple  Skin:     General: Skin is warm and dry  Findings: No erythema or rash  Neurological:      Mental Status: He is alert and oriented to person, place, and time  Psychiatric:         Behavior: Behavior normal          Thought Content: Thought content normal          The history was obtained from the review of the chart, patient and family      Lab Results:   Lab Results   Component Value Date/Time    Albumin 4 5 05/22/2023 07:23 AM    Albumin 4 3 10/18/2022 07:28 AM    ALT 29 05/22/2023 07:23 AM    ALT 24 10/18/2022 07:28 AM    AST 23 05/22/2023 07:23 AM    AST 20 10/18/2022 07:28 AM    BUN 19 05/22/2023 07:23 AM    BUN 18 10/18/2022 07:28 AM    Chloride 99 05/22/2023 07:23 AM    Chloride 100 10/18/2022 07:28 AM    CO2 20 05/22/2023 07:23 AM    CO2 23 10/18/2022 07:28 AM    Creatinine 1 15 05/22/2023 07:23 AM    Creatinine 1 09 10/18/2022 07:28 AM    Globulin, Total 2 5 05/22/2023 07:23 AM    Globulin, Total 2 5 10/18/2022 07:28 AM    HCT 47 4 05/22/2023 07:23 AM    HDL 54 05/22/2023 07:23 AM    HDL 63 10/18/2022 07:28 AM    Hemoglobin 15 5 05/22/2023 07:23 AM    Hemoglobin A1C 8 4 (H) 05/22/2023 07:23 AM    Hemoglobin A1C 7 5 (A) 02/07/2023 09:56 AM    Hemoglobin A1C 7 2 (H) 10/18/2022 07:28 AM    Potassium 4 6 05/22/2023 07:23 AM    Potassium 4 5 10/18/2022 07:28 AM    MCV 90 05/22/2023 07:23 AM    Platelet Count 056 06/81/8011 07:23 AM    Protein, Total 7 0 05/22/2023 07:23 AM    Protein, Total 6 8 10/18/2022 07:28 AM    Triglycerides 207 (H) 05/22/2023 07:23 AM    Triglycerides 74 "10/18/2022 07:28 AM    White Blood Cell Count 7 5 05/22/2023 07:23 AM         Imaging Studies: I have personally reviewed pertinent reports  Portions of the record may have been created with voice recognition software  Occasional wrong word or \"sound a like\" substitutions may have occurred due to the inherent limitations of voice recognition software  Read the chart carefully and recognize, using context, where substitutions have occurred       "

## 2023-05-31 NOTE — PATIENT INSTRUCTIONS
Continue current medications  Please check blood sugars more often before meals and at bedtime, keep a log, send log for review in 2 to 3 weeks  Will try to get a continuous glucose monitor    Please call your insurance and inquire which of the following would be covered  - GLP-1  Agonist-Victoza, Bydureon, Byetta, ozempic, trulicity

## 2023-06-03 LAB
DME PARACHUTE DELIVERY DATE REQUESTED: NORMAL
DME PARACHUTE ITEM DESCRIPTION: NORMAL
DME PARACHUTE ITEM DESCRIPTION: NORMAL
DME PARACHUTE ORDER STATUS: NORMAL
DME PARACHUTE SUPPLIER NAME: NORMAL
DME PARACHUTE SUPPLIER PHONE: NORMAL

## 2023-06-13 ENCOUNTER — TELEPHONE (OUTPATIENT)
Dept: PAIN MEDICINE | Facility: CLINIC | Age: 78
End: 2023-06-13

## 2023-06-13 DIAGNOSIS — G89.4 CHRONIC PAIN SYNDROME: ICD-10-CM

## 2023-06-13 RX ORDER — HYDROCODONE BITARTRATE AND ACETAMINOPHEN 5; 325 MG/1; MG/1
1 TABLET ORAL 2 TIMES DAILY PRN
Qty: 30 TABLET | Refills: 0 | Status: SHIPPED | OUTPATIENT
Start: 2023-06-13

## 2023-06-13 NOTE — TELEPHONE ENCOUNTER
Caller: lexa    Doctor: Lefty Ovalle    Reason for call: pt wants to know if he can get another procedure?     Call back#: [phone]

## 2023-06-13 NOTE — TELEPHONE ENCOUNTER
S/w pt's wife(ok per medical communication consent on file)  Pt is s/p  Right L3L4 TFESI on 3/30  Pt had 50% relief until recently and questioned how often injections can be repeated? Pt is requesting a refill of Hydrocodone 5/325  Pt took only when necessary and has 1  pill left  LP 2/23 #30  No f/u [facility] scheduled    Please advise

## 2023-06-13 NOTE — TELEPHONE ENCOUNTER
Pt contacted Call Center requested refill of their medication  Medication Name: hydrocodone      Dosage of Med: 5-325 mg      Frequency of Med: Take 1 tablet by mouth 2 (two) times a day as needed for pain Max Daily Amount: 2 tablets      Remaining Medication: 1      Pharmacy and Location: [facility] [place]        Pt  Preferred Callback Phone Number: [phone]      Thank you

## 2023-06-13 NOTE — TELEPHONE ENCOUNTER
Okay to schedule repeat injection every 4 months as needed so please schedule repeat injection    Refill sent for the hydrocodone

## 2023-06-15 ENCOUNTER — TELEPHONE (OUTPATIENT)
Dept: ENDOCRINOLOGY | Facility: CLINIC | Age: 78
End: 2023-06-15

## 2023-06-22 ENCOUNTER — TRANSCRIBE ORDERS (OUTPATIENT)
Dept: PAIN MEDICINE | Facility: CLINIC | Age: 78
End: 2023-06-22

## 2023-06-22 NOTE — TELEPHONE ENCOUNTER
Caller: [facility]     Doctor: Q    Reason for call: stated received another letter for patient clearance and stated did fax clearance over     Will be faxing clearance again    Call back#:

## 2023-07-14 ENCOUNTER — HOSPITAL ENCOUNTER (OUTPATIENT)
Dept: RADIOLOGY | Facility: CLINIC | Age: 78
End: 2023-07-14
Payer: MEDICARE

## 2023-07-14 VITALS
SYSTOLIC BLOOD PRESSURE: 135 MMHG | DIASTOLIC BLOOD PRESSURE: 76 MMHG | TEMPERATURE: 97.7 F | HEART RATE: 63 BPM | RESPIRATION RATE: 18 BRPM | OXYGEN SATURATION: 96 %

## 2023-07-14 DIAGNOSIS — M51.16 INTERVERTEBRAL DISC DISORDER WITH RADICULOPATHY OF LUMBAR REGION: ICD-10-CM

## 2023-07-14 PROCEDURE — 64483 NJX AA&/STRD TFRM EPI L/S 1: CPT | Performed by: ANESTHESIOLOGY

## 2023-07-14 PROCEDURE — 64484 NJX AA&/STRD TFRM EPI L/S EA: CPT | Performed by: ANESTHESIOLOGY

## 2023-07-14 RX ORDER — BUPIVACAINE HCL/PF 2.5 MG/ML
2 VIAL (ML) INJECTION ONCE
Status: COMPLETED | OUTPATIENT
Start: 2023-07-14 | End: 2023-07-14

## 2023-07-14 RX ORDER — METHYLPREDNISOLONE ACETATE 80 MG/ML
80 INJECTION, SUSPENSION INTRA-ARTICULAR; INTRALESIONAL; INTRAMUSCULAR; PARENTERAL; SOFT TISSUE ONCE
Status: COMPLETED | OUTPATIENT
Start: 2023-07-14 | End: 2023-07-14

## 2023-07-14 RX ORDER — 0.9 % SODIUM CHLORIDE 0.9 %
4 VIAL (ML) INJECTION ONCE
Status: COMPLETED | OUTPATIENT
Start: 2023-07-14 | End: 2023-07-14

## 2023-07-14 RX ADMIN — BUPIVACAINE HYDROCHLORIDE 2 ML: 2.5 INJECTION, SOLUTION EPIDURAL; INFILTRATION; INTRACAUDAL at 10:32

## 2023-07-14 RX ADMIN — Medication 4 ML: at 10:30

## 2023-07-14 RX ADMIN — METHYLPREDNISOLONE ACETATE 80 MG: 80 INJECTION, SUSPENSION INTRA-ARTICULAR; INTRALESIONAL; INTRAMUSCULAR; PARENTERAL; SOFT TISSUE at 10:32

## 2023-07-14 RX ADMIN — IOHEXOL 1 ML: 300 INJECTION, SOLUTION INTRAVENOUS at 10:32

## 2023-07-14 NOTE — DISCHARGE INSTR - LAB
Epidural Steroid Injection   WHAT YOU NEED TO KNOW:   An epidural steroid injection (HELEN) is a procedure to inject steroid medicine into the epidural space. The epidural space is between your spinal cord and vertebrae. Steroids reduce inflammation and fluid buildup in your spine that may be causing pain. You may be given pain medicine along with the steroids. ACTIVITY  Do not drive or operate machinery today. No strenuous activity today - bending, lifting, etc.  You may resume normal activites starting tomorrow - start slowly and as tolerated. You may shower today, but no tub baths or hot tubs. You may have numbness for several hours from the local anesthetic. Please use caution and common sense, especially with weight-bearing activities. CARE OF THE INJECTION SITE  If you have soreness or pain, apply ice to the area today (20 minutes on/20 minutes off). Starting tomorrow, you may use warm, moist heat or ice if needed. You may have an increase or change in your discomfort for 36-48 hours after your treatment. Apply ice and continue with any pain medication you have been prescribed. Notify the [facility] if you have any of the following: redness, drainage, swelling, headache, stiff neck or fever above 100°F.    SPECIAL INSTRUCTIONS  Our office will contact you in approximately 7 days for a progress report. MEDICATIONS  Continue to take all routine medications. Our office may have instructed you to hold some medications. You may resume your plavix at your next scheduled dosing time. As no general anesthesia was used in today's procedure, you should not experience any side effects related to anesthesia. If you are diabetic, the steroids used in today's injection may temporarily increase your blood sugar levels after the first few days after your injection.  Please keep a close eye on your sugars and alert the doctor who manages your diabetes if your sugars are significantly high from your baseline or you are symptomatic. If you have a problem specifically related to your procedure, please call our office at [phone]. Problems not related to your procedure should be directed to your primary care physician.

## 2023-07-14 NOTE — H&P
History of Present Illness: The patient is a 66 y.o. male who presents with complaints of right lower back and leg pain is here today for right L3-4 transforaminal epidural steroid injection    Past Medical History:   Diagnosis Date   • Arthritis     knees   • Cancer (HCC)     prostate   • Coronary artery disease     two coronary stents   • Diabetes mellitus ([address])    • Hyperlipidemia 10/19/2018   • Hypertension    • Psoriasis     lower extremities and buttocks   • Sleep apnea    • Type 2 diabetes mellitus ([address]) 10/19/2018       Past Surgical History:   Procedure Laterality Date   • CATARACT EXTRACTION Bilateral     with iol's   • COLONOSCOPY N/A 4/5/2016    Procedure: COLONOSCOPY snare polypectomy;  Surgeon: Elzbieta Castellon MD;  Location: [address] [place] [address] GI LAB; Service:    • INSERTION PROSTATE RADIATION SEED     • REPLACEMENT TOTAL KNEE Right    • TRACHEOSTOMY      x 2  up to 5 years ago   • UMBILICAL HERNIA REPAIR           Current Outpatient Medications:   •  Ascorbic Acid (VITAMIN C) 1000 MG tablet, Take 1,000 mg by mouth daily, Disp: , Rfl:   •  aspirin 81 MG tablet, Take 81 mg by mouth daily. , Disp: , Rfl:   •  Azelastine-Fluticasone 137-50 MCG/ACT SUSP, , Disp: , Rfl:   •  B-D ULTRAFINE III SHORT PEN 31G X 8 MM MISC, USE WITH INSULIN TWICE A DAY, Disp: 180 each, Rfl: 3  •  Blood Glucose Monitoring Suppl (OneTouch Verio) w/Device KIT, Use to test blood sugar 3 times a day, Disp: 1 kit, Rfl: 0  •  Blood Glucose Monitoring Suppl (OneTouch Verio) w/Device KIT, Use to test blood sugar 3 times a day, Disp: 1 kit, Rfl: 0  •  cholecalciferol (VITAMIN D3) 1,000 units tablet, Take 2,000 Units by mouth daily , Disp: , Rfl:   •  clopidogrel (PLAVIX) 75 mg tablet, TAKE 1 TABLET DAILY, Disp: , Rfl:   •  doxycycline hyclate (VIBRAMYCIN) 100 mg capsule, Take 100 mg by mouth daily, Disp: , Rfl:   •  Entresto  MG TABS, , Disp: , Rfl:   •  ezetimibe (ZETIA) 10 mg tablet, TAKE 1 TABLET BY MOUTH TIMES A WEEK ON MONDAY [facility] AND FRIDAY AT 6PM, Disp: , Rfl:   •  Farxiga 10 MG tablet, TAKE 1 TABLET DAILY, Disp: 90 tablet, Rfl: 3  •  fluticasone (Flovent HFA) 220 mcg/act inhaler, Inhale 2 puffs as needed, Disp: , Rfl:   •  furosemide (LASIX) 40 mg tablet, Take 40 mg by mouth daily Twice a week on Mon & Thur--PRN, Disp: , Rfl:   •  gabapentin (NEURONTIN) 300 mg capsule, Take 1 capsule by mouth Three times a day, Disp: , Rfl:   •  Glucagon, rDNA, (Glucagon Emergency) 1 MG KIT, Use glucagon kit if you have a low blood sugar and unconscious, Disp: 1 each, Rfl: 0  •  glucose blood (OneTouch Verio) test strip, USE TO TEST BLOOD SUGAR THREE TIMES A DAY, Disp: 300 strip, Rfl: 3  •  guaiFENesin-codeine (ROBITUSSIN AC) 100-10 mg/5 mL oral solution, , Disp: , Rfl:   •  HumaLOG Mix 75/25 KwikPen (75-25) 100 units/mL injection pen, INJECT 45 UNITS UNDER THE SKIN BEFORE BREAKFAST AND 40 UNITS BEFORE DINNER.  Dose change., Disp: 90 mL, Rfl: 2  •  HYDROcodone-acetaminophen (NORCO) 5-325 mg per tablet, Take 1 tablet by mouth 2 (two) times a day as needed for pain Max Daily Amount: 2 tablets, Disp: 30 tablet, Rfl: 0  •  levocetirizine (XYZAL) 5 MG tablet, Take 5 mg by mouth, Disp: , Rfl:   •  levocetirizine (XYZAL) 5 MG tablet, Take 5 mg by mouth every evening, Disp: , Rfl:   •  levofloxacin (LEVAQUIN) 750 mg tablet, Take 750 mg by mouth daily, Disp: , Rfl:   •  lisinopril (ZESTRIL) 5 mg tablet, Take 5 mg by mouth daily , Disp: , Rfl:   •  metFORMIN (GLUCOPHAGE-XR) 750 mg 24 hr tablet, TAKE 1 TABLET TWICE A DAY, Disp: 180 tablet, Rfl: 3  •  metoprolol succinate (TOPROL-XL) 25 mg 24 hr tablet, , Disp: , Rfl:   •  metoprolol succinate (TOPROL-XL) 25 mg 24 hr tablet, Take 25 mg by mouth daily, Disp: , Rfl:   •  Multiple Vitamins-Minerals (MULTIVITAMIN ADULT PO), Take by mouth, Disp: , Rfl:   •  neomycin-polymyxin-hydrocortisone (CORTISPORIN) 1 % SOLN, , Disp: , Rfl:   •  omeprazole (PriLOSEC) 20 mg delayed release capsule, Take 1 capsule (20 mg total) by mouth daily, Disp: 30 capsule, Rfl: 11  •  predniSONE 50 mg tablet, Take 50 mg by mouth daily, Disp: , Rfl:   •  predniSONE 50 mg tablet, Take 50 mg by mouth daily, Disp: , Rfl:   •  rosuvastatin (CRESTOR) 40 MG tablet, Take 40 mg by mouth daily 1/2 pill at night, Disp: , Rfl:   •  rosuvastatin (CRESTOR) 40 MG tablet, Take 20 mg by mouth daily, Disp: , Rfl:   •  sacubitril-valsartan (Entresto)  MG TABS, Take 1 tablet by mouth 2 (two) times a day, Disp: , Rfl:   •  spironolactone (ALDACTONE) 25 mg tablet, , Disp: , Rfl:   •  STELARA 90 MG/ML subcutaneous injection, , Disp: , Rfl:   •  traMADol (ULTRAM) 50 mg tablet, , Disp: , Rfl:   •  traZODone (DESYREL) 100 mg tablet, TAKE 1 TABLET AT BEDTIME AS NEEDED FOR SLEEP, Disp: , Rfl:   •  Tremfya 100 MG/ML SOSY, , Disp: , Rfl:   •  triamcinolone (KENALOG) 0.1 % cream, Apply topically as needed Twice a week, Disp: , Rfl:   •  urea (CARMOL) 40 %, Apply topically as needed, Disp: , Rfl:   •  VASCEPA 1 g CAPS, Take by mouth daily, Disp: , Rfl:     Current Facility-Administered Medications:   •  bupivacaine (PF) (MARCAINE) 0.25 % injection 2 mL, 2 mL, Epidural, Once, Chucky Grullon MD  •  iohexol (OMNIPAQUE) 300 mg/mL injection 1 mL, 1 mL, Epidural, Once, Chucky Grullon MD  •  lidocaine (PF) (XYLOCAINE-MPF) 2 % injection 4 mL, 4 mL, Infiltration, Once, Chucky Grullon MD  •  methylPREDNISolone acetate (DEPO-MEDROL) injection 80 mg, 80 mg, Epidural, Once, Chucky Grullon MD  •  sodium chloride (PF) 0.9 % injection 4 mL, 4 mL, Infiltration, Once, Chucky Grullon MD    Allergies   Allergen Reactions   • Bee Venom Shortness Of Breath   • Lidocaine Shortness Of Breath     Pt reports he was tested and does not have allergy to Lidocaine       Physical Exam:   Vitals:    07/14/23 1017   BP: 125/75   Pulse: 63   Resp: 18   Temp: 97.7 °F (36.5 °C)   SpO2: 96%     General: Awake, Alert, Oriented x 3, Mood and affect appropriate  Respiratory: Respirations even and unlabored  Cardiovascular: Peripheral pulses intact; no edema  Musculoskeletal Exam: Right lower back and leg pain    ASA Score: 3    Patient/Chart Verification  Patient ID Verified: Verbal  ID Band Applied: No  Consents Confirmed: Procedural, To be obtained in the Pre-Procedure area  H&P( within 30 days) Verified: To be obtained in the Pre-Procedure area  Interval H&P(within 24 hr) Complete (required for Outpatients and Surgery Admit only): To be obtained in the Pre-Procedure area  Allergies Reviewed: Yes (LIDOCAINE not an allergy per PT)  Anticoag/NSAID held?: Yes (Plavix LD 7/6/23 hs 7/7/23)  Currently on antibiotics?: No    Assessment:   1.  Intervertebral disc disorder with radiculopathy of lumbar region        Plan: Rt L3-L4 TFESI

## 2023-07-21 ENCOUNTER — TELEPHONE (OUTPATIENT)
Dept: RADIOLOGY | Facility: MEDICAL CENTER | Age: 78
End: 2023-07-21

## 2023-08-24 LAB
BUN SERPL-MCNC: 22 MG/DL (ref 8–27)
BUN/CREAT SERPL: 19 (ref 10–24)
CALCIUM SERPL-MCNC: 9.4 MG/DL (ref 8.6–10.2)
CHLORIDE SERPL-SCNC: 98 MMOL/L (ref 96–106)
CHOLEST SERPL-MCNC: 118 MG/DL (ref 100–199)
CO2 SERPL-SCNC: 23 MMOL/L (ref 20–29)
CREAT SERPL-MCNC: 1.17 MG/DL (ref 0.76–1.27)
EGFR: 64 ML/MIN/1.73
EST. AVERAGE GLUCOSE BLD GHB EST-MCNC: 197 MG/DL
FRUCTOSAMINE SERPL-SCNC: 270 UMOL/L (ref 0–285)
GLUCOSE SERPL-MCNC: 189 MG/DL (ref 70–99)
HBA1C MFR BLD: 8.5 % (ref 4.8–5.6)
HDLC SERPL-MCNC: 53 MG/DL
LDLC SERPL CALC-MCNC: 41 MG/DL (ref 0–99)
POTASSIUM SERPL-SCNC: 4.5 MMOL/L (ref 3.5–5.2)
SL AMB VLDL CHOLESTEROL CALC: 24 MG/DL (ref 5–40)
SODIUM SERPL-SCNC: 138 MMOL/L (ref 134–144)
TRIGL SERPL-MCNC: 142 MG/DL (ref 0–149)

## 2023-08-31 ENCOUNTER — OFFICE VISIT (OUTPATIENT)
Dept: ENDOCRINOLOGY | Facility: CLINIC | Age: 78
End: 2023-08-31
Payer: MEDICARE

## 2023-08-31 VITALS — WEIGHT: 280.3 LBS | BODY MASS INDEX: 40.22 KG/M2

## 2023-08-31 DIAGNOSIS — Z79.4 TYPE 2 DIABETES MELLITUS WITH STAGE 2 CHRONIC KIDNEY DISEASE, WITH LONG-TERM CURRENT USE OF INSULIN (HCC): Primary | ICD-10-CM

## 2023-08-31 DIAGNOSIS — I10 ESSENTIAL (PRIMARY) HYPERTENSION: ICD-10-CM

## 2023-08-31 DIAGNOSIS — E78.2 MIXED HYPERLIPIDEMIA: ICD-10-CM

## 2023-08-31 DIAGNOSIS — N18.2 TYPE 2 DIABETES MELLITUS WITH STAGE 2 CHRONIC KIDNEY DISEASE, WITH LONG-TERM CURRENT USE OF INSULIN (HCC): Primary | ICD-10-CM

## 2023-08-31 DIAGNOSIS — E66.01 CLASS 2 SEVERE OBESITY DUE TO EXCESS CALORIES WITH SERIOUS COMORBIDITY AND BODY MASS INDEX (BMI) OF 38.0 TO 38.9 IN ADULT (HCC): ICD-10-CM

## 2023-08-31 DIAGNOSIS — E11.22 TYPE 2 DIABETES MELLITUS WITH STAGE 2 CHRONIC KIDNEY DISEASE, WITH LONG-TERM CURRENT USE OF INSULIN (HCC): Primary | ICD-10-CM

## 2023-08-31 DIAGNOSIS — I25.10 CORONARY ARTERY DISEASE INVOLVING NATIVE CORONARY ARTERY OF NATIVE HEART WITHOUT ANGINA PECTORIS: ICD-10-CM

## 2023-08-31 PROCEDURE — 99214 OFFICE O/P EST MOD 30 MIN: CPT | Performed by: INTERNAL MEDICINE

## 2023-08-31 NOTE — PROGRESS NOTES
Nichelle Hawthorne 66 y.o. male MRN: [id number]    Encounter: [id number]      Assessment/Plan     1. Type 2 diabetes mellitus on long-term insulin therapy  2. CKD  3. Obesity  Well controlled based on fructosamine level; this is discordant with the A1c which is 8.5 %  Fasting blood sugars fair on recall, has not been checking sugars at other times of the day    Recommend the following at this time  Continue current regimen  Advised to check blood sugars more often before meals and at bedtime  Patient will call [phone] regarding delivery of freestyle alfonzo  -Follow-up in 3 months with repeat labs  -Patient to send blood sugar log for review in 2 to 3 weeks    4. Hyperlipidemia  - continue statin therapy    5. Hypertension  6. CAD, CHF  - continue current medications       CC: Diabetes    History of Present Illness     HPI:  Nichelle Hawthorne is a 66 y.o. male presents for a follow-up visit regarding diabetes management. Last seen in 5/2023  Last Eye exam: due   Has been following up with podiatry every 2-3 months     Current regimen:   Insulin 75/25 43 units twice a day with meals  Metformin  mg orally twice a day  Farxiga 10 mg orally daily    Says that he was billed for the [place] but did not receive it. It appears that [id number] was unable to contact the patient and so delivery cancelled. Noticed slightly high er BG over the last 1 week   Occasional blurry vision -  Has not made an appointment   No other complaints     Statin: Crestor  ACE-I/ARB:  Valsartan, lisinopril     Home glucose monitoring: on recall, forgot meter   Before breakfast: 130s, previously was in the 120s  Not checking at other times   Symptoms of hypoglycemia : no lows; lowest 90s - occasionally gets sweaty and feels hungry; not often      All other systems were reviewed and are negative.     Review of Systems      Historical Information   Past Medical History:   Diagnosis Date   • Arthritis     knees   • Cancer [facility])     prostate   • Coronary artery disease     two coronary stents   • Diabetes mellitus ([address] [facility])    • Hyperlipidemia 10/19/2018   • Hypertension    • Psoriasis     lower extremities and buttocks   • Sleep apnea    • Type 2 diabetes mellitus ([address]) 10/19/2018     Past Surgical History:   Procedure Laterality Date   • CATARACT EXTRACTION Bilateral     with iol's   • COLONOSCOPY N/A 4/5/2016    Procedure: COLONOSCOPY snare polypectomy;  Surgeon: Manuel Rodrigues MD;  Location: [facility] GI LAB; Service:    • INSERTION PROSTATE RADIATION SEED     • REPLACEMENT TOTAL KNEE Right    • TRACHEOSTOMY      x 2  up to 5 years ago   • UMBILICAL HERNIA REPAIR       Social History   Social History     Substance and Sexual Activity   Alcohol Use Yes   • Alcohol/week: 4.0 standard drinks of alcohol   • Types: 4 Cans of beer per week    Comment: daily     Social History     Substance and Sexual Activity   Drug Use No     Social History     Tobacco Use   Smoking Status Never   Smokeless Tobacco Never     Family History: No family history on file. Meds/Allergies   Current Outpatient Medications   Medication Sig Dispense Refill   • Ascorbic Acid (VITAMIN C) 1000 MG tablet Take 1,000 mg by mouth daily     • aspirin 81 MG tablet Take 81 mg by mouth daily.      • Azelastine-Fluticasone 137-50 MCG/ACT SUSP      • B-D ULTRAFINE III SHORT PEN 31G X 8 MM MISC USE WITH INSULIN TWICE A  each 3   • Blood Glucose Monitoring Suppl (OneTouch Verio) w/Device KIT Use to test blood sugar 3 times a day 1 kit 0   • Blood Glucose Monitoring Suppl (OneTouch Verio) w/Device KIT Use to test blood sugar 3 times a day 1 kit 0   • cholecalciferol (VITAMIN D3) 1,000 units tablet Take 2,000 Units by mouth daily      • clopidogrel (PLAVIX) 75 mg tablet TAKE 1 TABLET DAILY     • doxycycline hyclate (VIBRAMYCIN) 100 mg capsule Take 100 mg by mouth daily     • Entresto  MG TABS      • ezetimibe (ZETIA) 10 mg tablet TAKE 1 TABLET BY MOUTH TIMES A WEEK ON MONDAY [facility] AND FRIDAY AT 6PM     • Farxiga 10 MG tablet TAKE 1 TABLET DAILY 90 tablet 3   • fluticasone (Flovent HFA) 220 mcg/act inhaler Inhale 2 puffs as needed     • furosemide (LASIX) 40 mg tablet Take 40 mg by mouth daily Twice a week on Mon & Thur--PRN     • gabapentin (NEURONTIN) 300 mg capsule Take 1 capsule by mouth Three times a day     • Glucagon, rDNA, (Glucagon Emergency) 1 MG KIT Use glucagon kit if you have a low blood sugar and unconscious 1 each 0   • glucose blood (OneTouch Verio) test strip USE TO TEST BLOOD SUGAR THREE TIMES A  strip 3   • guaiFENesin-codeine (ROBITUSSIN AC) 100-10 mg/5 mL oral solution      • HumaLOG Mix 75/25 KwikPen (75-25) 100 units/mL injection pen INJECT 45 UNITS UNDER THE SKIN BEFORE BREAKFAST AND 40 UNITS BEFORE DINNER. Dose change.  90 mL 2   • HYDROcodone-acetaminophen (NORCO) 5-325 mg per tablet Take 1 tablet by mouth 2 (two) times a day as needed for pain Max Daily Amount: 2 tablets 30 tablet 0   • levocetirizine (XYZAL) 5 MG tablet Take 5 mg by mouth     • levocetirizine (XYZAL) 5 MG tablet Take 5 mg by mouth every evening     • levofloxacin (LEVAQUIN) 750 mg tablet Take 750 mg by mouth daily     • lisinopril (ZESTRIL) 5 mg tablet Take 5 mg by mouth daily      • metFORMIN (GLUCOPHAGE-XR) 750 mg 24 hr tablet TAKE 1 TABLET TWICE A  tablet 3   • metoprolol succinate (TOPROL-XL) 25 mg 24 hr tablet      • metoprolol succinate (TOPROL-XL) 25 mg 24 hr tablet Take 25 mg by mouth daily     • Multiple Vitamins-Minerals (MULTIVITAMIN ADULT PO) Take by mouth     • mupirocin (BACTROBAN) 2 % ointment APPLY OINTMENT TOPICALLY TO AFFECTED AREA UP TO THREE TIMES DAILY     • neomycin-polymyxin-hydrocortisone (CORTISPORIN) 1 % SOLN      • omeprazole (PriLOSEC) 20 mg delayed release capsule Take 1 capsule (20 mg total) by mouth daily 30 capsule 11   • predniSONE 50 mg tablet Take 50 mg by mouth daily     • predniSONE 50 mg tablet Take 50 mg by mouth daily     • rosuvastatin (CRESTOR) 40 MG tablet Take 40 mg by mouth daily 1/2 pill at night     • rosuvastatin (CRESTOR) 40 MG tablet Take 20 mg by mouth daily     • sacubitril-valsartan (Entresto)  MG TABS Take 1 tablet by mouth 2 (two) times a day     • spironolactone (ALDACTONE) 25 mg tablet      • STELARA 90 MG/ML subcutaneous injection  (Patient not taking: Reported on 2/7/2023)     • traMADol (ULTRAM) 50 mg tablet      • traZODone (DESYREL) 100 mg tablet TAKE 1 TABLET AT BEDTIME AS NEEDED FOR SLEEP     • Tremfya 100 MG/ML SOSY      • triamcinolone (KENALOG) 0.1 % cream Apply topically as needed Twice a week     • urea (CARMOL) 40 % Apply topically as needed     • VASCEPA 1 g CAPS Take by mouth daily       No current facility-administered medications for this visit. Allergies   Allergen Reactions   • Bee Venom Shortness Of Breath   • Lidocaine Shortness Of Breath     Pt reports he was tested and does not have allergy to Lidocaine   • Bee Pollen Other (See Comments)     edema  Edema       Objective   Vitals: Weight 127 kg (280 lb 4.8 oz). Physical Exam  Constitutional:       General: He is not in acute distress. Appearance: He is well-developed. He is not diaphoretic. HENT:      Head: Normocephalic and atraumatic. Eyes:      Conjunctiva/sclera: Conjunctivae normal.      Pupils: Pupils are equal, round, and reactive to light. Cardiovascular:      Rate and Rhythm: Normal rate and regular rhythm. Heart sounds: Normal heart sounds. No murmur heard. Pulmonary:      Effort: Pulmonary effort is normal. No respiratory distress. Breath sounds: Normal breath sounds. No wheezing. Abdominal:      General: There is no distension. Palpations: Abdomen is soft. Tenderness: There is no abdominal tenderness. There is no guarding. Musculoskeletal:      Cervical back: Normal range of motion and neck supple. Skin:     General: Skin is warm and dry. Findings: No erythema or rash.    Neurological:      Mental Status: He is alert and oriented to person, place, and time. Psychiatric:         Behavior: Behavior normal.         Thought Content: Thought content normal.         The history was obtained from the review of the chart, patient and family. Lab Results:   Lab Results   Component Value Date/Time    Hemoglobin A1C 8.5 (H) 08/23/2023 07:32 AM    Hemoglobin A1C 8.4 (H) 05/22/2023 07:23 AM    Hemoglobin A1C 7.5 (A) 02/07/2023 09:56 AM    Hemoglobin A1C 7.2 (H) 10/18/2022 07:28 AM    White Blood Cell Count 7.5 05/22/2023 07:23 AM    Hemoglobin 15.5 05/22/2023 07:23 AM    HCT 47.4 05/22/2023 07:23 AM    MCV 90 05/22/2023 07:23 AM    Platelet Count 049 59/67/3673 07:23 AM    BUN 22 08/23/2023 07:32 AM    BUN 19 05/22/2023 07:23 AM    BUN 18 10/18/2022 07:28 AM    Potassium 4.5 08/23/2023 07:32 AM    Potassium 4.6 05/22/2023 07:23 AM    Potassium 4.5 10/18/2022 07:28 AM    Chloride 98 08/23/2023 07:32 AM    Chloride 99 05/22/2023 07:23 AM    Chloride 100 10/18/2022 07:28 AM    CO2 23 08/23/2023 07:32 AM    CO2 20 05/22/2023 07:23 AM    CO2 23 10/18/2022 07:28 AM    Creatinine 1.17 08/23/2023 07:32 AM    Creatinine 1.15 05/22/2023 07:23 AM    Creatinine 1.09 10/18/2022 07:28 AM    AST 23 05/22/2023 07:23 AM    AST 20 10/18/2022 07:28 AM    ALT 29 05/22/2023 07:23 AM    ALT 24 10/18/2022 07:28 AM    Protein, Total 7.0 05/22/2023 07:23 AM    Protein, Total 6.8 10/18/2022 07:28 AM    Albumin 4.5 05/22/2023 07:23 AM    Albumin 4.3 10/18/2022 07:28 AM    Globulin, Total 2.5 05/22/2023 07:23 AM    Globulin, Total 2.5 10/18/2022 07:28 AM    HDL 53 08/23/2023 07:32 AM    HDL 54 05/22/2023 07:23 AM    HDL 63 10/18/2022 07:28 AM    Triglycerides 142 08/23/2023 07:32 AM    Triglycerides 207 (H) 05/22/2023 07:23 AM    Triglycerides 74 10/18/2022 07:28 AM         Imaging Studies: I have personally reviewed pertinent reports. Portions of the record may have been created with voice recognition software.  Occasional wrong word or "sound a like" substitutions may have occurred due to the inherent limitations of voice recognition software. Read the chart carefully and recognize, using context, where substitutions have occurred.

## 2023-08-31 NOTE — PATIENT INSTRUCTIONS
Current regimen  Please check blood sugars more often before meals and at bedtime, send log for review    Please call [facility] at the following number to inquire about the freestyle [place] delivery [phone] Option #1,

## 2023-09-21 NOTE — TELEPHONE ENCOUNTER
S/w pt's wife, she said the pt had the CT at [facility] on 9/19  Spoke to Stephanie with Dr. Joy.  Asked that he still look at the chart and determine if EUS is needed.  She will have him look and will discuss with me after his determination.

## 2023-09-27 ENCOUNTER — TELEPHONE (OUTPATIENT)
Dept: ENDOCRINOLOGY | Facility: CLINIC | Age: 78
End: 2023-09-27

## 2023-09-27 ENCOUNTER — OFFICE VISIT (OUTPATIENT)
Dept: OBGYN CLINIC | Facility: CLINIC | Age: 78
End: 2023-09-27
Payer: MEDICARE

## 2023-09-27 ENCOUNTER — APPOINTMENT (OUTPATIENT)
Dept: RADIOLOGY | Facility: CLINIC | Age: 78
End: 2023-09-27
Payer: MEDICARE

## 2023-09-27 VITALS
WEIGHT: 282 LBS | DIASTOLIC BLOOD PRESSURE: 80 MMHG | SYSTOLIC BLOOD PRESSURE: 149 MMHG | HEART RATE: 61 BPM | HEIGHT: 70 IN | BODY MASS INDEX: 40.37 KG/M2

## 2023-09-27 DIAGNOSIS — M25.562 LEFT KNEE PAIN, UNSPECIFIED CHRONICITY: ICD-10-CM

## 2023-09-27 DIAGNOSIS — Z96.652 HISTORY OF PROSTHETIC UNICOMPARTMENTAL ARTHROPLASTY OF LEFT KNEE: ICD-10-CM

## 2023-09-27 DIAGNOSIS — M17.12 PRIMARY OSTEOARTHRITIS OF LEFT KNEE: Primary | ICD-10-CM

## 2023-09-27 DIAGNOSIS — G89.29 CHRONIC PAIN OF LEFT KNEE: ICD-10-CM

## 2023-09-27 DIAGNOSIS — Z01.89 ENCOUNTER FOR LOWER EXTREMITY COMPARISON IMAGING STUDY: ICD-10-CM

## 2023-09-27 DIAGNOSIS — M25.562 CHRONIC PAIN OF LEFT KNEE: ICD-10-CM

## 2023-09-27 PROCEDURE — 73560 X-RAY EXAM OF KNEE 1 OR 2: CPT

## 2023-09-27 PROCEDURE — 99204 OFFICE O/P NEW MOD 45 MIN: CPT | Performed by: ORTHOPAEDIC SURGERY

## 2023-09-27 PROCEDURE — 73562 X-RAY EXAM OF KNEE 3: CPT

## 2023-09-27 RX ORDER — FLUTICASONE FUROATE, UMECLIDINIUM BROMIDE AND VILANTEROL TRIFENATATE 100; 62.5; 25 UG/1; UG/1; UG/1
1 POWDER RESPIRATORY (INHALATION) DAILY
COMMUNITY

## 2023-09-27 RX ORDER — SODIUM CHLORIDE FOR INHALATION 0.9 %
3 VIAL, NEBULIZER (ML) INHALATION 2 TIMES DAILY
COMMUNITY
Start: 2023-09-14 | End: 2024-03-27

## 2023-09-27 NOTE — PROGRESS NOTES
Assessment/Plan:  1. Primary osteoarthritis of left knee  XR knee 1 or 2 vw right    Ambulatory referral to Spine & Pain Management      2. Chronic pain of left knee  XR knee 3 vw left non injury    Ambulatory referral to Spine & Pain Management      3. History of prosthetic unicompartmental arthroplasty of left knee          Miya Quinonez is a pleasant 65 yo M with left knee pain secondary to progression of arthritis after medial unicompartmental knee replacement 10-15 years ago. It is unlikely that he has a PJI due to the chronicity of his pain and lack of exam findings indicative of infection. The treatment for progression of arthritis after a uni knee is conversion to TKA however he is not a surgical candidate. He has uncontrolled DM with a1c of 8.5, morbid obesity, and heart failure with reduce ejection fracture. He currently sees Dr Gib Babinski for pain management of his lumbar stenosis. We recommend peripheral nerve blocks/ablasions for symptomatic treatment. We also discussed PT, but he would like to talk to his pain doctor first. He can take tylenol for pain in the meantime. We instructed him to follow up immediately if he has acute worsening of pain, swelling, redness or fever. Otherwise, he can be seen as needed in the future    Subjective: left knee pain s/p L partial knee replacement several years ago    Patient ID: Mandy Chase is a 66 y.o. male with left knee pain for 4 years. He has hx of left unicompartmental knee replacement 10-15 years ago by Dr Nishant Buckley at [facility]. His pain has been getting worse over the past year. He has not had any treatment for his progressive knee pain. His pain is anteromedial and lateral, does not radiate, dull, worse with activity, relieved with rest, no associated numbness or tingling. He ambulates with a cane due to left knee pain, balance and lumbar stenosis. He has significant cardiac hx and DM, which he was told he cannot undergo lumbar surgery. He takes tylenol for pain.  Also has hx of right TKA by Dr Frankie Sever 26+ years ago complicated by postop MI. Review of Systems   Constitutional: Negative for chills and fever. HENT: Negative for ear pain and sore throat. Eyes: Negative for pain and visual disturbance. Respiratory: Negative for cough and shortness of breath. Cardiovascular: Negative for chest pain and palpitations. Gastrointestinal: Negative for abdominal pain and vomiting. Genitourinary: Negative for dysuria and hematuria. Musculoskeletal: Positive for arthralgias, back pain, gait problem and joint swelling. Skin: Negative for color change and rash. Neurological: Negative for seizures and syncope. All other systems reviewed and are negative. Past Medical History:   Diagnosis Date   • Arthritis     knees   • Cancer [facility])     prostate   • Coronary artery disease     two coronary stents   • Diabetes mellitus ([address] [facility])    • Hyperlipidemia 10/19/2018   • Hypertension    • Psoriasis     lower extremities and buttocks   • Sleep apnea    • Type 2 diabetes mellitus ([address] [facility]) 10/19/2018       Past Surgical History:   Procedure Laterality Date   • CATARACT EXTRACTION Bilateral     with iol's   • COLONOSCOPY N/A 4/5/2016    Procedure: COLONOSCOPY snare polypectomy;  Surgeon: Mark Ta MD;  Location: [address] [place] [address] GI LAB; Service:    • INSERTION PROSTATE RADIATION SEED     • REPLACEMENT TOTAL KNEE Right    • TRACHEOSTOMY      x 2  up to 5 years ago   • UMBILICAL HERNIA REPAIR         History reviewed. No pertinent family history. Social History     Occupational History   • Not on file   Tobacco Use   • Smoking status: Never   • Smokeless tobacco: Never   Vaping Use   • Vaping Use: Never used   Substance and Sexual Activity   • Alcohol use:  Yes     Alcohol/week: 4.0 standard drinks of alcohol     Types: 4 Cans of beer per week     Comment: daily   • Drug use: No   • Sexual activity: Not on file         Current Outpatient Medications:   •  Ascorbic Acid (VITAMIN C) 1000 MG tablet, Take 1,000 mg by mouth daily, Disp: , Rfl:   •  aspirin 81 MG tablet, Take 81 mg by mouth daily. , Disp: , Rfl:   •  Azelastine-Fluticasone 137-50 MCG/ACT SUSP, , Disp: , Rfl:   •  B-D ULTRAFINE III SHORT PEN 31G X 8 MM MISC, USE WITH INSULIN TWICE A DAY, Disp: 180 each, Rfl: 3  •  Blood Glucose Monitoring Suppl (OneTouch Verio) w/Device KIT, Use to test blood sugar 3 times a day, Disp: 1 kit, Rfl: 0  •  Blood Glucose Monitoring Suppl (OneTouch Verio) w/Device KIT, Use to test blood sugar 3 times a day, Disp: 1 kit, Rfl: 0  •  cholecalciferol (VITAMIN D3) 1,000 units tablet, Take 2,000 Units by mouth daily , Disp: , Rfl:   •  clopidogrel (PLAVIX) 75 mg tablet, TAKE 1 TABLET DAILY, Disp: , Rfl:   •  doxycycline hyclate (VIBRAMYCIN) 100 mg capsule, Take 100 mg by mouth daily, Disp: , Rfl:   •  Entresto  MG TABS, , Disp: , Rfl:   •  ezetimibe (ZETIA) 10 mg tablet, TAKE 1 TABLET BY MOUTH TIMES A WEEK ON MONDAY WEDNESDAY AND FRIDAY AT 6PM, Disp: , Rfl:   •  Farxiga 10 MG tablet, TAKE 1 TABLET DAILY, Disp: 90 tablet, Rfl: 3  •  fluticasone-umeclidinium-vilanterol (Trelegy Ellipta) 100-62.5-25 mcg/actuation inhaler, Inhale 1 puff daily, Disp: , Rfl:   •  furosemide (LASIX) 40 mg tablet, Take 40 mg by mouth daily Twice a week on Mon & Thur--PRN, Disp: , Rfl:   •  gabapentin (NEURONTIN) 300 mg capsule, Take 1 capsule by mouth Three times a day, Disp: , Rfl:   •  Glucagon, rDNA, (Glucagon Emergency) 1 MG KIT, Use glucagon kit if you have a low blood sugar and unconscious, Disp: 1 each, Rfl: 0  •  glucose blood (OneTouch Verio) test strip, USE TO TEST BLOOD SUGAR THREE TIMES A DAY, Disp: 300 strip, Rfl: 3  •  guaiFENesin-codeine (ROBITUSSIN AC) 100-10 mg/5 mL oral solution, , Disp: , Rfl:   •  HumaLOG Mix 75/25 KwikPen (75-25) 100 units/mL injection pen, INJECT 45 UNITS UNDER THE SKIN BEFORE BREAKFAST AND 40 UNITS BEFORE DINNER.  Dose change., Disp: 90 mL, Rfl: 2  •  HYDROcodone-acetaminophen (NORCO) 5-325 mg per tablet, Take 1 tablet by mouth 2 (two) times a day as needed for pain Max Daily Amount: 2 tablets, Disp: 30 tablet, Rfl: 0  •  levocetirizine (XYZAL) 5 MG tablet, Take 5 mg by mouth every evening, Disp: , Rfl:   •  levofloxacin (LEVAQUIN) 750 mg tablet, Take 750 mg by mouth daily, Disp: , Rfl:   •  lisinopril (ZESTRIL) 5 mg tablet, Take 5 mg by mouth daily , Disp: , Rfl:   •  metFORMIN (GLUCOPHAGE-XR) 750 mg 24 hr tablet, TAKE 1 TABLET TWICE A DAY, Disp: 180 tablet, Rfl: 3  •  metoprolol succinate (TOPROL-XL) 25 mg 24 hr tablet, , Disp: , Rfl:   •  metoprolol succinate (TOPROL-XL) 25 mg 24 hr tablet, Take 25 mg by mouth daily, Disp: , Rfl:   •  Multiple Vitamins-Minerals (MULTIVITAMIN ADULT PO), Take by mouth, Disp: , Rfl:   •  mupirocin (BACTROBAN) 2 % ointment, APPLY OINTMENT TOPICALLY TO AFFECTED AREA UP TO THREE TIMES DAILY, Disp: , Rfl:   •  neomycin-polymyxin-hydrocortisone (CORTISPORIN) 1 % SOLN, , Disp: , Rfl:   •  omeprazole (PriLOSEC) 20 mg delayed release capsule, Take 1 capsule (20 mg total) by mouth daily, Disp: 30 capsule, Rfl: 11  •  predniSONE 50 mg tablet, Take 50 mg by mouth daily, Disp: , Rfl:   •  predniSONE 50 mg tablet, Take 50 mg by mouth daily, Disp: , Rfl:   •  rosuvastatin (CRESTOR) 40 MG tablet, Take 40 mg by mouth daily 1/2 pill at night, Disp: , Rfl:   •  rosuvastatin (CRESTOR) 40 MG tablet, Take 20 mg by mouth daily, Disp: , Rfl:   •  sacubitril-valsartan (Entresto)  MG TABS, Take 1 tablet by mouth 2 (two) times a day, Disp: , Rfl:   •  sodium chloride 0.9 % nebulizer solution, Inhale 3 mL 2 (two) times a day, Disp: , Rfl:   •  spironolactone (ALDACTONE) 25 mg tablet, , Disp: , Rfl:   •  traMADol (ULTRAM) 50 mg tablet, , Disp: , Rfl:   •  traZODone (DESYREL) 100 mg tablet, TAKE 1 TABLET AT BEDTIME AS NEEDED FOR SLEEP, Disp: , Rfl:   •  Tremfya 100 MG/ML SOSY, , Disp: , Rfl:   •  triamcinolone (KENALOG) 0.1 % cream, Apply topically as needed Twice a week, Disp: , Rfl:   • urea (CARMOL) 40 %, Apply topically as needed, Disp: , Rfl:   •  VASCEPA 1 g CAPS, Take by mouth daily, Disp: , Rfl:   •  fluticasone (Flovent HFA) 220 mcg/act inhaler, Inhale 2 puffs as needed, Disp: , Rfl:   •  levocetirizine (XYZAL) 5 MG tablet, Take 5 mg by mouth (Patient not taking: Reported on 9/27/2023), Disp: , Rfl:   •  STELARA 90 MG/ML subcutaneous injection, , Disp: , Rfl:     Allergies   Allergen Reactions   • Bee Venom Shortness Of Breath   • Lidocaine Shortness Of Breath     Pt reports he was tested and does not have allergy to Lidocaine   • Bee Pollen Other (See Comments)     edema  Edema       Objective:  Vitals:    09/27/23 0913   BP: 149/80   Pulse: 61       Body mass index is 40.46 kg/m². Left Knee Exam     Tenderness   The patient is experiencing tenderness in the medial retinaculum. Range of Motion   Extension: 10   Flexion: 110     Tests   Varus: negative Valgus: negative  Drawer:  Anterior - negative     Posterior - negative  Pivot shift: negative  Patellar apprehension: negative    Other   Erythema: absent  Scars: present  Sensation: normal  Pulse: present  Swelling: mild    Comments: Well healed medial oblique scar, no signs of infection  +PF grind test  No clunking or crepitation  Significant peripheral vascular changes with healing wounds and discoloration of leg            Physical Exam  Vitals and nursing note reviewed. Constitutional:       Appearance: Normal appearance. HENT:      Head: Normocephalic and atraumatic. Nose: Nose normal.      Mouth/Throat:      Mouth: Mucous membranes are moist.      Pharynx: Oropharynx is clear. Eyes:      Extraocular Movements: Extraocular movements intact. Conjunctiva/sclera: Conjunctivae normal.   Cardiovascular:      Rate and Rhythm: Normal rate. Pulses: Normal pulses. Pulmonary:      Effort: Pulmonary effort is normal.      Breath sounds: Normal breath sounds. Abdominal:      Palpations: Abdomen is soft. Musculoskeletal:      Cervical back: Normal range of motion. Skin:     General: Skin is warm and dry. Capillary Refill: Capillary refill takes less than 2 seconds. Neurological:      General: No focal deficit present. Mental Status: He is alert. Psychiatric:         Mood and Affect: Mood normal.         I have personally reviewed pertinent films in PACS. x-rays taken today of his left knee. The cemented medial unilateral knee prosthesis remains well aligned with no signs of loosening or periprosthetic fracture. There is significant lateral and patellofemoral compartment degenerative changes with osteophytes, bone on bone contact, and subchondral sclerosis. Partially visualized right knee demonstrates a well placed TKA without signs of failure.

## 2023-09-27 NOTE — TELEPHONE ENCOUNTER
Please call and inform patient.   Reviewed blood sugars 9/8-9/21  Blood sugars mostly at goal.  Continue current regimen

## 2023-10-19 ENCOUNTER — TELEPHONE (OUTPATIENT)
Age: 78
End: 2023-10-19

## 2023-10-19 DIAGNOSIS — G89.4 CHRONIC PAIN SYNDROME: ICD-10-CM

## 2023-10-19 RX ORDER — HYDROCODONE BITARTRATE AND ACETAMINOPHEN 5; 325 MG/1; MG/1
1 TABLET ORAL 2 TIMES DAILY PRN
Qty: 30 TABLET | Refills: 0 | Status: SHIPPED | OUTPATIENT
Start: 2023-10-19

## 2023-10-19 NOTE — TELEPHONE ENCOUNTER
Refill sent for hydrocodone.   If he's using more frequently, will need office visit for further refills for uds/contract

## 2023-10-19 NOTE — TELEPHONE ENCOUNTER
RN s/w pt's wife (ok per comm form) Kat Dillon needs RF for his hydrocodone, he only has 2 left. He has been using sparingly but wife advising him now to take twice a day as prescribed to keep his pain under control. Hydrocodone was LP on 6/13/23 for only #30 and no RF. Pt scheduled for Lt genicular NB on 10/27/23. Will you send another script? Does he need to be set up for future ovs?   [facility] on file. Per wife pls call pt on his cell once Rx sent.  Caleb's cell # [phone]

## 2023-10-19 NOTE — TELEPHONE ENCOUNTER
Pt contacted Call Center requested refill of their medication. Medication Name: hydrocodone      Dosage of Med: 5-325 mg      Frequency of Med: 2 x per day      Remaining Medication: 2 pills left      Pharmacy and Location: [facility] in [address] [place] [address]. Preferred Callback Phone Number: [phone]      Thank you. PLEASE ADVISE PATIENTS:    REFILL REQUESTS WILL BE PROCESSED WITHIN 24-48 HOURS.

## 2023-10-27 ENCOUNTER — HOSPITAL ENCOUNTER (OUTPATIENT)
Dept: RADIOLOGY | Facility: CLINIC | Age: 78
Discharge: HOME | End: 2023-10-27
Admitting: ANESTHESIOLOGY
Payer: MEDICARE

## 2023-10-27 VITALS
OXYGEN SATURATION: 94 % | RESPIRATION RATE: 20 BRPM | HEART RATE: 60 BPM | TEMPERATURE: 97.4 F | SYSTOLIC BLOOD PRESSURE: 137 MMHG | DIASTOLIC BLOOD PRESSURE: 73 MMHG

## 2023-10-27 DIAGNOSIS — G89.29 CHRONIC PAIN OF LEFT KNEE: ICD-10-CM

## 2023-10-27 DIAGNOSIS — M25.562 CHRONIC PAIN OF LEFT KNEE: ICD-10-CM

## 2023-10-27 PROCEDURE — 64454 NJX AA&/STRD GNCLR NRV BRNCH: CPT | Performed by: ANESTHESIOLOGY

## 2023-10-27 RX ORDER — BUPIVACAINE HCL/PF 2.5 MG/ML
10 VIAL (ML) INJECTION ONCE
Status: COMPLETED | OUTPATIENT
Start: 2023-10-27 | End: 2023-10-27

## 2023-10-27 RX ADMIN — BUPIVACAINE HYDROCHLORIDE 1.5 ML: 2.5 INJECTION, SOLUTION EPIDURAL; INFILTRATION; INTRACAUDAL at 13:53

## 2023-10-27 NOTE — H&P
History of Present Illness: The patient is a 66 y.o. male who presents with complaints of left knee genicular nerve block    Past Medical History:   Diagnosis Date    Arthritis     knees    Cancer ([address])     prostate    Coronary artery disease     two coronary stents    Diabetes mellitus ([address])     Hyperlipidemia 10/19/2018    Hypertension     Psoriasis     lower extremities and buttocks    Sleep apnea     Type 2 diabetes mellitus ([address]) 10/19/2018       Past Surgical History:   Procedure Laterality Date    CATARACT EXTRACTION Bilateral     with iol's    COLONOSCOPY N/A 4/5/2016    Procedure: COLONOSCOPY snare polypectomy;  Surgeon: Vane Palomares MD;  Location: [facility] GI LAB; Service:     INSERTION PROSTATE RADIATION SEED      REPLACEMENT TOTAL KNEE Right     TRACHEOSTOMY      x 2  up to 5 years ago    UMBILICAL HERNIA REPAIR           Current Outpatient Medications:     Ascorbic Acid (VITAMIN C) 1000 MG tablet, Take 1,000 mg by mouth daily, Disp: , Rfl:     aspirin 81 MG tablet, Take 81 mg by mouth daily. , Disp: , Rfl:     Azelastine-Fluticasone 137-50 MCG/ACT SUSP, , Disp: , Rfl:     B-D ULTRAFINE III SHORT PEN 31G X 8 MM MISC, USE WITH INSULIN TWICE A DAY, Disp: 180 each, Rfl: 3    Blood Glucose Monitoring Suppl (OneTouch Verio) w/Device KIT, Use to test blood sugar 3 times a day, Disp: 1 kit, Rfl: 0    Blood Glucose Monitoring Suppl (OneTouch Verio) w/Device KIT, Use to test blood sugar 3 times a day, Disp: 1 kit, Rfl: 0    cholecalciferol (VITAMIN D3) 1,000 units tablet, Take 2,000 Units by mouth daily , Disp: , Rfl:     clopidogrel (PLAVIX) 75 mg tablet, TAKE 1 TABLET DAILY, Disp: , Rfl:     doxycycline hyclate (VIBRAMYCIN) 100 mg capsule, Take 100 mg by mouth daily, Disp: , Rfl:     Entresto  MG TABS, , Disp: , Rfl:     ezetimibe (ZETIA) 10 mg tablet, TAKE 1 TABLET BY MOUTH TIMES A WEEK ON MONDAY WEDNESDAY AND FRIDAY AT 6PM, Disp: , Rfl:     Farxiga 10 MG tablet, TAKE 1 TABLET DAILY, Disp: 90 tablet, Rfl: 3    fluticasone (Flovent HFA) 220 mcg/act inhaler, Inhale 2 puffs as needed, Disp: , Rfl:     fluticasone-umeclidinium-vilanterol (Trelegy Ellipta) 100-62.5-25 mcg/actuation inhaler, Inhale 1 puff daily, Disp: , Rfl:     furosemide (LASIX) 40 mg tablet, Take 40 mg by mouth daily Twice a week on Mon & Thur--PRN, Disp: , Rfl:     gabapentin (NEURONTIN) 300 mg capsule, Take 1 capsule by mouth Three times a day, Disp: , Rfl:     Glucagon, rDNA, (Glucagon Emergency) 1 MG KIT, Use glucagon kit if you have a low blood sugar and unconscious, Disp: 1 each, Rfl: 0    glucose blood (OneTouch Verio) test strip, USE TO TEST BLOOD SUGAR THREE TIMES A DAY, Disp: 300 strip, Rfl: 3    guaiFENesin-codeine (ROBITUSSIN AC) 100-10 mg/5 mL oral solution, , Disp: , Rfl:     HumaLOG Mix 75/25 KwikPen (75-25) 100 units/mL injection pen, INJECT 45 UNITS UNDER THE SKIN BEFORE BREAKFAST AND 40 UNITS BEFORE DINNER.  Dose change., Disp: 90 mL, Rfl: 2    HYDROcodone-acetaminophen (NORCO) 5-325 mg per tablet, Take 1 tablet by mouth 2 (two) times a day as needed for pain Max Daily Amount: 2 tablets, Disp: 30 tablet, Rfl: 0    levocetirizine (XYZAL) 5 MG tablet, Take 5 mg by mouth (Patient not taking: Reported on 9/27/2023), Disp: , Rfl:     levocetirizine (XYZAL) 5 MG tablet, Take 5 mg by mouth every evening, Disp: , Rfl:     levofloxacin (LEVAQUIN) 750 mg tablet, Take 750 mg by mouth daily, Disp: , Rfl:     lisinopril (ZESTRIL) 5 mg tablet, Take 5 mg by mouth daily , Disp: , Rfl:     metFORMIN (GLUCOPHAGE-XR) 750 mg 24 hr tablet, TAKE 1 TABLET TWICE A DAY, Disp: 180 tablet, Rfl: 3    metoprolol succinate (TOPROL-XL) 25 mg 24 hr tablet, , Disp: , Rfl:     metoprolol succinate (TOPROL-XL) 25 mg 24 hr tablet, Take 25 mg by mouth daily, Disp: , Rfl:     Multiple Vitamins-Minerals (MULTIVITAMIN ADULT PO), Take by mouth, Disp: , Rfl:     mupirocin (BACTROBAN) 2 % ointment, APPLY OINTMENT TOPICALLY TO AFFECTED AREA UP TO THREE TIMES DAILY, Disp: , Rfl:     neomycin-polymyxin-hydrocortisone (CORTISPORIN) 1 % SOLN, , Disp: , Rfl:     omeprazole (PriLOSEC) 20 mg delayed release capsule, Take 1 capsule (20 mg total) by mouth daily, Disp: 30 capsule, Rfl: 11    predniSONE 50 mg tablet, Take 50 mg by mouth daily, Disp: , Rfl:     predniSONE 50 mg tablet, Take 50 mg by mouth daily, Disp: , Rfl:     rosuvastatin (CRESTOR) 40 MG tablet, Take 40 mg by mouth daily 1/2 pill at night, Disp: , Rfl:     rosuvastatin (CRESTOR) 40 MG tablet, Take 20 mg by mouth daily, Disp: , Rfl:     sacubitril-valsartan (Entresto)  MG TABS, Take 1 tablet by mouth 2 (two) times a day, Disp: , Rfl:     sodium chloride 0.9 % nebulizer solution, Inhale 3 mL 2 (two) times a day, Disp: , Rfl:     spironolactone (ALDACTONE) 25 mg tablet, , Disp: , Rfl:     STELARA 90 MG/ML subcutaneous injection, , Disp: , Rfl:     traMADol (ULTRAM) 50 mg tablet, , Disp: , Rfl:     traZODone (DESYREL) 100 mg tablet, TAKE 1 TABLET AT BEDTIME AS NEEDED FOR SLEEP, Disp: , Rfl:     Tremfya 100 MG/ML SOSY, , Disp: , Rfl:     triamcinolone (KENALOG) 0.1 % cream, Apply topically as needed Twice a week, Disp: , Rfl:     urea (CARMOL) 40 %, Apply topically as needed, Disp: , Rfl:     VASCEPA 1 g CAPS, Take by mouth daily, Disp: , Rfl:     Current Facility-Administered Medications:     bupivacaine (PF) (MARCAINE) 0.25 % injection 10 mL, 10 mL, Perineural, Once, Kathy Lord MD    Allergies   Allergen Reactions    Bee Venom Shortness Of Breath    Lidocaine Shortness Of Breath     Pt reports he was tested and does not have allergy to Lidocaine    Bee Pollen Other (See Comments)     edema  Edema       Physical Exam:   Vitals:    10/27/23 1337   BP: 126/71   Pulse: 60   Resp: 20   Temp: (!) 97.4 °F (36.3 °C)   SpO2: 92%     General: Awake, Alert, Oriented x 3, Mood and affect appropriate  Respiratory: Respirations even and unlabored  Cardiovascular: Peripheral pulses intact; no edema  Musculoskeletal Exam: Left knee pain    ASA Score: Left knee genicular nerve block    Patient/Chart Verification  Patient ID Verified: Verbal  Consents Confirmed: To be obtained in the Pre-Procedure area  Interval H&P(within 24 hr) Complete (required for Outpatients and Surgery Admit only): To be obtained in the Pre-Procedure area  Allergies Reviewed: Yes  Anticoag/NSAID held?: NA  Currently on antibiotics?: No    Assessment:   1.  Chronic pain of left knee        Plan: Left Geniculate nerve block

## 2023-10-27 NOTE — DISCHARGE INSTR - LAB

## 2023-11-03 ENCOUNTER — TELEPHONE (OUTPATIENT)
Dept: PAIN MEDICINE | Facility: CLINIC | Age: 78
End: 2023-11-03

## 2023-11-03 NOTE — TELEPHONE ENCOUNTER
Left message for patient to contact the scheduling office at [phone] to schedule their procedure.

## 2023-11-03 NOTE — TELEPHONE ENCOUNTER
----- Message from Qamar Jeffers MD sent at 11/2/2023  3:05 PM EDT -----  Regarding: FW: pain diary  Ok to proceed with the confirmatory block    ----- Message -----  From: Theresa Jackson  Sent: 10/31/2023  12:13 PM EDT  To: Qamar Jeffers MD  Subject: pain diary                                       Hi , please see pain diary scanned under media. Thank you.

## 2023-11-06 ENCOUNTER — TELEPHONE (OUTPATIENT)
Dept: PAIN MEDICINE | Facility: CLINIC | Age: 78
End: 2023-11-06

## 2023-11-06 NOTE — TELEPHONE ENCOUNTER
----- Message from Orlando Glasgow MD sent at 11/6/2023  8:12 AM EST -----  Regarding: RE: pain diary  Ok to proceed with RFA as discussed    ----- Message -----  From: Barbara Parker  Sent: 11/2/2023   3:16 PM EST  To: Orlando Glasgow MD  Subject: RE: pain diary                                   Just a question, you do not need a confirmatory for the genicular knee block, you can do the RFA right away if you want. Please advise. Manuelshamar Connolly  ----- Message -----  From: Orlando Glasgow MD  Sent: 11/2/2023   3:05 PM EDT  To: Spine And Pain Surgery Coordinator [facility]  Subject: FW: pain diary                                   Ok to proceed with the confirmatory block    ----- Message -----  From: Zehra Staley  Sent: 10/31/2023  12:13 PM EDT  To: Orlando Glasgow MD  Subject: pain diary                                       Hi , please see pain diary scanned under media. Thank you.

## 2023-11-06 NOTE — TELEPHONE ENCOUNTER
Left message 11/3/23, for patient to contact the scheduling office at [phone] to schedule their procedure.

## 2023-11-15 ENCOUNTER — TELEPHONE (OUTPATIENT)
Dept: PAIN MEDICINE | Facility: CLINIC | Age: 78
End: 2023-11-15

## 2023-11-15 NOTE — TELEPHONE ENCOUNTER
Called patient to schedule genicular RFA, patient's wife said knee doing very good, but patient would like to see if Dr. Melvin Bhakta can do the same thing for his back and hip. I advised he should make an appointment to go over options with Dr. Melvin Bhakta. She will talk to patient and call back.

## 2023-11-16 NOTE — TELEPHONE ENCOUNTER
Yes, he will need office visit to evaluate for MBB/RFA.   He had previously in 2019 and it helped, but more than 2 years ago and so because of the medicare guidelines have to start with MBB

## 2023-12-07 ENCOUNTER — TELEPHONE (OUTPATIENT)
Dept: ENDOCRINOLOGY | Facility: CLINIC | Age: 78
End: 2023-12-07

## 2023-12-13 LAB
ALBUMIN SERPL-MCNC: 4.5 G/DL (ref 3.8–4.8)
ALBUMIN/GLOB SERPL: 1.9 {RATIO} (ref 1.2–2.2)
ALP SERPL-CCNC: 73 IU/L (ref 44–121)
ALT SERPL-CCNC: 22 IU/L (ref 0–44)
AST SERPL-CCNC: 20 IU/L (ref 0–40)
BILIRUB SERPL-MCNC: 0.5 MG/DL (ref 0–1.2)
BUN SERPL-MCNC: 20 MG/DL (ref 8–27)
BUN/CREAT SERPL: 18 (ref 10–24)
CALCIUM SERPL-MCNC: 9.5 MG/DL (ref 8.6–10.2)
CHLORIDE SERPL-SCNC: 99 MMOL/L (ref 96–106)
CHOLEST SERPL-MCNC: 134 MG/DL (ref 100–199)
CO2 SERPL-SCNC: 24 MMOL/L (ref 20–29)
CREAT SERPL-MCNC: 1.11 MG/DL (ref 0.76–1.27)
EGFR: 68 ML/MIN/1.73
EST. AVERAGE GLUCOSE BLD GHB EST-MCNC: 203 MG/DL
GLOBULIN SER-MCNC: 2.4 G/DL (ref 1.5–4.5)
GLUCOSE SERPL-MCNC: 154 MG/DL (ref 70–99)
HBA1C MFR BLD: 8.7 % (ref 4.8–5.6)
HDLC SERPL-MCNC: 54 MG/DL
LDLC SERPL CALC-MCNC: 54 MG/DL (ref 0–99)
POTASSIUM SERPL-SCNC: 4.6 MMOL/L (ref 3.5–5.2)
PROT SERPL-MCNC: 6.9 G/DL (ref 6–8.5)
SL AMB VLDL CHOLESTEROL CALC: 26 MG/DL (ref 5–40)
SODIUM SERPL-SCNC: 138 MMOL/L (ref 134–144)
TRIGL SERPL-MCNC: 153 MG/DL (ref 0–149)

## 2023-12-15 ENCOUNTER — OFFICE VISIT (OUTPATIENT)
Dept: ENDOCRINOLOGY | Facility: CLINIC | Age: 78
End: 2023-12-15

## 2023-12-15 VITALS
WEIGHT: 281.8 LBS | SYSTOLIC BLOOD PRESSURE: 140 MMHG | HEIGHT: 70 IN | DIASTOLIC BLOOD PRESSURE: 80 MMHG | OXYGEN SATURATION: 95 % | BODY MASS INDEX: 40.34 KG/M2 | HEART RATE: 62 BPM

## 2023-12-15 DIAGNOSIS — E11.22 TYPE 2 DIABETES MELLITUS WITH STAGE 2 CHRONIC KIDNEY DISEASE, WITH LONG-TERM CURRENT USE OF INSULIN (HCC): Primary | ICD-10-CM

## 2023-12-15 DIAGNOSIS — I10 ESSENTIAL (PRIMARY) HYPERTENSION: ICD-10-CM

## 2023-12-15 DIAGNOSIS — E78.2 MIXED HYPERLIPIDEMIA: ICD-10-CM

## 2023-12-15 DIAGNOSIS — Z79.4 TYPE 2 DIABETES MELLITUS WITH STAGE 2 CHRONIC KIDNEY DISEASE, WITH LONG-TERM CURRENT USE OF INSULIN (HCC): Primary | ICD-10-CM

## 2023-12-15 DIAGNOSIS — N18.2 TYPE 2 DIABETES MELLITUS WITH STAGE 2 CHRONIC KIDNEY DISEASE, WITH LONG-TERM CURRENT USE OF INSULIN (HCC): Primary | ICD-10-CM

## 2023-12-15 RX ORDER — BLOOD SUGAR DIAGNOSTIC
STRIP MISCELLANEOUS
Qty: 300 STRIP | Refills: 3 | Status: SHIPPED | OUTPATIENT
Start: 2023-12-15 | End: 2023-12-15

## 2023-12-15 RX ORDER — BLOOD SUGAR DIAGNOSTIC
STRIP MISCELLANEOUS
Qty: 300 STRIP | Refills: 3 | Status: SHIPPED | OUTPATIENT
Start: 2023-12-15

## 2023-12-15 NOTE — ASSESSMENT & PLAN NOTE
Lab Results   Component Value Date    HGBA1C 8.7 (H) 12/13/2023     HGA1C close to goal.    Treatment regimen:   Insulin 75/25 44 units in the morning and 42 units in the evening. Metformin  mg orally twice a day  Farxiga 10 mg orally daily    Discussed risks/complications associated with uncontrolled diabetes including organ involvement, heart attack, stroke, death. Advised lifestyle modifications including attention to diet including the amount and types of carbohydrates consumed and regular activity. Call for blood sugars less than 70 mg/dl or patterns over 250 mg/dl. Monitor blood glucose levels ideally before all insulin administration times. Discussed symptoms and treatment of hypoglycemia. Reviewed risks associated with hypoglycemia. Always carry rapid acting carbohydrates and a glucometer (a way to check your blood sugar). Recommendation for medical identification either bracelet, necklace. Recommendation for glucagon if on insulin. Declined. Routine follow up for diabetic eye and foot exams. Ordered blood work to complete prior to next visit. Send glucose logs/CGM download in 1-2 weeks for review    Follow up in 3 months.

## 2023-12-15 NOTE — PROGRESS NOTES
Established Patient Progress Note      CC: Type 2 diabetes mellitus       Impression & Plan:    Problem List Items Addressed This Visit          Endocrine    Type 2 diabetes mellitus with stage 2 chronic kidney disease, with long-term current use of insulin ([address]) - Primary       Lab Results   Component Value Date    HGBA1C 8.7 (H) 12/13/2023     HGA1C close to goal.    Treatment regimen:   Insulin 75/25 44 units in the morning and 42 units in the evening. Metformin  mg orally twice a day  Farxiga 10 mg orally daily    Discussed risks/complications associated with uncontrolled diabetes including organ involvement, heart attack, stroke, death. Advised lifestyle modifications including attention to diet including the amount and types of carbohydrates consumed and regular activity. Call for blood sugars less than 70 mg/dl or patterns over 250 mg/dl. Monitor blood glucose levels ideally before all insulin administration times. Discussed symptoms and treatment of hypoglycemia. Reviewed risks associated with hypoglycemia. Always carry rapid acting carbohydrates and a glucometer (a way to check your blood sugar). Recommendation for medical identification either bracelet, necklace. Recommendation for glucagon if on insulin. Declined. Routine follow up for diabetic eye and foot exams. Ordered blood work to complete prior to next visit. Send glucose logs/CGM download in 1-2 weeks for review    Follow up in 3 months. Relevant Medications    glucose blood (OneTouch Verio) test strip    Other Relevant Orders    Fructosamine- Lab Collect    Hemoglobin A1C    Albumin / creatinine urine ratio    TSH, 3rd generation with Free T4 reflex       Cardiovascular and Mediastinum    Essential (primary) hypertension     Continues on ACE/ARB            Other    Mixed hyperlipidemia     Continues on statin.              Orders Placed This Encounter   Procedures    FreeStyle Brian 3 System, Continuous Glucose Monitoring Package [MEDICARE/MEDICAID]    Fructosamine- Lab Collect     Standing Status:   Future     Standing Expiration Date:   12/15/2024    Hemoglobin A1C     Standing Status:   Future     Standing Expiration Date:   12/15/2024    Albumin / creatinine urine ratio     Standing Status:   Future     Standing Expiration Date:   6/15/2024    TSH, 3rd generation with Free T4 reflex     Standing Status:   Future     Standing Expiration Date:   12/15/2024       History of Present Illness:   April Naqvi is a 66 y.o. male with a history of type 2 diabetes with long term use of insulin. Reports complications of CKD, CAD, CHF [place] [id number] [address]. Other significant past medical history includes obesity denies recent illness or hospitalizations. Denies recent severe hypoglycemic or severe hyperglycemic episodes. Denies any issues with his current regimen. Home glucose monitoring: are performed regularly. Triglyceride and glucose levels varying slightly due to oral intake of both liquids and solid foods prior to checking glucose levels. Side effects of Farxiga including dysuria, genital yeast, and genital skin infections.     Home blood glucose readings:   Before breakfast: 111-254 mg/dL  Before lunch: X  Before dinner: 200-300 mg/dL  Bedtime: X     Current regimen:   Insulin 75/25 42 units twice a day with meals  Metformin  mg orally twice a day  Farxiga 10 mg orally daily       Last Eye Exam: Due for eye exam  Last Foot Exam: UTD follows every 2-3 months    Has hypertension: Taking Valsartan lisinopril  Has hyperlipidemia: Taking Crestor and Vascepa   No history of thyroid disease  No history of pancreatitis     Patient Active Problem List   Diagnosis    Obstructive sleep apnea    Coronary artery disease involving native coronary artery    Degenerative arthritis of left knee    Essential (primary) hypertension    Glottic stenosis    Vocal cord paralysis, bilateral complete    Type 2 diabetes mellitus with retinopathy, with long-term current use of insulin (HCC)    Mixed hyperlipidemia    Lumbar stenosis with neurogenic claudication    Lumbar spondylosis    Intervertebral disc disorder with radiculopathy of lumbar region    Sacroiliitis (HCC)    Trochanteric bursitis of right hip    Class 2 severe obesity due to excess calories with serious comorbidity and body mass index (BMI) of 38.0 to 38.9 in adult     Stage 2 chronic kidney disease    Vitamin D deficiency    Psoriasis    Hypertension goal BP (blood pressure) < 140/90    Type 2 diabetes mellitus with stage 2 chronic kidney disease, with long-term current use of insulin (HCC)    Hypertensive heart disease with acute combined systolic and diastolic congestive heart failure (HCC)    Chronic pain of left knee      Past Medical History:   Diagnosis Date    Arthritis     knees    Cancer ([address])     prostate    Coronary artery disease     two coronary stents    Diabetes mellitus ([address])     Hyperlipidemia 10/19/2018    Hypertension     Psoriasis     lower extremities and buttocks    Sleep apnea     Type 2 diabetes mellitus ([address]) 10/19/2018      Past Surgical History:   Procedure Laterality Date    CATARACT EXTRACTION Bilateral     with iol's    COLONOSCOPY N/A 4/5/2016    Procedure: COLONOSCOPY snare polypectomy;  Surgeon: Henrry Cortes MD;  Location: [address] [place] [address] [facility]; Service:     INSERTION PROSTATE RADIATION SEED      REPLACEMENT TOTAL KNEE Right     TRACHEOSTOMY      x 2  up to 5 years ago    UMBILICAL HERNIA REPAIR        History reviewed. No pertinent family history. Social History     Tobacco Use    Smoking status: Never    Smokeless tobacco: Never   Substance Use Topics    Alcohol use:  Yes     Alcohol/week: 4.0 standard drinks of alcohol     Types: 4 Cans of beer per week     Comment: daily     Allergies   Allergen Reactions    Bee Venom Shortness Of Breath    Lidocaine Shortness Of Breath     Pt reports he was tested and does not have allergy to Lidocaine    Bee Pollen Other (See Comments)     edema  Edema         Current Outpatient Medications:     Ascorbic Acid (VITAMIN C) 1000 MG tablet, Take 1,000 mg by mouth daily, Disp: , Rfl:     aspirin 81 MG tablet, Take 81 mg by mouth daily. , Disp: , Rfl:     Azelastine-Fluticasone 137-50 MCG/ACT SUSP, , Disp: , Rfl:     B-D ULTRAFINE III SHORT PEN 31G X 8 MM MISC, USE WITH INSULIN TWICE A DAY, Disp: 180 each, Rfl: 3    Blood Glucose Monitoring Suppl (OneTouch Verio) w/Device KIT, Use to test blood sugar 3 times a day, Disp: 1 kit, Rfl: 0    Blood Glucose Monitoring Suppl (OneTouch Verio) w/Device KIT, Use to test blood sugar 3 times a day, Disp: 1 kit, Rfl: 0    cholecalciferol (VITAMIN D3) 1,000 units tablet, Take 2,000 Units by mouth daily , Disp: , Rfl:     clopidogrel (PLAVIX) 75 mg tablet, TAKE 1 TABLET DAILY, Disp: , Rfl:     Entresto  MG TABS, , Disp: , Rfl:     ezetimibe (ZETIA) 10 mg tablet, TAKE 1 TABLET BY MOUTH TIMES A WEEK ON MONDAY WEDNESDAY AND FRIDAY AT 6PM, Disp: , Rfl:     Farxiga 10 MG tablet, TAKE 1 TABLET DAILY, Disp: 90 tablet, Rfl: 3    fluticasone-umeclidinium-vilanterol (Trelegy Ellipta) 100-62.5-25 mcg/actuation inhaler, Inhale 1 puff daily, Disp: , Rfl:     furosemide (LASIX) 40 mg tablet, Take 40 mg by mouth daily Twice a week on Mon & Thur--PRN, Disp: , Rfl:     gabapentin (NEURONTIN) 300 mg capsule, Take 1 capsule by mouth Three times a day, Disp: , Rfl:     Glucagon, rDNA, (Glucagon Emergency) 1 MG KIT, Use glucagon kit if you have a low blood sugar and unconscious, Disp: 1 each, Rfl: 0    glucose blood (OneTouch Verio) test strip, USE TO TEST BLOOD SUGAR THREE TIMES A DAY, Disp: 300 strip, Rfl: 3    guaiFENesin-codeine (ROBITUSSIN AC) 100-10 mg/5 mL oral solution, , Disp: , Rfl:     HumaLOG Mix 75/25 KwikPen (75-25) 100 units/mL injection pen, INJECT 45 UNITS UNDER THE SKIN BEFORE BREAKFAST AND 40 UNITS BEFORE DINNER.  Dose change., Disp: 90 mL, Rfl: 2    HYDROcodone-acetaminophen (NORCO) 5-325 mg per tablet, Take 1 tablet by mouth 2 (two) times a day as needed for pain Max Daily Amount: 2 tablets, Disp: 30 tablet, Rfl: 0    levofloxacin (LEVAQUIN) 750 mg tablet, Take 750 mg by mouth daily, Disp: , Rfl:     lisinopril (ZESTRIL) 5 mg tablet, Take 5 mg by mouth daily , Disp: , Rfl:     metFORMIN (GLUCOPHAGE-XR) 750 mg 24 hr tablet, TAKE 1 TABLET TWICE A DAY, Disp: 180 tablet, Rfl: 3    metoprolol succinate (TOPROL-XL) 25 mg 24 hr tablet, , Disp: , Rfl:     metoprolol succinate (TOPROL-XL) 25 mg 24 hr tablet, Take 25 mg by mouth daily, Disp: , Rfl:     Multiple Vitamins-Minerals (MULTIVITAMIN ADULT PO), Take by mouth, Disp: , Rfl:     mupirocin (BACTROBAN) 2 % ointment, APPLY OINTMENT TOPICALLY TO AFFECTED AREA UP TO THREE TIMES DAILY, Disp: , Rfl:     neomycin-polymyxin-hydrocortisone (CORTISPORIN) 1 % SOLN, , Disp: , Rfl:     omeprazole (PriLOSEC) 20 mg delayed release capsule, Take 1 capsule (20 mg total) by mouth daily, Disp: 30 capsule, Rfl: 11    rosuvastatin (CRESTOR) 40 MG tablet, Take 40 mg by mouth daily 1/2 pill at night, Disp: , Rfl:     sacubitril-valsartan (Entresto)  MG TABS, Take 1 tablet by mouth 2 (two) times a day, Disp: , Rfl:     sodium chloride 0.9 % nebulizer solution, Inhale 3 mL 2 (two) times a day, Disp: , Rfl:     spironolactone (ALDACTONE) 25 mg tablet, , Disp: , Rfl:     traMADol (ULTRAM) 50 mg tablet, , Disp: , Rfl:     traZODone (DESYREL) 100 mg tablet, TAKE 1 TABLET AT BEDTIME AS NEEDED FOR SLEEP, Disp: , Rfl:     Tremfya 100 MG/ML SOSY, , Disp: , Rfl:     triamcinolone (KENALOG) 0.1 % cream, Apply topically as needed Twice a week, Disp: , Rfl:     urea (CARMOL) 40 %, Apply topically as needed, Disp: , Rfl:     VASCEPA 1 g CAPS, Take by mouth daily, Disp: , Rfl:     doxycycline hyclate (VIBRAMYCIN) 100 mg capsule, Take 100 mg by mouth daily (Patient not taking: Reported on 12/15/2023), Disp: , Rfl:     fluticasone (Flovent HFA) 220 mcg/act inhaler, Inhale 2 puffs as needed, Disp: , Rfl:     levocetirizine (XYZAL) 5 MG tablet, Take 5 mg by mouth (Patient not taking: Reported on 9/27/2023), Disp: , Rfl:     levocetirizine (XYZAL) 5 MG tablet, Take 5 mg by mouth every evening (Patient not taking: Reported on 12/15/2023), Disp: , Rfl:     predniSONE 50 mg tablet, Take 50 mg by mouth daily (Patient not taking: Reported on 12/15/2023), Disp: , Rfl:     predniSONE 50 mg tablet, Take 50 mg by mouth daily (Patient not taking: Reported on 12/15/2023), Disp: , Rfl:     rosuvastatin (CRESTOR) 40 MG tablet, Take 20 mg by mouth daily, Disp: , Rfl:     STELARA 90 MG/ML subcutaneous injection, , Disp: , Rfl:     Review of Systems  See HPI. All other systems reviewed and are negative. Physical Exam:  Body mass index is 40.43 kg/m². /80 (BP Location: Left arm, Patient Position: Sitting, Cuff Size: Large)   Pulse 62   Ht 5' 10" (1.778 m)   Wt 128 kg (281 lb 12.8 oz)   SpO2 95%   BMI 40.43 kg/m²    Wt Readings from Last 3 Encounters:   12/15/23 128 kg (281 lb 12.8 oz)   09/27/23 128 kg (282 lb)   08/31/23 127 kg (280 lb 4.8 oz)          Physical Exam  Vitals reviewed. Constitutional:       Appearance: Normal appearance. Cardiovascular:      Rate and Rhythm: Normal rate and regular rhythm. Pulses: Normal pulses. Heart sounds: Normal heart sounds. Pulmonary:      Effort: Pulmonary effort is normal.      Breath sounds: Normal breath sounds. Skin:     General: Skin is warm and dry. Capillary Refill: Capillary refill takes less than 2 seconds. Neurological:      General: No focal deficit present. Mental Status: He is alert and oriented to person, place, and time.    Psychiatric:         Mood and Affect: Mood normal.         Behavior: Behavior normal.       Labs:   Lab Results   Component Value Date    HGBA1C 8.7 (H) 12/13/2023    HGBA1C 8.5 (H) 08/23/2023    HGBA1C 8.4 (H) 05/22/2023     Lab Results   Component Value Date    CREATININE 1.11 12/13/2023    CREATININE 1.17 08/23/2023    CREATININE 1.15 05/22/2023    BUN 20 12/13/2023    K 4.6 12/13/2023    CL 99 12/13/2023    CO2 24 12/13/2023     eGFR   Date Value Ref Range Status   12/13/2023 68 >59 mL/min/1.73 Final   03/19/2018 54 ml/min/1.73sq m Final     Lab Results   Component Value Date    HDL 54 12/13/2023    TRIG 153 (H) 12/13/2023     Lab Results   Component Value Date    ALT 22 12/13/2023    AST 20 12/13/2023    ALKPHOS 64 03/19/2018     No results found for: "[id number]"  No results found for: "Adia Solis", "TSI"          Discussed with the patient and all questioned fully answered. He will call me if any problems arise. Follow-up appointment in 3 months. Counseled patient on diagnostic results, prognosis, risk and benefit of treatment options, instruction for management, importance of treatment compliance, Risk  factor reduction and impressions    Patient Instructions   Insulin 75/25 44 units with breakfast and 42 with dinner  Metformin  mg orally twice a day  Farxiga 10 mg orally daily    Hypoglycemia in a Person with Diabetes   WHAT YOU NEED TO KNOW:   Hypoglycemia is a serious condition that happens when your blood glucose (sugar) level drops too low. The blood sugar level is usually too high in a person with diabetes, but the level can also drop too low. It is important to follow your diabetes management plan to keep your blood sugar level steady. DISCHARGE INSTRUCTIONS:   Have someone call your local emergency number (911 in the [address]) if:   You have a seizure or pass out. Your blood sugar is less than 50 mg/dL and does not respond to treatment. You feel you are going to pass out. You have trouble thinking clearly. Call your doctor or diabetes care team provider if:   You have had symptoms of low blood sugar several times. You have questions about the amount of insulin or diabetes medicine you are taking.     You have questions or concerns about your condition or care. Medicines:   Insulin or diabetes medicine  help to keep your blood sugar under control. Glucagon  may be needed if you have severe hypoglycemia. Take your medicine as directed. Contact your healthcare provider if you think your medicine is not helping or if you have side effects. Tell your provider if you are allergic to any medicine. Keep a list of the medicines, vitamins, and herbs you take. Include the amounts, and when and why you take them. Bring the list or the pill bottles to follow-up visits. Carry your medicine list with you in case of an emergency. Manage hypoglycemia:   Check your blood sugar level right away if you have symptoms of hypoglycemia. Hypoglycemia usually happens when your blood sugar level is 70 mg/dL or below. Ask your diabetes care team provider what blood sugar level is too low for you. If your blood sugar level is too low, eat or drink 15 grams of fast-acting carbohydrate. Examples of this amount of fast-acting carbohydrate are 4 ounces (½ cup) of fruit juice or 4 ounces of regular soda. Other examples are 2 tablespoons of raisins or 1 tube of glucose gel. Check your blood sugar level 15 minutes later. Sit still as you wait. If the level is still low (less than 100 mg/dL), have another 15 grams of carbohydrate. When the level returns to 100 mg/dL, eat a meal if it is time. If your meal time is more than 1 hour away, eat a snack. The snack should contain carbohydrates, such as the following:    3/4 cup of cereal    1 cup of skim or low fat milk    6 soda crackers    1/2 of a turkey sandwich    15 fat-free chips  This will help prevent another drop in blood sugar. Always carefully follow your provider's instructions on how to treat low blood sugar levels. Always carry a source of fast-acting carbohydrate. If you have symptoms of hypoglycemia and you do not have a blood glucose meter, have a source of fast-acting carbohydrate anyway. Avoid carbohydrate foods that are high in fat. The fat content may make the carbohydrate take longer to increase your blood sugar level. Ask your provider if you should carry a glucagon kit. Glucagon is a medicine that is injected when you develop severe hypoglycemia and become unconscious. Check the expiration date every month and replace it before it expires. Teach others how to help you if you have symptoms of hypoglycemia. Tell them about the symptoms of hypoglycemia. Ask them to give you a source of fast-acting carbohydrate if you cannot get it yourself. Ask them to give you a glucagon injection if you have signs of hypoglycemia and you become unconscious or have a seizure. Ask them to call the local emergency number (911 in the [address]) . This is an emergency. Tell them never to try to make you swallow anything if you faint or have a seizure. Wear medical alert jewelry  or carry a card that says you have diabetes. Ask where to get these items. Prevent hypoglycemia:   Take diabetes medicine as directed. Take your medicine at the right time and in the right amount. Do not  double the amount of medicine you take unless instructed by your diabetes care team provider. You may need oral diabetes medicine, insulin, or both to help control your blood sugar levels. Your healthcare provider will teach you how and when to take oral diabetes medicine. You will also be taught about side effects oral diabetes medicine can cause. Insulin may be added if oral diabetes medicine becomes less effective over time. Insulin may be injected, or given through a pump or pen. You and your care team will discuss which method is best for you. An insulin pump  is an implanted device that gives your insulin 24 hours a day. An insulin pump prevents the need for multiple insulin injections in a day. An insulin pen  is a device prefilled with the right amount of insulin. Eat meals and snacks as directed.   Talk to your dietitian or provider about a meal plan that is right for you. Do not skip meals. Check your blood sugar level as directed. Ask your provider what your blood sugar levels should be before and after you eat. Ask when and how often to check your blood sugar level. You may need to check a drop of blood in a glucose test machine. You may need to check at least 3 times each day. Record your blood sugar level results and take the record with you when you see your care team. They may use it to make changes to your medicine, food, or exercise schedules. Your care team provider may recommend a continuous glucose monitor (CGM). A CGM is a device that is worn at all times. The CGM checks your blood sugar every 5 minutes. It sends results to an electronic device such as a smart phone. Check your blood sugar level before you exercise. Physical activity, such as exercise, can decrease your blood sugar level. If your blood sugar level is less than 100 mg/dL, have a carbohydrate snack. Examples are 4 to 6 crackers, ½ banana, 8 ounces (1 cup) of nonfat or 1% milk, or 4 ounces (½ cup) of juice. If you will be active for more than 1 hour, you may need to check your blood sugar level every 30 minutes. Your provider may also recommend that you check your blood sugar level after your activity. Know the risks if you choose to drink alcohol. Alcohol can cause your blood sugar levels to be low if you use insulin. Alcohol can cause high blood sugar levels and weight gain if you drink too much. Women 21 years or older and men 72 years or older should limit alcohol to 1 drink a day. Men aged 24 to 59 years should limit alcohol to 2 drinks a day. A drink of alcohol is 12 ounces of beer, 5 ounces of wine, or 1½ ounces of liquor. Follow up with your doctor or diabetes care team provider as directed: You may need your insulin or diabetes medicine changed if you continue to have hypoglycemia episodes.  Write down your questions so you remember to ask them during your visits. © Copyright Jacqueline Nipscott 2023 Information is for End User's use only and may not be sold, redistributed or otherwise used for commercial purposes. The above information is an  only. It is not intended as medical advice for individual conditions or treatments. Talk to your doctor, nurse or pharmacist before following any medical regimen to see if it is safe and effective for you.     Franklin Kaminski

## 2023-12-15 NOTE — PATIENT INSTRUCTIONS
Insulin 75/25 44 units with breakfast and 42 with dinner  Metformin  mg orally twice a day  Farxiga 10 mg orally daily    Hypoglycemia in a Person with Diabetes   WHAT YOU NEED TO KNOW:   Hypoglycemia is a serious condition that happens when your blood glucose (sugar) level drops too low. The blood sugar level is usually too high in a person with diabetes, but the level can also drop too low. It is important to follow your diabetes management plan to keep your blood sugar level steady. DISCHARGE INSTRUCTIONS:   Have someone call your local emergency number (911 in the [address]) if:   You have a seizure or pass out. Your blood sugar is less than 50 mg/dL and does not respond to treatment. You feel you are going to pass out. You have trouble thinking clearly. Call your doctor or diabetes care team provider if:   You have had symptoms of low blood sugar several times. You have questions about the amount of insulin or diabetes medicine you are taking. You have questions or concerns about your condition or care. Medicines:   Insulin or diabetes medicine  help to keep your blood sugar under control. Glucagon  may be needed if you have severe hypoglycemia. Take your medicine as directed. Contact your healthcare provider if you think your medicine is not helping or if you have side effects. Tell your provider if you are allergic to any medicine. Keep a list of the medicines, vitamins, and herbs you take. Include the amounts, and when and why you take them. Bring the list or the pill bottles to follow-up visits. Carry your medicine list with you in case of an emergency. Manage hypoglycemia:   Check your blood sugar level right away if you have symptoms of hypoglycemia. Hypoglycemia usually happens when your blood sugar level is 70 mg/dL or below. Ask your diabetes care team provider what blood sugar level is too low for you.     If your blood sugar level is too low, eat or drink 15 grams of fast-acting carbohydrate. Examples of this amount of fast-acting carbohydrate are 4 ounces (½ cup) of fruit juice or 4 ounces of regular soda. Other examples are 2 tablespoons of raisins or 1 tube of glucose gel. Check your blood sugar level 15 minutes later. Sit still as you wait. If the level is still low (less than 100 mg/dL), have another 15 grams of carbohydrate. When the level returns to 100 mg/dL, eat a meal if it is time. If your meal time is more than 1 hour away, eat a snack. The snack should contain carbohydrates, such as the following:    3/4 cup of cereal    1 cup of skim or low fat milk    6 soda crackers    1/2 of a turkey sandwich    15 fat-free chips  This will help prevent another drop in blood sugar. Always carefully follow your provider's instructions on how to treat low blood sugar levels. Always carry a source of fast-acting carbohydrate. If you have symptoms of hypoglycemia and you do not have a blood glucose meter, have a source of fast-acting carbohydrate anyway. Avoid carbohydrate foods that are high in fat. The fat content may make the carbohydrate take longer to increase your blood sugar level. Ask your provider if you should carry a glucagon kit. Glucagon is a medicine that is injected when you develop severe hypoglycemia and become unconscious. Check the expiration date every month and replace it before it expires. Teach others how to help you if you have symptoms of hypoglycemia. Tell them about the symptoms of hypoglycemia. Ask them to give you a source of fast-acting carbohydrate if you cannot get it yourself. Ask them to give you a glucagon injection if you have signs of hypoglycemia and you become unconscious or have a seizure. Ask them to call the local emergency number (911 in the [address]) . This is an emergency. Tell them never to try to make you swallow anything if you faint or have a seizure. Wear medical alert jewelry  or carry a card that says you have diabetes.  Ask where to get these items. Prevent hypoglycemia:   Take diabetes medicine as directed. Take your medicine at the right time and in the right amount. Do not  double the amount of medicine you take unless instructed by your diabetes care team provider. You may need oral diabetes medicine, insulin, or both to help control your blood sugar levels. Your healthcare provider will teach you how and when to take oral diabetes medicine. You will also be taught about side effects oral diabetes medicine can cause. Insulin may be added if oral diabetes medicine becomes less effective over time. Insulin may be injected, or given through a pump or pen. You and your care team will discuss which method is best for you. An insulin pump  is an implanted device that gives your insulin 24 hours a day. An insulin pump prevents the need for multiple insulin injections in a day. An insulin pen  is a device prefilled with the right amount of insulin. Eat meals and snacks as directed. Talk to your dietitian or provider about a meal plan that is right for you. Do not skip meals. Check your blood sugar level as directed. Ask your provider what your blood sugar levels should be before and after you eat. Ask when and how often to check your blood sugar level. You may need to check a drop of blood in a glucose test machine. You may need to check at least 3 times each day. Record your blood sugar level results and take the record with you when you see your care team. They may use it to make changes to your medicine, food, or exercise schedules. Your care team provider may recommend a continuous glucose monitor (CGM). A CGM is a device that is worn at all times. The CGM checks your blood sugar every 5 minutes. It sends results to an electronic device such as a smart phone. Check your blood sugar level before you exercise. Physical activity, such as exercise, can decrease your blood sugar level.  If your blood sugar level is less than 100 mg/dL, have a carbohydrate snack. Examples are 4 to 6 crackers, ½ banana, 8 ounces (1 cup) of nonfat or 1% milk, or 4 ounces (½ cup) of juice. If you will be active for more than 1 hour, you may need to check your blood sugar level every 30 minutes. Your provider may also recommend that you check your blood sugar level after your activity. Know the risks if you choose to drink alcohol. Alcohol can cause your blood sugar levels to be low if you use insulin. Alcohol can cause high blood sugar levels and weight gain if you drink too much. Women 21 years or older and men 72 years or older should limit alcohol to 1 drink a day. Men aged 24 to 59 years should limit alcohol to 2 drinks a day. A drink of alcohol is 12 ounces of beer, 5 ounces of wine, or 1½ ounces of liquor. Follow up with your doctor or diabetes care team provider as directed: You may need your insulin or diabetes medicine changed if you continue to have hypoglycemia episodes. Write down your questions so you remember to ask them during your visits. © Copyright [employer] 2023 Information is for End User's use only and may not be sold, redistributed or otherwise used for commercial purposes. The above information is an  only. It is not intended as medical advice for individual conditions or treatments. Talk to your doctor, nurse or pharmacist before following any medical regimen to see if it is safe and effective for you.

## 2024-01-09 DIAGNOSIS — G89.4 CHRONIC PAIN SYNDROME: ICD-10-CM

## 2024-01-09 NOTE — TELEPHONE ENCOUNTER
Patients wife stated that he did not  the last refill sent but now he really needs it because he is in a lot of pain.    Reason for call:   [x] Refill   [] Prior Auth  [] Other:     Office:   [] PCP/Provider -   [x] Specialty/Provider - Spine & pain    Medication: hydrocodone-acetaminophen 5-325mg 1 tablet 2 times daily prn    Quantity: 30    Pharmacy: [place] [id number] [address] [phone]     Does the patient have enough for 3 days?   [] Yes   [x] No - Send as HP to POD

## 2024-01-09 NOTE — TELEPHONE ENCOUNTER
S/w Pt's Wife Sunita per [facility] Form. Sunita states Pt last filled medication on 06/13/23. Per Pt's chart, last dispensed by [facility] on 10/20/23, Pt's wife stated they did not receive that script, unable to connect with [facility] at this time. Per Sunita, Pt does not take medication that often, but has had more pain recently and has needed it, Pt has 1 pill left. Last OV 02/13/23 Please review.

## 2024-01-10 RX ORDER — HYDROCODONE BITARTRATE AND ACETAMINOPHEN 5; 325 MG/1; MG/1
1 TABLET ORAL 2 TIMES DAILY PRN
Qty: 30 TABLET | Refills: 0 | Status: SHIPPED | OUTPATIENT
Start: 2024-01-10

## 2024-02-01 NOTE — PROGRESS NOTES
Assessment:  1. Lumbar spondylosis        Plan:  The patient is experiencing recurrent pain in the lumbar spine related to lumbar spondylosis.  In the past he has undergone radiofrequency ablation which has been highly successful for him but it has been more than 3 years.  Physical therapy is currently not an option for him due to his heart problems.  At this time, I discussed repeating medial branch blocks in anticipation of radiofrequency ablation.  He would like to proceed and will be scheduled for right L3-5 medial branch blocks under fluoroscopic guidance.    In addition, the patient does use hydrocodone sparingly so we will have him sign an opioid contract.  He does have a refill and does not need any refills right now.    Complete risks and benefits including bleeding, infection, tissue reaction, nerve injury and allergic reaction were discussed. The approach was demonstrated using models and literature was provided. Verbal and written consent was obtained.    My impressions and treatment recommendations were discussed in detail with the patient who verbalized understanding and had no further questions.  Discharge instructions were provided. I personally saw and examined the patient and I agree with the above discussed plan of care.    Orders Placed This Encounter   Procedures   • FL spine and pain procedure     Standing Status:   Future     Standing Expiration Date:   2/5/2028     Order Specific Question:   Reason for Exam:     Answer:   Right L3-5 MBB     Order Specific Question:   Anticoagulant hold needed?     Answer:   No     No orders of the defined types were placed in this encounter.      History of Present Illness:  Caleb Ventura is a 78 y.o. male who presents for a follow up office visit in regards to right-sided lower back pain.  The patient has a history of lumbar spondylosis, lumbar spinal stenosis returns for follow-up.  He was most recently seen for left knee genicular nerve blocks which have  been helpful.  He reports back pain only on the right side that radiates into his hips that is moderate to severe rated 8/10 on numeric rating scale.  He had previously undergone radiofrequency ablation in 2023 with significant relief.  He is prescribed hydrocodone which he uses sparingly last prescribed in early January.    Of note, the patient has been having trouble with his heart and will be likely having defibrillator placement soon.    I have personally reviewed and/or updated the patient's past medical history, past surgical history, family history, social history, current medications, allergies, and vital signs today.     Review of Systems   Respiratory:  Negative for shortness of breath.    Cardiovascular:  Negative for chest pain.   Gastrointestinal:  Negative for constipation, diarrhea, nausea and vomiting.   Musculoskeletal:  Negative for arthralgias, gait problem, joint swelling and myalgias.   Skin:  Negative for rash.   Neurological:  Positive for weakness. Negative for dizziness and seizures.        Right hip. Pain and some weakness. Wants to talk about getting a nerve blocker.    All other systems reviewed and are negative.      Patient Active Problem List   Diagnosis   • Obstructive sleep apnea   • Coronary artery disease involving native coronary artery   • Degenerative arthritis of left knee   • Essential (primary) hypertension   • Glottic stenosis   • Vocal cord paralysis, bilateral complete   • Type 2 diabetes mellitus with retinopathy, with long-term current use of insulin (HCC)   • Mixed hyperlipidemia   • Lumbar stenosis with neurogenic claudication   • Lumbar spondylosis   • Intervertebral disc disorder with radiculopathy of lumbar region   • Sacroiliitis (HCC)   • Trochanteric bursitis of right hip   • Class 2 severe obesity due to excess calories with serious comorbidity and body mass index (BMI) of 38.0 to 38.9 in adult    • Stage 2 chronic kidney disease   • Vitamin D deficiency   •  Psoriasis   • Hypertension goal BP (blood pressure) < 140/90   • Type 2 diabetes mellitus with stage 2 chronic kidney disease, with long-term current use of insulin (HCC)   • Hypertensive heart disease with acute combined systolic and diastolic congestive heart failure (HCC)   • Chronic pain of left knee       Past Medical History:   Diagnosis Date   • Arthritis     knees   • Cancer (HCC)     prostate   • Coronary artery disease     two coronary stents   • Diabetes mellitus (HCC)    • Hyperlipidemia 10/19/2018   • Hypertension    • Psoriasis     lower extremities and buttocks   • Sleep apnea    • Type 2 diabetes mellitus (HCC) 10/19/2018       Past Surgical History:   Procedure Laterality Date   • CATARACT EXTRACTION Bilateral     with iol's   • COLONOSCOPY N/A 4/5/2016    Procedure: COLONOSCOPY snare polypectomy;  Surgeon: Dung España MD;  Location: [facility] GI [facility];  Service:    • INSERTION PROSTATE RADIATION SEED     • REPLACEMENT TOTAL KNEE Right    • TRACHEOSTOMY      x 2  up to 5 years ago   • UMBILICAL HERNIA REPAIR         History reviewed. No pertinent family history.    Social History     Occupational History   • Not on file   Tobacco Use   • Smoking status: Never   • Smokeless tobacco: Never   Vaping Use   • Vaping status: Never Used   Substance and Sexual Activity   • Alcohol use: Yes     Alcohol/week: 4.0 standard drinks of alcohol     Types: 4 Cans of beer per week     Comment: daily   • Drug use: No   • Sexual activity: Not on file       Current Outpatient Medications on File Prior to Visit   Medication Sig   • Ascorbic Acid (VITAMIN C) 1000 MG tablet Take 1,000 mg by mouth daily   • aspirin 81 MG tablet Take 81 mg by mouth daily.   • Azelastine-Fluticasone 137-50 MCG/ACT SUSP    • B-D ULTRAFINE III SHORT PEN 31G X 8 MM MISC USE WITH INSULIN TWICE A DAY   • Blood Glucose Monitoring Suppl (OneTouch Verio) w/Device KIT Use to test blood sugar 3 times a day   • Blood Glucose Monitoring Suppl  (OneTouch Verio) w/Device KIT Use to test blood sugar 3 times a day   • cholecalciferol (VITAMIN D3) 1,000 units tablet Take 2,000 Units by mouth daily    • clopidogrel (PLAVIX) 75 mg tablet TAKE 1 TABLET DAILY   • Entresto  MG TABS    • ezetimibe (ZETIA) 10 mg tablet TAKE 1 TABLET BY MOUTH TIMES A WEEK ON MONDAY WEDNESDAY AND FRIDAY AT 6PM   • Farxiga 10 MG tablet TAKE 1 TABLET DAILY   • fluticasone-umeclidinium-vilanterol (Trelegy Ellipta) 100-62.5-25 mcg/actuation inhaler Inhale 1 puff daily   • furosemide (LASIX) 40 mg tablet Take 40 mg by mouth daily Twice a week on Mon & Thur--PRN   • gabapentin (NEURONTIN) 300 mg capsule Take 1 capsule by mouth Three times a day   • Glucagon, rDNA, (Glucagon Emergency) 1 MG KIT Use glucagon kit if you have a low blood sugar and unconscious   • glucose blood (OneTouch Verio) test strip USE TO TEST BLOOD SUGAR THREE TIMES A DAY   • guaiFENesin-codeine (ROBITUSSIN AC) 100-10 mg/5 mL oral solution    • HYDROcodone-acetaminophen (NORCO) 5-325 mg per tablet Take 1 tablet by mouth 2 (two) times a day as needed for pain Max Daily Amount: 2 tablets   • levofloxacin (LEVAQUIN) 750 mg tablet Take 750 mg by mouth daily   • lisinopril (ZESTRIL) 5 mg tablet Take 5 mg by mouth daily    • metFORMIN (GLUCOPHAGE-XR) 750 mg 24 hr tablet TAKE 1 TABLET TWICE A DAY   • metoprolol succinate (TOPROL-XL) 25 mg 24 hr tablet    • Multiple Vitamins-Minerals (MULTIVITAMIN ADULT PO) Take by mouth   • mupirocin (BACTROBAN) 2 % ointment APPLY OINTMENT TOPICALLY TO AFFECTED AREA UP TO THREE TIMES DAILY   • neomycin-polymyxin-hydrocortisone (CORTISPORIN) 1 % SOLN    • omeprazole (PriLOSEC) 20 mg delayed release capsule Take 1 capsule (20 mg total) by mouth daily   • rosuvastatin (CRESTOR) 40 MG tablet Take 40 mg by mouth daily 1/2 pill at night   • sacubitril-valsartan (Entresto)  MG TABS Take 1 tablet by mouth 2 (two) times a day   • sodium chloride 0.9 % nebulizer solution Inhale 3 mL 2 (two)  "times a day   • spironolactone (ALDACTONE) 25 mg tablet    • traMADol (ULTRAM) 50 mg tablet    • traZODone (DESYREL) 100 mg tablet TAKE 1 TABLET AT BEDTIME AS NEEDED FOR SLEEP   • Tremfya 100 MG/ML SOSY    • triamcinolone (KENALOG) 0.1 % cream Apply topically as needed Twice a week   • urea (CARMOL) 40 % Apply topically as needed   • VASCEPA 1 g CAPS Take by mouth daily   • doxycycline hyclate (VIBRAMYCIN) 100 mg capsule Take 100 mg by mouth daily (Patient not taking: Reported on 12/15/2023)   • fluticasone (Flovent HFA) 220 mcg/act inhaler Inhale 2 puffs as needed   • levocetirizine (XYZAL) 5 MG tablet Take 5 mg by mouth (Patient not taking: Reported on 9/27/2023)   • levocetirizine (XYZAL) 5 MG tablet Take 5 mg by mouth every evening (Patient not taking: Reported on 12/15/2023)   • metoprolol succinate (TOPROL-XL) 25 mg 24 hr tablet Take 25 mg by mouth daily   • rosuvastatin (CRESTOR) 40 MG tablet Take 20 mg by mouth daily   • STELARA 90 MG/ML subcutaneous injection  (Patient not taking: Reported on 2/7/2023)   • [DISCONTINUED] HumaLOG Mix 75/25 KwikPen (75-25) 100 units/mL injection pen INJECT 45 UNITS UNDER THE SKIN BEFORE BREAKFAST AND 40 UNITS BEFORE DINNER. Dose change.   • [DISCONTINUED] predniSONE 50 mg tablet Take 50 mg by mouth daily (Patient not taking: Reported on 12/15/2023)   • [DISCONTINUED] predniSONE 50 mg tablet Take 50 mg by mouth daily (Patient not taking: Reported on 12/15/2023)     No current facility-administered medications on file prior to visit.       Allergies   Allergen Reactions   • Bee Venom Shortness Of Breath   • Lidocaine Shortness Of Breath     Pt reports he was tested and does not have allergy to Lidocaine   • Bee Pollen Other (See Comments)     edema  Edema       Physical Exam:    Ht 5' 10\" (1.778 m)   Wt 128 kg (283 lb)   BMI 40.61 kg/m²     Constitutional:normal, well developed, well nourished, alert, in no distress and non-toxic and no overt pain " behavior.  Eyes:anicteric  HEENT:grossly intact  Neck:supple, symmetric, trachea midline and no masses   Pulmonary:even and unlabored  Cardiovascular:No edema or pitting edema present  Skin:Normal without rashes or lesions and well hydrated  Psychiatric:Mood and affect appropriate  Neurologic:Cranial Nerves II-XII grossly intact  Musculoskeletal:normal    Lumbar Spine Exam  Appearance:  Normal lordosis  Palpation/Tenderness:  right lumbar paraspinal tenderness  Right lumbar facet tenderness with positive facet loading at L4-5 L5-S1  Range of Motion:  Flexion:  Minimally limited  with pain  Extension:  Minimally limited  with pain  Motor Strength:  Left hip flexion:  5/5  Left hip extension:  5/5  Right hip flexion:  5/5  Right hip extension:  5/5  Left knee flexion:  5/5  Left knee extension:  5/5  Right knee flexion:  5/5  Right knee extension:  5/5  Left foot dorsiflexion:  5/5  Left foot plantar flexion:  5/5  Right foot dorsiflexion:  5/5  Right foot plantar flexion:  5/5    Imaging    MRI LUMBAR SPINE WITHOUT CONTRAST (12/27/2022)     INDICATION: M48.062: Spinal stenosis, lumbar region with neurogenic claudication.     COMPARISON:  11/13/2018     TECHNIQUE:  Multiplanar, multisequence imaging of the lumbar spine was performed. .          IMAGE QUALITY:  Diagnostic     FINDINGS:     VERTEBRAL BODIES:  There are 5 lumbar type vertebral bodies.  Normal alignment of the lumbar spine.  No spondylolysis or spondylolisthesis. No scoliosis.  No compression fracture.    No suspicious marrow signal abnormality identified.  There is a stable   hemangioma within the L2 vertebral body.     SACRUM:  Normal signal within the sacrum. No evidence of insufficiency or stress fracture.     DISTAL CORD AND CONUS:  Normal size and signal within the distal cord and conus.  Conus medullaris terminates at L1.     PARASPINAL SOFT TISSUES:  Paraspinal soft tissues are unremarkable.     LOWER THORACIC DISC SPACES:  Normal disc height and  signal.  No disc herniation, canal stenosis or foraminal narrowing.     LUMBAR DISC SPACES:     L1-L2:  There is a bulging annulus with a small superimposed central disc extrusion.  There is facet arthrosis.  There is mild mass effect on thecal sac.  There is no significant foraminal narrowing.  Findings are stable.     L2-L3:  There is facet arthrosis.  There is no significant canal stenosis or foraminal narrowing.     L3-L4:  There is a bulging annulus.  There is fluid within the facet joints bilaterally.  There is mild canal stenosis.  There is a tiny synovial cyst arising from the left facet joint again noted and similar to the prior study.  There is moderate left   foraminal narrowing with contact of the exiting nerve root.  There is mild to moderate right foraminal narrowing.  Findings are overall stable.  There is mild canal stenosis.     L4-L5:  There is a bulging annulus with a small superimposed left paracentral/foraminal disc protrusion.  There is facet arthrosis.  There is mild canal stenosis.  There is moderate to severe there is moderate right foraminal narrowing with impingement   the exiting L4 nerve root.  Left foraminal narrowing with impingement of the exiting nerve root.     L5-S1:  There is a bulging annulus with a superimposed central/left paracentral disc protrusion.  There is mild mass effect on thecal sac.  There is facet arthrosis.  There is moderate left foraminal narrowing with contact of the exiting nerve root.       OTHER FINDINGS:  None.

## 2024-02-05 ENCOUNTER — OFFICE VISIT (OUTPATIENT)
Dept: PAIN MEDICINE | Facility: CLINIC | Age: 79
End: 2024-02-05
Payer: MEDICARE

## 2024-02-05 VITALS — BODY MASS INDEX: 40.52 KG/M2 | HEIGHT: 70 IN | WEIGHT: 283 LBS

## 2024-02-05 DIAGNOSIS — N18.2 TYPE 2 DIABETES MELLITUS WITH STAGE 2 CHRONIC KIDNEY DISEASE, WITH LONG-TERM CURRENT USE OF INSULIN (HCC): ICD-10-CM

## 2024-02-05 DIAGNOSIS — M47.816 LUMBAR SPONDYLOSIS: Primary | ICD-10-CM

## 2024-02-05 DIAGNOSIS — E11.22 TYPE 2 DIABETES MELLITUS WITH STAGE 2 CHRONIC KIDNEY DISEASE, WITH LONG-TERM CURRENT USE OF INSULIN (HCC): ICD-10-CM

## 2024-02-05 DIAGNOSIS — Z79.4 TYPE 2 DIABETES MELLITUS WITH STAGE 2 CHRONIC KIDNEY DISEASE, WITH LONG-TERM CURRENT USE OF INSULIN (HCC): ICD-10-CM

## 2024-02-05 PROCEDURE — 99214 OFFICE O/P EST MOD 30 MIN: CPT | Performed by: ANESTHESIOLOGY

## 2024-02-05 RX ORDER — INSULIN LISPRO 100 [IU]/ML
INJECTION, SUSPENSION SUBCUTANEOUS
Qty: 90 ML | Refills: 2 | Status: SHIPPED | OUTPATIENT
Start: 2024-02-05

## 2024-03-05 ENCOUNTER — HOSPITAL ENCOUNTER (OUTPATIENT)
Dept: RADIOLOGY | Facility: CLINIC | Age: 79
Discharge: HOME | End: 2024-03-05
Payer: MEDICARE

## 2024-03-05 VITALS
OXYGEN SATURATION: 94 % | RESPIRATION RATE: 18 BRPM | HEART RATE: 65 BPM | SYSTOLIC BLOOD PRESSURE: 152 MMHG | TEMPERATURE: 97.5 F | DIASTOLIC BLOOD PRESSURE: 76 MMHG

## 2024-03-05 DIAGNOSIS — M47.816 LUMBAR SPONDYLOSIS: ICD-10-CM

## 2024-03-05 PROCEDURE — 64493 INJ PARAVERT F JNT L/S 1 LEV: CPT | Performed by: ANESTHESIOLOGY

## 2024-03-05 PROCEDURE — 64494 INJ PARAVERT F JNT L/S 2 LEV: CPT | Performed by: ANESTHESIOLOGY

## 2024-03-05 RX ORDER — BUPIVACAINE HYDROCHLORIDE 7.5 MG/ML
5 INJECTION, SOLUTION EPIDURAL; RETROBULBAR ONCE
Status: COMPLETED | OUTPATIENT
Start: 2024-03-05 | End: 2024-03-05

## 2024-03-05 RX ADMIN — BUPIVACAINE HYDROCHLORIDE 1.5 ML: 7.5 INJECTION, SOLUTION EPIDURAL; RETROBULBAR at 10:43

## 2024-03-05 NOTE — H&P
History of Present Illness: The patient is a 78 y.o. male who presents with complaints of right lower back pain is here today for right L3-5 diagnostic blocks    Past Medical History:   Diagnosis Date    Arthritis     knees    Cancer (HCC)     prostate    Coronary artery disease     two coronary stents    Diabetes mellitus (HCC)     Hyperlipidemia 10/19/2018    Hypertension     Psoriasis     lower extremities and buttocks    Sleep apnea     Type 2 diabetes mellitus (HCC) 10/19/2018       Past Surgical History:   Procedure Laterality Date    CATARACT EXTRACTION Bilateral     with iol's    COLONOSCOPY N/A 4/5/2016    Procedure: COLONOSCOPY snare polypectomy;  Surgeon: Dung España MD;  Location: [facility] GI LAB;  Service:     INSERTION PROSTATE RADIATION SEED      REPLACEMENT TOTAL KNEE Right     TRACHEOSTOMY      x 2  up to 5 years ago    UMBILICAL HERNIA REPAIR           Current Outpatient Medications:     Ascorbic Acid (VITAMIN C) 1000 MG tablet, Take 1,000 mg by mouth daily, Disp: , Rfl:     aspirin 81 MG tablet, Take 81 mg by mouth daily., Disp: , Rfl:     Azelastine-Fluticasone 137-50 MCG/ACT SUSP, , Disp: , Rfl:     B-D ULTRAFINE III SHORT PEN 31G X 8 MM MISC, USE WITH INSULIN TWICE A DAY, Disp: 180 each, Rfl: 3    Blood Glucose Monitoring Suppl (OneTouch Verio) w/Device KIT, Use to test blood sugar 3 times a day, Disp: 1 kit, Rfl: 0    Blood Glucose Monitoring Suppl (OneTouch Verio) w/Device KIT, Use to test blood sugar 3 times a day, Disp: 1 kit, Rfl: 0    cholecalciferol (VITAMIN D3) 1,000 units tablet, Take 2,000 Units by mouth daily , Disp: , Rfl:     clopidogrel (PLAVIX) 75 mg tablet, TAKE 1 TABLET DAILY, Disp: , Rfl:     doxycycline hyclate (VIBRAMYCIN) 100 mg capsule, Take 100 mg by mouth daily (Patient not taking: Reported on 12/15/2023), Disp: , Rfl:     Entresto  MG TABS, , Disp: , Rfl:     ezetimibe (ZETIA) 10 mg tablet, TAKE 1 TABLET BY MOUTH TIMES A WEEK ON MONDAY WEDNESDAY AND FRIDAY AT  6PM, Disp: , Rfl:     Farxiga 10 MG tablet, TAKE 1 TABLET DAILY, Disp: 90 tablet, Rfl: 3    fluticasone (Flovent HFA) 220 mcg/act inhaler, Inhale 2 puffs as needed, Disp: , Rfl:     fluticasone-umeclidinium-vilanterol (Trelegy Ellipta) 100-62.5-25 mcg/actuation inhaler, Inhale 1 puff daily, Disp: , Rfl:     furosemide (LASIX) 40 mg tablet, Take 40 mg by mouth daily Twice a week on Mon & Thur--PRN, Disp: , Rfl:     gabapentin (NEURONTIN) 300 mg capsule, Take 1 capsule by mouth Three times a day, Disp: , Rfl:     Glucagon, rDNA, (Glucagon Emergency) 1 MG KIT, Use glucagon kit if you have a low blood sugar and unconscious, Disp: 1 each, Rfl: 0    glucose blood (OneTouch Verio) test strip, USE TO TEST BLOOD SUGAR THREE TIMES A DAY, Disp: 300 strip, Rfl: 3    guaiFENesin-codeine (ROBITUSSIN AC) 100-10 mg/5 mL oral solution, , Disp: , Rfl:     HumaLOG Mix 75/25 KwikPen (75-25) 100 units/mL injection pen, INJECT 45 UNITS UNDER THE SKIN BEFORE BREAKFAST AND 40 UNITS BEFORE DINNER (DOSE CHANGED), Disp: 90 mL, Rfl: 2    HYDROcodone-acetaminophen (NORCO) 5-325 mg per tablet, Take 1 tablet by mouth 2 (two) times a day as needed for pain Max Daily Amount: 2 tablets, Disp: 30 tablet, Rfl: 0    levocetirizine (XYZAL) 5 MG tablet, Take 5 mg by mouth (Patient not taking: Reported on 9/27/2023), Disp: , Rfl:     levocetirizine (XYZAL) 5 MG tablet, Take 5 mg by mouth every evening (Patient not taking: Reported on 12/15/2023), Disp: , Rfl:     levofloxacin (LEVAQUIN) 750 mg tablet, Take 750 mg by mouth daily, Disp: , Rfl:     lisinopril (ZESTRIL) 5 mg tablet, Take 5 mg by mouth daily , Disp: , Rfl:     metFORMIN (GLUCOPHAGE-XR) 750 mg 24 hr tablet, TAKE 1 TABLET TWICE A DAY, Disp: 180 tablet, Rfl: 3    metoprolol succinate (TOPROL-XL) 25 mg 24 hr tablet, , Disp: , Rfl:     metoprolol succinate (TOPROL-XL) 25 mg 24 hr tablet, Take 25 mg by mouth daily, Disp: , Rfl:     Multiple Vitamins-Minerals (MULTIVITAMIN ADULT PO), Take by mouth,  Disp: , Rfl:     mupirocin (BACTROBAN) 2 % ointment, APPLY OINTMENT TOPICALLY TO AFFECTED AREA UP TO THREE TIMES DAILY, Disp: , Rfl:     neomycin-polymyxin-hydrocortisone (CORTISPORIN) 1 % SOLN, , Disp: , Rfl:     omeprazole (PriLOSEC) 20 mg delayed release capsule, Take 1 capsule (20 mg total) by mouth daily, Disp: 30 capsule, Rfl: 11    rosuvastatin (CRESTOR) 40 MG tablet, Take 40 mg by mouth daily 1/2 pill at night, Disp: , Rfl:     rosuvastatin (CRESTOR) 40 MG tablet, Take 20 mg by mouth daily, Disp: , Rfl:     sacubitril-valsartan (Entresto)  MG TABS, Take 1 tablet by mouth 2 (two) times a day, Disp: , Rfl:     sodium chloride 0.9 % nebulizer solution, Inhale 3 mL 2 (two) times a day, Disp: , Rfl:     spironolactone (ALDACTONE) 25 mg tablet, , Disp: , Rfl:     STELARA 90 MG/ML subcutaneous injection, , Disp: , Rfl:     traMADol (ULTRAM) 50 mg tablet, , Disp: , Rfl:     traZODone (DESYREL) 100 mg tablet, TAKE 1 TABLET AT BEDTIME AS NEEDED FOR SLEEP, Disp: , Rfl:     Tremfya 100 MG/ML SOSY, , Disp: , Rfl:     triamcinolone (KENALOG) 0.1 % cream, Apply topically as needed Twice a week, Disp: , Rfl:     urea (CARMOL) 40 %, Apply topically as needed, Disp: , Rfl:     VASCEPA 1 g CAPS, Take by mouth daily, Disp: , Rfl:     Current Facility-Administered Medications:     bupivacaine (PF) (MARCAINE) 0.75 % injection 5 mL, 5 mL, Other, Once, Dewayne Marx MD    Allergies   Allergen Reactions    Bee Venom Shortness Of Breath    Lidocaine Shortness Of Breath     Pt reports he was tested and does not have allergy to Lidocaine    Bee Pollen Other (See Comments)     edema  Edema       Physical Exam:   Vitals:    03/05/24 1022   BP: 144/73   Pulse: 69   Resp: 20   Temp: 97.5 °F (36.4 °C)   SpO2: 96%     General: Awake, Alert, Oriented x 3, Mood and affect appropriate  Respiratory: Respirations even and unlabored  Cardiovascular: Peripheral pulses intact; no edema  Musculoskeletal Exam: Right lower back pain    ASA  Score: 3    Patient/Chart Verification  Patient ID Verified: Verbal  Consents Confirmed: To be obtained in the Pre-Procedure area  Interval H&P(within 24 hr) Complete (required for Outpatients and Surgery Admit only): To be obtained in the Pre-Procedure area  Allergies Reviewed: Yes  Anticoag/NSAID held?: NA  Currently on antibiotics?: No    Assessment:   1. Lumbar spondylosis        Plan: Right L3-5 MBB

## 2024-03-05 NOTE — DISCHARGE INSTR - LAB

## 2024-03-06 ENCOUNTER — TELEPHONE (OUTPATIENT)
Dept: PAIN MEDICINE | Facility: CLINIC | Age: 79
End: 2024-03-06

## 2024-03-06 ENCOUNTER — TRANSCRIBE ORDERS (OUTPATIENT)
Dept: PAIN MEDICINE | Facility: CLINIC | Age: 79
End: 2024-03-06

## 2024-03-06 NOTE — TELEPHONE ENCOUNTER
Patient scheduled for procedure with , 3/21/24.  Patient does not have mychart, so the following instructions were given to patient verbally, no vaccines 14 days prior or after procedure, nothing to eat or drink 1 hr prior to procedure, wear something loose and comfortable, no jewelry, if ill, infection or antibiotics, must call office to reschedule procedure.  Take prescription medications as normal and you will need a  18 yrs or older.  Advised not to take any pain medication 6 hours before and 6-8 hours after procedure.

## 2024-03-06 NOTE — TELEPHONE ENCOUNTER
----- Message from Dewayne Marx MD sent at 3/6/2024 12:14 PM EST -----  Regarding: FW: Pain diary  Ok to proceed with confirmatory MBB    ----- Message -----  From: Mayda Pond  Sent: 3/6/2024  10:21 AM EST  To: Dewayne Marx MD  Subject: Pain diary                                       Hello,    Patient dropped off pain diary today. I have scanned it into his chart under media for your review.    Thank you

## 2024-03-13 DIAGNOSIS — Z79.4 TYPE 2 DIABETES MELLITUS WITH STAGE 2 CHRONIC KIDNEY DISEASE, WITH LONG-TERM CURRENT USE OF INSULIN (HCC): ICD-10-CM

## 2024-03-13 DIAGNOSIS — E11.22 TYPE 2 DIABETES MELLITUS WITH STAGE 2 CHRONIC KIDNEY DISEASE, WITH LONG-TERM CURRENT USE OF INSULIN (HCC): ICD-10-CM

## 2024-03-13 DIAGNOSIS — N18.2 TYPE 2 DIABETES MELLITUS WITH STAGE 2 CHRONIC KIDNEY DISEASE, WITH LONG-TERM CURRENT USE OF INSULIN (HCC): ICD-10-CM

## 2024-03-13 RX ORDER — DAPAGLIFLOZIN 10 MG/1
TABLET, FILM COATED ORAL
Qty: 90 TABLET | Refills: 1 | Status: SHIPPED | OUTPATIENT
Start: 2024-03-13

## 2024-03-16 LAB
FRUCTOSAMINE SERPL-SCNC: 297 UMOL/L (ref 0–285)
HBA1C MFR BLD: 8.6 % (ref 4.8–5.6)
SL AMB T4, FREE (DIRECT): 1 NG/DL (ref 0.82–1.77)
TSH SERPL DL<=0.005 MIU/L-ACNC: 4.59 UIU/ML (ref 0.45–4.5)

## 2024-03-20 DIAGNOSIS — N18.2 TYPE 2 DIABETES MELLITUS WITH STAGE 2 CHRONIC KIDNEY DISEASE, WITH LONG-TERM CURRENT USE OF INSULIN (HCC): ICD-10-CM

## 2024-03-20 DIAGNOSIS — Z79.4 TYPE 2 DIABETES MELLITUS WITH STAGE 2 CHRONIC KIDNEY DISEASE, WITH LONG-TERM CURRENT USE OF INSULIN (HCC): ICD-10-CM

## 2024-03-20 DIAGNOSIS — E11.22 TYPE 2 DIABETES MELLITUS WITH STAGE 2 CHRONIC KIDNEY DISEASE, WITH LONG-TERM CURRENT USE OF INSULIN (HCC): ICD-10-CM

## 2024-03-20 RX ORDER — METFORMIN HYDROCHLORIDE 750 MG/1
TABLET, EXTENDED RELEASE ORAL
Qty: 180 TABLET | Refills: 1 | Status: SHIPPED | OUTPATIENT
Start: 2024-03-20

## 2024-03-21 ENCOUNTER — HOSPITAL ENCOUNTER (OUTPATIENT)
Dept: RADIOLOGY | Facility: CLINIC | Age: 79
Discharge: HOME | End: 2024-03-21
Payer: MEDICARE

## 2024-03-21 VITALS
DIASTOLIC BLOOD PRESSURE: 70 MMHG | HEART RATE: 66 BPM | RESPIRATION RATE: 18 BRPM | SYSTOLIC BLOOD PRESSURE: 148 MMHG | OXYGEN SATURATION: 94 % | TEMPERATURE: 97.4 F

## 2024-03-21 DIAGNOSIS — M47.816 LUMBAR SPONDYLOSIS: ICD-10-CM

## 2024-03-21 PROCEDURE — 64494 INJ PARAVERT F JNT L/S 2 LEV: CPT | Performed by: ANESTHESIOLOGY

## 2024-03-21 PROCEDURE — 64493 INJ PARAVERT F JNT L/S 1 LEV: CPT | Performed by: ANESTHESIOLOGY

## 2024-03-21 RX ORDER — METOPROLOL SUCCINATE 50 MG/1
50 TABLET, EXTENDED RELEASE ORAL DAILY
COMMUNITY
Start: 2024-03-13

## 2024-03-21 RX ORDER — BUPIVACAINE HYDROCHLORIDE 7.5 MG/ML
5 INJECTION, SOLUTION EPIDURAL; RETROBULBAR ONCE
Status: COMPLETED | OUTPATIENT
Start: 2024-03-21 | End: 2024-03-21

## 2024-03-21 RX ADMIN — BUPIVACAINE HYDROCHLORIDE 1.5 ML: 7.5 INJECTION, SOLUTION EPIDURAL; RETROBULBAR at 13:25

## 2024-03-21 NOTE — DISCHARGE INSTR - LAB

## 2024-03-21 NOTE — H&P
History of Present Illness: The patient is a 78 y.o. male who presents with complaints of right lower back pain is here today for confirmatory right L3-5 blocks    Past Medical History:   Diagnosis Date    Arthritis     knees    Cancer (HCC)     prostate    Coronary artery disease     two coronary stents    Diabetes mellitus (HCC)     Hyperlipidemia 10/19/2018    Hypertension     Psoriasis     lower extremities and buttocks    Sleep apnea     Type 2 diabetes mellitus (HCC) 10/19/2018       Past Surgical History:   Procedure Laterality Date    CATARACT EXTRACTION Bilateral     with iol's    COLONOSCOPY N/A 4/5/2016    Procedure: COLONOSCOPY snare polypectomy;  Surgeon: Dung España MD;  Location: [facility] GI LAB;  Service:     INSERTION PROSTATE RADIATION SEED      REPLACEMENT TOTAL KNEE Right     TRACHEOSTOMY      x 2  up to 5 years ago    UMBILICAL HERNIA REPAIR           Current Outpatient Medications:     Ascorbic Acid (VITAMIN C) 1000 MG tablet, Take 1,000 mg by mouth daily, Disp: , Rfl:     aspirin 81 MG tablet, Take 81 mg by mouth daily., Disp: , Rfl:     Azelastine-Fluticasone 137-50 MCG/ACT SUSP, , Disp: , Rfl:     B-D ULTRAFINE III SHORT PEN 31G X 8 MM MISC, USE WITH INSULIN TWICE A DAY, Disp: 180 each, Rfl: 3    Blood Glucose Monitoring Suppl (OneTouch Verio) w/Device KIT, Use to test blood sugar 3 times a day, Disp: 1 kit, Rfl: 0    Blood Glucose Monitoring Suppl (OneTouch Verio) w/Device KIT, Use to test blood sugar 3 times a day, Disp: 1 kit, Rfl: 0    cholecalciferol (VITAMIN D3) 1,000 units tablet, Take 2,000 Units by mouth daily , Disp: , Rfl:     clopidogrel (PLAVIX) 75 mg tablet, TAKE 1 TABLET DAILY, Disp: , Rfl:     dapagliflozin (Farxiga) 10 MG tablet, TAKE 1 TABLET DAILY, Disp: 90 tablet, Rfl: 1    doxycycline hyclate (VIBRAMYCIN) 100 mg capsule, Take 100 mg by mouth daily (Patient not taking: Reported on 12/15/2023), Disp: , Rfl:     Entresto  MG TABS, , Disp: , Rfl:     ezetimibe  (ZETIA) 10 mg tablet, TAKE 1 TABLET BY MOUTH TIMES A WEEK ON MONDAY WEDNESDAY AND FRIDAY AT 6PM, Disp: , Rfl:     fluticasone (Flovent HFA) 220 mcg/act inhaler, Inhale 2 puffs as needed, Disp: , Rfl:     fluticasone-umeclidinium-vilanterol (Trelegy Ellipta) 100-62.5-25 mcg/actuation inhaler, Inhale 1 puff daily, Disp: , Rfl:     furosemide (LASIX) 40 mg tablet, Take 40 mg by mouth daily Twice a week on Mon & Thur--PRN, Disp: , Rfl:     gabapentin (NEURONTIN) 300 mg capsule, Take 1 capsule by mouth Three times a day, Disp: , Rfl:     Glucagon, rDNA, (Glucagon Emergency) 1 MG KIT, Use glucagon kit if you have a low blood sugar and unconscious, Disp: 1 each, Rfl: 0    glucose blood (OneTouch Verio) test strip, USE TO TEST BLOOD SUGAR THREE TIMES A DAY, Disp: 300 strip, Rfl: 3    guaiFENesin-codeine (ROBITUSSIN AC) 100-10 mg/5 mL oral solution, , Disp: , Rfl:     HumaLOG Mix 75/25 KwikPen (75-25) 100 units/mL injection pen, INJECT 45 UNITS UNDER THE SKIN BEFORE BREAKFAST AND 40 UNITS BEFORE DINNER (DOSE CHANGED), Disp: 90 mL, Rfl: 2    HYDROcodone-acetaminophen (NORCO) 5-325 mg per tablet, Take 1 tablet by mouth 2 (two) times a day as needed for pain Max Daily Amount: 2 tablets, Disp: 30 tablet, Rfl: 0    levocetirizine (XYZAL) 5 MG tablet, Take 5 mg by mouth (Patient not taking: Reported on 9/27/2023), Disp: , Rfl:     levocetirizine (XYZAL) 5 MG tablet, Take 5 mg by mouth every evening (Patient not taking: Reported on 12/15/2023), Disp: , Rfl:     levofloxacin (LEVAQUIN) 750 mg tablet, Take 750 mg by mouth daily, Disp: , Rfl:     lisinopril (ZESTRIL) 5 mg tablet, Take 5 mg by mouth daily , Disp: , Rfl:     metFORMIN (GLUCOPHAGE-XR) 750 mg 24 hr tablet, TAKE 1 TABLET TWICE A DAY, Disp: 180 tablet, Rfl: 1    metoprolol succinate (TOPROL-XL) 25 mg 24 hr tablet, , Disp: , Rfl:     metoprolol succinate (TOPROL-XL) 25 mg 24 hr tablet, Take 25 mg by mouth daily, Disp: , Rfl:     Multiple Vitamins-Minerals (MULTIVITAMIN ADULT  PO), Take by mouth, Disp: , Rfl:     mupirocin (BACTROBAN) 2 % ointment, APPLY OINTMENT TOPICALLY TO AFFECTED AREA UP TO THREE TIMES DAILY, Disp: , Rfl:     neomycin-polymyxin-hydrocortisone (CORTISPORIN) 1 % SOLN, , Disp: , Rfl:     omeprazole (PriLOSEC) 20 mg delayed release capsule, Take 1 capsule (20 mg total) by mouth daily, Disp: 30 capsule, Rfl: 11    rosuvastatin (CRESTOR) 40 MG tablet, Take 40 mg by mouth daily 1/2 pill at night, Disp: , Rfl:     rosuvastatin (CRESTOR) 40 MG tablet, Take 20 mg by mouth daily, Disp: , Rfl:     sacubitril-valsartan (Entresto)  MG TABS, Take 1 tablet by mouth 2 (two) times a day, Disp: , Rfl:     sodium chloride 0.9 % nebulizer solution, Inhale 3 mL 2 (two) times a day, Disp: , Rfl:     spironolactone (ALDACTONE) 25 mg tablet, , Disp: , Rfl:     STELARA 90 MG/ML subcutaneous injection, , Disp: , Rfl:     traMADol (ULTRAM) 50 mg tablet, , Disp: , Rfl:     traZODone (DESYREL) 100 mg tablet, TAKE 1 TABLET AT BEDTIME AS NEEDED FOR SLEEP, Disp: , Rfl:     Tremfya 100 MG/ML SOSY, , Disp: , Rfl:     triamcinolone (KENALOG) 0.1 % cream, Apply topically as needed Twice a week, Disp: , Rfl:     urea (CARMOL) 40 %, Apply topically as needed, Disp: , Rfl:     VASCEPA 1 g CAPS, Take by mouth daily, Disp: , Rfl:     Current Facility-Administered Medications:     bupivacaine (PF) (MARCAINE) 0.75 % injection 5 mL, 5 mL, Other, Once, Dewayne Marx MD    Allergies   Allergen Reactions    Bee Venom Shortness Of Breath    Lidocaine Shortness Of Breath     Pt reports he was tested and does not have allergy to Lidocaine    Bee Pollen Other (See Comments)     edema  Edema       Physical Exam:   Vitals:    03/21/24 1307   BP: 148/69   Pulse: 84   Resp: 18   Temp: (!) 97.4 °F (36.3 °C)   SpO2: 93%     General: Awake, Alert, Oriented x 3, Mood and affect appropriate  Respiratory: Respirations even and unlabored  Cardiovascular: Peripheral pulses intact; no edema  Musculoskeletal Exam: Right  lower back pain    ASA Score: 3    Patient/Chart Verification  Patient ID Verified: Verbal  ID Band Applied: No  Consents Confirmed: Procedural, To be obtained in the Pre-Procedure area  H&P( within 30 days) Verified: To be obtained in the Pre-Procedure area  Allergies Reviewed: Yes  Anticoag/NSAID held?: No  Currently on antibiotics?: No    Assessment:   1. Lumbar spondylosis        Plan: Rt L3-L5 MBB#2

## 2024-03-22 ENCOUNTER — OFFICE VISIT (OUTPATIENT)
Dept: ENDOCRINOLOGY | Facility: CLINIC | Age: 79
End: 2024-03-22
Payer: MEDICARE

## 2024-03-22 VITALS
WEIGHT: 289 LBS | OXYGEN SATURATION: 98 % | BODY MASS INDEX: 41.37 KG/M2 | HEART RATE: 80 BPM | DIASTOLIC BLOOD PRESSURE: 78 MMHG | SYSTOLIC BLOOD PRESSURE: 146 MMHG | HEIGHT: 70 IN

## 2024-03-22 DIAGNOSIS — E66.01 CLASS 2 SEVERE OBESITY DUE TO EXCESS CALORIES WITH SERIOUS COMORBIDITY AND BODY MASS INDEX (BMI) OF 38.0 TO 38.9 IN ADULT (HCC): ICD-10-CM

## 2024-03-22 DIAGNOSIS — E55.9 VITAMIN D DEFICIENCY: ICD-10-CM

## 2024-03-22 DIAGNOSIS — E11.319 TYPE 2 DIABETES MELLITUS WITH RETINOPATHY, WITH LONG-TERM CURRENT USE OF INSULIN, MACULAR EDEMA PRESENCE UNSPECIFIED, UNSPECIFIED LATERALITY, UNSPECIFIED RETINOPATHY SEVERITY (HCC): ICD-10-CM

## 2024-03-22 DIAGNOSIS — Z79.4 TYPE 2 DIABETES MELLITUS WITH RETINOPATHY, WITH LONG-TERM CURRENT USE OF INSULIN, MACULAR EDEMA PRESENCE UNSPECIFIED, UNSPECIFIED LATERALITY, UNSPECIFIED RETINOPATHY SEVERITY (HCC): ICD-10-CM

## 2024-03-22 DIAGNOSIS — I10 HYPERTENSION GOAL BP (BLOOD PRESSURE) < 140/90: ICD-10-CM

## 2024-03-22 DIAGNOSIS — I50.41 HYPERTENSIVE HEART DISEASE WITH ACUTE COMBINED SYSTOLIC AND DIASTOLIC CONGESTIVE HEART FAILURE (HCC): ICD-10-CM

## 2024-03-22 DIAGNOSIS — E11.22 TYPE 2 DIABETES MELLITUS WITH STAGE 2 CHRONIC KIDNEY DISEASE, WITH LONG-TERM CURRENT USE OF INSULIN (HCC): ICD-10-CM

## 2024-03-22 DIAGNOSIS — E78.2 MIXED HYPERLIPIDEMIA: Primary | ICD-10-CM

## 2024-03-22 DIAGNOSIS — I11.0 HYPERTENSIVE HEART DISEASE WITH ACUTE COMBINED SYSTOLIC AND DIASTOLIC CONGESTIVE HEART FAILURE (HCC): ICD-10-CM

## 2024-03-22 DIAGNOSIS — N18.2 TYPE 2 DIABETES MELLITUS WITH STAGE 2 CHRONIC KIDNEY DISEASE, WITH LONG-TERM CURRENT USE OF INSULIN (HCC): ICD-10-CM

## 2024-03-22 DIAGNOSIS — Z79.4 TYPE 2 DIABETES MELLITUS WITH STAGE 2 CHRONIC KIDNEY DISEASE, WITH LONG-TERM CURRENT USE OF INSULIN (HCC): ICD-10-CM

## 2024-03-22 PROCEDURE — 99214 OFFICE O/P EST MOD 30 MIN: CPT | Performed by: NURSE PRACTITIONER

## 2024-03-22 NOTE — ASSESSMENT & PLAN NOTE
Lab Results   Component Value Date    HGBA1C 8.6 (H) 03/15/2024     HGA1C discordant with blood sugar levels and fructosamine levels. Continue current regimen for now.     Discussed risks/complications associated with uncontrolled diabetes including organ involvement, heart attack, stroke, death.    Advised lifestyle modifications including attention to diet including the amount and types of carbohydrates consumed and regular activity.     Call for blood sugars less than 70 mg/dl or patterns over 250 mg/dl.     Discussed symptoms and treatment of hypoglycemia.  Reviewed risks associated with hypoglycemia. Always carry rapid acting carbohydrates and a glucometer (a way to check your blood sugar).    Recommendation for medical identification either bracelet, necklace.    Has unexpired glucagon.     Routine follow up for diabetic eye and foot exams.     Ordered blood work to complete prior to next visit.    Send glucose logs/CGM download in 1-2 weeks for review    Follow up in 3 months.

## 2024-03-22 NOTE — PROGRESS NOTES
Established Patient Progress Note    Chief Complaint:  Diabetes follow up visit    Impression & Plan:    Problem List Items Addressed This Visit          Cardiovascular and Mediastinum    Hypertension goal BP (blood pressure) < 140/90     Remains above goal.   Clinically asymptomatic.   Recommend follow up with PCP for this.          Relevant Medications    metoprolol succinate (TOPROL-XL) 50 mg 24 hr tablet    Hypertensive heart disease with acute combined systolic and diastolic congestive heart failure (HCC)    Relevant Medications    metoprolol succinate (TOPROL-XL) 50 mg 24 hr tablet       Endocrine    Type 2 diabetes mellitus with retinopathy, with long-term current use of insulin ([facility])     Regular diabetic eye care.              Relevant Orders    Ambulatory referral to Ophthalmology    Fructosamine    Comprehensive metabolic panel    Hemoglobin A1C    Albumin / creatinine urine ratio    Type 2 diabetes mellitus with stage 2 chronic kidney disease, with long-term current use of insulin ([facility])       Lab Results   Component Value Date    HGBA1C 8.6 (H) 03/15/2024     HGA1C discordant with blood sugar levels and fructosamine levels. Continue current regimen for now.     Discussed risks/complications associated with uncontrolled diabetes including organ involvement, heart attack, stroke, death.    Advised lifestyle modifications including attention to diet including the amount and types of carbohydrates consumed and regular activity.     Call for blood sugars less than 70 mg/dl or patterns over 250 mg/dl.     Discussed symptoms and treatment of hypoglycemia.  Reviewed risks associated with hypoglycemia. Always carry rapid acting carbohydrates and a glucometer (a way to check your blood sugar).    Recommendation for medical identification either bracelet, necklace.    Has unexpired glucagon.     Routine follow up for diabetic eye and foot exams.     Ordered blood work to complete prior to next visit.    Send  glucose logs/CGM download in 1-2 weeks for review    Follow up in 3 months.               Other    Mixed hyperlipidemia - Primary     Continues statin and vascepa. LDL at goal.          Relevant Orders    Lipid panel    Class 2 severe obesity due to excess calories with serious comorbidity and body mass index (BMI) of 38.0 to 38.9 in adult      Recommend lifestyle modifications with diet and increased physical activity.          Vitamin D deficiency     Continues on vitamin D supplementation.             History of Present Illness:   Caleb Ventura is a 78 y.o. malewith a history of type 2 diabetes with long term use of insulin. Reports complications of CKD, CAD, CHF he follows with cardiology for and is on Farxiga.  Other significant past medical history includes obesity denies recent illness or hospitalizations. Denies recent severe hypoglycemic or severe hyperglycemic episodes. Denies any issues with his current regimen. Home glucose monitoring: are performed regularly.       Home blood glucose readings:   Before breakfast:  mg/dL  Before lunch: 101-179  Before dinner: 124-163 mg/dL  Bedtime: X     Current regimen:   Insulin 75/25 46 units twice a day with meals  Metformin  mg orally twice a day  Farxiga 10 mg orally daily     Denies side effects of treatment      Last Eye Exam: Due for eye exam, denies visual cahnges  Last Foot Exam: Follows with podiatry.      Has hypertension: Taking ACE/ARB, compliant   Has hyperlipidemia: Taking Crestor and Vascepa, tolerating     No history of thyroid disease  No history of pancreatitis        Patient Active Problem List   Diagnosis    Obstructive sleep apnea    Coronary artery disease involving native coronary artery    Degenerative arthritis of left knee    Essential (primary) hypertension    Glottic stenosis    Vocal cord paralysis, bilateral complete    Type 2 diabetes mellitus with retinopathy, with long-term current use of insulin (HCC)    Mixed  hyperlipidemia    Lumbar stenosis with neurogenic claudication    Lumbar spondylosis    Intervertebral disc disorder with radiculopathy of lumbar region    Sacroiliitis (HCC)    Trochanteric bursitis of right hip    Class 2 severe obesity due to excess calories with serious comorbidity and body mass index (BMI) of 38.0 to 38.9 in adult     Stage 2 chronic kidney disease    Vitamin D deficiency    Psoriasis    Hypertension goal BP (blood pressure) < 140/90    Type 2 diabetes mellitus with stage 2 chronic kidney disease, with long-term current use of insulin (HCC)    Hypertensive heart disease with acute combined systolic and diastolic congestive heart failure (HCC)    Chronic pain of left knee      Past Medical History:   Diagnosis Date    Arthritis     knees    Cancer (HCC)     prostate    Coronary artery disease     two coronary stents    Diabetes mellitus (HCC)     Hyperlipidemia 10/19/2018    Hypertension     Psoriasis     lower extremities and buttocks    Sleep apnea     Type 2 diabetes mellitus (HCC) 10/19/2018      Past Surgical History:   Procedure Laterality Date    CATARACT EXTRACTION Bilateral     with iol's    COLONOSCOPY N/A 4/5/2016    Procedure: COLONOSCOPY snare polypectomy;  Surgeon: Dung España MD;  Location: [facility] GI LAB;  Service:     INSERTION PROSTATE RADIATION SEED      REPLACEMENT TOTAL KNEE Right     TRACHEOSTOMY      x 2  up to 5 years ago    UMBILICAL HERNIA REPAIR        Family History   Problem Relation Age of Onset    No Known Problems Brother     No Known Problems Son     No Known Problems Son      Social History     Tobacco Use    Smoking status: Never    Smokeless tobacco: Never   Substance Use Topics    Alcohol use: Yes     Alcohol/week: 4.0 standard drinks of alcohol     Types: 4 Cans of beer per week     Comment: daily     Allergies   Allergen Reactions    Bee Venom Shortness Of Breath    Bee Pollen Other (See Comments)     edema  Edema         Current Outpatient  Medications:     Ascorbic Acid (VITAMIN C) 1000 MG tablet, Take 1,000 mg by mouth daily, Disp: , Rfl:     aspirin 81 MG tablet, Take 81 mg by mouth daily., Disp: , Rfl:     Blood Glucose Monitoring Suppl (OneTouch Verio) w/Device KIT, Use to test blood sugar 3 times a day, Disp: 1 kit, Rfl: 0    cholecalciferol (VITAMIN D3) 1,000 units tablet, Take 2,000 Units by mouth daily , Disp: , Rfl:     dapagliflozin (Farxiga) 10 MG tablet, TAKE 1 TABLET DAILY, Disp: 90 tablet, Rfl: 1    Entresto  MG TABS, , Disp: , Rfl:     ezetimibe (ZETIA) 10 mg tablet, TAKE 1 TABLET BY MOUTH TIMES A WEEK ON MONDAY WEDNESDAY AND FRIDAY AT 6PM, Disp: , Rfl:     gabapentin (NEURONTIN) 300 mg capsule, Take 1 capsule by mouth Three times a day, Disp: , Rfl:     glucose blood (OneTouch Verio) test strip, USE TO TEST BLOOD SUGAR THREE TIMES A DAY, Disp: 300 strip, Rfl: 3    HumaLOG Mix 75/25 KwikPen (75-25) 100 units/mL injection pen, INJECT 45 UNITS UNDER THE SKIN BEFORE BREAKFAST AND 40 UNITS BEFORE DINNER (DOSE CHANGED) (Patient taking differently: Inject 46 Units under the skin 2 (two) times a day with meals INJECT 46 UNITS UNDER THE SKIN BEFORE BREAKFAST AND 46 UNITS BEFORE DINNER (DOSE CHANGED)), Disp: 90 mL, Rfl: 2    lisinopril (ZESTRIL) 5 mg tablet, Take 5 mg by mouth daily , Disp: , Rfl:     metFORMIN (GLUCOPHAGE-XR) 750 mg 24 hr tablet, TAKE 1 TABLET TWICE A DAY, Disp: 180 tablet, Rfl: 1    metoprolol succinate (TOPROL-XL) 25 mg 24 hr tablet, , Disp: , Rfl:     metoprolol succinate (TOPROL-XL) 50 mg 24 hr tablet, Take 50 mg by mouth daily, Disp: , Rfl:     Multiple Vitamins-Minerals (MULTIVITAMIN ADULT PO), Take by mouth, Disp: , Rfl:     omeprazole (PriLOSEC) 20 mg delayed release capsule, Take 1 capsule (20 mg total) by mouth daily, Disp: 30 capsule, Rfl: 11    rosuvastatin (CRESTOR) 40 MG tablet, Take 40 mg by mouth daily 1/2 pill at night, Disp: , Rfl:     sacubitril-valsartan (Entresto)  MG TABS, Take 1 tablet by  mouth 2 (two) times a day, Disp: , Rfl:     spironolactone (ALDACTONE) 25 mg tablet, , Disp: , Rfl:     STELARA 90 MG/ML subcutaneous injection, , Disp: , Rfl:     traZODone (DESYREL) 100 mg tablet, TAKE 1 TABLET AT BEDTIME AS NEEDED FOR SLEEP, Disp: , Rfl:     Tremfya 100 MG/ML SOSY, , Disp: , Rfl:     VASCEPA 1 g CAPS, Take by mouth daily, Disp: , Rfl:     Azelastine-Fluticasone 137-50 MCG/ACT SUSP, , Disp: , Rfl:     B-D ULTRAFINE III SHORT PEN 31G X 8 MM MISC, USE WITH INSULIN TWICE A DAY, Disp: 180 each, Rfl: 3    Blood Glucose Monitoring Suppl (OneTouch Verio) w/Device KIT, Use to test blood sugar 3 times a day, Disp: 1 kit, Rfl: 0    clopidogrel (PLAVIX) 75 mg tablet, TAKE 1 TABLET DAILY (Patient not taking: Reported on 3/22/2024), Disp: , Rfl:     doxycycline hyclate (VIBRAMYCIN) 100 mg capsule, Take 100 mg by mouth daily (Patient not taking: Reported on 12/15/2023), Disp: , Rfl:     fluticasone (Flovent HFA) 220 mcg/act inhaler, Inhale 2 puffs as needed, Disp: , Rfl:     fluticasone-umeclidinium-vilanterol (Trelegy Ellipta) 100-62.5-25 mcg/actuation inhaler, Inhale 1 puff daily, Disp: , Rfl:     furosemide (LASIX) 40 mg tablet, Take 40 mg by mouth daily Twice a week on Mon & Thur--PRN, Disp: , Rfl:     Glucagon, rDNA, (Glucagon Emergency) 1 MG KIT, Use glucagon kit if you have a low blood sugar and unconscious, Disp: 1 each, Rfl: 0    guaiFENesin-codeine (ROBITUSSIN AC) 100-10 mg/5 mL oral solution, , Disp: , Rfl:     HYDROcodone-acetaminophen (NORCO) 5-325 mg per tablet, Take 1 tablet by mouth 2 (two) times a day as needed for pain Max Daily Amount: 2 tablets (Patient not taking: Reported on 3/22/2024), Disp: 30 tablet, Rfl: 0    levocetirizine (XYZAL) 5 MG tablet, Take 5 mg by mouth, Disp: , Rfl:     levocetirizine (XYZAL) 5 MG tablet, Take 5 mg by mouth every evening, Disp: , Rfl:     levofloxacin (LEVAQUIN) 750 mg tablet, Take 750 mg by mouth daily, Disp: , Rfl:     metoprolol succinate (TOPROL-XL) 25  "mg 24 hr tablet, Take 25 mg by mouth daily, Disp: , Rfl:     mupirocin (BACTROBAN) 2 % ointment, APPLY OINTMENT TOPICALLY TO AFFECTED AREA UP TO THREE TIMES DAILY, Disp: , Rfl:     neomycin-polymyxin-hydrocortisone (CORTISPORIN) 1 % SOLN, , Disp: , Rfl:     rosuvastatin (CRESTOR) 40 MG tablet, Take 20 mg by mouth daily, Disp: , Rfl:     sodium chloride 0.9 % nebulizer solution, Inhale 3 mL 2 (two) times a day (Patient not taking: Reported on 3/22/2024), Disp: , Rfl:     traMADol (ULTRAM) 50 mg tablet, , Disp: , Rfl:     triamcinolone (KENALOG) 0.1 % cream, Apply topically as needed Twice a week, Disp: , Rfl:     urea (CARMOL) 40 %, Apply topically as needed, Disp: , Rfl:   No current facility-administered medications for this visit.    Review of Systems  See HPI   All other systems reviewed and are negative.      Physical Exam:  Body mass index is 41.47 kg/m².  /78   Pulse 80   Ht 5' 10\" (1.778 m)   Wt 131 kg (289 lb)   SpO2 98%   BMI 41.47 kg/m²    Wt Readings from Last 3 Encounters:   03/22/24 131 kg (289 lb)   02/05/24 128 kg (283 lb)   12/15/23 128 kg (281 lb 12.8 oz)       Physical Exam   Constitutional: He is oriented to person, place, and time. He appears well-developed and well-nourished. No distress.   HENT:   Head: Normocephalic and atraumatic.   Neck: Normal range of motion.   Pulmonary/Chest: Effort normal.   Musculoskeletal: Normal range of motion.   Neurological: He is alert and oriented to person, place, and time.   Skin: He is not diaphoretic.   Psychiatric: He has a normal mood and affect. His behavior is normal.     Labs:   Lab Results   Component Value Date    HGBA1C 8.6 (H) 03/15/2024    HGBA1C 8.7 (H) 12/13/2023    HGBA1C 8.5 (H) 08/23/2023     Lab Results   Component Value Date    CREATININE 1.11 12/13/2023    CREATININE 1.17 08/23/2023    CREATININE 1.15 05/22/2023    BUN 20 12/13/2023    K 4.6 12/13/2023    CL 99 12/13/2023    CO2 24 12/13/2023     GFR, Calculated   Date Value Ref " "Range Status   08/08/2019 65 >60 mL/min/1.73m2 Final     Comment:     Please refer to initial GFR, CALC footnote     eGFR   Date Value Ref Range Status   12/13/2023 68 >59 mL/min/1.73 Final     Lab Results   Component Value Date    HDL 54 12/13/2023    TRIG 153 (H) 12/13/2023     Lab Results   Component Value Date    ALT 22 12/13/2023    AST 20 12/13/2023    ALKPHOS 64 03/19/2018     No results found for: \"[id number]\"  No results found for: \"FREET4\", \"TSI\"    Discussed with the patient and all questioned fully answered. He will call me if any problems arise.    Follow-up appointment in 3 months.     Counseled patient on diagnostic results, prognosis, risk and benefit of treatment options, instruction for management, importance of treatment compliance, Risk  factor reduction and impressions    NASH Martel    "

## 2024-03-25 ENCOUNTER — TELEPHONE (OUTPATIENT)
Dept: PAIN MEDICINE | Facility: CLINIC | Age: 79
End: 2024-03-25

## 2024-03-25 DIAGNOSIS — E11.22 TYPE 2 DIABETES MELLITUS WITH STAGE 2 CHRONIC KIDNEY DISEASE, WITH LONG-TERM CURRENT USE OF INSULIN (HCC): ICD-10-CM

## 2024-03-25 DIAGNOSIS — I10 HYPERTENSION GOAL BP (BLOOD PRESSURE) < 140/90: ICD-10-CM

## 2024-03-25 DIAGNOSIS — E78.2 MIXED HYPERLIPIDEMIA: ICD-10-CM

## 2024-03-25 DIAGNOSIS — N18.2 TYPE 2 DIABETES MELLITUS WITH STAGE 2 CHRONIC KIDNEY DISEASE, WITH LONG-TERM CURRENT USE OF INSULIN (HCC): ICD-10-CM

## 2024-03-25 DIAGNOSIS — Z79.4 TYPE 2 DIABETES MELLITUS WITH STAGE 2 CHRONIC KIDNEY DISEASE, WITH LONG-TERM CURRENT USE OF INSULIN (HCC): ICD-10-CM

## 2024-03-25 RX ORDER — PEN NEEDLE, DIABETIC 31 GX5/16"
NEEDLE, DISPOSABLE MISCELLANEOUS
Qty: 180 EACH | Refills: 3 | Status: SHIPPED | OUTPATIENT
Start: 2024-03-25

## 2024-03-25 NOTE — TELEPHONE ENCOUNTER
----- Message from Dewayne Marx MD sent at 3/25/2024  1:56 PM EDT -----  Regarding: FW: PAIN DIARY  Ok to proceed with RFA    ----- Message -----  From: Laura Carpio  Sent: 3/25/2024   9:07 AM EDT  To: Dewayne Marx MD  Subject: PAIN DIARY                                       Dr. Marx, please see pain diary scanned today under media.    Thank you.

## 2024-03-25 NOTE — TELEPHONE ENCOUNTER
Caller: Sunita     Doctor: Araseli     Reason for call: Patient is having an injection with dermatology for psoriasis on 4/4 it is a steroid injection. Wants to make sure this is ok if not they will change the dermatology appointment     Call back#: [phone]

## 2024-03-25 NOTE — TELEPHONE ENCOUNTER
Patient scheduled for procedure with , 4/11/24.  Patient does not have mychart, so the following instructions were given to patient verbally, no vaccines 14 days prior or after procedure, nothing to eat or drink 1 hr prior to procedure, wear something loose and comfortable, no jewelry, if ill, infection or antibiotics, must call office to reschedule procedure.  Take prescription medications as normal and you will need a  18 yrs or older.

## 2024-04-01 DIAGNOSIS — G89.4 CHRONIC PAIN SYNDROME: ICD-10-CM

## 2024-04-01 NOTE — TELEPHONE ENCOUNTER
Reason for call:   [x] Refill   [] Prior Auth  [] Other:     Office:   [] PCP/Provider -   [x] Specialty/Provider - S&P    Medication: HYDROCODONE- ACETAMINOPHEN    Dose/Frequency: 5-325 MG    Quantity: 30    Pharmacy:   [facility] [address] [place] [address] - [address]  [address] [place] [address]  Phone: [phone]  Fax: [phone]  KENNEDY #: --     Does the patient have enough for 3 days?   [] Yes   [x] No - Send as HP to POD

## 2024-04-01 NOTE — TELEPHONE ENCOUNTER
S/w pt, states he is taking Norco prn up to two pills per day, states he has 3 pills remaining at this time. Last OV 2/5, last prescribed 1/10. Please advise, thank you

## 2024-04-02 RX ORDER — HYDROCODONE BITARTRATE AND ACETAMINOPHEN 5; 325 MG/1; MG/1
1 TABLET ORAL 2 TIMES DAILY PRN
Qty: 60 TABLET | Refills: 0 | Status: SHIPPED | OUTPATIENT
Start: 2024-04-02

## 2024-04-02 NOTE — TELEPHONE ENCOUNTER
Caller: Paul [facility]     Doctor: Araseli    Reason for call: Paul from [facility] asking for a call back stating why patient is taking medication   HYDROcodone-acetaminophen (NORCO) 5-325 mg long term and needs different diagnosis please advise     Call back#: [phone]

## 2024-04-11 ENCOUNTER — HOSPITAL ENCOUNTER (OUTPATIENT)
Dept: RADIOLOGY | Facility: CLINIC | Age: 79
End: 2024-04-11
Payer: MEDICARE

## 2024-04-11 ENCOUNTER — TELEPHONE (OUTPATIENT)
Dept: PAIN MEDICINE | Facility: CLINIC | Age: 79
End: 2024-04-11

## 2024-04-11 VITALS
OXYGEN SATURATION: 94 % | DIASTOLIC BLOOD PRESSURE: 69 MMHG | TEMPERATURE: 98.4 F | SYSTOLIC BLOOD PRESSURE: 141 MMHG | RESPIRATION RATE: 20 BRPM | HEART RATE: 79 BPM

## 2024-04-11 DIAGNOSIS — M47.816 LUMBAR SPONDYLOSIS: ICD-10-CM

## 2024-04-11 PROCEDURE — 64635 DESTROY LUMB/SAC FACET JNT: CPT | Performed by: ANESTHESIOLOGY

## 2024-04-11 PROCEDURE — 64636 DESTROY L/S FACET JNT ADDL: CPT | Performed by: ANESTHESIOLOGY

## 2024-04-11 RX ORDER — METHYLPREDNISOLONE ACETATE 80 MG/ML
80 INJECTION, SUSPENSION INTRA-ARTICULAR; INTRALESIONAL; INTRAMUSCULAR; PARENTERAL; SOFT TISSUE ONCE
Status: COMPLETED | OUTPATIENT
Start: 2024-04-11 | End: 2024-04-11

## 2024-04-11 RX ORDER — BUPIVACAINE HCL/PF 2.5 MG/ML
10 VIAL (ML) INJECTION ONCE
Status: COMPLETED | OUTPATIENT
Start: 2024-04-11 | End: 2024-04-11

## 2024-04-11 RX ORDER — LIDOCAINE HYDROCHLORIDE 10 MG/ML
30 INJECTION, SOLUTION EPIDURAL; INFILTRATION; INTRACAUDAL; PERINEURAL ONCE
Status: COMPLETED | OUTPATIENT
Start: 2024-04-11 | End: 2024-04-11

## 2024-04-11 RX ADMIN — LIDOCAINE HYDROCHLORIDE 17 ML: 10 INJECTION, SOLUTION EPIDURAL; INFILTRATION; INTRACAUDAL; PERINEURAL at 09:09

## 2024-04-11 RX ADMIN — BUPIVACAINE HYDROCHLORIDE 3 ML: 2.5 INJECTION, SOLUTION EPIDURAL; INFILTRATION; INTRACAUDAL at 09:19

## 2024-04-11 RX ADMIN — METHYLPREDNISOLONE ACETATE 20 MG: 80 INJECTION, SUSPENSION INTRA-ARTICULAR; INTRALESIONAL; INTRAMUSCULAR; PARENTERAL; SOFT TISSUE at 09:19

## 2024-04-11 NOTE — H&P
History of Present Illness: The patient is a 78 y.o. male who presents with complaints of right lower back pain and is here today for right L3-5 ablation    Past Medical History:   Diagnosis Date    Arthritis     knees    Cancer (HCC)     prostate    Coronary artery disease     two coronary stents    Diabetes mellitus (HCC)     Hyperlipidemia 10/19/2018    Hypertension     Psoriasis     lower extremities and buttocks    Sleep apnea     Type 2 diabetes mellitus (HCC) 10/19/2018       Past Surgical History:   Procedure Laterality Date    CATARACT EXTRACTION Bilateral     with iol's    COLONOSCOPY N/A 4/5/2016    Procedure: COLONOSCOPY snare polypectomy;  Surgeon: Dung España MD;  Location: [facility] GI LAB;  Service:     INSERTION PROSTATE RADIATION SEED      REPLACEMENT TOTAL KNEE Right     TRACHEOSTOMY      x 2  up to 5 years ago    UMBILICAL HERNIA REPAIR           Current Outpatient Medications:     Ascorbic Acid (VITAMIN C) 1000 MG tablet, Take 1,000 mg by mouth daily, Disp: , Rfl:     aspirin 81 MG tablet, Take 81 mg by mouth daily., Disp: , Rfl:     Azelastine-Fluticasone 137-50 MCG/ACT SUSP, , Disp: , Rfl:     B-D ULTRAFINE III SHORT PEN 31G X 8 MM MISC, USE WITH INSULIN TWICE A DAY, Disp: 180 each, Rfl: 3    Blood Glucose Monitoring Suppl (OneTouch Verio) w/Device KIT, Use to test blood sugar 3 times a day, Disp: 1 kit, Rfl: 0    Blood Glucose Monitoring Suppl (OneTouch Verio) w/Device KIT, Use to test blood sugar 3 times a day, Disp: 1 kit, Rfl: 0    cholecalciferol (VITAMIN D3) 1,000 units tablet, Take 2,000 Units by mouth daily , Disp: , Rfl:     clopidogrel (PLAVIX) 75 mg tablet, TAKE 1 TABLET DAILY (Patient not taking: Reported on 3/22/2024), Disp: , Rfl:     dapagliflozin (Farxiga) 10 MG tablet, TAKE 1 TABLET DAILY, Disp: 90 tablet, Rfl: 1    doxycycline hyclate (VIBRAMYCIN) 100 mg capsule, Take 100 mg by mouth daily (Patient not taking: Reported on 12/15/2023), Disp: , Rfl:     Entresto  MG  TABS, , Disp: , Rfl:     ezetimibe (ZETIA) 10 mg tablet, TAKE 1 TABLET BY MOUTH TIMES A WEEK ON MONDAY WEDNESDAY AND FRIDAY AT 6PM, Disp: , Rfl:     fluticasone (Flovent HFA) 220 mcg/act inhaler, Inhale 2 puffs as needed, Disp: , Rfl:     fluticasone-umeclidinium-vilanterol (Trelegy Ellipta) 100-62.5-25 mcg/actuation inhaler, Inhale 1 puff daily, Disp: , Rfl:     furosemide (LASIX) 40 mg tablet, Take 40 mg by mouth daily Twice a week on Mon & Thur--PRN, Disp: , Rfl:     gabapentin (NEURONTIN) 300 mg capsule, Take 1 capsule by mouth Three times a day, Disp: , Rfl:     Glucagon, rDNA, (Glucagon Emergency) 1 MG KIT, Use glucagon kit if you have a low blood sugar and unconscious, Disp: 1 each, Rfl: 0    glucose blood (OneTouch Verio) test strip, USE TO TEST BLOOD SUGAR THREE TIMES A DAY, Disp: 300 strip, Rfl: 3    guaiFENesin-codeine (ROBITUSSIN AC) 100-10 mg/5 mL oral solution, , Disp: , Rfl:     HumaLOG Mix 75/25 KwikPen (75-25) 100 units/mL injection pen, INJECT 45 UNITS UNDER THE SKIN BEFORE BREAKFAST AND 40 UNITS BEFORE DINNER (DOSE CHANGED) (Patient taking differently: Inject 46 Units under the skin 2 (two) times a day with meals INJECT 46 UNITS UNDER THE SKIN BEFORE BREAKFAST AND 46 UNITS BEFORE DINNER (DOSE CHANGED)), Disp: 90 mL, Rfl: 2    HYDROcodone-acetaminophen (NORCO) 5-325 mg per tablet, Take 1 tablet by mouth 2 (two) times a day as needed for pain Max Daily Amount: 2 tablets, Disp: 60 tablet, Rfl: 0    levocetirizine (XYZAL) 5 MG tablet, Take 5 mg by mouth, Disp: , Rfl:     levocetirizine (XYZAL) 5 MG tablet, Take 5 mg by mouth every evening, Disp: , Rfl:     levofloxacin (LEVAQUIN) 750 mg tablet, Take 750 mg by mouth daily, Disp: , Rfl:     lisinopril (ZESTRIL) 5 mg tablet, Take 5 mg by mouth daily , Disp: , Rfl:     metFORMIN (GLUCOPHAGE-XR) 750 mg 24 hr tablet, TAKE 1 TABLET TWICE A DAY, Disp: 180 tablet, Rfl: 1    metoprolol succinate (TOPROL-XL) 25 mg 24 hr tablet, , Disp: , Rfl:     metoprolol  succinate (TOPROL-XL) 25 mg 24 hr tablet, Take 25 mg by mouth daily, Disp: , Rfl:     metoprolol succinate (TOPROL-XL) 50 mg 24 hr tablet, Take 50 mg by mouth daily, Disp: , Rfl:     Multiple Vitamins-Minerals (MULTIVITAMIN ADULT PO), Take by mouth, Disp: , Rfl:     mupirocin (BACTROBAN) 2 % ointment, APPLY OINTMENT TOPICALLY TO AFFECTED AREA UP TO THREE TIMES DAILY, Disp: , Rfl:     neomycin-polymyxin-hydrocortisone (CORTISPORIN) 1 % SOLN, , Disp: , Rfl:     omeprazole (PriLOSEC) 20 mg delayed release capsule, Take 1 capsule (20 mg total) by mouth daily, Disp: 30 capsule, Rfl: 11    rosuvastatin (CRESTOR) 40 MG tablet, Take 40 mg by mouth daily 1/2 pill at night, Disp: , Rfl:     rosuvastatin (CRESTOR) 40 MG tablet, Take 20 mg by mouth daily, Disp: , Rfl:     sacubitril-valsartan (Entresto)  MG TABS, Take 1 tablet by mouth 2 (two) times a day, Disp: , Rfl:     spironolactone (ALDACTONE) 25 mg tablet, , Disp: , Rfl:     STELARA 90 MG/ML subcutaneous injection, , Disp: , Rfl:     traMADol (ULTRAM) 50 mg tablet, , Disp: , Rfl:     traZODone (DESYREL) 100 mg tablet, TAKE 1 TABLET AT BEDTIME AS NEEDED FOR SLEEP, Disp: , Rfl:     Tremfya 100 MG/ML SOSY, , Disp: , Rfl:     triamcinolone (KENALOG) 0.1 % cream, Apply topically as needed Twice a week, Disp: , Rfl:     urea (CARMOL) 40 %, Apply topically as needed, Disp: , Rfl:     VASCEPA 1 g CAPS, Take by mouth daily, Disp: , Rfl:     Current Facility-Administered Medications:     bupivacaine (PF) (MARCAINE) 0.25 % injection 10 mL, 10 mL, Perineural, Once, Dewayne Marx MD    lidocaine (PF) (XYLOCAINE-MPF) 1 % injection 30 mL, 30 mL, Infiltration, Once, Dewayne Marx MD    methylPREDNISolone acetate (DEPO-MEDROL) injection 80 mg, 80 mg, Perineural, Once, Dewayne Marx MD    Allergies   Allergen Reactions    Bee Venom Shortness Of Breath    Bee Pollen Other (See Comments)     edema  Edema       Physical Exam:   Vitals:    04/11/24 0858   BP: 137/75   Pulse: 72    Resp: 20   Temp: 98.4 °F (36.9 °C)   SpO2: 92%     General: Awake, Alert, Oriented x 3, Mood and affect appropriate  Respiratory: Respirations even and unlabored  Cardiovascular: Peripheral pulses intact; no edema  Musculoskeletal Exam: Right lower back pain    ASA Score: 3    Patient/Chart Verification  Patient ID Verified: Verbal  ID Band Applied: No  Consents Confirmed: Procedural, To be obtained in the Pre-Procedure area  Interval H&P(within 24 hr) Complete (required for Outpatients and Surgery Admit only): To be obtained in the Pre-Procedure area  Allergies Reviewed: Yes  Anticoag/NSAID held?: NA  Currently on antibiotics?: No    Assessment:   1. Lumbar spondylosis        Plan: Right L3-5 RFA

## 2024-04-11 NOTE — DISCHARGE INSTR - LAB

## 2024-04-12 NOTE — TELEPHONE ENCOUNTER
BENJA  S/w Pt. Pt stated current pain level is 1/10. Pt states needle sites look good, denies S&S of infection, denies fevers, denies soreness and denies sun burn like sensation. Advised Pt if does have pain to take prescribed or OTC pain medications and/or use ice/heat and that it takes 4 to 6 weeks to see the full effect. Scheduled F/u appt w/ AS. Pt verbalized understanding.

## 2024-05-23 ENCOUNTER — OFFICE VISIT (OUTPATIENT)
Dept: PAIN MEDICINE | Facility: CLINIC | Age: 79
End: 2024-05-23
Payer: MEDICARE

## 2024-05-23 VITALS
DIASTOLIC BLOOD PRESSURE: 70 MMHG | HEART RATE: 68 BPM | SYSTOLIC BLOOD PRESSURE: 166 MMHG | BODY MASS INDEX: 40.46 KG/M2 | WEIGHT: 282 LBS

## 2024-05-23 DIAGNOSIS — M25.551 RIGHT HIP PAIN: Primary | ICD-10-CM

## 2024-05-23 DIAGNOSIS — G89.4 CHRONIC PAIN SYNDROME: ICD-10-CM

## 2024-05-23 DIAGNOSIS — M48.062 SPINAL STENOSIS OF LUMBAR REGION WITH NEUROGENIC CLAUDICATION: ICD-10-CM

## 2024-05-23 DIAGNOSIS — M47.816 LUMBAR SPONDYLOSIS: ICD-10-CM

## 2024-05-23 PROCEDURE — 99214 OFFICE O/P EST MOD 30 MIN: CPT

## 2024-05-23 NOTE — PROGRESS NOTES
Assessment:  1. Right hip pain    2. Chronic pain syndrome    3. Lumbar spondylosis    4. Spinal stenosis of lumbar region with neurogenic claudication        Plan:  The patient is a 79-year-old male with a history of chronic pain secondary to low back pain, lumbar spondylosis and lumbar spinal stenosis with neurogenic claudication who presents to the office with improved right-sided low back pain after undergoing a right-sided L3-5 RFA on 4/11/2024 and worsening right hip pain.    At this time, I will x-ray the patient's right hip for further evaluation of his right hip pain.  I instructed patient I will call once I receive the results and with options moving forward, such as steroid injection.    I also instructed patient the right-sided L3-5 RFA may provide him relief of his right-sided low back pain for 1 to 2 years and that this procedure can be repeated if his pain were to return.  He can continue his current pain medication regimen, refills for [place] are not needed at this time.    [place] Prescription Drug Monitoring Program report was reviewed and was appropriate     There are risks associated with opioid medications, including dependence, addiction and tolerance. The patient understands and agrees to use these medications only as prescribed. Potential side effects of the medications include, but are not limited to, constipation, drowsiness, addiction, impaired judgment and risk of fatal overdose if not taken as prescribed. The patient was warned against driving while taking sedation medications.  Sharing medications is a felony. At this point in time, the patient is showing no signs of addiction, abuse, diversion or suicidal ideation.    The patient will follow-up in 8 weeks for medication prescription refill and reevaluation. The patient was advised to contact the office should their symptoms worsen in the interim. The patient was agreeable and verbalized an understanding.        History of Present  Illness:    The patient is a 79 y.o. male with a history of chronic pain secondary to low back pain, lumbar spondylosis and lumbar spinal stenosis with neurogenic claudication.  He was last seen on 4/11/2024 where he underwent a right-sided L3-5 RFA which continues to provide him greater than 50% relief of his pain.  He presents to the office with improved right-sided low back pain and worsening right hip pain.    He states the pain in his right-sided low back is better and worse in his right hip since the last office visit.  He states his pain is worse with walking and does ease when he rests.  He rates quality of his pain as burning and is currently rating his pain a 4-5/10 on a numeric scale.    Current pain medications include Norco 5/325 mg which is providing him 60% relief of his pain without side effects.  He does take this medication sparingly.    Pain Contract Signed: 2/5/24  Last Urine Drug Screen:  Last Dose: 5/23/24    I have personally reviewed and/or updated the patient's past medical history, past surgical history, family history, social history, current medications, allergies, and vital signs today.       Review of Systems:    Review of Systems   Respiratory:  Negative for shortness of breath.    Cardiovascular:  Negative for chest pain.   Gastrointestinal:  Positive for constipation. Negative for diarrhea, nausea and vomiting.   Musculoskeletal:  Positive for back pain, gait problem and joint swelling. Negative for arthralgias and myalgias.        BLE Pain   Skin:  Negative for rash.   Neurological:  Positive for weakness. Negative for dizziness and seizures.   All other systems reviewed and are negative.        Past Medical History:   Diagnosis Date    Arthritis     knees    Cancer (HCC)     prostate    Coronary artery disease     two coronary stents    Diabetes mellitus (HCC)     Hyperlipidemia 10/19/2018    Hypertension     Psoriasis     lower extremities and buttocks    Sleep apnea     Type 2  diabetes mellitus (HCC) 10/19/2018       Past Surgical History:   Procedure Laterality Date    CATARACT EXTRACTION Bilateral     with iol's    COLONOSCOPY N/A 4/5/2016    Procedure: COLONOSCOPY snare polypectomy;  Surgeon: Dung España MD;  Location: [facility] GI LAB;  Service:     INSERTION PROSTATE RADIATION SEED      REPLACEMENT TOTAL KNEE Right     TRACHEOSTOMY      x 2  up to 5 years ago    UMBILICAL HERNIA REPAIR         Family History   Problem Relation Age of Onset    No Known Problems Brother     No Known Problems Son     No Known Problems Son        Social History     Occupational History    Not on file   Tobacco Use    Smoking status: Never    Smokeless tobacco: Never   Vaping Use    Vaping status: Never Used   Substance and Sexual Activity    Alcohol use: Yes     Alcohol/week: 4.0 standard drinks of alcohol     Types: 4 Cans of beer per week     Comment: daily    Drug use: No    Sexual activity: Not Currently     Partners: Female         Current Outpatient Medications:     Ascorbic Acid (VITAMIN C) 1000 MG tablet, Take 1,000 mg by mouth daily, Disp: , Rfl:     aspirin 81 MG tablet, Take 81 mg by mouth daily., Disp: , Rfl:     B-D ULTRAFINE III SHORT PEN 31G X 8 MM MISC, USE WITH INSULIN TWICE A DAY, Disp: 180 each, Rfl: 3    Blood Glucose Monitoring Suppl (OneTouch Verio) w/Device KIT, Use to test blood sugar 3 times a day, Disp: 1 kit, Rfl: 0    Blood Glucose Monitoring Suppl (OneTouch Verio) w/Device KIT, Use to test blood sugar 3 times a day, Disp: 1 kit, Rfl: 0    cholecalciferol (VITAMIN D3) 1,000 units tablet, Take 2,000 Units by mouth daily , Disp: , Rfl:     dapagliflozin (Farxiga) 10 MG tablet, TAKE 1 TABLET DAILY, Disp: 90 tablet, Rfl: 1    Entresto  MG TABS, , Disp: , Rfl:     ezetimibe (ZETIA) 10 mg tablet, TAKE 1 TABLET BY MOUTH TIMES A WEEK ON MONDAY WEDNESDAY AND FRIDAY AT 6PM, Disp: , Rfl:     fluticasone-umeclidinium-vilanterol (Trelegy Ellipta) 100-62.5-25 mcg/actuation  inhaler, Inhale 1 puff daily, Disp: , Rfl:     furosemide (LASIX) 40 mg tablet, Take 40 mg by mouth daily Twice a week on Mon & Thur--PRN, Disp: , Rfl:     gabapentin (NEURONTIN) 300 mg capsule, Take 1 capsule by mouth Three times a day, Disp: , Rfl:     Glucagon, rDNA, (Glucagon Emergency) 1 MG KIT, Use glucagon kit if you have a low blood sugar and unconscious, Disp: 1 each, Rfl: 0    glucose blood (OneTouch Verio) test strip, USE TO TEST BLOOD SUGAR THREE TIMES A DAY, Disp: 300 strip, Rfl: 3    guaiFENesin-codeine (ROBITUSSIN AC) 100-10 mg/5 mL oral solution, , Disp: , Rfl:     HumaLOG Mix 75/25 KwikPen (75-25) 100 units/mL injection pen, INJECT 45 UNITS UNDER THE SKIN BEFORE BREAKFAST AND 40 UNITS BEFORE DINNER (DOSE CHANGED) (Patient taking differently: Inject 46 Units under the skin 2 (two) times a day with meals INJECT 46 UNITS UNDER THE SKIN BEFORE BREAKFAST AND 46 UNITS BEFORE DINNER (DOSE CHANGED)), Disp: 90 mL, Rfl: 2    HYDROcodone-acetaminophen (NORCO) 5-325 mg per tablet, Take 1 tablet by mouth 2 (two) times a day as needed for pain Max Daily Amount: 2 tablets, Disp: 60 tablet, Rfl: 0    levocetirizine (XYZAL) 5 MG tablet, Take 5 mg by mouth, Disp: , Rfl:     levocetirizine (XYZAL) 5 MG tablet, Take 5 mg by mouth every evening, Disp: , Rfl:     levofloxacin (LEVAQUIN) 750 mg tablet, Take 750 mg by mouth daily, Disp: , Rfl:     lisinopril (ZESTRIL) 5 mg tablet, Take 5 mg by mouth daily , Disp: , Rfl:     metFORMIN (GLUCOPHAGE-XR) 750 mg 24 hr tablet, TAKE 1 TABLET TWICE A DAY, Disp: 180 tablet, Rfl: 1    metoprolol succinate (TOPROL-XL) 25 mg 24 hr tablet, , Disp: , Rfl:     metoprolol succinate (TOPROL-XL) 50 mg 24 hr tablet, Take 50 mg by mouth daily, Disp: , Rfl:     Multiple Vitamins-Minerals (MULTIVITAMIN ADULT PO), Take by mouth, Disp: , Rfl:     mupirocin (BACTROBAN) 2 % ointment, APPLY OINTMENT TOPICALLY TO AFFECTED AREA UP TO THREE TIMES DAILY, Disp: , Rfl:     neomycin-polymyxin-hydrocortisone  (CORTISPORIN) 1 % SOLN, , Disp: , Rfl:     omeprazole (PriLOSEC) 20 mg delayed release capsule, Take 1 capsule (20 mg total) by mouth daily, Disp: 30 capsule, Rfl: 11    rosuvastatin (CRESTOR) 40 MG tablet, Take 40 mg by mouth daily 1/2 pill at night, Disp: , Rfl:     sacubitril-valsartan (Entresto)  MG TABS, Take 1 tablet by mouth 2 (two) times a day, Disp: , Rfl:     spironolactone (ALDACTONE) 25 mg tablet, , Disp: , Rfl:     STELARA 90 MG/ML subcutaneous injection, , Disp: , Rfl:     traMADol (ULTRAM) 50 mg tablet, , Disp: , Rfl:     traZODone (DESYREL) 100 mg tablet, TAKE 1 TABLET AT BEDTIME AS NEEDED FOR SLEEP, Disp: , Rfl:     Tremfya 100 MG/ML SOSY, , Disp: , Rfl:     triamcinolone (KENALOG) 0.1 % cream, Apply topically as needed Twice a week, Disp: , Rfl:     urea (CARMOL) 40 %, Apply topically as needed, Disp: , Rfl:     VASCEPA 1 g CAPS, Take by mouth daily, Disp: , Rfl:     Azelastine-Fluticasone 137-50 MCG/ACT SUSP, , Disp: , Rfl:     clopidogrel (PLAVIX) 75 mg tablet, TAKE 1 TABLET DAILY (Patient not taking: Reported on 3/22/2024), Disp: , Rfl:     doxycycline hyclate (VIBRAMYCIN) 100 mg capsule, Take 100 mg by mouth daily (Patient not taking: Reported on 12/15/2023), Disp: , Rfl:     fluticasone (Flovent HFA) 220 mcg/act inhaler, Inhale 2 puffs as needed, Disp: , Rfl:     metoprolol succinate (TOPROL-XL) 25 mg 24 hr tablet, Take 25 mg by mouth daily, Disp: , Rfl:     rosuvastatin (CRESTOR) 40 MG tablet, Take 20 mg by mouth daily, Disp: , Rfl:     Allergies   Allergen Reactions    Bee Venom Shortness Of Breath    Bee Pollen Other (See Comments)     edema  Edema       Physical Exam:    /70   Pulse 68   Wt 128 kg (282 lb)   BMI 40.46 kg/m²     Constitutional:normal, well developed, well nourished, alert, in no distress and non-toxic and no overt pain behavior.  Eyes:anicteric  HEENT:grossly intact  Neck:supple, symmetric, trachea midline and no masses   Pulmonary:even and  unlabored  Cardiovascular:No edema or pitting edema present  Skin:Normal without rashes or lesions and well hydrated  Psychiatric:Mood and affect appropriate  Neurologic:Cranial Nerves II-XII grossly intact  Musculoskeletal:antalgic and ambulates with cane      Imaging  XR hip/pelv 2-3 vws right if performed    (Results Pending)         Orders Placed This Encounter   Procedures    XR hip/pelv 2-3 vws right if performed

## 2024-05-23 NOTE — PATIENT INSTRUCTIONS

## 2024-06-11 ENCOUNTER — HOSPITAL ENCOUNTER (OUTPATIENT)
Dept: RADIOLOGY | Facility: HOSPITAL | Age: 79
Discharge: HOME | End: 2024-06-11
Payer: MEDICARE

## 2024-06-11 DIAGNOSIS — M25.551 RIGHT HIP PAIN: ICD-10-CM

## 2024-06-11 PROCEDURE — 73502 X-RAY EXAM HIP UNI 2-3 VIEWS: CPT

## 2024-06-28 ENCOUNTER — TELEPHONE (OUTPATIENT)
Dept: PAIN MEDICINE | Facility: CLINIC | Age: 79
End: 2024-06-28

## 2024-06-28 NOTE — TELEPHONE ENCOUNTER
Pt informed of Rt hip Xr results. He still has Rt hip pain that radiates to his Rt LB. Pain level 7/10.  Pt would like to proceed with Rt hip injection ASAP.  Pt informed our  will call him to set up inj.

## 2024-06-28 NOTE — TELEPHONE ENCOUNTER
NASH Howell  [facility]  Please call patient with x-ray of right hip showing arthritis and some osteophytic spurring at the right acetabular margin.  Can you get an update on his right hip pain?  If patient is still experiencing pain in his right hip, would recommend joint injection.

## 2024-06-28 NOTE — TELEPHONE ENCOUNTER
Patient scheduled for procedure with , 7/25/24.  Patient does not want [email or web address] message, so the following instructions were given to patient verbally, no vaccines 14 days prior or after procedure, nothing to eat or drink 1 hr prior to procedure, wear something loose and comfortable, no jewelry, if ill, infection or antibiotics, must call office to reschedule procedure.  Take prescription medications as normal and you will need a  18 yrs or older.  Patient denies taking any blood thinners, Aspirin or prescription.

## 2024-07-07 LAB
ALBUMIN SERPL-MCNC: 4.2 G/DL (ref 3.8–4.8)
ALP SERPL-CCNC: 94 IU/L (ref 44–121)
ALT SERPL-CCNC: 16 IU/L (ref 0–44)
AST SERPL-CCNC: 15 IU/L (ref 0–40)
BILIRUB SERPL-MCNC: 0.4 MG/DL (ref 0–1.2)
BUN SERPL-MCNC: 16 MG/DL (ref 8–27)
BUN/CREAT SERPL: 15 (ref 10–24)
CALCIUM SERPL-MCNC: 9.4 MG/DL (ref 8.6–10.2)
CHLORIDE SERPL-SCNC: 100 MMOL/L (ref 96–106)
CHOLEST SERPL-MCNC: 120 MG/DL (ref 100–199)
CO2 SERPL-SCNC: 22 MMOL/L (ref 20–29)
CREAT SERPL-MCNC: 1.06 MG/DL (ref 0.76–1.27)
EGFR: 71 ML/MIN/1.73
FRUCTOSAMINE SERPL-SCNC: 293 UMOL/L (ref 0–285)
GLOBULIN SER-MCNC: 2.6 G/DL (ref 1.5–4.5)
GLUCOSE SERPL-MCNC: 164 MG/DL (ref 70–99)
HBA1C MFR BLD: 8.3 % (ref 4.8–5.6)
HDLC SERPL-MCNC: 57 MG/DL
LDLC SERPL CALC-MCNC: 45 MG/DL (ref 0–99)
POTASSIUM SERPL-SCNC: 4.8 MMOL/L (ref 3.5–5.2)
PROT SERPL-MCNC: 6.8 G/DL (ref 6–8.5)
SL AMB VLDL CHOLESTEROL CALC: 18 MG/DL (ref 5–40)
SODIUM SERPL-SCNC: 137 MMOL/L (ref 134–144)
TRIGL SERPL-MCNC: 97 MG/DL (ref 0–149)

## 2024-07-12 ENCOUNTER — OFFICE VISIT (OUTPATIENT)
Dept: ENDOCRINOLOGY | Facility: CLINIC | Age: 79
End: 2024-07-12
Payer: MEDICARE

## 2024-07-12 VITALS
SYSTOLIC BLOOD PRESSURE: 139 MMHG | HEART RATE: 67 BPM | OXYGEN SATURATION: 96 % | BODY MASS INDEX: 40.26 KG/M2 | WEIGHT: 280.6 LBS | DIASTOLIC BLOOD PRESSURE: 67 MMHG

## 2024-07-12 DIAGNOSIS — E11.22 TYPE 2 DIABETES MELLITUS WITH STAGE 2 CHRONIC KIDNEY DISEASE, WITH LONG-TERM CURRENT USE OF INSULIN (HCC): ICD-10-CM

## 2024-07-12 DIAGNOSIS — I10 ESSENTIAL (PRIMARY) HYPERTENSION: Primary | ICD-10-CM

## 2024-07-12 DIAGNOSIS — E78.2 MIXED HYPERLIPIDEMIA: ICD-10-CM

## 2024-07-12 DIAGNOSIS — Z79.4 TYPE 2 DIABETES MELLITUS WITH STAGE 2 CHRONIC KIDNEY DISEASE, WITH LONG-TERM CURRENT USE OF INSULIN (HCC): ICD-10-CM

## 2024-07-12 DIAGNOSIS — N18.2 TYPE 2 DIABETES MELLITUS WITH STAGE 2 CHRONIC KIDNEY DISEASE, WITH LONG-TERM CURRENT USE OF INSULIN (HCC): ICD-10-CM

## 2024-07-12 PROCEDURE — 99214 OFFICE O/P EST MOD 30 MIN: CPT | Performed by: NURSE PRACTITIONER

## 2024-07-12 RX ORDER — INSULIN LISPRO 100 [IU]/ML
INJECTION, SUSPENSION SUBCUTANEOUS
Qty: 90 ML | Refills: 2 | Status: SHIPPED | OUTPATIENT
Start: 2024-07-12

## 2024-07-12 NOTE — PATIENT INSTRUCTIONS
"Insulin 75/25 46 units in the morning 44 units in the evening before dinner.   Continue metformin and Farxiga     Check blood sugars at least 2 times per day at different times  Call for blood sugars less than 70 or over 250 mg/dL     Low blood sugar in people with diabetes   The Basics   Written by the doctors and editors at [facility]   What is low blood sugar? -- This is when the level of sugar in a person's blood gets too low. It is also called \"hypoglycemia.\"  Low blood sugar can cause symptoms ranging from sweating and feeling hungry to passing out.  Low blood sugar can happen in people with diabetes who take certain medicines, including insulin, other medicines given as shots, and some types of pills.  When can people with diabetes get low blood sugar? -- People with diabetes can get low blood sugar when they:   Take too much medicine, including insulin, other medicines given as shots, or certain diabetes pills   Do not eat enough food   Exercise too much without eating a snack or reducing their insulin dose   Wait too long between meals   Drink too much alcohol or drink alcohol on an empty stomach  What are the symptoms of low blood sugar? -- The symptoms can be different from person to person, and can change over time. During the early stages of low blood sugar, a person can:   Sweat or tremble   Feel hungry   Feel worried  People who have early symptoms should check their blood sugar level to see if it is low and needs to be treated. If low blood sugar levels are not treated, severe symptoms can occur. These can include:   Trouble walking or feeling weak   Trouble seeing clearly   Being confused or acting in a strange way   Passing out or having a seizure  Some people do not get symptoms during the early stages of low blood sugar. Doctors sometimes call this \"hypoglycemia unawareness.\" People with hypoglycemia unawareness are more likely to have severe symptoms, because they might not know that they have low " blood sugar until they have severe symptoms. Hypoglycemia unawareness is more likely in people who:   Have had type 1 diabetes for more than 5 to 10 years   Have frequent episodes of low blood sugar   Use insulin to keep their blood sugar level tightly managed   Are tired   Drink a lot of alcohol   Take certain medicines for high blood pressure or diabetes  How is low blood sugar treated? -- It can be treated with:   Quick sources of sugar - People can eat or drink quick sources of sugar (table 1). Foods that have fat, such as chocolate or cheese, do not treat low blood sugar as quickly. You and a family member should carry a quick source of sugar at all times.   A dose of glucagon - Glucagon is a hormone that can quickly raise blood sugar levels and stop severe symptoms. It comes as a shot (figure 1) or a nose spray. If your doctor recommends that you carry glucagon with you, they will tell you when and how to use it. If possible, it's also a good idea to have a family member, friend, or roommate learn how to give you glucagon. That way, they can give it to you if you can't do it yourself.  After treating low blood sugar, it is very important to recheck your blood sugar level to make sure that it rises and stays in the normal range. Once your blood sugar is normal, eat a small snack that contains protein, fat, and carbohydrate. This can help keep your blood sugar stable.  What should I do after treatment? -- After treatment for low blood sugar, most people can get back to their usual routine. But your doctor or nurse might recommend that you check your blood sugar level more often during the next 2 to 3 days.  If your low blood sugar was treated with glucagon, call your doctor or nurse. They might change the dose of your diabetes medicine.  How can I prevent low blood sugar? -- The best way is to:   Check your blood sugar levels often - Your doctor or nurse will tell you how and when to check your blood sugar  "levels at home. They will also tell you what your blood sugar levels should be, and when to treat low blood sugar.   Learn the symptoms of low blood sugar, and be ready to treat it in the early stages. Treating low blood sugar early can prevent severe symptoms.  When should I go to a hospital or call for an ambulance? -- A family member or friend should take you to a hospital or call for an ambulance (in the [place] and [place], call 9-1-1) if you:   Are still confused 15 minutes after being treated with a dose of glucagon   Have passed out, and there is no glucagon nearby   Still have low blood sugar after treatment  If you have low blood sugar, do not try to drive yourself to the hospital. Driving with low blood sugar can be dangerous.  All topics are updated as new evidence becomes available and our peer review process is complete.  This topic retrieved from [employer] on: Apr 11, 2024.  Topic 62830 Version 22.0  Release: 32.3.2 - C32.100  © 2024 UpToDate, Inc. and/or its affiliates. All rights reserved.  table 1: Quick sources of sugar to treat low blood sugar  3 or 4 glucose tablets   ½ cup of juice or regular soda (not sugar-free)   2 tablespoons of raisins   4 or 5 saltine crackers   1 tablespoon of sugar   1 tablespoon of honey or corn syrup   6 to 8 hard candies   These sources of sugar act quickly to treat low blood sugar levels. People with diabetes who use insulin or certain other diabetes medicines should carry at least 1 of these items at all times.  Graphic 98200 Version 4.0  figure 1: Glucagon autoinjector     Some people carry glucagon in the form of an autoinjector \"pen.\" This makes it easy to give a dose into the upper arm, thigh, or belly.  Graphic 654056 Version 2.0  Consumer Information Use and Disclaimer   Disclaimer: This generalized information is a limited summary of diagnosis, treatment, and/or medication information. It is not meant to be comprehensive and should be used as a tool to help the " user understand and/or assess potential diagnostic and treatment options. It does NOT include all information about conditions, treatments, medications, side effects, or risks that may apply to a specific patient. It is not intended to be medical advice or a substitute for the medical advice, diagnosis, or treatment of a health care provider based on the health care provider's examination and assessment of a patient's specific and unique circumstances. Patients must speak with a health care provider for complete information about their health, medical questions, and treatment options, including any risks or benefits regarding use of medications. This information does not endorse any treatments or medications as safe, effective, or approved for treating a specific patient. UpToDate, Inc. and its affiliates disclaim any warranty or liability relating to this information or the use thereof.The use of this information is governed by the Terms of Use, available at https://www.[employer].com/en/know/clinical-effectiveness-terms. 2024© UpToDate, Inc. and its affiliates and/or licensors. All rights reserved.  Copyright   © 2024 UpToDate, Inc. and/or its affiliates. All rights reserved.

## 2024-07-12 NOTE — PROGRESS NOTES
Established Patient Progress Note    Chief Complaint:  Diabetes follow up visit    Impression & Plan:    Problem List Items Addressed This Visit          Cardiovascular and Mediastinum    Essential (primary) hypertension - Primary     BP close to goal.   Continues on ACE-I.             Endocrine    Type 2 diabetes mellitus with stage 2 chronic kidney disease, with long-term current use of insulin (HCC)    Relevant Medications    HumaLOG Mix 75/25 KwikPen (75-25) 100 units/mL injection pen    Other Relevant Orders    Ambulatory Referral to Physical Therapy    Comprehensive metabolic panel    Fructosamine    Hemoglobin A1C    TSH, 3rd generation    T4, free       Other    Mixed hyperlipidemia     Continues on statin.   LDL at goal.            History of Present Illness:   Caleb Ventura is a 79 y.o. male with a history of type 2 diabetes with long term use of insulin. Reports complications of CKD, CAD, CHF he follows with cardiology for and is on Farxiga.      Denies recent illness or hospitalizations.     Denies recent severe hypoglycemic or severe hyperglycemic episodes.     Denies any side effects with his current regimen.     Home glucose monitoring: are performed regularly.       Home blood glucose readings:   Before breakfast:  mg/dL  Before lunch: X  Before dinner: X  Bedtime: X     Current regimen:   Insulin 75/25 46 units twice a day with meals  Metformin  mg orally twice a day  Farxiga 10 mg orally daily     Denies side effects of treatment        Has hypertension: Taking ACE/ARB, compliant   Has hyperlipidemia: Taking Crestor and Vascepa, tolerating     No history of thyroid disease  No history of pancreatitis     Has been struggling with balance with history of neuropathy.     Patient Active Problem List   Diagnosis    Obstructive sleep apnea    Coronary artery disease involving native coronary artery    Degenerative arthritis of left knee    Essential (primary) hypertension    Glottic  stenosis    Vocal cord paralysis, bilateral complete    Type 2 diabetes mellitus with retinopathy, with long-term current use of insulin (HCC)    Mixed hyperlipidemia    Lumbar stenosis with neurogenic claudication    Lumbar spondylosis    Intervertebral disc disorder with radiculopathy of lumbar region    Sacroiliitis (HCC)    Trochanteric bursitis of right hip    Class 2 severe obesity due to excess calories with serious comorbidity and body mass index (BMI) of 38.0 to 38.9 in adult (HCC)    Stage 2 chronic kidney disease    Vitamin D deficiency    Psoriasis    Hypertension goal BP (blood pressure) < 140/90    Type 2 diabetes mellitus with stage 2 chronic kidney disease, with long-term current use of insulin (HCC)    Hypertensive heart disease with acute combined systolic and diastolic congestive heart failure (HCC)    Chronic pain of left knee      Past Medical History:   Diagnosis Date    Arthritis     knees    Cancer ([facility])     prostate    Coronary artery disease     two coronary stents    Diabetes mellitus ([facility])     Hyperlipidemia 10/19/2018    Hypertension     Psoriasis     lower extremities and buttocks    Sleep apnea     Type 2 diabetes mellitus ([facility]) 10/19/2018      Past Surgical History:   Procedure Laterality Date    CATARACT EXTRACTION Bilateral     with iol's    COLONOSCOPY N/A 4/5/2016    Procedure: COLONOSCOPY snare polypectomy;  Surgeon: Dung España MD;  Location: [facility] GI LAB;  Service:     INSERTION PROSTATE RADIATION SEED      REPLACEMENT TOTAL KNEE Right     TRACHEOSTOMY      x 2  up to 5 years ago    UMBILICAL HERNIA REPAIR        Family History   Problem Relation Age of Onset    No Known Problems Brother     No Known Problems Son     No Known Problems Son      Social History     Tobacco Use    Smoking status: Never    Smokeless tobacco: Never   Substance Use Topics    Alcohol use: Yes     Alcohol/week: 4.0 standard drinks of alcohol     Types: 4 Cans of beer per week     Comment: daily      Allergies   Allergen Reactions    Bee Venom Shortness Of Breath    Bee Pollen Other (See Comments)     edema  Edema         Current Outpatient Medications:     Ascorbic Acid (VITAMIN C) 1000 MG tablet, Take 1,000 mg by mouth daily, Disp: , Rfl:     aspirin 81 MG tablet, Take 81 mg by mouth daily., Disp: , Rfl:     cholecalciferol (VITAMIN D3) 1,000 units tablet, Take 2,000 Units by mouth daily , Disp: , Rfl:     dapagliflozin (Farxiga) 10 MG tablet, TAKE 1 TABLET DAILY, Disp: 90 tablet, Rfl: 1    Entresto  MG TABS, , Disp: , Rfl:     ezetimibe (ZETIA) 10 mg tablet, TAKE 1 TABLET BY MOUTH TIMES A WEEK ON MONDAY WEDNESDAY AND FRIDAY AT 6PM, Disp: , Rfl:     fluticasone-umeclidinium-vilanterol (Trelegy Ellipta) 100-62.5-25 mcg/actuation inhaler, Inhale 1 puff daily, Disp: , Rfl:     furosemide (LASIX) 40 mg tablet, Take 40 mg by mouth daily Twice a week on Mon & Thur--PRN, Disp: , Rfl:     Glucagon, rDNA, (Glucagon Emergency) 1 MG KIT, Use glucagon kit if you have a low blood sugar and unconscious, Disp: 1 each, Rfl: 0    guaiFENesin-codeine (ROBITUSSIN AC) 100-10 mg/5 mL oral solution, , Disp: , Rfl:     HumaLOG Mix 75/25 KwikPen (75-25) 100 units/mL injection pen, INJECT 46 UNITS UNDER THE SKIN BEFORE BREAKFAST AND 44 units before dinner., Disp: 90 mL, Rfl: 2    HYDROcodone-acetaminophen (NORCO) 5-325 mg per tablet, Take 1 tablet by mouth 2 (two) times a day as needed for pain Max Daily Amount: 2 tablets, Disp: 60 tablet, Rfl: 0    levocetirizine (XYZAL) 5 MG tablet, Take 5 mg by mouth, Disp: , Rfl:     levofloxacin (LEVAQUIN) 750 mg tablet, Take 750 mg by mouth daily, Disp: , Rfl:     lisinopril (ZESTRIL) 5 mg tablet, Take 5 mg by mouth daily , Disp: , Rfl:     metoprolol succinate (TOPROL-XL) 25 mg 24 hr tablet, , Disp: , Rfl:     Multiple Vitamins-Minerals (MULTIVITAMIN ADULT PO), Take by mouth, Disp: , Rfl:     mupirocin (BACTROBAN) 2 % ointment, APPLY OINTMENT TOPICALLY TO AFFECTED AREA UP TO THREE  TIMES DAILY, Disp: , Rfl:     omeprazole (PriLOSEC) 20 mg delayed release capsule, Take 1 capsule (20 mg total) by mouth daily, Disp: 30 capsule, Rfl: 11    rosuvastatin (CRESTOR) 40 MG tablet, Take 40 mg by mouth daily 1/2 pill at night, Disp: , Rfl:     sacubitril-valsartan (Entresto)  MG TABS, Take 1 tablet by mouth 2 (two) times a day, Disp: , Rfl:     spironolactone (ALDACTONE) 25 mg tablet, , Disp: , Rfl:     traMADol (ULTRAM) 50 mg tablet, , Disp: , Rfl:     traZODone (DESYREL) 100 mg tablet, TAKE 1 TABLET AT BEDTIME AS NEEDED FOR SLEEP, Disp: , Rfl:     triamcinolone (KENALOG) 0.1 % cream, Apply topically as needed Twice a week, Disp: , Rfl:     urea (CARMOL) 40 %, Apply topically as needed, Disp: , Rfl:     Azelastine-Fluticasone 137-50 MCG/ACT SUSP, , Disp: , Rfl:     B-D ULTRAFINE III SHORT PEN 31G X 8 MM MISC, USE WITH INSULIN TWICE A DAY, Disp: 180 each, Rfl: 3    Blood Glucose Monitoring Suppl (OneTouch Verio) w/Device KIT, Use to test blood sugar 3 times a day, Disp: 1 kit, Rfl: 0    Blood Glucose Monitoring Suppl (OneTouch Verio) w/Device KIT, Use to test blood sugar 3 times a day, Disp: 1 kit, Rfl: 0    clopidogrel (PLAVIX) 75 mg tablet, TAKE 1 TABLET DAILY (Patient not taking: Reported on 3/22/2024), Disp: , Rfl:     doxycycline hyclate (VIBRAMYCIN) 100 mg capsule, Take 100 mg by mouth daily (Patient not taking: Reported on 12/15/2023), Disp: , Rfl:     fluticasone (Flovent HFA) 220 mcg/act inhaler, Inhale 2 puffs as needed, Disp: , Rfl:     gabapentin (NEURONTIN) 300 mg capsule, Take 1 capsule by mouth Three times a day (Patient not taking: Reported on 7/12/2024), Disp: , Rfl:     glucose blood (OneTouch Verio) test strip, USE TO TEST BLOOD SUGAR THREE TIMES A DAY, Disp: 300 strip, Rfl: 3    levocetirizine (XYZAL) 5 MG tablet, Take 5 mg by mouth every evening, Disp: , Rfl:     metFORMIN (GLUCOPHAGE-XR) 750 mg 24 hr tablet, TAKE 1 TABLET TWICE A DAY, Disp: 180 tablet, Rfl: 1    metoprolol  succinate (TOPROL-XL) 25 mg 24 hr tablet, Take 25 mg by mouth daily, Disp: , Rfl:     metoprolol succinate (TOPROL-XL) 50 mg 24 hr tablet, Take 50 mg by mouth daily, Disp: , Rfl:     neomycin-polymyxin-hydrocortisone (CORTISPORIN) 1 % SOLN, , Disp: , Rfl:     rosuvastatin (CRESTOR) 40 MG tablet, Take 20 mg by mouth daily, Disp: , Rfl:     STELARA 90 MG/ML subcutaneous injection, , Disp: , Rfl:     Tremfya 100 MG/ML SOSY, , Disp: , Rfl:     VASCEPA 1 g CAPS, Take by mouth daily, Disp: , Rfl:     Review of Systems  See  HPI.   All other systems reviewed and are negative.      Physical Exam:  Body mass index is 40.26 kg/m².  /67 (BP Location: Right arm, Patient Position: Sitting, Cuff Size: Large)   Pulse 67   Wt 127 kg (280 lb 9.6 oz)   SpO2 96%   BMI 40.26 kg/m²    Wt Readings from Last 3 Encounters:   07/12/24 127 kg (280 lb 9.6 oz)   05/23/24 128 kg (282 lb)   03/22/24 131 kg (289 lb)        Physical Exam  Vitals reviewed.   Constitutional:       Appearance: Normal appearance.   Cardiovascular:      Rate and Rhythm: Normal rate and regular rhythm.      Pulses: Normal pulses.      Heart sounds: Normal heart sounds.   Pulmonary:      Effort: Pulmonary effort is normal.      Breath sounds: Normal breath sounds.   Skin:     General: Skin is warm and dry.      Capillary Refill: Capillary refill takes less than 2 seconds.   Neurological:      General: No focal deficit present.      Mental Status: He is alert and oriented to person, place, and time.   Psychiatric:         Mood and Affect: Mood normal.         Behavior: Behavior normal.       Labs:   Lab Results   Component Value Date    HGBA1C 8.3 (H) 07/03/2024    HGBA1C 8.6 (H) 03/15/2024    HGBA1C 8.7 (H) 12/13/2023     Lab Results   Component Value Date    CREATININE 1.06 07/03/2024    CREATININE 0.97 05/07/2024    CREATININE 1.11 12/13/2023    BUN 16 07/03/2024    K 4.8 07/03/2024     07/03/2024    CO2 22 07/03/2024     GFR, Calculated   Date Value  "Ref Range Status   08/08/2019 65 >60 mL/min/1.73m2 Final     Comment:     Please refer to initial GFR, CALC footnote     eGFRcr   Date Value Ref Range Status   05/07/2024 79 >59 Final     eGFR   Date Value Ref Range Status   07/03/2024 71 >59 mL/min/1.73 Final     Lab Results   Component Value Date    HDL 57 07/03/2024    TRIG 97 07/03/2024     Lab Results   Component Value Date    ALT 16 07/03/2024    AST 15 07/03/2024    ALKPHOS 64 03/19/2018     No results found for: \"[id number]\"  No results found for: \"FREET4\", \"TSI\"    Discussed with the patient and all questioned fully answered. He will call me if any problems arise.    Follow-up appointment in 3 months.     Counseled patient on diagnostic results, prognosis, risk and benefit of treatment options, instruction for management, importance of treatment compliance, Risk  factor reduction and impressions    NASH Martel    "

## 2024-07-13 NOTE — ASSESSMENT & PLAN NOTE
Lab Results   Component Value Date    HGBA1C 8.3 (H) 07/03/2024     HGA1C above goal. Fructosamine consistent with glucose levels ~ 6.5-7%. Concern for lower fasting glucose levels, goal fasting glucose levels are greater than 100 mg/dL for concern for nocturnal hypoglycemic unawareness.     Treatment regimen: Insulin 75/25 46 units in the morning 44 units in the evening before dinner.   Continue metformin and Farxiga     Discussed risks/complications associated with uncontrolled diabetes including organ involvement, heart attack, stroke, death.    Advised lifestyle modifications including attention to diet including the amount and types of carbohydrates consumed and regular activity.     Call for blood sugars less than 70 mg/dl or patterns over 250 mg/dl.     Monitor blood glucose levels at least 2 times a day and if symptomatic. No interest in CGM at this time.     Discussed symptoms and treatment of hypoglycemia.  Reviewed risks associated with hypoglycemia. Always carry rapid acting carbohydrates and a glucometer (a way to check your blood sugar).    Recommendation for medical identification either bracelet, necklace.    Recommendation for glucagon if on insulin. Reviewed.    Routine follow up for diabetic eye and foot exams.     Ordered blood work to complete prior to next visit.    Send glucose logs/CGM download in 1-2 weeks for review    Follow up in 3 months.     Having balance concerns with history of neuropathy. Recommend balance therapy eval.

## 2024-07-25 ENCOUNTER — HOSPITAL ENCOUNTER (OUTPATIENT)
Dept: RADIOLOGY | Facility: CLINIC | Age: 79
Discharge: HOME | End: 2024-07-25
Admitting: ANESTHESIOLOGY
Payer: MEDICARE

## 2024-07-25 VITALS
TEMPERATURE: 97.7 F | OXYGEN SATURATION: 94 % | RESPIRATION RATE: 20 BRPM | DIASTOLIC BLOOD PRESSURE: 74 MMHG | HEART RATE: 65 BPM | SYSTOLIC BLOOD PRESSURE: 138 MMHG

## 2024-07-25 DIAGNOSIS — G89.4 CHRONIC PAIN SYNDROME: ICD-10-CM

## 2024-07-25 DIAGNOSIS — M25.551 RIGHT HIP PAIN: ICD-10-CM

## 2024-07-25 PROCEDURE — 77002 NEEDLE LOCALIZATION BY XRAY: CPT | Performed by: ANESTHESIOLOGY

## 2024-07-25 PROCEDURE — 20610 DRAIN/INJ JOINT/BURSA W/O US: CPT | Performed by: ANESTHESIOLOGY

## 2024-07-25 RX ORDER — 0.9 % SODIUM CHLORIDE 0.9 %
2 VIAL (ML) INJECTION ONCE
Status: COMPLETED | OUTPATIENT
Start: 2024-07-25 | End: 2024-07-25

## 2024-07-25 RX ORDER — METHYLPREDNISOLONE ACETATE 80 MG/ML
80 INJECTION, SUSPENSION INTRA-ARTICULAR; INTRALESIONAL; INTRAMUSCULAR; PARENTERAL; SOFT TISSUE ONCE
Status: COMPLETED | OUTPATIENT
Start: 2024-07-25 | End: 2024-07-25

## 2024-07-25 RX ORDER — ROPIVACAINE HYDROCHLORIDE 2 MG/ML
3 INJECTION, SOLUTION EPIDURAL; INFILTRATION; PERINEURAL ONCE
Status: COMPLETED | OUTPATIENT
Start: 2024-07-25 | End: 2024-07-25

## 2024-07-25 RX ORDER — HYDROCODONE BITARTRATE AND ACETAMINOPHEN 5; 325 MG/1; MG/1
1 TABLET ORAL 2 TIMES DAILY PRN
Qty: 60 TABLET | Refills: 0 | Status: SHIPPED | OUTPATIENT
Start: 2024-07-25 | End: 2024-08-24

## 2024-07-25 RX ADMIN — SODIUM CHLORIDE 2 ML: 9 INJECTION INTRAMUSCULAR; INTRAVENOUS; SUBCUTANEOUS at 14:06

## 2024-07-25 RX ADMIN — ROPIVACAINE HYDROCHLORIDE 3 ML: 2 INJECTION, SOLUTION EPIDURAL; INFILTRATION; PERINEURAL at 14:06

## 2024-07-25 RX ADMIN — Medication 2 ML: at 14:06

## 2024-07-25 RX ADMIN — IOHEXOL 1 ML: 300 INJECTION, SOLUTION INTRAVENOUS at 14:06

## 2024-07-25 RX ADMIN — METHYLPREDNISOLONE ACETATE 80 MG: 80 INJECTION, SUSPENSION INTRA-ARTICULAR; INTRALESIONAL; INTRAMUSCULAR; PARENTERAL; SOFT TISSUE at 14:06

## 2024-07-25 NOTE — DISCHARGE INSTR - LAB
Do not apply heat to any area that is numb. If you have discomfort or soreness at the injection site, you may apply ice today, 20 minutes on and 20 minutes off. Tomorrow you may use ice or warm, moist heat. Do not apply ice or heat directly to the skin.  If you experience severe shortness of breath, go to the Emergency Room.  You may have numbness for several hours from the local anesthetic. Please use caution and common sense, especially with weight-bearing activities.  You may have an increase or change in the discomfort for 36-48 hours after your treatment. Apply ice and continue with any pain medicine you have been prescribed.  Do not do anything strenuous today. You may shower, but no tub baths or hot tubs today. You may resume your normal activities tomorrow, but do not “overdo it”. Resume normal activities slowly when you are feeling better.  If you experience redness, drainage or swelling at the injection site, or if you develop a fever above 100 degrees, please call [facility] at [phone] or go to the Emergency Room.  Continue to take all routine medicines prescribed by your primary care physician unless otherwise instructed by our staff. Most blood thinners should be started again according to your regularly scheduled dosing. If you have any questions, please give our office a call.    As no general anesthesia was used in today's procedure, you should not experience any side effects related to anesthesia.       If you have a problem specifically related to your procedure, please call our office at [phone].  Problems not related to your procedure should be directed to your primary care physician.

## 2024-07-25 NOTE — H&P
History of Present Illness: The patient is a 79 y.o. male who presents with complaints of right hip pain is here today for right hip injection    Past Medical History:   Diagnosis Date    Arthritis     knees    Cancer (HCC)     prostate    Coronary artery disease     two coronary stents    Diabetes mellitus (HCC)     Hyperlipidemia 10/19/2018    Hypertension     Psoriasis     lower extremities and buttocks    Sleep apnea     Type 2 diabetes mellitus (HCC) 10/19/2018       Past Surgical History:   Procedure Laterality Date    CATARACT EXTRACTION Bilateral     with iol's    COLONOSCOPY N/A 4/5/2016    Procedure: COLONOSCOPY snare polypectomy;  Surgeon: Dung España MD;  Location: [facility] GI LAB;  Service:     INSERTION PROSTATE RADIATION SEED      REPLACEMENT TOTAL KNEE Right     TRACHEOSTOMY      x 2  up to 5 years ago    UMBILICAL HERNIA REPAIR           Current Outpatient Medications:     Ascorbic Acid (VITAMIN C) 1000 MG tablet, Take 1,000 mg by mouth daily, Disp: , Rfl:     aspirin 81 MG tablet, Take 81 mg by mouth daily., Disp: , Rfl:     Azelastine-Fluticasone 137-50 MCG/ACT SUSP, , Disp: , Rfl:     B-D ULTRAFINE III SHORT PEN 31G X 8 MM MISC, USE WITH INSULIN TWICE A DAY, Disp: 180 each, Rfl: 3    Blood Glucose Monitoring Suppl (OneTouch Verio) w/Device KIT, Use to test blood sugar 3 times a day, Disp: 1 kit, Rfl: 0    Blood Glucose Monitoring Suppl (OneTouch Verio) w/Device KIT, Use to test blood sugar 3 times a day, Disp: 1 kit, Rfl: 0    cholecalciferol (VITAMIN D3) 1,000 units tablet, Take 2,000 Units by mouth daily , Disp: , Rfl:     clopidogrel (PLAVIX) 75 mg tablet, TAKE 1 TABLET DAILY (Patient not taking: Reported on 3/22/2024), Disp: , Rfl:     dapagliflozin (Farxiga) 10 MG tablet, TAKE 1 TABLET DAILY, Disp: 90 tablet, Rfl: 1    doxycycline hyclate (VIBRAMYCIN) 100 mg capsule, Take 100 mg by mouth daily (Patient not taking: Reported on 12/15/2023), Disp: , Rfl:     Entresto  MG TABS, ,  Disp: , Rfl:     ezetimibe (ZETIA) 10 mg tablet, TAKE 1 TABLET BY MOUTH TIMES A WEEK ON MONDAY WEDNESDAY AND FRIDAY AT 6PM, Disp: , Rfl:     fluticasone (Flovent HFA) 220 mcg/act inhaler, Inhale 2 puffs as needed, Disp: , Rfl:     fluticasone-umeclidinium-vilanterol (Trelegy Ellipta) 100-62.5-25 mcg/actuation inhaler, Inhale 1 puff daily, Disp: , Rfl:     furosemide (LASIX) 40 mg tablet, Take 40 mg by mouth daily Twice a week on Mon & Thur--PRN, Disp: , Rfl:     gabapentin (NEURONTIN) 300 mg capsule, Take 1 capsule by mouth Three times a day (Patient not taking: Reported on 7/12/2024), Disp: , Rfl:     Glucagon, rDNA, (Glucagon Emergency) 1 MG KIT, Use glucagon kit if you have a low blood sugar and unconscious, Disp: 1 each, Rfl: 0    glucose blood (OneTouch Verio) test strip, USE TO TEST BLOOD SUGAR THREE TIMES A DAY, Disp: 300 strip, Rfl: 3    guaiFENesin-codeine (ROBITUSSIN AC) 100-10 mg/5 mL oral solution, , Disp: , Rfl:     HumaLOG Mix 75/25 KwikPen (75-25) 100 units/mL injection pen, INJECT 46 UNITS UNDER THE SKIN BEFORE BREAKFAST AND 44 units before dinner., Disp: 90 mL, Rfl: 2    HYDROcodone-acetaminophen (NORCO) 5-325 mg per tablet, Take 1 tablet by mouth 2 (two) times a day as needed for pain Max Daily Amount: 2 tablets, Disp: 60 tablet, Rfl: 0    levocetirizine (XYZAL) 5 MG tablet, Take 5 mg by mouth, Disp: , Rfl:     levocetirizine (XYZAL) 5 MG tablet, Take 5 mg by mouth every evening, Disp: , Rfl:     levofloxacin (LEVAQUIN) 750 mg tablet, Take 750 mg by mouth daily, Disp: , Rfl:     lisinopril (ZESTRIL) 5 mg tablet, Take 5 mg by mouth daily , Disp: , Rfl:     metFORMIN (GLUCOPHAGE-XR) 750 mg 24 hr tablet, TAKE 1 TABLET TWICE A DAY, Disp: 180 tablet, Rfl: 1    metoprolol succinate (TOPROL-XL) 25 mg 24 hr tablet, , Disp: , Rfl:     metoprolol succinate (TOPROL-XL) 25 mg 24 hr tablet, Take 25 mg by mouth daily, Disp: , Rfl:     metoprolol succinate (TOPROL-XL) 50 mg 24 hr tablet, Take 50 mg by mouth  daily, Disp: , Rfl:     Multiple Vitamins-Minerals (MULTIVITAMIN ADULT PO), Take by mouth, Disp: , Rfl:     mupirocin (BACTROBAN) 2 % ointment, APPLY OINTMENT TOPICALLY TO AFFECTED AREA UP TO THREE TIMES DAILY, Disp: , Rfl:     neomycin-polymyxin-hydrocortisone (CORTISPORIN) 1 % SOLN, , Disp: , Rfl:     omeprazole (PriLOSEC) 20 mg delayed release capsule, Take 1 capsule (20 mg total) by mouth daily, Disp: 30 capsule, Rfl: 11    rosuvastatin (CRESTOR) 40 MG tablet, Take 40 mg by mouth daily 1/2 pill at night, Disp: , Rfl:     rosuvastatin (CRESTOR) 40 MG tablet, Take 20 mg by mouth daily, Disp: , Rfl:     sacubitril-valsartan (Entresto)  MG TABS, Take 1 tablet by mouth 2 (two) times a day, Disp: , Rfl:     spironolactone (ALDACTONE) 25 mg tablet, , Disp: , Rfl:     STELARA 90 MG/ML subcutaneous injection, , Disp: , Rfl:     traMADol (ULTRAM) 50 mg tablet, , Disp: , Rfl:     traZODone (DESYREL) 100 mg tablet, TAKE 1 TABLET AT BEDTIME AS NEEDED FOR SLEEP, Disp: , Rfl:     Tremfya 100 MG/ML SOSY, , Disp: , Rfl:     triamcinolone (KENALOG) 0.1 % cream, Apply topically as needed Twice a week, Disp: , Rfl:     urea (CARMOL) 40 %, Apply topically as needed, Disp: , Rfl:     VASCEPA 1 g CAPS, Take by mouth daily, Disp: , Rfl:     Current Facility-Administered Medications:     iohexol (OMNIPAQUE) 300 mg/mL injection 1 mL, 1 mL, Intra-articular, Once, Dewayne Marx MD    lidocaine (PF) (XYLOCAINE-MPF) 2 % injection 2 mL, 2 mL, Infiltration, Once, Dewayne Marx MD    methylPREDNISolone acetate (DEPO-MEDROL) injection 80 mg, 80 mg, Intra-articular, Once, Dewayne Marx MD    ropivacaine (NAROPIN) injection 3 mL, 3 mL, Intra-articular, Once, Dewayne Marx MD    sodium chloride (PF) 0.9 % injection 2 mL, 2 mL, Infiltration, Once, Dewayne Marx MD    Allergies   Allergen Reactions    Bee Venom Shortness Of Breath    Bee Pollen Other (See Comments)     edema  Edema       Physical Exam:   Vitals:    07/25/24 1335   BP:  133/72   Pulse: 61   Resp: 20   Temp: 97.7 °F (36.5 °C)   SpO2: 92%     General: Awake, Alert, Oriented x 3, Mood and affect appropriate  Respiratory: Respirations even and unlabored  Cardiovascular: Peripheral pulses intact; no edema  Musculoskeletal Exam: Right hip pain    ASA Score: 3    Patient/Chart Verification  Patient ID Verified: Verbal  Consents Confirmed: To be obtained in the Pre-Procedure area  Interval H&P(within 24 hr) Complete (required for Outpatients and Surgery Admit only): To be obtained in the Pre-Procedure area  Allergies Reviewed: Yes  Anticoag/NSAID held?: NA  Currently on antibiotics?: No  Pre-op Lab/Test Results Available:  (Pt FBS at home at 5:30 am was 138)    Assessment:   1. Right hip pain        Plan: Rt hip intra-articular injection

## 2024-08-08 ENCOUNTER — TELEPHONE (OUTPATIENT)
Dept: RADIOLOGY | Facility: MEDICAL CENTER | Age: 79
End: 2024-08-08

## 2024-08-09 ENCOUNTER — OFFICE VISIT (OUTPATIENT)
Dept: FAMILY MEDICINE CLINIC | Facility: CLINIC | Age: 79
End: 2024-08-09
Payer: MEDICARE

## 2024-08-09 VITALS
TEMPERATURE: 100 F | HEIGHT: 70 IN | RESPIRATION RATE: 20 BRPM | HEART RATE: 68 BPM | SYSTOLIC BLOOD PRESSURE: 130 MMHG | BODY MASS INDEX: 40.66 KG/M2 | WEIGHT: 284 LBS | DIASTOLIC BLOOD PRESSURE: 64 MMHG

## 2024-08-09 DIAGNOSIS — J44.9 CHRONIC OBSTRUCTIVE PULMONARY DISEASE, UNSPECIFIED COPD TYPE (HCC): ICD-10-CM

## 2024-08-09 DIAGNOSIS — I10 ESSENTIAL (PRIMARY) HYPERTENSION: Primary | ICD-10-CM

## 2024-08-09 DIAGNOSIS — L97.919 VENOUS ULCERS OF BOTH LOWER EXTREMITIES (HCC): ICD-10-CM

## 2024-08-09 DIAGNOSIS — C61 PROSTATE CANCER (HCC): ICD-10-CM

## 2024-08-09 DIAGNOSIS — R60.0 BILATERAL LOWER EXTREMITY EDEMA: ICD-10-CM

## 2024-08-09 DIAGNOSIS — L97.929 VENOUS ULCERS OF BOTH LOWER EXTREMITIES (HCC): ICD-10-CM

## 2024-08-09 DIAGNOSIS — I83.029 VENOUS ULCERS OF BOTH LOWER EXTREMITIES (HCC): ICD-10-CM

## 2024-08-09 DIAGNOSIS — N18.2 TYPE 2 DIABETES MELLITUS WITH STAGE 2 CHRONIC KIDNEY DISEASE, WITH LONG-TERM CURRENT USE OF INSULIN (HCC): ICD-10-CM

## 2024-08-09 DIAGNOSIS — E55.9 VITAMIN D DEFICIENCY: ICD-10-CM

## 2024-08-09 DIAGNOSIS — Z11.59 NEED FOR HEPATITIS C SCREENING TEST: ICD-10-CM

## 2024-08-09 DIAGNOSIS — J30.1 NON-SEASONAL ALLERGIC RHINITIS DUE TO POLLEN: ICD-10-CM

## 2024-08-09 DIAGNOSIS — I83.019 VENOUS ULCERS OF BOTH LOWER EXTREMITIES (HCC): ICD-10-CM

## 2024-08-09 DIAGNOSIS — Z79.4 TYPE 2 DIABETES MELLITUS WITH STAGE 2 CHRONIC KIDNEY DISEASE, WITH LONG-TERM CURRENT USE OF INSULIN (HCC): ICD-10-CM

## 2024-08-09 DIAGNOSIS — J98.4 SCARRING OF LUNG: ICD-10-CM

## 2024-08-09 DIAGNOSIS — E78.2 MIXED HYPERLIPIDEMIA: ICD-10-CM

## 2024-08-09 DIAGNOSIS — I49.5 TACHYCARDIA-BRADYCARDIA SYNDROME (HCC): ICD-10-CM

## 2024-08-09 DIAGNOSIS — M46.1 SACROILIITIS, NOT ELSEWHERE CLASSIFIED (HCC): ICD-10-CM

## 2024-08-09 DIAGNOSIS — E11.22 TYPE 2 DIABETES MELLITUS WITH STAGE 2 CHRONIC KIDNEY DISEASE, WITH LONG-TERM CURRENT USE OF INSULIN (HCC): ICD-10-CM

## 2024-08-09 DIAGNOSIS — I25.10 CORONARY ARTERY DISEASE INVOLVING NATIVE CORONARY ARTERY OF NATIVE HEART WITHOUT ANGINA PECTORIS: ICD-10-CM

## 2024-08-09 DIAGNOSIS — F11.20 CONTINUOUS OPIOID DEPENDENCE (HCC): ICD-10-CM

## 2024-08-09 DIAGNOSIS — Z00.00 MEDICARE ANNUAL WELLNESS VISIT, SUBSEQUENT: ICD-10-CM

## 2024-08-09 DIAGNOSIS — Z79.4 TYPE 2 DIABETES MELLITUS WITH RETINOPATHY, WITH LONG-TERM CURRENT USE OF INSULIN, MACULAR EDEMA PRESENCE UNSPECIFIED, UNSPECIFIED LATERALITY, UNSPECIFIED RETINOPATHY SEVERITY (HCC): ICD-10-CM

## 2024-08-09 DIAGNOSIS — E11.319 TYPE 2 DIABETES MELLITUS WITH RETINOPATHY, WITH LONG-TERM CURRENT USE OF INSULIN, MACULAR EDEMA PRESENCE UNSPECIFIED, UNSPECIFIED LATERALITY, UNSPECIFIED RETINOPATHY SEVERITY (HCC): ICD-10-CM

## 2024-08-09 PROBLEM — R00.1 SEVERE SINUS BRADYCARDIA: Status: ACTIVE | Noted: 2019-08-07

## 2024-08-09 PROBLEM — J30.9 ALLERGIC RHINITIS: Status: ACTIVE | Noted: 2024-08-09

## 2024-08-09 PROBLEM — Z95.0 PRESENCE OF PERMANENT CARDIAC PACEMAKER: Status: ACTIVE | Noted: 2019-08-07

## 2024-08-09 PROBLEM — E66.01 SEVERE OBESITY (HCC): Status: ACTIVE | Noted: 2019-11-04

## 2024-08-09 PROBLEM — Z95.810 BIVENTRICULAR ICD (IMPLANTABLE CARDIOVERTER-DEFIBRILLATOR) IN PLACE: Status: ACTIVE | Noted: 2024-05-08

## 2024-08-09 PROBLEM — I25.5 ISCHEMIC CARDIOMYOPATHY: Status: ACTIVE | Noted: 2021-12-16

## 2024-08-09 PROBLEM — T46.6X5A ADVERSE REACTION TO HMG-COA REDUCTASE INHIBITOR: Status: ACTIVE | Noted: 2019-11-03

## 2024-08-09 PROBLEM — F10.10 ALCOHOL ABUSE: Status: ACTIVE | Noted: 2024-02-02

## 2024-08-09 PROCEDURE — 92250 FUNDUS PHOTOGRAPHY W/I&R: CPT | Performed by: FAMILY MEDICINE

## 2024-08-09 PROCEDURE — 99204 OFFICE O/P NEW MOD 45 MIN: CPT | Performed by: FAMILY MEDICINE

## 2024-08-09 PROCEDURE — G0438 PPPS, INITIAL VISIT: HCPCS | Performed by: FAMILY MEDICINE

## 2024-08-09 RX ORDER — GINSENG 100 MG
CAPSULE ORAL
COMMUNITY

## 2024-08-09 RX ORDER — LEVOCETIRIZINE DIHYDROCHLORIDE 5 MG/1
5 TABLET, FILM COATED ORAL EVERY MORNING
Qty: 90 TABLET | Refills: 1 | Status: SHIPPED | OUTPATIENT
Start: 2024-08-09

## 2024-08-09 RX ORDER — MONTELUKAST SODIUM 10 MG/1
10 TABLET ORAL
Qty: 90 TABLET | Refills: 1 | Status: SHIPPED | OUTPATIENT
Start: 2024-08-09

## 2024-08-09 NOTE — PROGRESS NOTES
Ambulatory Visit  Name: Caleb Ventura      : 1945      MRN: [id number]  Encounter Provider: Frank Lombardi, DO  Encounter Date: 2024   Encounter department: [facility]    Assessment & Plan   1. Essential (primary) hypertension  2. Coronary artery disease involving native coronary artery of native heart without angina pectoris  Assessment & Plan:  Pt sees Dr Lundy  3. Type 2 diabetes mellitus with stage 2 chronic kidney disease, with long-term current use of insulin (HCC)  Assessment & Plan:  Pt sees Candelario Mcclure -   Lab Results   Component Value Date    HGBA1C 8.3 (H) 2024     Orders:  -     IRIS Diabetic eye exam  4. Type 2 diabetes mellitus with retinopathy, with long-term current use of insulin, macular edema presence unspecified, unspecified laterality, unspecified retinopathy severity (HCC)  5. Mixed hyperlipidemia  -     Lipid Panel with Direct LDL reflex; Future; Expected date: 2024  -     Lipid Panel with Direct LDL reflex  6. Vitamin D deficiency  7. Chronic obstructive pulmonary disease, unspecified COPD type (HCC)  8. Continuous opioid dependence (HCC)  9. Tachycardia-bradycardia syndrome (HCC)  10. Prostate cancer (HCC)  11. Sacroiliitis, not elsewhere classified (HCC)  12. Need for hepatitis C screening test  -     Hepatitis C antibody; Future  -     Hepatitis C antibody  13. Scarring of lung  -     CT chest w contrast; Future; Expected date: 2024  14. Non-seasonal allergic rhinitis due to pollen  -     montelukast (SINGULAIR) 10 mg tablet; Take 1 tablet (10 mg total) by mouth daily at bedtime  -     levocetirizine (XYZAL) 5 MG tablet; Take 1 tablet (5 mg total) by mouth every morning  15. Bilateral lower extremity edema  -     VAS Lower extremity venous insufficiency duplex, bilateral w/ measurements; Future; Expected date: 2024  16. Venous ulcers of both lower extremities (HCC)  -     VAS Lower extremity venous insufficiency duplex, bilateral w/  measurements; Future; Expected date: 08/09/2024  17. Medicare annual wellness visit, subsequent       Preventive health issues were discussed with patient, and age appropriate screening tests were ordered as noted in patient's After Visit Summary. Personalized health advice and appropriate referrals for health education or preventive services given if needed, as noted in patient's After Visit Summary.    History of Present Illness     Pt also due for an AWV       Patient Care Team:  Frank Lombardi, DO as PCP - General (Family Medicine)  MD Dewayne Alexander MD (Pain Medicine)  Dung España MD as Endoscopist  Stephanie Mcclure MD (Endocrinology)  NASH Up (Endocrinology)  Titus Humphreys MD as Consulting Physician (Pulmonary Disease)  Mg Dahl DPM as Consulting Physician (Podiatry)  Bryant Santana MD (Ophthalmology)  Rosalio Gonzalez MD (Urology)    Review of Systems  Medical History Reviewed by provider this encounter:       Annual Wellness Visit Questionnaire   Caleb is here for his Subsequent Wellness visit. Last Medicare Wellness visit information reviewed, patient interviewed, no change since last AWV.     Health Risk Assessment:   Patient rates overall health as very good. Patient feels that their physical health rating is slightly worse. Patient is very satisfied with their life. Eyesight was rated as slightly worse. Hearing was rated as slightly worse. Patient feels that their emotional and mental health rating is same. Patients states they are never, rarely angry. Patient states they are sometimes unusually tired/fatigued. Pain experienced in the last 7 days has been some. Patient's pain rating has been 6/10. Patient states that he has experienced no weight loss or gain in last 6 months.     Depression Screening:   PHQ-2 Score: 0      Fall Risk Screening:   In the past year, patient has experienced: no history of falling in past year      Home Safety:  Patient does not have  trouble with stairs inside or outside of their home. Patient has working smoke alarms and has working carbon monoxide detector. Home safety hazards include: none.     Nutrition:   Current diet is Regular.     Medications:   Patient is currently taking over-the-counter supplements. OTC medications include: see medication list. Patient is able to manage medications.     Activities of Daily Living (ADLs)/Instrumental Activities of Daily Living (IADLs):   Walk and transfer into and out of bed and chair?: Yes  Dress and groom yourself?: Yes    Bathe or shower yourself?: Yes    Feed yourself? Yes  Do your laundry/housekeeping?: Yes  Manage your money, pay your bills and track your expenses?: Yes  Make your own meals?: Yes    Do your own shopping?: Yes    Previous Hospitalizations:   Any hospitalizations or ED visits within the last 12 months?: Yes    How many hospitalizations have you had in the last year?: 1-2    Advance Care Planning:   Living will: Yes    Durable POA for healthcare: Yes    Advanced directive: Yes      Cognitive Screening:   Provider or family/friend/caregiver concerned regarding cognition?: No    PREVENTIVE SCREENINGS      Cardiovascular Screening:    General: Screening Not Indicated, History Lipid Disorder and Risks and Benefits Discussed    Due for: Lipid Panel      Diabetes Screening:     General: Screening Not Indicated, History Diabetes and Risks and Benefits Discussed    Due for: Blood Glucose      Colorectal Cancer Screening:     General: Risks and Benefits Discussed and Screening Current      Prostate Cancer Screening:    General: History Prostate Cancer and Screening Not Indicated      Osteoporosis Screening:    General: Patient Declines and Risks and Benefits Discussed      Abdominal Aortic Aneurysm (AAA) Screening:        General: Screening Not Indicated      Lung Cancer Screening:     General: Screening Not Indicated      Hepatitis C Screening:    General: Risks and Benefits Discussed     "Hep C Screening Accepted: Yes      Screening, Brief Intervention, and Referral to Treatment (SBIRT)    Screening  Typical number of drinks in a day: 4  Typical number of drinks in a week: 28  Interpretation: Risky drinking behavior.    Single Item Drug Screening:  How often have you used an illegal drug (including marijuana) or a prescription medication for non-medical reasons in the past year? never    Single Item Drug Screen Score: 0  Interpretation: Negative screen for possible drug use disorder    Brief Intervention  Alcohol & drug use screenings were reviewed. No concerns regarding substance use disorder identified.     Review of Current Opioid Use    Opioid Risk Tool (ORT) Interpretation: Complete Opioid Risk Tool (ORT)    Social Determinants of Health     Food Insecurity: No Food Insecurity (8/9/2024)    Hunger Vital Sign    • Worried About Running Out of Food in the Last Year: Never true    • Ran Out of Food in the Last Year: Never true   Transportation Needs: No Transportation Needs (8/9/2024)    PRAPARE - Transportation    • Lack of Transportation (Medical): No    • Lack of Transportation (Non-Medical): No   Housing Stability: Low Risk  (8/9/2024)    Housing Stability Vital Sign    • Unable to Pay for Housing in the Last Year: No    • Number of Times Moved in the Last Year: 0    • Homeless in the Last Year: No   Utilities: Not At Risk (8/9/2024)    [facility] Utilities    • Threatened with loss of utilities: No     No results found.    Objective     /64   Pulse 68   Temp 100 °F (37.8 °C) (Tympanic)   Resp 20   Ht 5' 10\" (1.778 m)   Wt 129 kg (284 lb)   BMI 40.75 kg/m²     Physical Exam      "

## 2024-08-09 NOTE — PATIENT INSTRUCTIONS
Medicare Preventive Visit Patient Instructions  Thank you for completing your Welcome to Medicare Visit or Medicare Annual Wellness Visit today. Your next wellness visit will be due in one year (8/10/2025).  The screening/preventive services that you may require over the next 5-10 years are detailed below. Some tests may not apply to you based off risk factors and/or age. Screening tests ordered at today's visit but not completed yet may show as past due. Also, please note that scanned in results may not display below.  Preventive Screenings:  Service Recommendations Previous Testing/Comments   Colorectal Cancer Screening  Colonoscopy    Fecal Occult Blood Test (FOBT)/Fecal Immunochemical Test (FIT)  Fecal DNA/Cologuard Test  Flexible Sigmoidoscopy Age: 45-75 years old   Colonoscopy: every 10 years (May be performed more frequently if at higher risk)  OR  FOBT/FIT: every 1 year  OR  Cologuard: every 3 years  OR  Sigmoidoscopy: every 5 years  Screening may be recommended earlier than age 45 if at higher risk for colorectal cancer. Also, an individualized decision between you and your healthcare provider will decide whether screening between the ages of 76-85 would be appropriate. Colonoscopy: 04/05/2016  FOBT/FIT: Not on file  Cologuard: Not on file  Sigmoidoscopy: Not on file          Prostate Cancer Screening Individualized decision between patient and health care provider in men between ages of 55-69   Medicare will cover every 12 months beginning on the day after your 50th birthday PSA: No results in last 5 years     History Prostate Cancer  Screening Not Indicated     Hepatitis C Screening Once for adults born between 1945 and 1965  More frequently in patients at high risk for Hepatitis C Hep C Antibody: Not on file        Diabetes Screening 1-2 times per year if you're at risk for diabetes or have pre-diabetes Fasting glucose: No results in last 5 years (No results in last 5 years)  A1C: 8.3 %  (7/3/2024)  Screening Not Indicated  History Diabetes   Cholesterol Screening Once every 5 years if you don't have a lipid disorder. May order more often based on risk factors. Lipid panel: 07/03/2024  Screening Not Indicated  History Lipid Disorder      Other Preventive Screenings Covered by Medicare:  Abdominal Aortic Aneurysm (AAA) Screening: covered once if your at risk. You're considered to be at risk if you have a family history of AAA or a male between the age of 65-75 who smoking at least 100 cigarettes in your lifetime.  Lung Cancer Screening: covers low dose CT scan once per year if you meet all of the following conditions: (1) Age 55-77; (2) No signs or symptoms of lung cancer; (3) Current smoker or have quit smoking within the last 15 years; (4) You have a tobacco smoking history of at least 20 pack years (packs per day x number of years you smoked); (5) You get a written order from a healthcare provider.  Glaucoma Screening: covered annually if you're considered high risk: (1) You have diabetes OR (2) Family history of glaucoma OR (3)  aged 50 and older OR (4)  American aged 65 and older  Osteoporosis Screening: covered every 2 years if you meet one of the following conditions: (1) Have a vertebral abnormality; (2) On glucocorticoid therapy for more than 3 months; (3) Have primary hyperparathyroidism; (4) On osteoporosis medications and need to assess response to drug therapy.  HIV Screening: covered annually if you're between the age of 15-65. Also covered annually if you are younger than 15 and older than 65 with risk factors for HIV infection. For pregnant patients, it is covered up to 3 times per pregnancy.    Immunizations:  Immunization Recommendations   Influenza Vaccine Annual influenza vaccination during flu season is recommended for all persons aged >= 6 months who do not have contraindications   Pneumococcal Vaccine   * Pneumococcal conjugate vaccine = PCV13 (Prevnar  13), PCV15 (Vaxneuvance), PCV20 (Prevnar 20)  * Pneumococcal polysaccharide vaccine = PPSV23 (Pneumovax) Adults 19-65 yo with certain risk factors or if 65+ yo  If never received any pneumonia vaccine: recommend Prevnar 20 (PCV20)  Give PCV20 if previously received 1 dose of PCV13 or PPSV23   Hepatitis B Vaccine 3 dose series if at intermediate or high risk (ex: diabetes, end stage renal disease, liver disease)   Respiratory syncytial virus (RSV) Vaccine - COVERED BY MEDICARE PART D  * RSVPreF3 (Arexvy) CDC recommends that adults 60 years of age and older may receive a single dose of RSV vaccine using shared clinical decision-making (SCDM)   Tetanus (Td) Vaccine - COST NOT COVERED BY MEDICARE PART B Following completion of primary series, a booster dose should be given every 10 years to maintain immunity against tetanus. Td may also be given as tetanus wound prophylaxis.   Tdap Vaccine - COST NOT COVERED BY MEDICARE PART B Recommended at least once for all adults. For pregnant patients, recommended with each pregnancy.   Shingles Vaccine (Shingrix) - COST NOT COVERED BY MEDICARE PART B  2 shot series recommended in those 19 years and older who have or will have weakened immune systems or those 50 years and older     Health Maintenance Due:      Topic Date Due   • Hepatitis C Screening  Never done   • Colorectal Cancer Screening  Discontinued     Immunizations Due:      Topic Date Due   • Pneumococcal Vaccine: 65+ Years (1 of 2 - PCV) Never done   • COVID-19 Vaccine (1 - 2023-24 season) Never done   • Influenza Vaccine (1) 09/01/2024     Advance Directives   What are advance directives?  Advance directives are legal documents that state your wishes and plans for medical care. These plans are made ahead of time in case you lose your ability to make decisions for yourself. Advance directives can apply to any medical decision, such as the treatments you want, and if you want to donate organs.   What are the types of  advance directives?  There are many types of advance directives, and each state has rules about how to use them. You may choose a combination of any of the following:  Living will:  This is a written record of the treatment you want. You can also choose which treatments you do not want, which to limit, and which to stop at a certain time. This includes surgery, medicine, IV fluid, and tube feedings.   Durable power of  for healthcare (DPAHC):  This is a written record that states who you want to make healthcare choices for you when you are unable to make them for yourself. This person, called a proxy, is usually a family member or a friend. You may choose more than 1 proxy.  Do not resuscitate (DNR) order:  A DNR order is used in case your heart stops beating or you stop breathing. It is a request not to have certain forms of treatment, such as CPR. A DNR order may be included in other types of advance directives.  Medical directive:  This covers the care that you want if you are in a coma, near death, or unable to make decisions for yourself. You can list the treatments you want for each condition. Treatment may include pain medicine, surgery, blood transfusions, dialysis, IV or tube feedings, and a ventilator (breathing machine).  Values history:  This document has questions about your views, beliefs, and how you feel and think about life. This information can help others choose the care that you would choose.  Why are advance directives important?  An advance directive helps you control your care. Although spoken wishes may be used, it is better to have your wishes written down. Spoken wishes can be misunderstood, or not followed. Treatments may be given even if you do not want them. An advance directive may make it easier for your family to make difficult choices about your care.   Weight Management   Why it is important to manage your weight:  Being overweight increases your risk of health conditions  such as heart disease, high blood pressure, type 2 diabetes, and certain types of cancer. It can also increase your risk for osteoarthritis, sleep apnea, and other respiratory problems. Aim for a slow, steady weight loss. Even a small amount of weight loss can lower your risk of health problems.  How to lose weight safely:  A safe and healthy way to lose weight is to eat fewer calories and get regular exercise. You can lose up about 1 pound a week by decreasing the number of calories you eat by 500 calories each day.   Healthy meal plan for weight management:  A healthy meal plan includes a variety of foods, contains fewer calories, and helps you stay healthy. A healthy meal plan includes the following:  Eat whole-grain foods more often.  A healthy meal plan should contain fiber. Fiber is the part of grains, fruits, and vegetables that is not broken down by your body. Whole-grain foods are healthy and provide extra fiber in your diet. Some examples of whole-grain foods are whole-wheat breads and pastas, oatmeal, brown rice, and bulgur.  Eat a variety of vegetables every day.  Include dark, leafy greens such as spinach, kale, chayo greens, and mustard greens. Eat yellow and orange vegetables such as carrots, sweet potatoes, and winter squash.   Eat a variety of fruits every day.  Choose fresh or canned fruit (canned in its own juice or light syrup) instead of juice. Fruit juice has very little or no fiber.  Eat low-fat dairy foods.  Drink fat-free (skim) milk or 1% milk. Eat fat-free yogurt and low-fat cottage cheese. Try low-fat cheeses such as mozzarella and other reduced-fat cheeses.  Choose meat and other protein foods that are low in fat.  Choose beans or other legumes such as split peas or lentils. Choose fish, skinless poultry (chicken or turkey), or lean cuts of red meat (beef or pork). Before you cook meat or poultry, cut off any visible fat.   Use less fat and oil.  Try baking foods instead of frying  them. Add less fat, such as margarine, sour cream, regular salad dressing and mayonnaise to foods. Eat fewer high-fat foods. Some examples of high-fat foods include french fries, doughnuts, ice cream, and cakes.  Eat fewer sweets.  Limit foods and drinks that are high in sugar. This includes candy, cookies, regular soda, and sweetened drinks.  Exercise:  Exercise at least 30 minutes per day on most days of the week. Some examples of exercise include walking, biking, dancing, and swimming. You can also fit in more physical activity by taking the stairs instead of the elevator or parking farther away from stores. Ask your healthcare provider about the best exercise plan for you.   Narcotic (Opioid) Safety    Use narcotics safely:  Take prescribed narcotics exactly as directed  Do not give narcotics to others or take narcotics that belong to someone else  Do not mix narcotics without medicines or alcohol  Do not drive or operate heavy machinery after you take the narcotic  Monitor for side effects and notify your healthcare provider if you experienced side effects such as nausea, sleepiness, itching, or trouble thinking clearly.    Manage constipation:    Constipation is the most common side effect of narcotic medicine. Constipation is when you have hard, dry bowel movements, or you go longer than usual between bowel movements. Tell your healthcare provider about all changes in your bowel movements while you are taking narcotics. He or she may recommend laxative medicine to help you have a bowel movement. He or she may also change the kind of narcotic you are taking, or change when you take it. The following are more ways you can prevent or relieve constipation:    Drink liquids as directed.  You may need to drink extra liquids to help soften and move your bowels. Ask how much liquid to drink each day and which liquids are best for you.  Eat high-fiber foods.  This may help decrease constipation by adding bulk to your  bowel movements. High-fiber foods include fruits, vegetables, whole-grain breads and cereals, and beans. Your healthcare provider or dietitian can help you create a high-fiber meal plan. Your provider may also recommend a fiber supplement if you cannot get enough fiber from food.  Exercise regularly.  Regular physical activity can help stimulate your intestines. Walking is a good exercise to prevent or relieve constipation. Ask which exercises are best for you.  Schedule a time each day to have a bowel movement.  This may help train your body to have regular bowel movements. Bend forward while you are on the toilet to help move the bowel movement out. Sit on the toilet for at least 10 minutes, even if you do not have a bowel movement.    Store narcotics safely:   Store narcotics where others cannot easily get them.  Keep them in a locked cabinet or secure area. Do not  keep them in a purse or other bag you carry with you. A person may be looking for something else and find the narcotics.  Make sure narcotics are stored out of the reach of children.  A child can easily overdose on narcotics. Narcotics may look like candy to a small child.    The best way to dispose of narcotics:      The laws vary by country and area. In the [place], the best way is to return the narcotics through a take-back program. This program is offered by the [place] Drug Enforcement Agency ([place]). The following are options for using the program:  Take the narcotics to a [place] collection site.  The site is often a law enforcement center. Call your local law enforcement center for scheduled take-back days in your area. You will be given information on where to go if the collection site is in a different location.  Take the narcotics to an approved pharmacy or hospital.  A pharmacy or hospital may be set up as a collection site. You will need to ask if it is a [place] collection site if you were not directed there. A pharmacy or doctor's office may not  be able to take back narcotics unless it is a [place] site.  Use a mail-back system.  This means you are given containers to put the narcotics into. You will then mail them in the containers.  Use a take-back drop box.  This is a place to leave the narcotics at any time. People and animals will not be able to get into the box. Your local law enforcement agency can tell you where to find a drop box in your area.    Other ways to manage pain:   Ask your healthcare provider about non-narcotic medicines to control pain.  Nonprescription medicines include NSAIDs (such as ibuprofen) and acetaminophen. Prescription medicines include muscle relaxers, antidepressants, and steroids.  Pain may be managed without any medicines.  Some ways to relieve pain include massage, aromatherapy, or meditation. Physical or occupational therapy may also help.    For more information:   Drug Enforcement Administration  [address] [place] [address]  Phone: 1- 009 - 262-2290  Web Address: https://www.deadiversion.[facility].gov/drug_disposal/    [place] Food and Drug Administration  [address] [place] [id number]  Phone: 5- 373 - 547-0291  Web Address: http://www.fda.gov     © Copyright [employer] 2018 Information is for End User's use only and may not be sold, redistributed or otherwise used for commercial purposes. All illustrations and images included in CareNotes® are the copyrighted property of A.D.A.M., Inc. or [employer]

## 2024-08-09 NOTE — PROGRESS NOTES
Assessment/Plan:    1. Essential (primary) hypertension  2. Coronary artery disease involving native coronary artery of native heart without angina pectoris  Assessment & Plan:  Pt sees Dr Lundy  3. Type 2 diabetes mellitus with stage 2 chronic kidney disease, with long-term current use of insulin (HCC)  Assessment & Plan:  Pt sees Candelario Mcclure -   Lab Results   Component Value Date    HGBA1C 8.3 (H) 07/03/2024     Orders:  -     IRIS Diabetic eye exam  4. Type 2 diabetes mellitus with retinopathy, with long-term current use of insulin, macular edema presence unspecified, unspecified laterality, unspecified retinopathy severity (HCC)  5. Mixed hyperlipidemia  -     Lipid Panel with Direct LDL reflex; Future; Expected date: 08/09/2024  -     Lipid Panel with Direct LDL reflex  6. Vitamin D deficiency  7. Chronic obstructive pulmonary disease, unspecified COPD type (HCC)  8. Continuous opioid dependence (HCC)  9. Tachycardia-bradycardia syndrome (HCC)  10. Prostate cancer (HCC)  11. Sacroiliitis, not elsewhere classified (HCC)  12. Need for hepatitis C screening test  -     Hepatitis C antibody; Future  -     Hepatitis C antibody  13. Scarring of lung  -     CT chest w contrast; Future; Expected date: 08/09/2024  14. Non-seasonal allergic rhinitis due to pollen  -     montelukast (SINGULAIR) 10 mg tablet; Take 1 tablet (10 mg total) by mouth daily at bedtime  -     levocetirizine (XYZAL) 5 MG tablet; Take 1 tablet (5 mg total) by mouth every morning  15. Bilateral lower extremity edema  -     VAS Lower extremity venous insufficiency duplex, bilateral w/ measurements; Future; Expected date: 08/09/2024  16. Venous ulcers of both lower extremities (HCC)  -     VAS Lower extremity venous insufficiency duplex, bilateral w/ measurements; Future; Expected date: 08/09/2024  17. Medicare annual wellness visit, subsequent          Patient Instructions   Medicare Preventive Visit Patient Instructions  Thank you for completing  your Welcome to Medicare Visit or Medicare Annual Wellness Visit today. Your next wellness visit will be due in one year (8/10/2025).  The screening/preventive services that you may require over the next 5-10 years are detailed below. Some tests may not apply to you based off risk factors and/or age. Screening tests ordered at today's visit but not completed yet may show as past due. Also, please note that scanned in results may not display below.  Preventive Screenings:  Service Recommendations Previous Testing/Comments   Colorectal Cancer Screening  Colonoscopy    Fecal Occult Blood Test (FOBT)/Fecal Immunochemical Test (FIT)  Fecal DNA/Cologuard Test  Flexible Sigmoidoscopy Age: 45-75 years old   Colonoscopy: every 10 years (May be performed more frequently if at higher risk)  OR  FOBT/FIT: every 1 year  OR  Cologuard: every 3 years  OR  Sigmoidoscopy: every 5 years  Screening may be recommended earlier than age 45 if at higher risk for colorectal cancer. Also, an individualized decision between you and your healthcare provider will decide whether screening between the ages of 76-85 would be appropriate. Colonoscopy: 04/05/2016  FOBT/FIT: Not on file  Cologuard: Not on file  Sigmoidoscopy: Not on file          Prostate Cancer Screening Individualized decision between patient and health care provider in men between ages of 55-69   Medicare will cover every 12 months beginning on the day after your 50th birthday PSA: No results in last 5 years     History Prostate Cancer  Screening Not Indicated     Hepatitis C Screening Once for adults born between 1945 and 1965  More frequently in patients at high risk for Hepatitis C Hep C Antibody: Not on file        Diabetes Screening 1-2 times per year if you're at risk for diabetes or have pre-diabetes Fasting glucose: No results in last 5 years (No results in last 5 years)  A1C: 8.3 % (7/3/2024)  Screening Not Indicated  History Diabetes   Cholesterol Screening Once every 5  years if you don't have a lipid disorder. May order more often based on risk factors. Lipid panel: 07/03/2024  Screening Not Indicated  History Lipid Disorder      Other Preventive Screenings Covered by Medicare:  Abdominal Aortic Aneurysm (AAA) Screening: covered once if your at risk. You're considered to be at risk if you have a family history of AAA or a male between the age of 65-75 who smoking at least 100 cigarettes in your lifetime.  Lung Cancer Screening: covers low dose CT scan once per year if you meet all of the following conditions: (1) Age 55-77; (2) No signs or symptoms of lung cancer; (3) Current smoker or have quit smoking within the last 15 years; (4) You have a tobacco smoking history of at least 20 pack years (packs per day x number of years you smoked); (5) You get a written order from a healthcare provider.  Glaucoma Screening: covered annually if you're considered high risk: (1) You have diabetes OR (2) Family history of glaucoma OR (3)  aged 50 and older OR (4)  American aged 65 and older  Osteoporosis Screening: covered every 2 years if you meet one of the following conditions: (1) Have a vertebral abnormality; (2) On glucocorticoid therapy for more than 3 months; (3) Have primary hyperparathyroidism; (4) On osteoporosis medications and need to assess response to drug therapy.  HIV Screening: covered annually if you're between the age of 15-65. Also covered annually if you are younger than 15 and older than 65 with risk factors for HIV infection. For pregnant patients, it is covered up to 3 times per pregnancy.    Immunizations:  Immunization Recommendations   Influenza Vaccine Annual influenza vaccination during flu season is recommended for all persons aged >= 6 months who do not have contraindications   Pneumococcal Vaccine   * Pneumococcal conjugate vaccine = PCV13 (Prevnar 13), PCV15 (Vaxneuvance), PCV20 (Prevnar 20)  * Pneumococcal polysaccharide vaccine = PPSV23  (Pneumovax) Adults 19-65 yo with certain risk factors or if 65+ yo  If never received any pneumonia vaccine: recommend Prevnar 20 (PCV20)  Give PCV20 if previously received 1 dose of PCV13 or PPSV23   Hepatitis B Vaccine 3 dose series if at intermediate or high risk (ex: diabetes, end stage renal disease, liver disease)   Respiratory syncytial virus (RSV) Vaccine - COVERED BY MEDICARE PART D  * RSVPreF3 (Arexvy) CDC recommends that adults 60 years of age and older may receive a single dose of RSV vaccine using shared clinical decision-making (SCDM)   Tetanus (Td) Vaccine - COST NOT COVERED BY MEDICARE PART B Following completion of primary series, a booster dose should be given every 10 years to maintain immunity against tetanus. Td may also be given as tetanus wound prophylaxis.   Tdap Vaccine - COST NOT COVERED BY MEDICARE PART B Recommended at least once for all adults. For pregnant patients, recommended with each pregnancy.   Shingles Vaccine (Shingrix) - COST NOT COVERED BY MEDICARE PART B  2 shot series recommended in those 19 years and older who have or will have weakened immune systems or those 50 years and older     Health Maintenance Due:      Topic Date Due   • Hepatitis C Screening  Never done   • Colorectal Cancer Screening  Discontinued     Immunizations Due:      Topic Date Due   • Pneumococcal Vaccine: 65+ Years (1 of 2 - PCV) Never done   • COVID-19 Vaccine (1 - 2023-24 season) Never done   • Influenza Vaccine (1) 09/01/2024     Advance Directives   What are advance directives?  Advance directives are legal documents that state your wishes and plans for medical care. These plans are made ahead of time in case you lose your ability to make decisions for yourself. Advance directives can apply to any medical decision, such as the treatments you want, and if you want to donate organs.   What are the types of advance directives?  There are many types of advance directives, and each state has rules about  how to use them. You may choose a combination of any of the following:  Living will:  This is a written record of the treatment you want. You can also choose which treatments you do not want, which to limit, and which to stop at a certain time. This includes surgery, medicine, IV fluid, and tube feedings.   Durable power of  for healthcare (DPAHC):  This is a written record that states who you want to make healthcare choices for you when you are unable to make them for yourself. This person, called a proxy, is usually a family member or a friend. You may choose more than 1 proxy.  Do not resuscitate (DNR) order:  A DNR order is used in case your heart stops beating or you stop breathing. It is a request not to have certain forms of treatment, such as CPR. A DNR order may be included in other types of advance directives.  Medical directive:  This covers the care that you want if you are in a coma, near death, or unable to make decisions for yourself. You can list the treatments you want for each condition. Treatment may include pain medicine, surgery, blood transfusions, dialysis, IV or tube feedings, and a ventilator (breathing machine).  Values history:  This document has questions about your views, beliefs, and how you feel and think about life. This information can help others choose the care that you would choose.  Why are advance directives important?  An advance directive helps you control your care. Although spoken wishes may be used, it is better to have your wishes written down. Spoken wishes can be misunderstood, or not followed. Treatments may be given even if you do not want them. An advance directive may make it easier for your family to make difficult choices about your care.   Weight Management   Why it is important to manage your weight:  Being overweight increases your risk of health conditions such as heart disease, high blood pressure, type 2 diabetes, and certain types of cancer. It can  also increase your risk for osteoarthritis, sleep apnea, and other respiratory problems. Aim for a slow, steady weight loss. Even a small amount of weight loss can lower your risk of health problems.  How to lose weight safely:  A safe and healthy way to lose weight is to eat fewer calories and get regular exercise. You can lose up about 1 pound a week by decreasing the number of calories you eat by 500 calories each day.   Healthy meal plan for weight management:  A healthy meal plan includes a variety of foods, contains fewer calories, and helps you stay healthy. A healthy meal plan includes the following:  Eat whole-grain foods more often.  A healthy meal plan should contain fiber. Fiber is the part of grains, fruits, and vegetables that is not broken down by your body. Whole-grain foods are healthy and provide extra fiber in your diet. Some examples of whole-grain foods are whole-wheat breads and pastas, oatmeal, brown rice, and bulgur.  Eat a variety of vegetables every day.  Include dark, leafy greens such as spinach, kale, chayo greens, and mustard greens. Eat yellow and orange vegetables such as carrots, sweet potatoes, and winter squash.   Eat a variety of fruits every day.  Choose fresh or canned fruit (canned in its own juice or light syrup) instead of juice. Fruit juice has very little or no fiber.  Eat low-fat dairy foods.  Drink fat-free (skim) milk or 1% milk. Eat fat-free yogurt and low-fat cottage cheese. Try low-fat cheeses such as mozzarella and other reduced-fat cheeses.  Choose meat and other protein foods that are low in fat.  Choose beans or other legumes such as split peas or lentils. Choose fish, skinless poultry (chicken or turkey), or lean cuts of red meat (beef or pork). Before you cook meat or poultry, cut off any visible fat.   Use less fat and oil.  Try baking foods instead of frying them. Add less fat, such as margarine, sour cream, regular salad dressing and mayonnaise to foods.  Eat fewer high-fat foods. Some examples of high-fat foods include french fries, doughnuts, ice cream, and cakes.  Eat fewer sweets.  Limit foods and drinks that are high in sugar. This includes candy, cookies, regular soda, and sweetened drinks.  Exercise:  Exercise at least 30 minutes per day on most days of the week. Some examples of exercise include walking, biking, dancing, and swimming. You can also fit in more physical activity by taking the stairs instead of the elevator or parking farther away from stores. Ask your healthcare provider about the best exercise plan for you.   Narcotic (Opioid) Safety    Use narcotics safely:  Take prescribed narcotics exactly as directed  Do not give narcotics to others or take narcotics that belong to someone else  Do not mix narcotics without medicines or alcohol  Do not drive or operate heavy machinery after you take the narcotic  Monitor for side effects and notify your healthcare provider if you experienced side effects such as nausea, sleepiness, itching, or trouble thinking clearly.    Manage constipation:    Constipation is the most common side effect of narcotic medicine. Constipation is when you have hard, dry bowel movements, or you go longer than usual between bowel movements. Tell your healthcare provider about all changes in your bowel movements while you are taking narcotics. He or she may recommend laxative medicine to help you have a bowel movement. He or she may also change the kind of narcotic you are taking, or change when you take it. The following are more ways you can prevent or relieve constipation:    Drink liquids as directed.  You may need to drink extra liquids to help soften and move your bowels. Ask how much liquid to drink each day and which liquids are best for you.  Eat high-fiber foods.  This may help decrease constipation by adding bulk to your bowel movements. High-fiber foods include fruits, vegetables, whole-grain breads and cereals, and  beans. Your healthcare provider or dietitian can help you create a high-fiber meal plan. Your provider may also recommend a fiber supplement if you cannot get enough fiber from food.  Exercise regularly.  Regular physical activity can help stimulate your intestines. Walking is a good exercise to prevent or relieve constipation. Ask which exercises are best for you.  Schedule a time each day to have a bowel movement.  This may help train your body to have regular bowel movements. Bend forward while you are on the toilet to help move the bowel movement out. Sit on the toilet for at least 10 minutes, even if you do not have a bowel movement.    Store narcotics safely:   Store narcotics where others cannot easily get them.  Keep them in a locked cabinet or secure area. Do not  keep them in a purse or other bag you carry with you. A person may be looking for something else and find the narcotics.  Make sure narcotics are stored out of the reach of children.  A child can easily overdose on narcotics. Narcotics may look like candy to a small child.    The best way to dispose of narcotics:      The laws vary by country and area. In the [place], the best way is to return the narcotics through a take-back program. This program is offered by the [place] Drug Enforcement Agency ([place]). The following are options for using the program:  Take the narcotics to a [place] collection site.  The site is often a law enforcement center. Call your local law enforcement center for scheduled take-back days in your area. You will be given information on where to go if the collection site is in a different location.  Take the narcotics to an approved pharmacy or hospital.  A pharmacy or hospital may be set up as a collection site. You will need to ask if it is a [place] collection site if you were not directed there. A pharmacy or doctor's office may not be able to take back narcotics unless it is a [place] site.  Use a mail-back system.  This means you  are given containers to put the narcotics into. You will then mail them in the containers.  Use a take-back drop box.  This is a place to leave the narcotics at any time. People and animals will not be able to get into the box. Your local law enforcement agency can tell you where to find a drop box in your area.    Other ways to manage pain:   Ask your healthcare provider about non-narcotic medicines to control pain.  Nonprescription medicines include NSAIDs (such as ibuprofen) and acetaminophen. Prescription medicines include muscle relaxers, antidepressants, and steroids.  Pain may be managed without any medicines.  Some ways to relieve pain include massage, aromatherapy, or meditation. Physical or occupational therapy may also help.    For more information:   Drug Enforcement Administration  [address] [place] [address]  Phone: 0- 892 - [phone]  Web Address: https://www.deadiversion.Connectloud.gov/drug_disposal/    [place] Food and Drug Administration  [address] [place] [address]  Phone: 0- 313 - 726-8999  Web Address: http://www.fda.gov     © Copyright [employer] 2018 Information is for End User's use only and may not be sold, redistributed or otherwise used for commercial purposes. All illustrations and images included in CareNotes® are the copyrighted property of [employer] or [employer]       Return in 6 months (on 2/9/2025) for Diabetes follow-up.    Subjective:      Patient ID: Caleb Ventura is a 79 y.o. male.    Chief Complaint   Patient presents with   • Establish Care     YC       Pt is here for the first time to establish.  Transferring from Dr Marquis    Pt sees foot doctor regularly  Pt has not seen eye doctor    Pt states he had an x ray in the past and something was seen in the chest and he was supposed to get a ct scan    Pt states he gets a lot of pressure in his rt ear - has been going on for 5 years he was told it was allergies, states the  mucus goes to his chest and he hacks it up.     Pt states for the last 4 or 5 years pt has chronic changes  and lesions -           The following portions of the patient's history were reviewed and updated as appropriate: allergies, current medications, past family history, past medical history, past social history, past surgical history and problem list.    Review of Systems   Constitutional:  Negative for activity change, appetite change, chills, diaphoresis, fatigue, fever and unexpected weight change.   HENT:  Negative for congestion, dental problem, ear pain, mouth sores, sinus pressure, sinus pain, sore throat and trouble swallowing.    Eyes:  Negative for photophobia, discharge and itching.   Respiratory:  Negative for apnea, chest tightness and shortness of breath.    Cardiovascular:  Positive for leg swelling. Negative for chest pain and palpitations.   Gastrointestinal:  Negative for abdominal distention, abdominal pain, blood in stool, nausea and vomiting.   Endocrine: Negative for cold intolerance, heat intolerance, polydipsia, polyphagia and polyuria.   Genitourinary:  Negative for difficulty urinating.   Musculoskeletal:  Negative for arthralgias.   Skin:  Positive for rash and wound. Negative for color change.   Neurological:  Positive for numbness. Negative for dizziness, syncope, speech difficulty and headaches.   Hematological:  Negative for adenopathy.   Psychiatric/Behavioral:  Negative for agitation and behavioral problems.          Current Outpatient Medications   Medication Sig Dispense Refill   • Ascorbic Acid (VITAMIN C) 1000 MG tablet Take 1,000 mg by mouth daily     • aspirin 81 MG tablet Take 81 mg by mouth daily.     • Blood Glucose Monitoring Suppl (OneTouch Verio) w/Device KIT Use to test blood sugar 3 times a day 1 kit 0   • Blood Glucose Monitoring Suppl (OneTouch Verio) w/Device KIT Use to test blood sugar 3 times a day 1 kit 0   • cholecalciferol (VITAMIN D3) 1,000 units tablet  Take 2,000 Units by mouth daily      • dapagliflozin (Farxiga) 10 MG tablet TAKE 1 TABLET DAILY 90 tablet 1   • docusate sodium (COLACE) 50 mg capsule Take 50 mg by mouth 2 (two) times a day     • Entresto  MG TABS      • ezetimibe (ZETIA) 10 mg tablet TAKE 1 TABLET BY MOUTH TIMES A WEEK ON MONDAY WEDNESDAY AND FRIDAY AT 6PM     • furosemide (LASIX) 40 mg tablet Take 40 mg by mouth daily Twice a week on Mon & Thur--PRN     • Glucagon, rDNA, (Glucagon Emergency) 1 MG KIT Use glucagon kit if you have a low blood sugar and unconscious 1 each 0   • glucose blood (OneTouch Verio) test strip USE TO TEST BLOOD SUGAR THREE TIMES A  strip 3   • HumaLOG Mix 75/25 KwikPen (75-25) 100 units/mL injection pen INJECT 46 UNITS UNDER THE SKIN BEFORE BREAKFAST AND 44 units before dinner. 90 mL 2   • HYDROcodone-acetaminophen (NORCO) 5-325 mg per tablet Take 1 tablet by mouth 2 (two) times a day as needed for pain Max Daily Amount: 2 tablets 60 tablet 0   • levocetirizine (XYZAL) 5 MG tablet Take 1 tablet (5 mg total) by mouth every morning 90 tablet 1   • metFORMIN (GLUCOPHAGE-XR) 750 mg 24 hr tablet TAKE 1 TABLET TWICE A  tablet 1   • metoprolol succinate (TOPROL-XL) 50 mg 24 hr tablet Take 50 mg by mouth daily     • montelukast (SINGULAIR) 10 mg tablet Take 1 tablet (10 mg total) by mouth daily at bedtime 90 tablet 1   • Multiple Vitamins-Minerals (MULTIVITAMIN ADULT PO) Take by mouth     • omeprazole (PriLOSEC) 20 mg delayed release capsule Take 1 capsule (20 mg total) by mouth daily 30 capsule 11   • rosuvastatin (CRESTOR) 40 MG tablet Take 40 mg by mouth daily 1/2 pill at night     • sacubitril-valsartan (Entresto)  MG TABS Take 1 tablet by mouth 2 (two) times a day     • spironolactone (ALDACTONE) 25 mg tablet      • traMADol (ULTRAM) 50 mg tablet      • traZODone (DESYREL) 100 mg tablet TAKE 1 TABLET AT BEDTIME AS NEEDED FOR SLEEP     • Zinc 50 MG TABS Take by mouth     • B-D ULTRAFINE III SHORT PEN  "31G X 8 MM MISC USE WITH INSULIN TWICE A  each 3   • doxycycline hyclate (VIBRAMYCIN) 100 mg capsule Take 100 mg by mouth daily (Patient not taking: Reported on 12/15/2023)     • gabapentin (NEURONTIN) 300 mg capsule Take 1 capsule by mouth Three times a day (Patient not taking: Reported on 7/12/2024)       No current facility-administered medications for this visit.       Objective:    /64   Pulse 68   Temp 100 °F (37.8 °C) (Tympanic)   Resp 20   Ht 5' 10\" (1.778 m)   Wt 129 kg (284 lb)   BMI 40.75 kg/m²        Physical Exam  Vitals and nursing note reviewed.   Constitutional:       General: He is not in acute distress.     Appearance: He is well-developed. He is not diaphoretic.   HENT:      Head: Normocephalic and atraumatic.      Right Ear: External ear normal.      Left Ear: External ear normal.      Nose: Nose normal.      Mouth/Throat:      Pharynx: No oropharyngeal exudate.   Eyes:      General: No scleral icterus.        Right eye: No discharge.         Left eye: No discharge.      Pupils: Pupils are equal, round, and reactive to light.   Neck:      Thyroid: No thyromegaly.   Cardiovascular:      Rate and Rhythm: Normal rate.      Heart sounds: Normal heart sounds. No murmur heard.  Pulmonary:      Effort: Pulmonary effort is normal. No respiratory distress.      Breath sounds: Normal breath sounds. No wheezing.   Abdominal:      General: Bowel sounds are normal. There is no distension.      Palpations: Abdomen is soft. There is no mass.      Tenderness: There is no abdominal tenderness. There is no guarding or rebound.   Musculoskeletal:         General: Normal range of motion.      Right lower leg: Edema present.      Left lower leg: Edema present.   Skin:     General: Skin is warm and dry.      Findings: Erythema and rash present.   Neurological:      Mental Status: He is alert.      Coordination: Coordination normal.      Deep Tendon Reflexes: Reflexes normal.   Psychiatric:         " Behavior: Behavior normal.                Frank Lombardi, DO

## 2024-08-12 ENCOUNTER — TELEPHONE (OUTPATIENT)
Dept: ADMINISTRATIVE | Facility: OTHER | Age: 79
End: 2024-08-12

## 2024-08-12 NOTE — LETTER
2nd Request      Diabetic Foot Exam Form    Date Requested: 24  Patient: Caleb Ventura  Patient : 1945   Referring Provider: Frank Lombardi, DO    Diabetic Foot Exam Performed with shoes and socks removed        Yes         No     Date of Diabetic Foot Exam ______________________________  Risk Score ____________________________________________    Left Foot       Visual Inspection         Monofilament Testing Sensory Exam        Pedal Pulses         Additional Comments         Right Foot      Visual Inspection         Monofilament Testing Sensory Exam       Pedal Pulses         Additional Comments         Comments __________________________________________________________    Practice Providing Exam ______________________________________________    Exam Performed By (print name) _______________________________________      Provider Signature ___________________________________________________        These reports are needed for  compliance.    Please fax this completed form and a copy of the Diabetic Foot Exam report to our office located at [address] [place] [address] as soon as possible via Fax [phone] attention Annemarie: Phone [phone]    We thank you for your assistance in treating our mutual patient.

## 2024-08-12 NOTE — TELEPHONE ENCOUNTER
Upon review of the In Basket request and the patient's chart, initial outreach has been made via fax to facility. Please see Contacts section for details.     Thank you  Annemarie Klein MA

## 2024-08-12 NOTE — TELEPHONE ENCOUNTER
----- Message from Frank Lombardi, DO sent at 8/9/2024 10:09 AM EDT -----  Regarding: foot  08/09/24 10:09 AM    German, our patient Caleb Ventura has had Diabetic Foot Exam completed/performed. Please assist in updating the patient chart by making an External outreach to [facility] facility located in [facility]. The date of service is recent.    Thank you,  Frank Lombardi, DO  [facility]

## 2024-08-12 NOTE — LETTER
Diabetic Foot Exam Form    Date Requested: 24  Patient: Caleb Ventura  Patient : 1945   Referring Provider: Frank Lombardi, DO    Diabetic Foot Exam Performed with shoes and socks removed        Yes         No     Date of Diabetic Foot Exam ______________________________  Risk Score ____________________________________________    Left Foot       Visual Inspection         Monofilament Testing Sensory Exam        Pedal Pulses         Additional Comments         Right Foot      Visual Inspection         Monofilament Testing Sensory Exam       Pedal Pulses         Additional Comments         Comments __________________________________________________________    Practice Providing Exam ______________________________________________    Exam Performed By (print name) _______________________________________      Provider Signature ___________________________________________________        These reports are needed for  compliance.    Please fax this completed form and a copy of the Diabetic Foot Exam report to our office located at [address] [place] [address] as soon as possible via Fax [phone] attention Annemarie: Phone [phone]    We thank you for your assistance in treating our mutual patient.

## 2024-08-13 LAB
LEFT EYE DIABETIC RETINOPATHY: ABNORMAL
LEFT EYE IMAGE QUALITY: ABNORMAL
LEFT EYE MACULAR EDEMA: ABNORMAL
LEFT EYE OTHER RETINOPATHY: ABNORMAL
RIGHT EYE DIABETIC RETINOPATHY: ABNORMAL
RIGHT EYE IMAGE QUALITY: ABNORMAL
RIGHT EYE MACULAR EDEMA: ABNORMAL
RIGHT EYE OTHER RETINOPATHY: ABNORMAL
SEVERITY (EYE EXAM): ABNORMAL

## 2024-08-14 ENCOUNTER — TELEPHONE (OUTPATIENT)
Dept: FAMILY MEDICINE CLINIC | Facility: CLINIC | Age: 79
End: 2024-08-14

## 2024-08-14 NOTE — TELEPHONE ENCOUNTER
Please call pt, let him know his Iris exam was in conclusive,  He will need a fomal exam at an eye doctor

## 2024-08-16 NOTE — TELEPHONE ENCOUNTER
As a follow-up, a second attempt has been made for outreach via fax to facility. Please see Contacts section for details.    Thank you  Annemarie Klein MA

## 2024-08-27 DIAGNOSIS — G47.00 INSOMNIA, UNSPECIFIED TYPE: Primary | ICD-10-CM

## 2024-08-27 NOTE — TELEPHONE ENCOUNTER
As a final attempt, a third outreach has been made via telephone call to facility. Please see Contacts section for details. This encounter will be closed and completed by end of day. Should we receive the requested information because of previous outreach attempts, the requested patient's chart will be updated appropriately.     Thank you  Annemarie Klein MA

## 2024-08-28 RX ORDER — TRAZODONE HYDROCHLORIDE 100 MG/1
100 TABLET ORAL
Qty: 30 TABLET | Refills: 3 | Status: SHIPPED | OUTPATIENT
Start: 2024-08-28 | End: 2024-12-26

## 2024-09-09 DIAGNOSIS — Z79.4 TYPE 2 DIABETES MELLITUS WITH STAGE 2 CHRONIC KIDNEY DISEASE, WITH LONG-TERM CURRENT USE OF INSULIN (HCC): ICD-10-CM

## 2024-09-09 DIAGNOSIS — E11.22 TYPE 2 DIABETES MELLITUS WITH STAGE 2 CHRONIC KIDNEY DISEASE, WITH LONG-TERM CURRENT USE OF INSULIN (HCC): ICD-10-CM

## 2024-09-09 DIAGNOSIS — N18.2 TYPE 2 DIABETES MELLITUS WITH STAGE 2 CHRONIC KIDNEY DISEASE, WITH LONG-TERM CURRENT USE OF INSULIN (HCC): ICD-10-CM

## 2024-09-09 RX ORDER — DAPAGLIFLOZIN 10 MG/1
TABLET, FILM COATED ORAL
Qty: 90 TABLET | Refills: 1 | Status: SHIPPED | OUTPATIENT
Start: 2024-09-09

## 2024-09-16 DIAGNOSIS — Z79.4 TYPE 2 DIABETES MELLITUS WITH STAGE 2 CHRONIC KIDNEY DISEASE, WITH LONG-TERM CURRENT USE OF INSULIN (HCC): ICD-10-CM

## 2024-09-16 DIAGNOSIS — E11.22 TYPE 2 DIABETES MELLITUS WITH STAGE 2 CHRONIC KIDNEY DISEASE, WITH LONG-TERM CURRENT USE OF INSULIN (HCC): ICD-10-CM

## 2024-09-16 DIAGNOSIS — N18.2 TYPE 2 DIABETES MELLITUS WITH STAGE 2 CHRONIC KIDNEY DISEASE, WITH LONG-TERM CURRENT USE OF INSULIN (HCC): ICD-10-CM

## 2024-09-17 RX ORDER — METFORMIN HYDROCHLORIDE 750 MG/1
TABLET, EXTENDED RELEASE ORAL
Qty: 180 TABLET | Refills: 3 | Status: SHIPPED | OUTPATIENT
Start: 2024-09-17

## 2024-10-01 LAB
CHOLEST SERPL-MCNC: 135 MG/DL (ref 100–199)
HCV AB S/CO SERPL IA: NON REACTIVE
HDLC SERPL-MCNC: 57 MG/DL
LDLC SERPL CALC-MCNC: 59 MG/DL (ref 0–99)
LDLC/HDLC SERPL: 1 RATIO (ref 0–3.6)
MICRODELETION SYND BLD/T FISH: NORMAL
SL AMB VLDL CHOLESTEROL CALC: 19 MG/DL (ref 5–40)
TRIGL SERPL-MCNC: 104 MG/DL (ref 0–149)

## 2024-10-11 ENCOUNTER — HOSPITAL ENCOUNTER (OUTPATIENT)
Dept: RADIOLOGY | Facility: HOSPITAL | Age: 79
Discharge: HOME | End: 2024-10-11
Attending: FAMILY MEDICINE
Payer: MEDICARE

## 2024-10-11 DIAGNOSIS — J98.4 SCARRING OF LUNG: ICD-10-CM

## 2024-10-11 DIAGNOSIS — L97.919 VENOUS ULCERS OF BOTH LOWER EXTREMITIES (HCC): ICD-10-CM

## 2024-10-11 DIAGNOSIS — I83.019 VENOUS ULCERS OF BOTH LOWER EXTREMITIES (HCC): ICD-10-CM

## 2024-10-11 DIAGNOSIS — R60.0 BILATERAL LOWER EXTREMITY EDEMA: ICD-10-CM

## 2024-10-11 DIAGNOSIS — I83.029 VENOUS ULCERS OF BOTH LOWER EXTREMITIES (HCC): ICD-10-CM

## 2024-10-11 DIAGNOSIS — L97.929 VENOUS ULCERS OF BOTH LOWER EXTREMITIES (HCC): ICD-10-CM

## 2024-10-11 PROCEDURE — 71260 CT THORAX DX C+: CPT

## 2024-10-11 PROCEDURE — 93970 EXTREMITY STUDY: CPT

## 2024-10-11 PROCEDURE — 93970 EXTREMITY STUDY: CPT | Performed by: SURGERY

## 2024-10-11 RX ADMIN — IOHEXOL 85 ML: 350 INJECTION, SOLUTION INTRAVENOUS at 08:39

## 2024-10-11 NOTE — RESULT ENCOUNTER NOTE
No venous insuficiency seen on the duplex study - I would suggest otc compression stockings for the leg edema

## 2024-10-21 ENCOUNTER — TELEPHONE (OUTPATIENT)
Age: 79
End: 2024-10-21

## 2024-10-21 NOTE — TELEPHONE ENCOUNTER
Pts wife called for an appt. For her  who ONLY wants to see Dr. Lombardi. She said,  he has a respiratory infection and the only appt. Dr. Lombardi has is Friday at 10:15. If you have an opening or can get him in sooner please give her a call.  He's 79 and willing to wait a week for the doctor!

## 2024-10-25 ENCOUNTER — OFFICE VISIT (OUTPATIENT)
Dept: FAMILY MEDICINE CLINIC | Facility: CLINIC | Age: 79
End: 2024-10-25
Payer: MEDICARE

## 2024-10-25 VITALS
HEART RATE: 75 BPM | BODY MASS INDEX: 41.66 KG/M2 | HEIGHT: 70 IN | WEIGHT: 291 LBS | OXYGEN SATURATION: 98 % | TEMPERATURE: 98.4 F | SYSTOLIC BLOOD PRESSURE: 134 MMHG | DIASTOLIC BLOOD PRESSURE: 84 MMHG | RESPIRATION RATE: 16 BRPM

## 2024-10-25 DIAGNOSIS — R06.02 SOB (SHORTNESS OF BREATH): ICD-10-CM

## 2024-10-25 DIAGNOSIS — I10 PRIMARY HYPERTENSION: ICD-10-CM

## 2024-10-25 DIAGNOSIS — J30.1 NON-SEASONAL ALLERGIC RHINITIS DUE TO POLLEN: ICD-10-CM

## 2024-10-25 DIAGNOSIS — R60.0 BILATERAL LOWER EXTREMITY EDEMA: ICD-10-CM

## 2024-10-25 DIAGNOSIS — R09.81 CONGESTION OF NASAL SINUS: ICD-10-CM

## 2024-10-25 DIAGNOSIS — I50.41 HYPERTENSIVE HEART DISEASE WITH ACUTE COMBINED SYSTOLIC AND DIASTOLIC CONGESTIVE HEART FAILURE (HCC): ICD-10-CM

## 2024-10-25 DIAGNOSIS — E78.2 MIXED HYPERLIPIDEMIA: ICD-10-CM

## 2024-10-25 DIAGNOSIS — I11.0 HYPERTENSIVE HEART DISEASE WITH ACUTE COMBINED SYSTOLIC AND DIASTOLIC CONGESTIVE HEART FAILURE (HCC): ICD-10-CM

## 2024-10-25 DIAGNOSIS — E11.319 TYPE 2 DIABETES MELLITUS WITH RETINOPATHY, WITH LONG-TERM CURRENT USE OF INSULIN, MACULAR EDEMA PRESENCE UNSPECIFIED, UNSPECIFIED LATERALITY, UNSPECIFIED RETINOPATHY SEVERITY (HCC): Primary | ICD-10-CM

## 2024-10-25 DIAGNOSIS — I25.10 CORONARY ARTERY DISEASE INVOLVING NATIVE CORONARY ARTERY OF NATIVE HEART WITHOUT ANGINA PECTORIS: ICD-10-CM

## 2024-10-25 DIAGNOSIS — Z79.4 TYPE 2 DIABETES MELLITUS WITH RETINOPATHY, WITH LONG-TERM CURRENT USE OF INSULIN, MACULAR EDEMA PRESENCE UNSPECIFIED, UNSPECIFIED LATERALITY, UNSPECIFIED RETINOPATHY SEVERITY (HCC): Primary | ICD-10-CM

## 2024-10-25 DIAGNOSIS — R05.2 SUBACUTE COUGH: ICD-10-CM

## 2024-10-25 PROCEDURE — G2211 COMPLEX E/M VISIT ADD ON: HCPCS | Performed by: FAMILY MEDICINE

## 2024-10-25 PROCEDURE — 99214 OFFICE O/P EST MOD 30 MIN: CPT | Performed by: FAMILY MEDICINE

## 2024-10-25 RX ORDER — LEVOCETIRIZINE DIHYDROCHLORIDE 5 MG/1
5 TABLET, FILM COATED ORAL EVERY MORNING
Qty: 90 TABLET | Refills: 1 | Status: SHIPPED | OUTPATIENT
Start: 2024-10-25

## 2024-10-25 RX ORDER — MONTELUKAST SODIUM 10 MG/1
10 TABLET ORAL
Qty: 90 TABLET | Refills: 1 | Status: SHIPPED | OUTPATIENT
Start: 2024-10-25

## 2024-10-25 RX ORDER — AZITHROMYCIN 250 MG/1
TABLET, FILM COATED ORAL
Qty: 6 TABLET | Refills: 0 | Status: SHIPPED | OUTPATIENT
Start: 2024-10-25 | End: 2024-10-30

## 2024-10-25 NOTE — PROGRESS NOTES
Assessment/Plan:    1. Type 2 diabetes mellitus with retinopathy, with long-term current use of insulin, macular edema presence unspecified, unspecified laterality, unspecified retinopathy severity (HCC)  -     Albumin / creatinine urine ratio; Future; Expected date: 04/23/2025  -     Comprehensive metabolic panel; Future; Expected date: 04/23/2025  -     Hemoglobin A1C; Future; Expected date: 04/23/2025  -     CBC and differential; Future; Expected date: 04/23/2025  -     Lipid Panel with Direct LDL reflex; Future; Expected date: 04/23/2025  -     Albumin / creatinine urine ratio  -     Comprehensive metabolic panel  -     Hemoglobin A1C  -     CBC and differential  -     Lipid Panel with Direct LDL reflex  2. Primary hypertension  Assessment & Plan:  stable  Orders:  -     Albumin / creatinine urine ratio; Future; Expected date: 04/23/2025  -     Comprehensive metabolic panel; Future; Expected date: 04/23/2025  -     Hemoglobin A1C; Future; Expected date: 04/23/2025  -     CBC and differential; Future; Expected date: 04/23/2025  -     Lipid Panel with Direct LDL reflex; Future; Expected date: 04/23/2025  -     Albumin / creatinine urine ratio  -     Comprehensive metabolic panel  -     Hemoglobin A1C  -     CBC and differential  -     Lipid Panel with Direct LDL reflex  3. Coronary artery disease involving native coronary artery of native heart without angina pectoris  4. Mixed hyperlipidemia  Assessment & Plan:  Acceptabl;e and stable  Orders:  -     Albumin / creatinine urine ratio; Future; Expected date: 04/23/2025  -     Comprehensive metabolic panel; Future; Expected date: 04/23/2025  -     Hemoglobin A1C; Future; Expected date: 04/23/2025  -     CBC and differential; Future; Expected date: 04/23/2025  -     Lipid Panel with Direct LDL reflex; Future; Expected date: 04/23/2025  -     Albumin / creatinine urine ratio  -     Comprehensive metabolic panel  -     Hemoglobin A1C  -     CBC and differential  -      Lipid Panel with Direct LDL reflex  5. Bilateral lower extremity edema  Assessment & Plan:  Venous insuf study ordered, looks like a duplex was performed instead.  Pt was advised to use OTC compression stockings - on in the amd off in the PM  6. 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 Subacute cough  -     XR chest pa and lateral; Future; Expected date: 10/25/2024  -     azithromycin (ZITHROMAX) 250 mg tablet; 2 tabs on day 1, 1 tab a day for 4 days after  8. SOB (shortness of breath)  -     XR chest pa and lateral; Future; Expected date: 10/25/2024  -     azithromycin (ZITHROMAX) 250 mg tablet; 2 tabs on day 1, 1 tab a day for 4 days after  9. Congestion of nasal sinus  -     azithromycin (ZITHROMAX) 250 mg tablet; 2 tabs on day 1, 1 tab a day for 4 days after  10. Non-seasonal allergic rhinitis due to pollen  -     montelukast (SINGULAIR) 10 mg tablet; Take 1 tablet (10 mg total) by mouth daily at bedtime  -     levocetirizine (XYZAL) 5 MG tablet; Take 1 tablet (5 mg total) by mouth every morning      Pt is new to me but looks like the chronic condition of the congestion in the throat breathing etc has been going on since having surgery and issues with his vocal cord.  Looks like it has been extensively worked up by specialists.  Reviewed testing I ordered on the pt  Added cjhest x ray to make sure he has no fluid in his lungs      There are no Patient Instructions on file for this visit.    No follow-ups on file.    Subjective:      Patient ID: Caleb Ventura is a 79 y.o. adult.    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  PT states this has been going on for 5 years   Pt states he has phlem it has been yellow  and then white.  Pt  states he spoke with his old PCP about iot and he told him it was allergies.      Pt states he has a diabetic foot exam sched for Monday    Pt states while he is here if he is walking up hill he will run out of gas quick - pt states this 5 year complaint.  Pt has a pulmonology and cardiology - this has been worked up  Pt also has been seen by otlaryngology.      Pt also states he has chroic back pain for which he sees pain mgmt          The following portions of the patient's history were reviewed and updated as appropriate: allergies, current medications, past family history, past medical history, past social history, past surgical history and problem list.    Review of Systems   HENT:  Positive for congestion.    Respiratory:  Positive for cough and shortness of breath.    Musculoskeletal:  Positive for back pain.         Current Outpatient Medications   Medication Sig Dispense Refill    Ascorbic Acid (VITAMIN C) 1000 MG tablet Take 1,000 mg by mouth daily      aspirin 81 MG tablet Take 81 mg by mouth daily.      azithromycin (ZITHROMAX) 250 mg tablet 2 tabs on day 1, 1 tab a day for 4 days after 6 tablet 0    B-D ULTRAFINE III SHORT PEN 31G X 8 MM MISC USE WITH INSULIN TWICE A  each 3    Blood Glucose Monitoring Suppl (OneTouch Verio) w/Device KIT Use to test blood sugar 3 times a day 1 kit 0    Blood Glucose Monitoring Suppl (OneTouch Verio) w/Device KIT Use to test blood sugar 3 times a day 1 kit 0    cholecalciferol (VITAMIN D3) 1,000 units tablet Take 2,000 Units by mouth daily       dapagliflozin (Farxiga) 10 MG tablet TAKE 1 TABLET DAILY 90 tablet 1    docusate sodium (COLACE) 50 mg capsule Take 50 mg by mouth 2 (two) times a day      Entresto  MG TABS       ezetimibe (ZETIA) 10 mg tablet TAKE 1 TABLET BY MOUTH TIMES A WEEK ON MONDAY WEDNESDAY AND FRIDAY AT 6PM      furosemide (LASIX) 40 mg tablet Take 40 mg by mouth daily Twice a week on Mon & Thur--PRN      Glucagon, rDNA, (Glucagon  "Emergency) 1 MG KIT Use glucagon kit if you have a low blood sugar and unconscious 1 each 0    glucose blood (OneTouch Verio) test strip USE TO TEST BLOOD SUGAR THREE TIMES A  strip 3    HumaLOG Mix 75/25 KwikPen (75-25) 100 units/mL injection pen INJECT 46 UNITS UNDER THE SKIN BEFORE BREAKFAST AND 44 units before dinner. 90 mL 2    levocetirizine (XYZAL) 5 MG tablet Take 1 tablet (5 mg total) by mouth every morning 90 tablet 1    metFORMIN (GLUCOPHAGE-XR) 750 mg 24 hr tablet TAKE 1 TABLET TWICE A  tablet 3    metoprolol succinate (TOPROL-XL) 50 mg 24 hr tablet Take 50 mg by mouth daily      montelukast (SINGULAIR) 10 mg tablet Take 1 tablet (10 mg total) by mouth daily at bedtime 90 tablet 1    Multiple Vitamins-Minerals (MULTIVITAMIN ADULT PO) Take by mouth      omeprazole (PriLOSEC) 20 mg delayed release capsule Take 1 capsule (20 mg total) by mouth daily 30 capsule 11    rosuvastatin (CRESTOR) 40 MG tablet Take 40 mg by mouth daily 1/2 pill at night      sacubitril-valsartan (Entresto)  MG TABS Take 1 tablet by mouth 2 (two) times a day      spironolactone (ALDACTONE) 25 mg tablet       traMADol (ULTRAM) 50 mg tablet       traZODone (DESYREL) 100 mg tablet Take 1 tablet (100 mg total) by mouth daily at bedtime as needed for sleep 30 tablet 3    Zinc 50 MG TABS Take by mouth      doxycycline hyclate (VIBRAMYCIN) 100 mg capsule Take 100 mg by mouth daily (Patient not taking: Reported on 12/15/2023)      gabapentin (NEURONTIN) 300 mg capsule Take 1 capsule by mouth Three times a day (Patient not taking: Reported on 7/12/2024)       No current facility-administered medications for this visit.       Objective:    /84   Pulse 75   Temp 98.4 °F (36.9 °C) (Tympanic)   Resp 16   Ht 5' 10\" (1.778 m)   Wt 132 kg (291 lb)   SpO2 98%   BMI 41.75 kg/m²        Physical Exam  Constitutional:       Appearance: He is obese.   Pulmonary:      Comments: Possible adventitiois nosises in lung " "bases  Musculoskeletal:      Right lower leg: Edema present.      Left lower leg: Edema present.   Skin:     Findings: Erythema and rash present.            CT chest w contrast  Status: Final result     PACS Images     Show images for CT chest w contrast    CT chest w contrast: Result Notes     Frank Lombardi, DO  10/16/2024 10:52 AM EDT       Ct lung back - findings all stable            Study Result    Narrative & Impression   CT CHEST WITH IV CONTRAST     INDICATION:   J98.4: Other disorders of lung. \"Lung scarring.\"     COMPARISON: CXR 6/7/2021, chest CT 2/27/2017.     TECHNIQUE: Chest CT with intravenous contrast.  Axial, sagittal, coronal 2D reformats and coronal MIPS from source data.     Radiation dose length product (DLP):  901.06 mGy-cm . Radiation dose exposure minimized using iterative reconstruction and automated exposure control.     IV Contrast:  85 mL of iohexol (OMNIPAQUE)     FINDINGS:     LUNGS: No acute disease. Several stable 3 mm lung nodules since 2017, benign. Benign linear scar in the left lower lobe. Mild herniation of the lung between the lateral right seventh and eighth ribs.     AIRWAYS: No significant filling defects.     PLEURA:  Unremarkable.     HEART/GREAT VESSELS: Mild cardiomegaly. Bilateral subclavian pacemaker/ICDs with 2 leads in the right atrium and 3 leads in the right ventricle. Mild coronary artery calcification indicating atherosclerotic heart disease. Small left ventricular apical   aneurysm.     MEDIASTINUM AND RADHA:  Unremarkable.     CHEST WALL AND LOWER NECK: Moderate gynecomastia. Indeterminate 1 cm subcutaneous nodule in the left lateral chest wall, likely benign sebaceous cyst or epidermal inclusion cyst.     UPPER ABDOMEN:  Unremarkable.     OSSEOUS STRUCTURES: Mild degenerative disease in the spine. Healed right rib fractures.     IMPRESSION:     Several stable 3 mm lung nodules since 2017, benign.     Small left apical ventricular aneurysm.     Mild benign " linear scar in the left lower lobe.        Workstation performed: [id number]        Imaging    CT chest w contrast (Order: [id number]) - 10/11/2024    Result History    CT chest w contrast (Order #[id number]) on 10/15/2024 - Order Result History Report    Order Report     Order Details      Order Questions    Question Answer   What is the patient's sedation requirement? No Sedation   Note: If medication for claustrophobia is needed, please order medication at this point.   Contrast information: IV   Did the patient ever have a reaction to CT contrast? If yes, please verify the type of reaction and order the contrast allergy prep. Unknown   Note: Did the patient ever have a reaction to CT contrast? If yes, please verify the type of reaction and order the contrast allergy prep.   Release to patient through [facility]   Exam reason ordered by prev PCP for lung scarring   Note: Enter reason for exam              Result Information      Status Priority Source   Final result (10/15/2024  7:17 PM) Routine      VAS VENOUS DUPLEX - LOWER LIMB BILATERAL  Status: Final result     PACS Images     Show images for VAS VENOUS DUPLEX - LOWER LIMB BILATERAL     VAS VENOUS DUPLEX - LOWER LIMB BILATERAL: Result Notes     Frank Lombardi, DO  10/11/2024  3:57 PM EDT       No venous insuficiency seen on the duplex study - I would suggest otc compression stockings for the leg edema            Study Result    Result Text      [facility] REPORT  CLINICAL:  Indications:  Patient presents with bilateral lower extremity edema.  Operative History:  A-v cardiac pacemaker insertion  Cardiac defibrillator placement  Risk Factors  The patient has history of Obesity, HTN, Diabetes, Hyperlipidemia, CKD and CAD.     FINDINGS:     Right       Reflux  Valve Closure Time    PostTibial  Reflux                2.16       Left        Impression        Peroneal    Not Visualized             CONCLUSION:     Impression:  RIGHT LOWER LIMB:  No  evidence of acute or chronic deep vein thrombosis.  No evidence of superficial thrombophlebitis noted.  Doppler evaluation shows a normal response to augmentation maneuvers..  Popliteal, posterior tibial and anterior tibial arterial Doppler waveforms are  biphasic.     LEFT LOWER LIMB:  No evidence of acute or chronic deep vein thrombosis.  No evidence of superficial thrombophlebitis noted.  Doppler evaluation shows a normal response to augmentation maneuvers.  Popliteal, posterior tibial and anterior tibial arterial Doppler waveforms are  triphasic.     SIGNATURE:  Electronically Signed by: ENDY FISCHER DO, RPVI on 2024-10-11 02:26:39 P Frank Lombardi, DO

## 2024-10-25 NOTE — ASSESSMENT & PLAN NOTE
Venous insuf study ordered, looks like a duplex was performed instead.  Pt was advised to use OTC compression stockings - on in the amd off in the PM

## 2024-10-28 ENCOUNTER — TELEPHONE (OUTPATIENT)
Dept: ADMINISTRATIVE | Facility: OTHER | Age: 79
End: 2024-10-28

## 2024-10-28 ENCOUNTER — OFFICE VISIT (OUTPATIENT)
Dept: ENDOCRINOLOGY | Facility: CLINIC | Age: 79
End: 2024-10-28
Payer: MEDICARE

## 2024-10-28 VITALS
OXYGEN SATURATION: 98 % | DIASTOLIC BLOOD PRESSURE: 70 MMHG | BODY MASS INDEX: 41.49 KG/M2 | SYSTOLIC BLOOD PRESSURE: 134 MMHG | WEIGHT: 289.8 LBS | HEIGHT: 70 IN | HEART RATE: 65 BPM

## 2024-10-28 DIAGNOSIS — Z79.4 TYPE 2 DIABETES MELLITUS WITH STAGE 2 CHRONIC KIDNEY DISEASE, WITH LONG-TERM CURRENT USE OF INSULIN (HCC): ICD-10-CM

## 2024-10-28 DIAGNOSIS — N18.2 TYPE 2 DIABETES MELLITUS WITH STAGE 2 CHRONIC KIDNEY DISEASE, WITH LONG-TERM CURRENT USE OF INSULIN (HCC): ICD-10-CM

## 2024-10-28 DIAGNOSIS — E11.22 TYPE 2 DIABETES MELLITUS WITH STAGE 2 CHRONIC KIDNEY DISEASE, WITH LONG-TERM CURRENT USE OF INSULIN (HCC): ICD-10-CM

## 2024-10-28 DIAGNOSIS — I10 PRIMARY HYPERTENSION: Primary | ICD-10-CM

## 2024-10-28 PROCEDURE — 99204 OFFICE O/P NEW MOD 45 MIN: CPT | Performed by: NURSE PRACTITIONER

## 2024-10-28 NOTE — PROGRESS NOTES
Ambulatory Visit  Name: Caleb Ventura      : 1945      MRN: [id number]  Encounter Provider: NASH Martel  Encounter Date: 10/28/2024   Encounter department: [facility]    Assessment & Plan  Type 2 diabetes mellitus with stage 2 chronic kidney disease, with long-term current use of insulin ([facility])    Lab Results   Component Value Date    HGBA1C 8.3 (H) 2024     HGA1C above goal with reportedly normal glucose levels. Will order fructosamine level with next lab work and patient is interested in wearing continuous glucose monitor. Will place order for alfonzo 3 CGM, per patient preference.  Will also place referral for diabetes education for placement and use of device.      The indication for CGM therapy is insulin therapy.   Discussed risks/complications associated with uncontrolled diabetes including organ involvement, heart attack, stroke, death.  Recommended referral to diabetes education.      Advised lifestyle modifications including attention to diet including the amount and types of carbohydrates consumed and regular activity.     Call for blood sugars less than 70 mg/dl or patterns over 250 mg/dl.     Monitor blood glucose levels ideally before all insulin administration times.     Discussed use of CGM to collect additional blood glucose data to reveal patterns that can be used to improve glucose levels and adjust insulin regimen. Interested in alfonzo 3 CGM.     Discussed symptoms and treatment of hypoglycemia.  Reviewed risks associated with hypoglycemia. Always carry rapid acting carbohydrates and a glucometer (a way to check your blood sugar).    Recommendation for medical identification either bracelet, necklace.    Recommendation for glucagon if on insulin. Has unexpired at home.     Routine follow up for diabetic eye and foot exams.     Ordered blood work to complete prior to next visit.    Send glucose logs/CGM download in 1-2 weeks for  review    Follow up in 3 months.     Orders:    Ambulatory Referral to Diabetic Education; Future    Fructosamine; Future    Comprehensive metabolic panel; Future    Hemoglobin A1C; Future    Albumin / creatinine urine ratio; Future    TSH, 3rd generation with Free T4 reflex; Future    Fructosamine    Comprehensive metabolic panel    Hemoglobin A1C    Albumin / creatinine urine ratio    TSH, 3rd generation with Free T4 reflex    Primary hypertension  BP under good control.  Continues on ACE/ARB.            History of Present Illness     Caleb Ventura is a 79 y.o. adult who presents for follow up of type 2 diabetes mellitus.     Denies recent hospitalization or illnesses.    Fidencio any severe hypoglycemia or hyperglycemia requiring medical attention or third party assistance.    Denies changes in frequency of urination, thirst, vision, or sensation in feet.     Checks blood sugars regularly at least once daily. Blood sugars range from 111-176 mg/dL    Current regimen:     Insulin 75/25 46 units twice daily  Metformin  mg twice daily  Farxiga 10 mg daily    Denies side effects of treatments including nausea, vomiting, diarrhea, dysuria, genital yeast or skin infections.     Continues on ACE/ARB and statin, follows with cardiology    Denies history of thyroid disease.    History obtained from : patient    Review of Systems  See HPI.   All other systems reviewed and are negative.    Medical History Reviewed by provider this encounter:  Meds       Current Outpatient Medications on File Prior to Visit   Medication Sig Dispense Refill    Ascorbic Acid (VITAMIN C) 1000 MG tablet Take 1,000 mg by mouth daily      aspirin 81 MG tablet Take 81 mg by mouth daily.      B-D ULTRAFINE III SHORT PEN 31G X 8 MM MISC USE WITH INSULIN TWICE A  each 3    Blood Glucose Monitoring Suppl (OneTouch Verio) w/Device KIT Use to test blood sugar 3 times a day 1 kit 0    Blood Glucose Monitoring Suppl (OneTouch Verio) w/Device  KIT Use to test blood sugar 3 times a day 1 kit 0    cholecalciferol (VITAMIN D3) 1,000 units tablet Take 2,000 Units by mouth daily       dapagliflozin (Farxiga) 10 MG tablet TAKE 1 TABLET DAILY 90 tablet 1    docusate sodium (COLACE) 50 mg capsule Take 50 mg by mouth 2 (two) times a day      Entresto  MG TABS       ezetimibe (ZETIA) 10 mg tablet TAKE 1 TABLET BY MOUTH TIMES A WEEK ON MONDAY WEDNESDAY AND FRIDAY AT 6PM      furosemide (LASIX) 40 mg tablet Take 40 mg by mouth daily Twice a week on Mon & Thur--PRN      Glucagon, rDNA, (Glucagon Emergency) 1 MG KIT Use glucagon kit if you have a low blood sugar and unconscious 1 each 0    glucose blood (OneTouch Verio) test strip USE TO TEST BLOOD SUGAR THREE TIMES A  strip 3    HumaLOG Mix 75/25 KwikPen (75-25) 100 units/mL injection pen INJECT 46 UNITS UNDER THE SKIN BEFORE BREAKFAST AND 44 units before dinner. 90 mL 2    levocetirizine (XYZAL) 5 MG tablet Take 1 tablet (5 mg total) by mouth every morning 90 tablet 1    metFORMIN (GLUCOPHAGE-XR) 750 mg 24 hr tablet TAKE 1 TABLET TWICE A  tablet 3    metoprolol succinate (TOPROL-XL) 50 mg 24 hr tablet Take 50 mg by mouth daily      montelukast (SINGULAIR) 10 mg tablet Take 1 tablet (10 mg total) by mouth daily at bedtime 90 tablet 1    Multiple Vitamins-Minerals (MULTIVITAMIN ADULT PO) Take by mouth      omeprazole (PriLOSEC) 20 mg delayed release capsule Take 1 capsule (20 mg total) by mouth daily 30 capsule 11    rosuvastatin (CRESTOR) 40 MG tablet Take 40 mg by mouth daily 1/2 pill at night      sacubitril-valsartan (Entresto)  MG TABS Take 1 tablet by mouth 2 (two) times a day      spironolactone (ALDACTONE) 25 mg tablet       traMADol (ULTRAM) 50 mg tablet       traZODone (DESYREL) 100 mg tablet Take 1 tablet (100 mg total) by mouth daily at bedtime as needed for sleep 30 tablet 3    Zinc 50 MG TABS Take by mouth       No current facility-administered medications on file prior to visit.  "         Objective     /70 (BP Location: Left arm, Patient Position: Sitting, Cuff Size: Large)   Pulse 65   Ht 5' 10\" (1.778 m)   Wt 131 kg (289 lb 12.8 oz)   SpO2 98%   BMI 41.58 kg/m²       Physical Exam   Constitutional: He is oriented to person, place, and time. He appears well-developed and well-nourished. No distress.   HENT:   Head: Normocephalic and atraumatic.   Neck: Normal range of motion.   Pulmonary/Chest: Effort normal.   Musculoskeletal: Normal range of motion.   Neurological: He is alert and oriented to person, place, and time.   Skin: He is not diaphoretic.   Psychiatric: He has a normal mood and affect. His behavior is normal.     Lab:   Component      Latest Ref Rng 7/3/2024 9/30/2024   GLUCOSE      70 - 99 mg/dL 164 (H)     BUN      8 - 27 mg/dL 16     Creatinine      0.76 - 1.27 mg/dL 1.06     GFR, Calculated      >59 mL/min/1.73 71     SL AMB BUN/CREATININE RATIO      10 - 24  15     Sodium      134 - 144 mmol/L 137     Potassium      3.5 - 5.2 mmol/L 4.8     Chloride      96 - 106 mmol/L 100     Carbon Dioxide      20 - 29 mmol/L 22     CALCIUM      8.6 - 10.2 mg/dL 9.4     Total Protein      6.0 - 8.5 g/dL 6.8     Albumin      3.8 - 4.8 g/dL 4.2     Globulin, Total      1.5 - 4.5 g/dL 2.6     Total Bilirubin      0.0 - 1.2 mg/dL 0.4     ALKALINE PHOSPHATASE ISOENZYMES      44 - 121 IU/L 94     AST      0 - 40 IU/L 15     ALT      0 - 44 IU/L 16     Cholesterol      100 - 199 mg/dL 120  135    Triglycerides      0 - 149 mg/dL 97  104    HDL      >39 mg/dL 57  57    VLDL Cholesterol Alejandro      5 - 40 mg/dL 18  19    LDL Calculated      0 - 99 mg/dL 45  59    LDL/HDL RATIO      0.0 - 3.6 ratio  1.0    Hemoglobin A1C      4.8 - 5.6 % 8.3 (H)     FRUCTOSAMINE      0 - 285 umol/L 293 (H)        Administrative Statements     "

## 2024-10-28 NOTE — ASSESSMENT & PLAN NOTE
Lab Results   Component Value Date    HGBA1C 8.3 (H) 07/03/2024     HGA1C above goal with reportedly normal glucose levels. Will order fructosamine level with next lab work and patient is interested in wearing continuous glucose monitor. Will place order for alfonzo 3 CGM, per patient preference.  Will also place referral for diabetes education for placement and use of device.      The indication for CGM therapy is insulin therapy.   Discussed risks/complications associated with uncontrolled diabetes including organ involvement, heart attack, stroke, death.  Recommended referral to diabetes education.      Advised lifestyle modifications including attention to diet including the amount and types of carbohydrates consumed and regular activity.     Call for blood sugars less than 70 mg/dl or patterns over 250 mg/dl.     Monitor blood glucose levels ideally before all insulin administration times.     Discussed use of CGM to collect additional blood glucose data to reveal patterns that can be used to improve glucose levels and adjust insulin regimen. Interested in alfonzo 3 CGM.     Discussed symptoms and treatment of hypoglycemia.  Reviewed risks associated with hypoglycemia. Always carry rapid acting carbohydrates and a glucometer (a way to check your blood sugar).    Recommendation for medical identification either bracelet, necklace.    Recommendation for glucagon if on insulin. Has unexpired at home.     Routine follow up for diabetic eye and foot exams.     Ordered blood work to complete prior to next visit.    Send glucose logs/CGM download in 1-2 weeks for review    Follow up in 3 months.     Orders:    Ambulatory Referral to Diabetic Education; Future    Fructosamine; Future    Comprehensive metabolic panel; Future    Hemoglobin A1C; Future    Albumin / creatinine urine ratio; Future    TSH, 3rd generation with Free T4 reflex; Future    Fructosamine    Comprehensive metabolic panel    Hemoglobin A1C    Albumin /  creatinine urine ratio    TSH, 3rd generation with Free T4 reflex

## 2024-10-28 NOTE — TELEPHONE ENCOUNTER
----- Message from Frank Lombardi, DO sent at 10/25/2024 11:07 AM EDT -----  Regarding: foot  10/25/24 11:07 AM    Hello, our patient Caleb Ventura has had Diabetic Foot Exam completed/performed. Please assist in updating the patient chart by making an External outreach to Dr Dahl facility located in [facility]. The date of service is recent.    Thank you,  Frank Lombardi, DO  [facility]

## 2024-10-28 NOTE — PATIENT INSTRUCTIONS
"Low blood sugar in people with diabetes   The Basics   Written by the doctors and editors at [facility]   What is low blood sugar? -- This is when the level of sugar in a person's blood gets too low. It is also called \"hypoglycemia.\"  Low blood sugar can cause symptoms ranging from sweating and feeling hungry to passing out.  Low blood sugar can happen in people with diabetes who take certain medicines, including insulin, other medicines given as shots, and some types of pills.  When can people with diabetes get low blood sugar? -- People with diabetes can get low blood sugar when they:   Take too much medicine, including insulin, other medicines given as shots, or certain diabetes pills   Do not eat enough food   Exercise too much without eating a snack or reducing their insulin dose   Wait too long between meals   Drink too much alcohol or drink alcohol on an empty stomach  What are the symptoms of low blood sugar? -- The symptoms can be different from person to person, and can change over time. During the early stages of low blood sugar, a person can:   Sweat or tremble   Feel hungry   Feel worried  People who have early symptoms should check their blood sugar level to see if it is low and needs to be treated. If low blood sugar levels are not treated, severe symptoms can occur. These can include:   Trouble walking or feeling weak   Trouble seeing clearly   Being confused or acting in a strange way   Passing out or having a seizure  Some people do not get symptoms during the early stages of low blood sugar. Doctors sometimes call this \"hypoglycemia unawareness.\" People with hypoglycemia unawareness are more likely to have severe symptoms, because they might not know that they have low blood sugar until they have severe symptoms. Hypoglycemia unawareness is more likely in people who:   Have had type 1 diabetes for more than 5 to 10 years   Have frequent episodes of low blood sugar   Use insulin to keep their blood " sugar level tightly managed   Are tired   Drink a lot of alcohol   Take certain medicines for high blood pressure or diabetes  How is low blood sugar treated? -- It can be treated with:   Quick sources of sugar - People can eat or drink quick sources of sugar (table 1). Foods that have fat, such as chocolate or cheese, do not treat low blood sugar as quickly. You and a family member should carry a quick source of sugar at all times.   A dose of glucagon - Glucagon is a hormone that can quickly raise blood sugar levels and stop severe symptoms. It comes as a shot (figure 1) or a nose spray. If your doctor recommends that you carry glucagon with you, they will tell you when and how to use it. If possible, it's also a good idea to have a family member, friend, or roommate learn how to give you glucagon. That way, they can give it to you if you can't do it yourself.  After treating low blood sugar, it is very important to recheck your blood sugar level to make sure that it rises and stays in the normal range. Once your blood sugar is normal, eat a small snack that contains protein, fat, and carbohydrate. This can help keep your blood sugar stable.  What should I do after treatment? -- After treatment for low blood sugar, most people can get back to their usual routine. But your doctor or nurse might recommend that you check your blood sugar level more often during the next 2 to 3 days.  If your low blood sugar was treated with glucagon, call your doctor or nurse. They might change the dose of your diabetes medicine.  How can I prevent low blood sugar? -- The best way is to:   Check your blood sugar levels often - Your doctor or nurse will tell you how and when to check your blood sugar levels at home. They will also tell you what your blood sugar levels should be, and when to treat low blood sugar.   Learn the symptoms of low blood sugar, and be ready to treat it in the early stages. Treating low blood sugar early can  "prevent severe symptoms.  When should I go to a hospital or call for an ambulance? -- A family member or friend should take you to a hospital or call for an ambulance (in the [place] and [place], call 9-1-1) if you:   Are still confused 15 minutes after being treated with a dose of glucagon   Have passed out, and there is no glucagon nearby   Still have low blood sugar after treatment  If you have low blood sugar, do not try to drive yourself to the hospital. Driving with low blood sugar can be dangerous.  All topics are updated as new evidence becomes available and our peer review process is complete.  This topic retrieved from [employer] on: Apr 11, 2024.  Topic 25159 Version 22.0  Release: 32.3.2 - C32.100  © 2024 UpToDate, Inc. and/or its affiliates. All rights reserved.  table 1: Quick sources of sugar to treat low blood sugar  3 or 4 glucose tablets   ½ cup of juice or regular soda (not sugar-free)   2 tablespoons of raisins   4 or 5 saltine crackers   1 tablespoon of sugar   1 tablespoon of honey or corn syrup   6 to 8 hard candies   These sources of sugar act quickly to treat low blood sugar levels. People with diabetes who use insulin or certain other diabetes medicines should carry at least 1 of these items at all times.  Graphic 69240 Version 4.0  figure 1: Glucagon autoinjector     Some people carry glucagon in the form of an autoinjector \"pen.\" This makes it easy to give a dose into the upper arm, thigh, or belly.  Graphic 573943 Version 2.0  Consumer Information Use and Disclaimer   Disclaimer: This generalized information is a limited summary of diagnosis, treatment, and/or medication information. It is not meant to be comprehensive and should be used as a tool to help the user understand and/or assess potential diagnostic and treatment options. It does NOT include all information about conditions, treatments, medications, side effects, or risks that may apply to a specific patient. It is not intended to be " medical advice or a substitute for the medical advice, diagnosis, or treatment of a health care provider based on the health care provider's examination and assessment of a patient's specific and unique circumstances. Patients must speak with a health care provider for complete information about their health, medical questions, and treatment options, including any risks or benefits regarding use of medications. This information does not endorse any treatments or medications as safe, effective, or approved for treating a specific patient. UpToDate, Inc. and its affiliates disclaim any warranty or liability relating to this information or the use thereof.The use of this information is governed by the Terms of Use, available at https://www.[employer].com/en/know/clinical-effectiveness-terms. 2024© UpToDate, Inc. and its affiliates and/or licensors. All rights reserved.  Copyright   © 2024 UpToDate, Inc. and/or its affiliates. All rights reserved.

## 2024-10-28 NOTE — LETTER
Diabetic Foot Exam Form    Date Requested: 10/28/24  Patient: Caleb Ventura  Patient : 1945   Referring Provider: Frank Lombardi, DO    Diabetic Foot Exam Performed with shoes and socks removed        Yes         No     Date of Diabetic Foot Exam ______________________________  Risk Score ____________________________________________    Left Foot       Visual Inspection         Monofilament Testing Sensory Exam        Pedal Pulses         Additional Comments         Right Foot      Visual Inspection         Monofilament Testing Sensory Exam       Pedal Pulses         Additional Comments         Comments __________________________________________________________    Practice Providing Exam ______________________________________________    Exam Performed By (print name) _______________________________________      Provider Signature ___________________________________________________        These reports are needed for  compliance.    Please fax this completed form and a copy of the Diabetic Foot Exam report to our office located at [address] [place] [address] as soon as possible via Fax [phone] attention Annemarie: Phone [phone]    We thank you for your assistance in treating our mutual patient.

## 2024-10-28 NOTE — LETTER
2nd Request          Diabetic Foot Exam Form    Date Requested: 10/31/24  Patient: Caleb Ventura  Patient : 1945   Referring Provider: Frank Lombardi, DO    Diabetic Foot Exam Performed with shoes and socks removed        Yes         No     Date of Diabetic Foot Exam ______________________________  Risk Score ____________________________________________    Left Foot       Visual Inspection         Monofilament Testing Sensory Exam        Pedal Pulses         Additional Comments         Right Foot      Visual Inspection         Monofilament Testing Sensory Exam       Pedal Pulses         Additional Comments         Comments __________________________________________________________    Practice Providing Exam ______________________________________________    Exam Performed By (print name) _______________________________________      Provider Signature ___________________________________________________        These reports are needed for  compliance.    Please fax this completed form and a copy of the Diabetic Foot Exam report to our office located at [address] [place] [address] as soon as possible via Fax [phone] attention Annemarie: Phone [phone]    We thank you for your assistance in treating our mutual patient.

## 2024-11-01 ENCOUNTER — HOSPITAL ENCOUNTER (OUTPATIENT)
Dept: RADIOLOGY | Facility: HOSPITAL | Age: 79
Discharge: HOME | End: 2024-11-01
Payer: MEDICARE

## 2024-11-01 ENCOUNTER — TELEPHONE (OUTPATIENT)
Dept: ENDOCRINOLOGY | Facility: CLINIC | Age: 79
End: 2024-11-01

## 2024-11-01 DIAGNOSIS — R05.2 SUBACUTE COUGH: ICD-10-CM

## 2024-11-01 DIAGNOSIS — R06.02 SOB (SHORTNESS OF BREATH): ICD-10-CM

## 2024-11-01 PROCEDURE — 71046 X-RAY EXAM CHEST 2 VIEWS: CPT

## 2024-11-01 NOTE — PROGRESS NOTES
FreeStyle Brian 3 / 3 Plus System Resupply Package  FreeStyle Brian 3 Continuous Glucose Monitor (Currently In Use) - RESUPPLY  Brian 3 Sensor, change every 14 days - CGMS.    Quantity  1  Prescription Details  Additional directions - Use per  directions  Supplier  [facility]

## 2024-11-20 DIAGNOSIS — G89.4 CHRONIC PAIN SYNDROME: ICD-10-CM

## 2024-11-20 RX ORDER — HYDROCODONE BITARTRATE AND ACETAMINOPHEN 5; 325 MG/1; MG/1
1 TABLET ORAL 2 TIMES DAILY PRN
Qty: 60 TABLET | Refills: 0 | Status: SHIPPED | OUTPATIENT
Start: 2024-11-20 | End: 2024-12-20

## 2024-11-20 NOTE — TELEPHONE ENCOUNTER
Reason for call:   [x] Refill   [] Prior Auth  [] Other:     Office:   [] PCP/Provider -   [x] Specialty/Provider - Dewayne Marx MD     Medication: HYDROcodone-acetaminophen (NORCO) 5-325 mg per tablet     Dose/Frequency:  Take 1 tablet by mouth 2 (two) times a day as needed for pain     Quantity: 60 tablet     Pharmacy: [place] [facility] [id number] [address]     Does the patient have enough for 3 days?   [] Yes   [x] No - Send as HP to POD

## 2024-11-20 NOTE — TELEPHONE ENCOUNTER
Last phone note dated 4/2/24 #60 filled. He was supposed to make a 4 week F/U.  His next OVS is not until 12/16/24.   Please advise. thanks

## 2024-12-02 DIAGNOSIS — G47.00 INSOMNIA, UNSPECIFIED TYPE: ICD-10-CM

## 2024-12-02 RX ORDER — TRAZODONE HYDROCHLORIDE 100 MG/1
100 TABLET ORAL
Qty: 30 TABLET | Refills: 0 | Status: SHIPPED | OUTPATIENT
Start: 2024-12-02 | End: 2025-04-01

## 2024-12-02 RX ORDER — TRAZODONE HYDROCHLORIDE 100 MG/1
100 TABLET ORAL
Qty: 7 TABLET | Refills: 0 | Status: SHIPPED | OUTPATIENT
Start: 2024-12-02

## 2024-12-02 NOTE — TELEPHONE ENCOUNTER
Reason for call:   [x] Refill   [] Prior Auth  [x] Other: Not a duplicate- Patients wife states patient has [facility]ills with [facility] but would need a short term supply sent to [facility]. Please review.    Office:   [x] PCP/Provider - [facility] - Frank Lombardi, DO   [] Specialty/Provider -     Medication: traZODone (DESYREL) 100 mg tablet    Dose/Frequency: Take 1 tablet (100 mg total) by mouth daily at bedtime as needed for sleep     Quantity: 30 tablet     Pharmacy: [facility] [address] [place] [address] [address] [phone]    Does the patient have enough for 3 days?   [] Yes   [x] No - Send as HP to POD

## 2024-12-12 NOTE — PROGRESS NOTES
Assessment:  1. Right hip pain    2. Lumbar stenosis with neurogenic claudication        Plan:  The patient experiencing severe pain in his right hip despite right hip injections with the family and MRI right hip to better evaluate.  Advised him we will call with results and discuss treatment moving forward.    I have also recommend physical therapy which he was amenable to doing.    My impressions and treatment recommendations were discussed in detail with the patient who verbalized understanding and had no further questions.  Discharge instructions were provided. I personally saw and examined the patient and I agree with the above discussed plan of care.    Orders Placed This Encounter   Procedures   • MRI hip right wo contrast     Standing Status:   Future     Expected Date:   12/16/2024     Expiration Date:   12/16/2028     Scheduling Instructions:      There is no preparation for this test. Please leave your jewelry and valuables at home, wedding rings are the exception. All patients will be required to change into a hospital gown and pants.  Street clothes are not permitted in the MRI.  Magnetic nail polish must be removed prior to arrival for your test. Please bring your insurance cards, a form of photo ID and a list of your medications with you. Arrive 15 minutes prior to your appointment time in order to register. Please bring any prior CT or MRI studies of this area that were not performed at a [facility] facility.            To schedule this appointment, please contact Central Scheduling at [phone].            Prior to your appointment, please make sure you complete the MRI Screening Form when you e-Check in for your appointment. This will be available starting 7 days before your appointment in [employer]. You may receive an e-mail with an activation code if you do not have a [employer] account. If you do not have access to a device, we will complete your screening at your appointment.     Reason for  "Exam:   right hip pain     For OP exams needed \"URGENT\", choose the appropriate timeframe below and call Central Scheduling at [phone]. No need to speak with a Radiologist.:   Not URGENT     What is the patient's sedation requirement?:   No Sedation     Does the patient need medication for Claustrophobia? If yes, order medication at this point.:   No     Does the patient wear a life vest, have an implanted cardiac device, a stimulation device, a sleep apnea stimulator, or a breast tissue expansion device?:   Yes     If yes, what type of device is implanted?:   Pacemaker     If yes, what type of device is implanted?:   Internal Defibrillator     Release to patient through Mychart:   Immediate   • Ambulatory referral to Physical Therapy     Standing Status:   Future     Expiration Date:   12/16/2025     Referral Priority:   Routine     Referral Type:   Physical Therapy     Referral Reason:   Specialty Services Required     Requested Specialty:   Physical Therapy     Number of Visits Requested:   1     Expiration Date:   12/16/2025     No orders of the defined types were placed in this encounter.      History of Present Illness:  Caleb Ventura is a 79 y.o. adult who presents for a follow up office visit in regards to back and right hip pain.   The patient was last seen in July at which time he underwent a right hip injection which unfortunately did not help much.  He reports ongoing pain in his right hip as well as difficulty walking.  He feels weak.  Symptoms are moderate rated 7/10 on numeric rating scale.    I have personally reviewed and/or updated the patient's past medical history, past surgical history, family history, social history, current medications, allergies, and vital signs today.     Review of Systems   Respiratory:  Negative for shortness of breath.    Cardiovascular:  Negative for chest pain.   Gastrointestinal:  Negative for constipation, diarrhea, nausea and vomiting.   Musculoskeletal:  " Positive for arthralgias, back pain, gait problem and myalgias. Negative for joint swelling.   Skin:  Negative for rash.   Neurological:  Positive for weakness. Negative for dizziness and seizures.   All other systems reviewed and are negative.      Patient Active Problem List   Diagnosis   • Obstructive sleep apnea   • Coronary artery disease involving native coronary artery   • Osteoarthritis of left knee   • Primary hypertension   • Glottic stenosis   • Vocal cord paralysis, bilateral complete   • Type 2 diabetes mellitus with retinopathy, with long-term current use of insulin ([facility])   • Mixed hyperlipidemia   • Lumbar stenosis with neurogenic claudication   • Lumbar spondylosis   • Intervertebral disc disorder with radiculopathy of lumbar region   • Sacroiliitis ([facility])   • Trochanteric bursitis of right hip   • Severe obesity ([facility])   • Stage 2 chronic kidney disease   • Vitamin D deficiency   • Psoriasis   • Type 2 diabetes mellitus with stage 2 chronic kidney disease, with long-term current use of insulin ([facility])   • Hypertensive heart disease with acute combined systolic and diastolic congestive heart failure ([facility])   • Chronic pain of left knee   • Right hip pain   • Chronic obstructive pulmonary disease, unspecified COPD type ([facility])   • Continuous opioid dependence ([facility])   • Tachycardia-bradycardia syndrome ([facility])   • Prostate cancer ([facility])   • Adverse reaction to HMG-CoA reductase inhibitor   • Alcohol abuse   • Ischemic cardiomyopathy   • Biventricular ICD (implantable cardioverter-defibrillator) in place   • Presence of permanent cardiac pacemaker   • Severe sinus bradycardia   • Allergic rhinitis   • Bilateral lower extremity edema       Past Medical History:   Diagnosis Date   • Arthritis     knees   • Cancer (HCC)     prostate   • Coronary artery disease     two coronary stents   • Diabetes mellitus ([facility])    • Hyperlipidemia 10/19/2018   • Hypertension    • Psoriasis     lower extremities and buttocks   •  Sleep apnea    • Type 2 diabetes mellitus (HCC) 10/19/2018       Past Surgical History:   Procedure Laterality Date   • A-V CARDIAC PACEMAKER INSERTION     • CARDIAC DEFIBRILLATOR PLACEMENT     • CATARACT EXTRACTION Bilateral     with iol's   • COLONOSCOPY N/A 04/05/2016    Procedure: COLONOSCOPY snare polypectomy;  Surgeon: Dung España MD;  Location: [facility];  Service:    • INSERTION PROSTATE RADIATION SEED     • REPLACEMENT TOTAL KNEE Right    • TRACHEOSTOMY      x 2  up to 5 years ago   • UMBILICAL HERNIA REPAIR         Family History   Problem Relation Age of Onset   • Heart disease Mother    • Cirrhosis Father    • Schizophrenia Brother    • No Known Problems Son    • No Known Problems Son        Social History     Occupational History   • Not on file   Tobacco Use   • Smoking status: Never     Passive exposure: Never   • Smokeless tobacco: Never   Vaping Use   • Vaping status: Never Used   Substance and Sexual Activity   • Alcohol use: Yes     Alcohol/week: 4.0 standard drinks of alcohol     Types: 4 Cans of beer per week     Comment: daily   • Drug use: No   • Sexual activity: Not Currently     Partners: Female       Current Outpatient Medications on File Prior to Visit   Medication Sig   • Ascorbic Acid (VITAMIN C) 1000 MG tablet Take 1,000 mg by mouth daily   • aspirin 81 MG tablet Take 81 mg by mouth daily.   • B-D ULTRAFINE III SHORT PEN 31G X 8 MM MISC USE WITH INSULIN TWICE A DAY   • Blood Glucose Monitoring Suppl (OneTouch Verio) w/Device KIT Use to test blood sugar 3 times a day   • Blood Glucose Monitoring Suppl (OneTouch Verio) w/Device KIT Use to test blood sugar 3 times a day   • cholecalciferol (VITAMIN D3) 1,000 units tablet Take 2,000 Units by mouth daily    • dapagliflozin (Farxiga) 10 MG tablet TAKE 1 TABLET DAILY   • docusate sodium (COLACE) 50 mg capsule Take 50 mg by mouth 2 (two) times a day   • Entresto  MG TABS    • ezetimibe (ZETIA) 10 mg tablet TAKE 1 TABLET BY  "MOUTH TIMES A WEEK ON MONDAY WEDNESDAY AND FRIDAY AT 6PM   • furosemide (LASIX) 40 mg tablet Take 40 mg by mouth daily Twice a week on Mon & Thur--PRN   • Glucagon, rDNA, (Glucagon Emergency) 1 MG KIT Use glucagon kit if you have a low blood sugar and unconscious   • glucose blood (OneTouch Verio) test strip USE TO TEST BLOOD SUGAR THREE TIMES A DAY   • HumaLOG Mix 75/25 KwikPen (75-25) 100 units/mL injection pen INJECT 46 UNITS UNDER THE SKIN BEFORE BREAKFAST AND 44 units before dinner.   • HYDROcodone-acetaminophen (NORCO) 5-325 mg per tablet Take 1 tablet by mouth 2 (two) times a day as needed for pain Max Daily Amount: 2 tablets   • levocetirizine (XYZAL) 5 MG tablet Take 1 tablet (5 mg total) by mouth every morning   • metFORMIN (GLUCOPHAGE-XR) 750 mg 24 hr tablet TAKE 1 TABLET TWICE A DAY   • metoprolol succinate (TOPROL-XL) 50 mg 24 hr tablet Take 50 mg by mouth daily   • montelukast (SINGULAIR) 10 mg tablet Take 1 tablet (10 mg total) by mouth daily at bedtime   • Multiple Vitamins-Minerals (MULTIVITAMIN ADULT PO) Take by mouth   • omeprazole (PriLOSEC) 20 mg delayed release capsule Take 1 capsule (20 mg total) by mouth daily   • rosuvastatin (CRESTOR) 40 MG tablet Take 40 mg by mouth daily 1/2 pill at night   • sacubitril-valsartan (Entresto)  MG TABS Take 1 tablet by mouth 2 (two) times a day   • spironolactone (ALDACTONE) 25 mg tablet    • traMADol (ULTRAM) 50 mg tablet    • traZODone (DESYREL) 100 mg tablet Take 1 tablet (100 mg total) by mouth daily at bedtime   • traZODone (DESYREL) 100 mg tablet Take 1 tablet (100 mg total) by mouth daily at bedtime as needed for sleep   • Zinc 50 MG TABS Take by mouth     No current facility-administered medications on file prior to visit.       Allergies   Allergen Reactions   • Bee Venom Shortness Of Breath   • Bee Pollen Other (See Comments)     edema  Edema       Physical Exam:    /70   Pulse 65   Ht 5' 10\" (1.778 m)   Wt 130 kg (287 lb)   BMI " 41.18 kg/m²     Constitutional:normal, well developed, well nourished, alert, in no distress and non-toxic and no overt pain behavior. and obese  Eyes:anicteric  HEENT:grossly intact  Neck:supple, symmetric, trachea midline and no masses   Pulmonary:even and unlabored  Cardiovascular:No edema or pitting edema present  Skin:Normal without rashes or lesions and well hydrated  Psychiatric:Mood and affect appropriate  Neurologic:Cranial Nerves II-XII grossly intact  Musculoskeletal:antalgic    Lumbar Spine Exam  Appearance:  Normal lordosis  Palpation/Tenderness:  Right trochanteric bursa tenderness  Motor Strength:  Left hip flexion:  5/5  Left hip extension:  5/5  Right hip flexion:  4/5  Right hip extension:  5/5  Left knee flexion:  5/5  Left knee extension:  5/5  Right knee flexion:  5/5  Right knee extension:  5/5  Left foot dorsiflexion:  3/5  Left foot plantar flexion:  5/5  Right foot dorsiflexion:  5/5    Imaging    MRI LUMBAR SPINE WITHOUT CONTRAST (12/27/2022)     INDICATION: M48.062: Spinal stenosis, lumbar region with neurogenic claudication.     COMPARISON:  11/13/2018     TECHNIQUE:  Multiplanar, multisequence imaging of the lumbar spine was performed. .          IMAGE QUALITY:  Diagnostic     FINDINGS:     VERTEBRAL BODIES:  There are 5 lumbar type vertebral bodies.  Normal alignment of the lumbar spine.  No spondylolysis or spondylolisthesis. No scoliosis.  No compression fracture.    No suspicious marrow signal abnormality identified.  There is a stable   hemangioma within the L2 vertebral body.     SACRUM:  Normal signal within the sacrum. No evidence of insufficiency or stress fracture.     DISTAL CORD AND CONUS:  Normal size and signal within the distal cord and conus.  Conus medullaris terminates at L1.     PARASPINAL SOFT TISSUES:  Paraspinal soft tissues are unremarkable.     LOWER THORACIC DISC SPACES:  Normal disc height and signal.  No disc herniation, canal stenosis or foraminal narrowing.      LUMBAR DISC SPACES:     L1-L2:  There is a bulging annulus with a small superimposed central disc extrusion.  There is facet arthrosis.  There is mild mass effect on thecal sac.  There is no significant foraminal narrowing.  Findings are stable.     L2-L3:  There is facet arthrosis.  There is no significant canal stenosis or foraminal narrowing.     L3-L4:  There is a bulging annulus.  There is fluid within the facet joints bilaterally.  There is mild canal stenosis.  There is a tiny synovial cyst arising from the left facet joint again noted and similar to the prior study.  There is moderate left   foraminal narrowing with contact of the exiting nerve root.  There is mild to moderate right foraminal narrowing.  Findings are overall stable.  There is mild canal stenosis.     L4-L5:  There is a bulging annulus with a small superimposed left paracentral/foraminal disc protrusion.  There is facet arthrosis.  There is mild canal stenosis.  There is moderate to severe there is moderate right foraminal narrowing with impingement   the exiting L4 nerve root.  Left foraminal narrowing with impingement of the exiting nerve root.     L5-S1:  There is a bulging annulus with a superimposed central/left paracentral disc protrusion.  There is mild mass effect on thecal sac.  There is facet arthrosis.  There is moderate left foraminal narrowing with contact of the exiting nerve root.       OTHER FINDINGS:  None.

## 2024-12-13 DIAGNOSIS — Z79.4 TYPE 2 DIABETES MELLITUS WITH STAGE 2 CHRONIC KIDNEY DISEASE, WITH LONG-TERM CURRENT USE OF INSULIN (HCC): ICD-10-CM

## 2024-12-13 DIAGNOSIS — E11.22 TYPE 2 DIABETES MELLITUS WITH STAGE 2 CHRONIC KIDNEY DISEASE, WITH LONG-TERM CURRENT USE OF INSULIN (HCC): ICD-10-CM

## 2024-12-13 DIAGNOSIS — N18.2 TYPE 2 DIABETES MELLITUS WITH STAGE 2 CHRONIC KIDNEY DISEASE, WITH LONG-TERM CURRENT USE OF INSULIN (HCC): ICD-10-CM

## 2024-12-13 RX ORDER — INSULIN LISPRO 100 [IU]/ML
INJECTION, SUSPENSION SUBCUTANEOUS
Qty: 90 ML | Refills: 1 | Status: SHIPPED | OUTPATIENT
Start: 2024-12-13

## 2024-12-13 NOTE — TELEPHONE ENCOUNTER
Reason for call:   [x] Refill   [] Prior Auth  [] Other:     Office:   [x] Specialty/Provider - patrick / [facility]    Medication: Humalog 75/25 injection pens    Dose/Frequency: 100 units/mL (46 units before breakfast + 44 units before dinner)    Quantity: 90mL    Pharmacy: [facility] - [place], [place] - [address] [place] [address]     Does the patient have enough for 3 days?   [x] Yes   [] No - Send as HP to POD

## 2024-12-16 ENCOUNTER — OFFICE VISIT (OUTPATIENT)
Dept: PAIN MEDICINE | Facility: CLINIC | Age: 79
End: 2024-12-16
Payer: MEDICARE

## 2024-12-16 VITALS
SYSTOLIC BLOOD PRESSURE: 134 MMHG | WEIGHT: 287 LBS | HEIGHT: 70 IN | HEART RATE: 65 BPM | DIASTOLIC BLOOD PRESSURE: 70 MMHG | BODY MASS INDEX: 41.09 KG/M2

## 2024-12-16 DIAGNOSIS — M48.062 LUMBAR STENOSIS WITH NEUROGENIC CLAUDICATION: ICD-10-CM

## 2024-12-16 DIAGNOSIS — M25.551 RIGHT HIP PAIN: Primary | ICD-10-CM

## 2024-12-16 PROCEDURE — G2211 COMPLEX E/M VISIT ADD ON: HCPCS | Performed by: ANESTHESIOLOGY

## 2024-12-16 PROCEDURE — 99214 OFFICE O/P EST MOD 30 MIN: CPT | Performed by: ANESTHESIOLOGY

## 2024-12-23 DIAGNOSIS — Z79.4 TYPE 2 DIABETES MELLITUS WITH STAGE 2 CHRONIC KIDNEY DISEASE, WITH LONG-TERM CURRENT USE OF INSULIN (HCC): ICD-10-CM

## 2024-12-23 DIAGNOSIS — E11.22 TYPE 2 DIABETES MELLITUS WITH STAGE 2 CHRONIC KIDNEY DISEASE, WITH LONG-TERM CURRENT USE OF INSULIN (HCC): ICD-10-CM

## 2024-12-23 DIAGNOSIS — N18.2 TYPE 2 DIABETES MELLITUS WITH STAGE 2 CHRONIC KIDNEY DISEASE, WITH LONG-TERM CURRENT USE OF INSULIN (HCC): ICD-10-CM

## 2024-12-23 RX ORDER — BLOOD SUGAR DIAGNOSTIC
STRIP MISCELLANEOUS 3 TIMES DAILY
Qty: 300 STRIP | Refills: 3 | Status: SHIPPED | OUTPATIENT
Start: 2024-12-23

## 2025-01-06 ENCOUNTER — TELEPHONE (OUTPATIENT)
Age: 80
End: 2025-01-06

## 2025-01-06 NOTE — TELEPHONE ENCOUNTER
I spoke to the pharmacy, they stated that the medication was not cancelled it was put on hold for some reason. The pharmacist stated that the patient can pick it up now, and the pharmacy will notify them when the medication is ready for .     I attempted to call the patients spouses phone number left below, but that number is not in service. I Left message on machine for patient informing him the pharmacy is going to fill his medication and if he has any other questions to call our office back. No further action.    Aracelis Archer, BAKARIN

## 2025-01-06 NOTE — TELEPHONE ENCOUNTER
"Patient's wife called in and stated that pharmacist informed her that Dr. Lombardi has canceled the patient's subscription   \" Trazadone\" and they would like to know why?  As he takes this med at night prior to going to sleep Please advise  "

## 2025-01-06 NOTE — TELEPHONE ENCOUNTER
Patient's wife called to see if Trazodone was refilled.  Clinical at practice said it was refilled at [facility].  Wife said she would call [facility] and see if the refill is ready.

## 2025-01-14 ENCOUNTER — TELEPHONE (OUTPATIENT)
Age: 80
End: 2025-01-14

## 2025-01-14 NOTE — TELEPHONE ENCOUNTER
Phone call from patient's spouse calling again to schedule Brian 3 Training. Transferred to [facility] in Diabetic Education for further assistance.

## 2025-01-30 ENCOUNTER — OFFICE VISIT (OUTPATIENT)
Dept: DIABETES SERVICES | Facility: CLINIC | Age: 80
End: 2025-01-30
Payer: MEDICARE

## 2025-01-30 VITALS — WEIGHT: 290 LBS | BODY MASS INDEX: 41.61 KG/M2

## 2025-01-30 DIAGNOSIS — E11.22 TYPE 2 DIABETES MELLITUS WITH STAGE 2 CHRONIC KIDNEY DISEASE, WITH LONG-TERM CURRENT USE OF INSULIN (HCC): Primary | ICD-10-CM

## 2025-01-30 DIAGNOSIS — Z79.4 TYPE 2 DIABETES MELLITUS WITH STAGE 2 CHRONIC KIDNEY DISEASE, WITH LONG-TERM CURRENT USE OF INSULIN (HCC): Primary | ICD-10-CM

## 2025-01-30 DIAGNOSIS — N18.2 TYPE 2 DIABETES MELLITUS WITH STAGE 2 CHRONIC KIDNEY DISEASE, WITH LONG-TERM CURRENT USE OF INSULIN (HCC): Primary | ICD-10-CM

## 2025-01-30 PROCEDURE — 95249 CONT GLUC MNTR PT PROV EQP: CPT

## 2025-01-30 NOTE — PROGRESS NOTES
Personal Brian Training        Met with Caleb Ventura, and his wife, Sunita, today for a personal Brian 2 training. Caleb brought his own supplies to the visit which include a reader and sensors.    Educator reviewed the following:    -- Skin Prep  -- How to place the Brian  -- Importance of site rotation  -- Brian 2 set alerts  -- How to scan for a reading  -- 60 minute warm-up  -- If reading doesn't match symptoms, do a finger stick  -- Return in 14 days for download and change sensor    Lab Results   Component Value Date    HGBA1C 8.3 (H) 07/03/2024         Patient response to instruction    Comprehension: good  Motivation: good  Expected Compliance: good  Response to Teachback: 100%, demonstrated understanding    Thank you for referring your patient to [facility], it was a pleasure working with them today. Please feel free to call with any questions or concerns.    Dominique Russ RD, LDN, CDE  [id number]  [place] [address]

## 2025-02-03 DIAGNOSIS — G47.00 INSOMNIA, UNSPECIFIED TYPE: ICD-10-CM

## 2025-02-04 RX ORDER — TRAZODONE HYDROCHLORIDE 100 MG/1
100 TABLET ORAL
Qty: 30 TABLET | Refills: 0 | Status: SHIPPED | OUTPATIENT
Start: 2025-02-04

## 2025-02-05 ENCOUNTER — RA CDI HCC (OUTPATIENT)
Dept: OTHER | Facility: HOSPITAL | Age: 80
End: 2025-02-05

## 2025-02-13 ENCOUNTER — OFFICE VISIT (OUTPATIENT)
Dept: FAMILY MEDICINE CLINIC | Facility: CLINIC | Age: 80
End: 2025-02-13
Payer: MEDICARE

## 2025-02-13 VITALS
DIASTOLIC BLOOD PRESSURE: 70 MMHG | SYSTOLIC BLOOD PRESSURE: 110 MMHG | WEIGHT: 289 LBS | HEIGHT: 70 IN | BODY MASS INDEX: 41.37 KG/M2 | RESPIRATION RATE: 18 BRPM | HEART RATE: 72 BPM | TEMPERATURE: 98.1 F

## 2025-02-13 DIAGNOSIS — I11.0 HYPERTENSIVE HEART DISEASE WITH ACUTE COMBINED SYSTOLIC AND DIASTOLIC CONGESTIVE HEART FAILURE (HCC): Primary | ICD-10-CM

## 2025-02-13 DIAGNOSIS — I49.5 TACHYCARDIA-BRADYCARDIA SYNDROME (HCC): ICD-10-CM

## 2025-02-13 DIAGNOSIS — J44.9 CHRONIC OBSTRUCTIVE PULMONARY DISEASE, UNSPECIFIED COPD TYPE (HCC): ICD-10-CM

## 2025-02-13 DIAGNOSIS — E66.01 SEVERE OBESITY (HCC): ICD-10-CM

## 2025-02-13 DIAGNOSIS — Z79.4 TYPE 2 DIABETES MELLITUS WITH STAGE 2 CHRONIC KIDNEY DISEASE, WITH LONG-TERM CURRENT USE OF INSULIN (HCC): ICD-10-CM

## 2025-02-13 DIAGNOSIS — F33.1 MODERATE EPISODE OF RECURRENT MAJOR DEPRESSIVE DISORDER (HCC): ICD-10-CM

## 2025-02-13 DIAGNOSIS — R22.1 LUMP IN NECK: ICD-10-CM

## 2025-02-13 DIAGNOSIS — M48.062 LUMBAR STENOSIS WITH NEUROGENIC CLAUDICATION: ICD-10-CM

## 2025-02-13 DIAGNOSIS — G89.29 CHRONIC LEFT SHOULDER PAIN: ICD-10-CM

## 2025-02-13 DIAGNOSIS — M25.512 CHRONIC LEFT SHOULDER PAIN: ICD-10-CM

## 2025-02-13 DIAGNOSIS — E11.319 TYPE 2 DIABETES MELLITUS WITH RETINOPATHY, WITH LONG-TERM CURRENT USE OF INSULIN, MACULAR EDEMA PRESENCE UNSPECIFIED, UNSPECIFIED LATERALITY, UNSPECIFIED RETINOPATHY SEVERITY (HCC): ICD-10-CM

## 2025-02-13 DIAGNOSIS — I50.41 HYPERTENSIVE HEART DISEASE WITH ACUTE COMBINED SYSTOLIC AND DIASTOLIC CONGESTIVE HEART FAILURE (HCC): Primary | ICD-10-CM

## 2025-02-13 DIAGNOSIS — M51.16 INTERVERTEBRAL DISC DISORDER WITH RADICULOPATHY OF LUMBAR REGION: ICD-10-CM

## 2025-02-13 DIAGNOSIS — Z23 NEED FOR VACCINATION: ICD-10-CM

## 2025-02-13 DIAGNOSIS — Z79.4 TYPE 2 DIABETES MELLITUS WITH RETINOPATHY, WITH LONG-TERM CURRENT USE OF INSULIN, MACULAR EDEMA PRESENCE UNSPECIFIED, UNSPECIFIED LATERALITY, UNSPECIFIED RETINOPATHY SEVERITY (HCC): ICD-10-CM

## 2025-02-13 DIAGNOSIS — C61 PROSTATE CANCER (HCC): ICD-10-CM

## 2025-02-13 DIAGNOSIS — F11.20 CONTINUOUS OPIOID DEPENDENCE (HCC): ICD-10-CM

## 2025-02-13 DIAGNOSIS — I25.10 CORONARY ARTERY DISEASE INVOLVING NATIVE CORONARY ARTERY OF NATIVE HEART WITHOUT ANGINA PECTORIS: ICD-10-CM

## 2025-02-13 DIAGNOSIS — N18.2 TYPE 2 DIABETES MELLITUS WITH STAGE 2 CHRONIC KIDNEY DISEASE, WITH LONG-TERM CURRENT USE OF INSULIN (HCC): ICD-10-CM

## 2025-02-13 DIAGNOSIS — E11.22 TYPE 2 DIABETES MELLITUS WITH STAGE 2 CHRONIC KIDNEY DISEASE, WITH LONG-TERM CURRENT USE OF INSULIN (HCC): ICD-10-CM

## 2025-02-13 DIAGNOSIS — Z99.89 DEPENDENCE ON CANE: ICD-10-CM

## 2025-02-13 DIAGNOSIS — E78.2 MIXED HYPERLIPIDEMIA: ICD-10-CM

## 2025-02-13 DIAGNOSIS — Z95.810 BIVENTRICULAR ICD (IMPLANTABLE CARDIOVERTER-DEFIBRILLATOR) IN PLACE: ICD-10-CM

## 2025-02-13 LAB — SL AMB POCT HEMOGLOBIN AIC: 8.1 (ref ?–6.5)

## 2025-02-13 PROCEDURE — 83036 HEMOGLOBIN GLYCOSYLATED A1C: CPT | Performed by: FAMILY MEDICINE

## 2025-02-13 PROCEDURE — 99214 OFFICE O/P EST MOD 30 MIN: CPT | Performed by: FAMILY MEDICINE

## 2025-02-13 PROCEDURE — G2211 COMPLEX E/M VISIT ADD ON: HCPCS | Performed by: FAMILY MEDICINE

## 2025-02-13 RX ORDER — DULOXETIN HYDROCHLORIDE 20 MG/1
20 CAPSULE, DELAYED RELEASE ORAL DAILY
Qty: 90 CAPSULE | Refills: 1 | Status: SHIPPED | OUTPATIENT
Start: 2025-02-13

## 2025-02-13 RX ORDER — GUSELKUMAB 100 MG/ML
INJECTION SUBCUTANEOUS
COMMUNITY
Start: 2025-01-29

## 2025-02-13 NOTE — ASSESSMENT & PLAN NOTE
Pt sees endo  Will get A1c  Low vcarb diet  Lab Results   Component Value Date    HGBA1C 8.1 (A) 02/13/2025       Orders:  •  POCT hemoglobin A1c  •  Albumin / creatinine urine ratio; Future  •  Comprehensive metabolic panel; Future  •  Hemoglobin A1C; Future  •  CBC and differential; Future  •  Lipid Panel with Direct LDL reflex; Future

## 2025-02-13 NOTE — ASSESSMENT & PLAN NOTE
Depression Screening Follow-up Plan: Patient's depression screening was positive with a PHQ-2 score of 6. Their PHQ-9 score was 7. Patient with underlying depression and was advised to continue current medications as prescribed.    Orders:  •  DULoxetine (CYMBALTA) 20 mg capsule; Take 1 capsule (20 mg total) by mouth daily

## 2025-02-13 NOTE — ASSESSMENT & PLAN NOTE
Wt Readings from Last 3 Encounters:   02/13/25 131 kg (289 lb)   01/30/25 132 kg (290 lb)   12/16/24 130 kg (287 lb)             Orders:  •  Albumin / creatinine urine ratio; Future  •  Comprehensive metabolic panel; Future  •  Hemoglobin A1C; Future  •  CBC and differential; Future  •  Lipid Panel with Direct LDL reflex; Future

## 2025-02-13 NOTE — ASSESSMENT & PLAN NOTE
Orders:  •  Albumin / creatinine urine ratio; Future  •  Comprehensive metabolic panel; Future  •  Hemoglobin A1C; Future  •  CBC and differential; Future  •  Lipid Panel with Direct LDL reflex; Future

## 2025-02-13 NOTE — ASSESSMENT & PLAN NOTE
Lab Results   Component Value Date    HGBA1C 8.1 (A) 02/13/2025     Orders:  •  Ambulatory Referral to Ophthalmology; Future

## 2025-02-13 NOTE — PROGRESS NOTES
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 (HCC)  Wt Readings from Last 3 Encounters:   25 131 kg (289 lb)   25 132 kg (290 lb)   24 130 kg (287 lb)             Orders:  •  Albumin / creatinine urine ratio; Future  •  Comprehensive metabolic panel; Future  •  Hemoglobin A1C; Future  •  CBC and differential; Future  •  Lipid Panel with Direct LDL reflex; Future    Coronary artery disease involving native coronary artery of native heart without angina pectoris    Orders:  •  Albumin / creatinine urine ratio; Future  •  Comprehensive metabolic panel; Future  •  Hemoglobin A1C; Future  •  CBC and differential; Future  •  Lipid Panel with Direct LDL reflex; Future    Type 2 diabetes mellitus with stage 2 chronic kidney disease, with long-term current use of insulin (HCC)  Pt sees endo  Will get A1c  Low vcarb diet  Lab Results   Component Value Date    HGBA1C 8.1 (A) 2025       Orders:  •  POCT hemoglobin A1c  •  Albumin / creatinine urine ratio; Future  •  Comprehensive metabolic panel; Future  •  Hemoglobin A1C; Future  •  CBC and differential; Future  •  Lipid Panel with Direct LDL reflex; Future    Mixed hyperlipidemia  Encouraged to get labs  Orders:  •  Albumin / creatinine urine ratio; Future  •  Comprehensive metabolic panel; Future  •  Hemoglobin A1C; Future  •  CBC and differential; Future  •  Lipid Panel with Direct LDL reflex; Future    Continuous opioid dependence (HCC)  ultram       Chronic obstructive pulmonary disease, unspecified COPD type (HCC)         Tachycardia-bradycardia syndrome (HCC)         Prostate cancer (HCC)         Severe obesity (HCC)         Type 2 diabetes mellitus with retinopathy, with long-term current use of  insulin, macular edema presence unspecified, unspecified laterality, unspecified retinopathy severity ([facility])    Lab Results   Component Value Date    HGBA1C 8.1 (A) 02/13/2025     Orders:  •  Ambulatory Referral to Ophthalmology; Future    Lumbar stenosis with neurogenic claudication    Orders:  •  DULoxetine (CYMBALTA) 20 mg capsule; Take 1 capsule (20 mg total) by mouth daily    Moderate episode of recurrent major depressive disorder (HCC)  Depression Screening Follow-up Plan: Patient's depression screening was positive with a PHQ-2 score of 6. Their PHQ-9 score was 7. Patient with underlying depression and was advised to continue current medications as prescribed.    Orders:  •  DULoxetine (CYMBALTA) 20 mg capsule; Take 1 capsule (20 mg total) by mouth daily    Intervertebral disc disorder with radiculopathy of lumbar region    Orders:  •  DULoxetine (CYMBALTA) 20 mg capsule; Take 1 capsule (20 mg total) by mouth daily    Biventricular ICD (implantable cardioverter-defibrillator) in place  Pt states ever since this was placed in may he has had pain in his left arm       Chronic left shoulder pain    Orders:  •  XR shoulder 2+ vw left; Future    Need for vaccination         Lump in neck    Orders:  •  US head neck soft tissue; Future    Dependence on cane               Depression Screening and Follow-up Plan:   Patient with underlying depression and was advised to continue current medications as prescribed.       History of Present Illness   Pt is being seen for a 6 month follow up  Sees Endo for DM  Pt sees cardio  Pt does not have labs for today    Pt encouraged to get his labs done about a week prior to his appts    Pt states he has been fatigued  Wife thinks he is depressed  Pt is in pain all the time with his back - has been going on for years,  Pt is not a candidtate for an operation  Pt does see pain mgmt states it has not been helping    Pt states his last diabetic foot exam was w Dr Almaguer      Pt  "complains of a bump on the back of his neck, popped up about a few weeks ago  Right next to it is a scab      Review of Systems   Constitutional:  Negative for activity change, appetite change, chills, diaphoresis, fatigue, fever and unexpected weight change.   HENT:  Positive for congestion (for years with cpap machine). Negative for dental problem, ear pain, mouth sores, sinus pressure, sinus pain, sore throat and trouble swallowing.         Lump in back of neck   Eyes:  Negative for photophobia, discharge and itching.   Respiratory:  Positive for shortness of breath. Negative for apnea and chest tightness.    Cardiovascular:  Negative for chest pain, palpitations and leg swelling.   Gastrointestinal:  Negative for abdominal distention, abdominal pain, blood in stool, nausea and vomiting.   Endocrine: Negative for cold intolerance, heat intolerance, polydipsia, polyphagia and polyuria.   Genitourinary:  Negative for difficulty urinating.   Musculoskeletal:  Positive for arthralgias, back pain, gait problem and myalgias.   Skin:  Negative for color change and wound.   Neurological:  Negative for dizziness, syncope, speech difficulty and headaches.   Hematological:  Negative for adenopathy.   Psychiatric/Behavioral:  Negative for agitation and behavioral problems.        Objective   /70   Pulse 72   Temp 98.1 °F (36.7 °C)   Resp 18   Ht 5' 10\" (1.778 m)   Wt 131 kg (289 lb)   BMI 41.47 kg/m²      Physical Exam  Vitals and nursing note reviewed.   Constitutional:       General: He is not in acute distress.     Appearance: He is well-developed. He is obese. He is not diaphoretic.   HENT:      Head: Normocephalic and atraumatic.      Right Ear: External ear normal.      Left Ear: External ear normal.      Nose: Nose normal.      Mouth/Throat:      Pharynx: No oropharyngeal exudate.   Eyes:      General: No scleral icterus.        Right eye: No discharge.         Left eye: No discharge.      Pupils: Pupils " are equal, round, and reactive to light.   Neck:      Thyroid: No thyromegaly.      Comments: Lump post neck  Cardiovascular:      Rate and Rhythm: Normal rate.      Heart sounds: Normal heart sounds. No murmur heard.  Pulmonary:      Effort: Pulmonary effort is normal. No respiratory distress.      Breath sounds: Normal breath sounds. No wheezing.   Abdominal:      General: Bowel sounds are normal. There is no distension.      Palpations: Abdomen is soft. There is no mass.      Tenderness: There is no abdominal tenderness. There is no guarding or rebound.   Musculoskeletal:         General: Normal range of motion.   Skin:     General: Skin is warm and dry.      Findings: No erythema or rash.   Neurological:      Mental Status: He is alert.      Coordination: Coordination normal.      Deep Tendon Reflexes: Reflexes normal.   Psychiatric:         Behavior: Behavior normal.       Depression Screening Follow-up Plan: Patient's depression screening was positive with a PHQ-2 score of 6. Their PHQ-9 score was 7. Patient with underlying depression and was advised to continue current medications as prescribed.

## 2025-02-13 NOTE — ASSESSMENT & PLAN NOTE
Encouraged to get labs  Orders:  •  Albumin / creatinine urine ratio; Future  •  Comprehensive metabolic panel; Future  •  Hemoglobin A1C; Future  •  CBC and differential; Future  •  Lipid Panel with Direct LDL reflex; Future

## 2025-02-14 ENCOUNTER — TELEPHONE (OUTPATIENT)
Dept: ADMINISTRATIVE | Facility: OTHER | Age: 80
End: 2025-02-14

## 2025-02-14 NOTE — TELEPHONE ENCOUNTER
Upon review of the In Basket request and the patient's chart, initial outreach has been made via fax to facility. Please see Contacts section for details.     Thank you  Vitor Shen MA

## 2025-02-14 NOTE — LETTER
Diabetic Foot Exam Form    Date Requested: 25  Patient: Caleb Ventura     2nd Request  Patient : 1945      Please complete form  Referring Provider: Frank Lombardi, DO    Diabetic Foot Exam Performed with shoes and socks removed        Yes         No     Date of Diabetic Foot Exam ______________________________  Risk Score ____________________________________________    Left Foot       Visual Inspection         Monofilament Testing Sensory Exam        Pedal Pulses         Additional Comments         Right Foot      Visual Inspection         Monofilament Testing Sensory Exam       Pedal Pulses         Additional Comments         Comments __________________________________________________________    Practice Providing Exam ______________________________________________    Exam Performed By (print name) _______________________________________      Provider Signature ___________________________________________________      These reports are needed for  compliance.    Please fax this completed form and a copy of the Diabetic Foot Exam report to our office located at [address] [place] [address] as soon as possible via Fax [phone] attention Vitor: Phone [phone]    We thank you for your assistance in treating our mutual patient.

## 2025-02-14 NOTE — LETTER
Diabetic Foot Exam Form    Date Requested: 25  Patient: Caleb Ventura     Please complete form  Patient : 1945   Referring Provider: Frank Lombardi, DO    Diabetic Foot Exam Performed with shoes and socks removed        Yes         No     Date of Diabetic Foot Exam ______________________________  Risk Score ____________________________________________    Left Foot       Visual Inspection         Monofilament Testing Sensory Exam        Pedal Pulses         Additional Comments         Right Foot      Visual Inspection         Monofilament Testing Sensory Exam       Pedal Pulses         Additional Comments         Comments __________________________________________________________    Practice Providing Exam ______________________________________________    Exam Performed By (print name) _______________________________________      Provider Signature ___________________________________________________      These reports are needed for  compliance.    Please fax this completed form and a copy of the Diabetic Foot Exam report to our office located at [address] [place] [address] as soon as possible via Fax [phone] attention Vitor: Phone [phone]    We thank you for your assistance in treating our mutual patient.

## 2025-02-14 NOTE — TELEPHONE ENCOUNTER
----- Message from Frank Lombardi, DO sent at 2/13/2025  9:57 AM EST -----  Regarding: foot  02/13/25 9:58 AM    Hello, our patient Caleb Ventura has had Diabetic Foot Exam completed/performed. Please assist in updating the patient chart by making an External outreach to Dr Dahl facility located in [facility]. The date of service is recent.    Thank you,  Frank Lombardi, DO  [facility]

## 2025-02-17 NOTE — TELEPHONE ENCOUNTER
As a follow-up, a second attempt has been made for outreach via fax to facility. Please see Contacts section for details.    Thank you  Vitor Shen MA

## 2025-02-19 LAB
ALBUMIN SERPL-MCNC: 4.4 G/DL (ref 3.8–4.8)
ALP SERPL-CCNC: 74 IU/L (ref 44–121)
ALT SERPL-CCNC: 15 IU/L (ref 0–44)
AST SERPL-CCNC: 17 IU/L (ref 0–40)
BILIRUB SERPL-MCNC: 0.4 MG/DL (ref 0–1.2)
BUN SERPL-MCNC: 23 MG/DL (ref 8–27)
BUN/CREAT SERPL: 20 (ref 10–24)
CALCIUM SERPL-MCNC: 9 MG/DL (ref 8.6–10.2)
CHLORIDE SERPL-SCNC: 101 MMOL/L (ref 96–106)
CO2 SERPL-SCNC: 21 MMOL/L (ref 20–29)
CREAT SERPL-MCNC: 1.16 MG/DL (ref 0.76–1.27)
EGFR: 64 ML/MIN/1.73
EST. AVERAGE GLUCOSE BLD GHB EST-MCNC: 197 MG/DL
FRUCTOSAMINE SERPL-SCNC: 279 UMOL/L (ref 0–285)
GLOBULIN SER-MCNC: 2.4 G/DL (ref 1.5–4.5)
GLUCOSE SERPL-MCNC: 125 MG/DL (ref 70–99)
HBA1C MFR BLD: 8.5 % (ref 4.8–5.6)
POTASSIUM SERPL-SCNC: 4.8 MMOL/L (ref 3.5–5.2)
PROT SERPL-MCNC: 6.8 G/DL (ref 6–8.5)
SODIUM SERPL-SCNC: 137 MMOL/L (ref 134–144)
TSH SERPL DL<=0.005 MIU/L-ACNC: 4.33 UIU/ML (ref 0.45–4.5)

## 2025-02-20 NOTE — TELEPHONE ENCOUNTER
As a final attempt, a third outreach has been made via telephone call to facility. Please see Contacts section for details. This encounter will be closed and completed by end of day. Should we receive the requested information because of previous outreach attempts, the requested patient's chart will be updated appropriately.     Thank you  Vitor Shen MA

## 2025-02-24 ENCOUNTER — RESULTS FOLLOW-UP (OUTPATIENT)
Dept: ENDOCRINOLOGY | Facility: CLINIC | Age: 80
End: 2025-02-24

## 2025-02-28 ENCOUNTER — TELEPHONE (OUTPATIENT)
Age: 80
End: 2025-02-28

## 2025-03-05 ENCOUNTER — RESULTS FOLLOW-UP (OUTPATIENT)
Dept: FAMILY MEDICINE CLINIC | Facility: CLINIC | Age: 80
End: 2025-03-05

## 2025-03-05 ENCOUNTER — HOSPITAL ENCOUNTER (OUTPATIENT)
Dept: RADIOLOGY | Facility: HOSPITAL | Age: 80
Discharge: HOME | End: 2025-03-05
Attending: FAMILY MEDICINE
Payer: MEDICARE

## 2025-03-05 ENCOUNTER — HOSPITAL ENCOUNTER (OUTPATIENT)
Dept: RADIOLOGY | Facility: HOSPITAL | Age: 80
Discharge: HOME | End: 2025-03-05
Payer: MEDICARE

## 2025-03-05 DIAGNOSIS — R22.1 LUMP IN NECK: ICD-10-CM

## 2025-03-05 DIAGNOSIS — G47.00 INSOMNIA, UNSPECIFIED TYPE: ICD-10-CM

## 2025-03-05 DIAGNOSIS — G89.29 CHRONIC LEFT SHOULDER PAIN: ICD-10-CM

## 2025-03-05 DIAGNOSIS — M25.512 CHRONIC LEFT SHOULDER PAIN: ICD-10-CM

## 2025-03-05 PROCEDURE — 76536 US EXAM OF HEAD AND NECK: CPT

## 2025-03-05 PROCEDURE — 73030 X-RAY EXAM OF SHOULDER: CPT

## 2025-03-05 RX ORDER — TRAZODONE HYDROCHLORIDE 100 MG/1
100 TABLET ORAL
Qty: 90 TABLET | Refills: 1 | Status: SHIPPED | OUTPATIENT
Start: 2025-03-05

## 2025-03-05 NOTE — TELEPHONE ENCOUNTER
Reason for call:   [x] Refill   [] Prior Auth  [] Other:     Office:   [x] PCP/Provider - [facility]/ Lombardi, DO  [] Specialty/Provider -     Medication: traZODone (DESYREL) 100 mg tablet     Dose/Frequency: Take 1 tablet (100 mg total) by mouth daily at bedtime    Quantity: 90    Pharmacy: [place] [address] [address] [phone]    Does the patient have enough for 3 days?   [] Yes   [x] No - Send as HP to POD

## 2025-03-05 NOTE — TELEPHONE ENCOUNTER
Please call pt - xray shoulder is back - looks like he has sever arthritis on the study, def causing pain.  If meds do not help, nest step would be to see Ortho

## 2025-03-10 DIAGNOSIS — N18.2 TYPE 2 DIABETES MELLITUS WITH STAGE 2 CHRONIC KIDNEY DISEASE, WITH LONG-TERM CURRENT USE OF INSULIN (HCC): ICD-10-CM

## 2025-03-10 DIAGNOSIS — E11.22 TYPE 2 DIABETES MELLITUS WITH STAGE 2 CHRONIC KIDNEY DISEASE, WITH LONG-TERM CURRENT USE OF INSULIN (HCC): ICD-10-CM

## 2025-03-10 DIAGNOSIS — Z79.4 TYPE 2 DIABETES MELLITUS WITH STAGE 2 CHRONIC KIDNEY DISEASE, WITH LONG-TERM CURRENT USE OF INSULIN (HCC): ICD-10-CM

## 2025-03-11 RX ORDER — DAPAGLIFLOZIN 10 MG/1
10 TABLET, FILM COATED ORAL DAILY
Qty: 90 TABLET | Refills: 1 | Status: SHIPPED | OUTPATIENT
Start: 2025-03-11

## 2025-03-11 NOTE — RESULT ENCOUNTER NOTE
US of the neck is back.  Looks like the area in question is a lipoma, this is a benign finding - nothing needs to be done

## 2025-03-12 ENCOUNTER — TELEPHONE (OUTPATIENT)
Age: 80
End: 2025-03-12

## 2025-03-12 DIAGNOSIS — F33.1 MODERATE EPISODE OF RECURRENT MAJOR DEPRESSIVE DISORDER (HCC): ICD-10-CM

## 2025-03-12 DIAGNOSIS — M48.062 LUMBAR STENOSIS WITH NEUROGENIC CLAUDICATION: ICD-10-CM

## 2025-03-12 DIAGNOSIS — M51.16 INTERVERTEBRAL DISC DISORDER WITH RADICULOPATHY OF LUMBAR REGION: ICD-10-CM

## 2025-03-12 RX ORDER — DULOXETIN HYDROCHLORIDE 30 MG/1
30 CAPSULE, DELAYED RELEASE ORAL DAILY
Qty: 90 CAPSULE | Refills: 1 | Status: SHIPPED | OUTPATIENT
Start: 2025-03-12 | End: 2025-09-08

## 2025-03-12 NOTE — TELEPHONE ENCOUNTER
Patient's wife called. He is feeling better since starting the DULoxetine (CYMBALTA) 20 mg capsule and is not having any side effects. Would like to increase the dose per prior request from Dr. Lombardi for feedback on how patient is doing after a month. Only has 3 pills left and didn't want to reorder at current dose.    Please increase dose to whatever is appropriate.     [facility] [address] [place] [address] [address]   Phone: [phone]  Fax: [phone]

## 2025-03-14 ENCOUNTER — TELEPHONE (OUTPATIENT)
Age: 80
End: 2025-03-14

## 2025-03-14 ENCOUNTER — CLINICAL SUPPORT (OUTPATIENT)
Dept: ENDOCRINOLOGY | Facility: CLINIC | Age: 80
End: 2025-03-14

## 2025-03-14 DIAGNOSIS — E11.22 TYPE 2 DIABETES MELLITUS WITH STAGE 2 CHRONIC KIDNEY DISEASE, WITH LONG-TERM CURRENT USE OF INSULIN (HCC): Primary | ICD-10-CM

## 2025-03-14 DIAGNOSIS — N18.2 TYPE 2 DIABETES MELLITUS WITH STAGE 2 CHRONIC KIDNEY DISEASE, WITH LONG-TERM CURRENT USE OF INSULIN (HCC): Primary | ICD-10-CM

## 2025-03-14 DIAGNOSIS — Z79.4 TYPE 2 DIABETES MELLITUS WITH STAGE 2 CHRONIC KIDNEY DISEASE, WITH LONG-TERM CURRENT USE OF INSULIN (HCC): Primary | ICD-10-CM

## 2025-03-14 PROCEDURE — PBNCHG PB NO CHARGE PLACEHOLDER

## 2025-03-14 NOTE — TELEPHONE ENCOUNTER
The pt's wife called stating she just missed a call - she had no voicemail. I was unable to reach a team member at this time, please give her a call back.

## 2025-03-19 DIAGNOSIS — I10 HYPERTENSION GOAL BP (BLOOD PRESSURE) < 140/90: ICD-10-CM

## 2025-03-19 DIAGNOSIS — E11.22 TYPE 2 DIABETES MELLITUS WITH STAGE 2 CHRONIC KIDNEY DISEASE, WITH LONG-TERM CURRENT USE OF INSULIN (HCC): ICD-10-CM

## 2025-03-19 DIAGNOSIS — Z79.4 TYPE 2 DIABETES MELLITUS WITH STAGE 2 CHRONIC KIDNEY DISEASE, WITH LONG-TERM CURRENT USE OF INSULIN (HCC): ICD-10-CM

## 2025-03-19 DIAGNOSIS — N18.2 TYPE 2 DIABETES MELLITUS WITH STAGE 2 CHRONIC KIDNEY DISEASE, WITH LONG-TERM CURRENT USE OF INSULIN (HCC): ICD-10-CM

## 2025-03-19 DIAGNOSIS — E78.2 MIXED HYPERLIPIDEMIA: ICD-10-CM

## 2025-03-19 RX ORDER — PEN NEEDLE, DIABETIC 31 GX5/16"
NEEDLE, DISPOSABLE MISCELLANEOUS
Qty: 180 EACH | Refills: 1 | Status: SHIPPED | OUTPATIENT
Start: 2025-03-19

## 2025-03-20 ENCOUNTER — TELEPHONE (OUTPATIENT)
Dept: ENDOCRINOLOGY | Facility: CLINIC | Age: 80
End: 2025-03-20

## 2025-03-20 DIAGNOSIS — N18.2 TYPE 2 DIABETES MELLITUS WITH STAGE 2 CHRONIC KIDNEY DISEASE, WITH LONG-TERM CURRENT USE OF INSULIN (HCC): Primary | ICD-10-CM

## 2025-03-20 DIAGNOSIS — E11.22 TYPE 2 DIABETES MELLITUS WITH STAGE 2 CHRONIC KIDNEY DISEASE, WITH LONG-TERM CURRENT USE OF INSULIN (HCC): Primary | ICD-10-CM

## 2025-03-20 DIAGNOSIS — Z79.4 TYPE 2 DIABETES MELLITUS WITH STAGE 2 CHRONIC KIDNEY DISEASE, WITH LONG-TERM CURRENT USE OF INSULIN (HCC): Primary | ICD-10-CM

## 2025-03-20 RX ORDER — HYDROCHLOROTHIAZIDE 12.5 MG/1
1 CAPSULE ORAL
Status: CANCELLED | OUTPATIENT
Start: 2025-03-20

## 2025-03-20 RX ORDER — HYDROCHLOROTHIAZIDE 12.5 MG/1
1 CAPSULE ORAL
Qty: 6 EACH | Refills: 2 | Status: SHIPPED | OUTPATIENT
Start: 2025-03-20

## 2025-03-20 NOTE — TELEPHONE ENCOUNTER
Wife called the RX Refill Line. Message is being forwarded to the office.     Wife called and states that she called [facility] and they said that they did not have a script for Freestyle Brian 3 sensors. Needs script sent to [facility]    Please contact wife at [phone]

## 2025-03-24 ENCOUNTER — TELEPHONE (OUTPATIENT)
Dept: PAIN MEDICINE | Facility: CLINIC | Age: 80
End: 2025-03-24

## 2025-03-24 DIAGNOSIS — G89.4 CHRONIC PAIN SYNDROME: Primary | ICD-10-CM

## 2025-03-24 RX ORDER — HYDROCODONE BITARTRATE AND ACETAMINOPHEN 5; 325 MG/1; MG/1
1 TABLET ORAL 2 TIMES DAILY PRN
Qty: 60 TABLET | Refills: 0 | Status: SHIPPED | OUTPATIENT
Start: 2025-03-24

## 2025-03-24 NOTE — TELEPHONE ENCOUNTER
Patient called the RX Refill Line. Message is being forwarded to the office.     Patient is requesting a refill for hydrocodone-acetaminophen 5-325, 1 bid, 60  To [facility] [place]. Med not on active med list\    Please contact patient at [phone]

## 2025-03-24 NOTE — TELEPHONE ENCOUNTER
S/W pt.  Advised pt of the same.  Scheduled pt for 4/14 at 11:00 w/ AS.  Pt verbalized understanding.

## 2025-03-24 NOTE — TELEPHONE ENCOUNTER
I sent the refill for him.  He should get scheduled for a follow-up visit in about a month or so for further refills

## 2025-03-24 NOTE — TELEPHONE ENCOUNTER
RN s/w pt who is asking for a refill for his Hydrocodone. This was LP 11/20/24 for # 60. Pt said he has 3 pills left.  Pt said there are days he doesn't take any other days he takes 1-2 per day.   LOVS was 12/16/24 w/ FQ.  Uses [facility].    Pt has no upcoming ovs, will he need ovs first or before any future refills?

## 2025-04-14 ENCOUNTER — OFFICE VISIT (OUTPATIENT)
Dept: PAIN MEDICINE | Facility: CLINIC | Age: 80
End: 2025-04-14
Payer: MEDICARE

## 2025-04-14 VITALS — WEIGHT: 290 LBS | HEIGHT: 70 IN | BODY MASS INDEX: 41.52 KG/M2

## 2025-04-14 DIAGNOSIS — M47.816 LUMBAR SPONDYLOSIS: ICD-10-CM

## 2025-04-14 DIAGNOSIS — M51.16 INTERVERTEBRAL DISC DISORDER WITH RADICULOPATHY OF LUMBAR REGION: Primary | ICD-10-CM

## 2025-04-14 DIAGNOSIS — M25.551 BILATERAL HIP PAIN: ICD-10-CM

## 2025-04-14 DIAGNOSIS — G89.4 CHRONIC PAIN SYNDROME: ICD-10-CM

## 2025-04-14 DIAGNOSIS — M25.552 BILATERAL HIP PAIN: ICD-10-CM

## 2025-04-14 DIAGNOSIS — M48.062 LUMBAR STENOSIS WITH NEUROGENIC CLAUDICATION: ICD-10-CM

## 2025-04-14 PROCEDURE — 99214 OFFICE O/P EST MOD 30 MIN: CPT

## 2025-04-14 RX ORDER — HYDROCODONE BITARTRATE AND ACETAMINOPHEN 7.5; 325 MG/1; MG/1
1 TABLET ORAL 2 TIMES DAILY PRN
Qty: 60 TABLET | Refills: 0 | Status: SHIPPED | OUTPATIENT
Start: 2025-04-22

## 2025-04-14 NOTE — PROGRESS NOTES
Assessment:  1. Intervertebral disc disorder with radiculopathy of lumbar region    2. Bilateral hip pain    3. Chronic pain syndrome    4. Lumbar spondylosis    5. Lumbar stenosis with neurogenic claudication        Plan:  The patient is a 79-year-old male with a history of chronic pain secondary to low back pain, lumbar spondylosis, lumbar spinal stenosis with neurogenic claudication and bilateral hip pain who presents to the office with ongoing bilateral low back pain and pain that radiates into bilateral hips, left worse than right.    Overall his pain continues to be managed with taking Norco, therefore I will continue him on this medication as prescribed, however I will increase the dose to 7.5/325 mg for better efficacy.  The patient does take this medication sparingly and 1 prescription should last him 2 to 3 months.  A prescription for Norco 7.5/325 mg was electronically sent to the patient's pharmacy with a do not fill date of 4/22/2025.    We will follow-up in 12 weeks for medication reevaluation and refills, or sooner if necessary.    There are risks associated with opioid medications, including dependence, addiction and tolerance. The patient understands and agrees to use these medications only as prescribed. Potential side effects of the medications include, but are not limited to, constipation, drowsiness, addiction, impaired judgment and risk of fatal overdose if not taken as prescribed. The patient was warned against driving while taking sedation medications.  Sharing medications is a felony. At this point in time, the patient is showing no signs of addiction, abuse, diversion or suicidal ideation.    [place] Prescription Drug Monitoring Program report was reviewed and was appropriate     My impressions and treatment recommendations were discussed in detail with the patient who verbalized understanding and had no further questions.  Discharge instructions were provided. I personally saw and  examined the patient and I agree with the above discussed plan of care.    No orders of the defined types were placed in this encounter.    New Medications Ordered This Visit   Medications    HYDROcodone-acetaminophen (NORCO) 7.5-325 mg per tablet     Sig: Take 1 tablet by mouth 2 (two) times a day as needed for pain Max Daily Amount: 2 tablets Do not start before April 22, 2025.     Dispense:  60 tablet     Refill:  0       History of Present Illness:  Caleb Ventura is a 79 y.o. male with a history of chronic pain secondary to low back pain, lumbar spondylosis, lumbar spinal stenosis with neurogenic claudication and bilateral hip pain.  He was last seen on 12/16/2024 where he was continued on Norco 5/325 mg.  He presents to the office with ongoing bilateral low back pain and pain that radiates into bilateral hips, left worse than right.  He has previously tried injections for his back and hip without any relief.    He states his pain is the same since the last office visit and constant.  He states his pain is worse with activity and standing.  He rates the quality of his pain as pressure-like and is currently rating his pain a 7/10 on a numeric scale.    Current pain medications include Norco 5/325 mg that he takes as needed, pretty sparingly.  1 prescription typically lasts him 2 to 3 months.  He is asking to increase the medication.    I have personally reviewed and/or updated the patient's past medical history, past surgical history, family history, social history, current medications, allergies, and vital signs today.     Review of Systems    Patient Active Problem List   Diagnosis    Obstructive sleep apnea    Coronary artery disease involving native coronary artery    Osteoarthritis of left knee    Primary hypertension    Glottic stenosis    Vocal cord paralysis, bilateral complete    Type 2 diabetes mellitus with retinopathy, with long-term current use of insulin ([facility])    Mixed hyperlipidemia    Lumbar  stenosis with neurogenic claudication    Lumbar spondylosis    Intervertebral disc disorder with radiculopathy of lumbar region    Sacroiliitis (HCC)    Trochanteric bursitis of right hip    Severe obesity (HCC)    Stage 2 chronic kidney disease    Vitamin D deficiency    Psoriasis    Type 2 diabetes mellitus with stage 2 chronic kidney disease, with long-term current use of insulin (HCC)    Hypertensive heart disease with acute combined systolic and diastolic congestive heart failure (HCC)    Chronic pain of left knee    Right hip pain    Chronic obstructive pulmonary disease, unspecified COPD type (HCC)    Continuous opioid dependence (HCC)    Tachycardia-bradycardia syndrome (HCC)    Prostate cancer (HCC)    Adverse reaction to HMG-CoA reductase inhibitor    Alcohol abuse    Ischemic cardiomyopathy    Biventricular ICD (implantable cardioverter-defibrillator) in place    Presence of permanent cardiac pacemaker    Severe sinus bradycardia    Allergic rhinitis    Bilateral lower extremity edema    Moderate episode of recurrent major depressive disorder (HCC)    Dependence on cane       Past Medical History:   Diagnosis Date    Arthritis     knees    Cancer (HCC)     prostate    Coronary artery disease     two coronary stents    Diabetes mellitus (HCC)     Hyperlipidemia 10/19/2018    Hypertension     Psoriasis     lower extremities and buttocks    Sleep apnea     Type 2 diabetes mellitus (HCC) 10/19/2018       Past Surgical History:   Procedure Laterality Date    A-V CARDIAC PACEMAKER INSERTION      CARDIAC DEFIBRILLATOR PLACEMENT      CATARACT EXTRACTION Bilateral     with iol's    COLONOSCOPY N/A 04/05/2016    Procedure: COLONOSCOPY snare polypectomy;  Surgeon: Dung España MD;  Location: [facility];  Service:     INSERTION PROSTATE RADIATION SEED      REPLACEMENT TOTAL KNEE Right     TRACHEOSTOMY      x 2  up to 5 years ago    UMBILICAL HERNIA REPAIR         Family History   Problem Relation Age of  Onset    Heart disease Mother     Cirrhosis Father     Schizophrenia Brother     No Known Problems Son     No Known Problems Son        Social History     Occupational History    Not on file   Tobacco Use    Smoking status: Never     Passive exposure: Never    Smokeless tobacco: Never   Vaping Use    Vaping status: Never Used   Substance and Sexual Activity    Alcohol use: Yes     Alcohol/week: 4.0 standard drinks of alcohol     Types: 4 Cans of beer per week     Comment: daily    Drug use: No    Sexual activity: Not Currently     Partners: Female       Current Outpatient Medications on File Prior to Visit   Medication Sig    Ascorbic Acid (VITAMIN C) 1000 MG tablet Take 1,000 mg by mouth daily    aspirin 81 MG tablet Take 81 mg by mouth daily.    B-D ULTRAFINE III SHORT PEN 31G X 8 MM MISC USE WITH INSULIN TWICE A DAY    Blood Glucose Monitoring Suppl (OneTouch Verio) w/Device KIT Use to test blood sugar 3 times a day    Blood Glucose Monitoring Suppl (OneTouch Verio) w/Device KIT Use to test blood sugar 3 times a day    cholecalciferol (VITAMIN D3) 1,000 units tablet Take 2,000 Units by mouth daily     Continuous Glucose Sensor (FreeStyle Brian 3 Plus Sensor) MISC Use 1 each every 15 (fifteen) days    dapagliflozin (Farxiga) 10 MG tablet TAKE 1 TABLET DAILY    docusate sodium (COLACE) 50 mg capsule Take 50 mg by mouth 2 (two) times a day    DULoxetine (CYMBALTA) 30 mg delayed release capsule Take 1 capsule (30 mg total) by mouth daily    Entresto  MG TABS     ezetimibe (ZETIA) 10 mg tablet TAKE 1 TABLET BY MOUTH TIMES A WEEK ON MONDAY WEDNESDAY AND FRIDAY AT 6PM    furosemide (LASIX) 40 mg tablet Take 40 mg by mouth daily Twice a week on Mon & Thur--PRN    Glucagon, rDNA, (Glucagon Emergency) 1 MG KIT Use glucagon kit if you have a low blood sugar and unconscious    HumaLOG Mix 75/25 KwikPen (75-25) 100 units/mL injection pen INJECT 46 UNITS UNDER THE SKIN BEFORE BREAKFAST AND 44 units before dinner.     "levocetirizine (XYZAL) 5 MG tablet Take 1 tablet (5 mg total) by mouth every morning    metFORMIN (GLUCOPHAGE-XR) 750 mg 24 hr tablet TAKE 1 TABLET TWICE A DAY    metoprolol succinate (TOPROL-XL) 50 mg 24 hr tablet Take 50 mg by mouth daily    montelukast (SINGULAIR) 10 mg tablet Take 1 tablet (10 mg total) by mouth daily at bedtime    Multiple Vitamins-Minerals (MULTIVITAMIN ADULT PO) Take by mouth    omeprazole (PriLOSEC) 20 mg delayed release capsule Take 1 capsule (20 mg total) by mouth daily    OneTouch Verio test strip USE TO TEST BLOOD SUGAR THREE TIMES A DAY    rosuvastatin (CRESTOR) 40 MG tablet Take 40 mg by mouth daily 1/2 pill at night    sacubitril-valsartan (Entresto)  MG TABS Take 1 tablet by mouth 2 (two) times a day    spironolactone (ALDACTONE) 25 mg tablet     traMADol (ULTRAM) 50 mg tablet     traZODone (DESYREL) 100 mg tablet TAKE 1 TABLET BY MOUTH ONCE DAILY AT BEDTIME AS NEEDED FOR SLEEP    traZODone (DESYREL) 100 mg tablet Take 1 tablet (100 mg total) by mouth daily at bedtime    Tremfya 100 MG/ML SOSY     Zinc 50 MG TABS Take by mouth    [DISCONTINUED] HYDROcodone-acetaminophen (NORCO) 5-325 mg per tablet Take 1 tablet by mouth 2 (two) times a day as needed for pain Max Daily Amount: 2 tablets     No current facility-administered medications on file prior to visit.       Allergies   Allergen Reactions    Bee Venom Shortness Of Breath    Lidocaine Shortness Of Breath     Pt reports he was tested and does not have allergy to Lidocaine    Bee Pollen Other (See Comments)     edema  Edema       Physical Exam:    Ht 5' 10\" (1.778 m)   Wt 132 kg (290 lb)   BMI 41.61 kg/m²     Constitutional:normal, well developed, well nourished, alert, in no distress and non-toxic and no overt pain behavior.  Eyes:anicteric  HEENT:grossly intact  Neck:supple, symmetric, trachea midline and no masses   Pulmonary:even and unlabored  Cardiovascular:No edema or pitting edema present  Skin:Normal without rashes " or lesions and well hydrated  Psychiatric:Mood and affect appropriate  Neurologic:Cranial Nerves II-XII grossly intact  Musculoskeletal:antalgic    Imaging

## 2025-04-14 NOTE — PATIENT INSTRUCTIONS
"Patient Education     Taking opioids safely   The Basics   Written by the doctors and editors at [facility]   What are opioids? -- Opioids are a group of prescription medicines that relieve pain. They work by attaching to \"opioid receptors\" in the body and blocking pain signals.  Opioids are sometimes used when other types of pain medicine do not help enough. Opioids can be helpful for treating short-term, or \"acute,\" pain, like after surgery or an injury. They are also sometimes used to treat long-term, or \"chronic,\" pain, like for people with cancer. But they come with risks.  If your doctor prescribes an opioid medicine, it's important to understand the risks and know how to stay safe.  What are the risks of taking opioids? -- You should know that:   Opioids have side effects. Some are just bothersome, and some can be dangerous. For example, taking too much of an opioid is called an \"overdose.\" An overdose can cause serious problems and even death.   In some cases, taking opioids can lead to misuse. For example, people might take the medicine when they don't need it for pain. Or they might take more than they are supposed to. Sharing or selling opioids are other examples of misuse.   There is a risk of addiction. This is also called \"opioid use disorder.\"  If you take too much, or take opioids with alcohol or certain other drugs, it can cause serious harm. It can even cause death from overdose.  How do I stay safe? -- There are things you can do to stay safe if you need to take an opioid medicine (figure 1). These things help protect yourself and others.  Know your medicines:    Opioids come in different forms. \"Immediate-release\" medicines work quickly and last for a short time. \"Extended-release\" medicines work more slowly and last longer. Make sure that you know what type of opioid you have. Read the label and the information that comes with your prescription.   Follow your treatment plan carefully. Take only " "the dose your doctor prescribes, and only as often as they tell you to.   Never take opioids that were not prescribed to you.   Some opioids come combined with other medicines like acetaminophen or an \"NSAID\" (like ibuprofen). Do not take any extra NSAIDs or acetaminophen without talking to your doctor first.   Make sure that all of your doctors know every medicine you take, even those that are non-prescription. Some medicines can affect the way opioids work. Bring a complete list of all of your pain medicines and other medicines with you whenever you go to a doctor, nurse, dentist, or pharmacist.   Ask your doctor or pharmacist if it is safe to take your other medicines with your opioid medicine.  Use and store your medicine safely:    Do not drink alcohol while you are taking opioids.   Do not take opioids with medicines that make you sleepy, unless your doctor tells you to. Examples include:   \"Benzodiazepines\" like diazepam (sample brand name: Valium) or alprazolam (sample brand name: Xanax)   Gabapentin (sample brand name: Neurontin) or pregabalin (brand name: Lyrica)   Muscle relaxants like baclofen or cyclobenzaprine   Sleeping pills like zolpidem (sample brand name: Ambien)   Talk to your doctor about whether it is safe to drive. Opioids can make you feel tired or have trouble thinking clearly. If you are starting a new prescription or taking a higher dose, you might need to avoid things like driving, using dangerous machinery, or other activities that could be risky.   Store your opioids in a safe place, such as a locked cabinet. This prevents children, teens, or anyone else from getting to them.   Never share your opioids with other people.  Be aware of side effects:    Opioid medicines can cause side effects. There are often ways to prevent or treat these.   Call your doctor or nurse if you have side effects that bother you, such as:   Constipation - Your doctor or nurse might suggest that you take a " "laxative to prevent or treat constipation. If your bowel movements are hard and dry, a stool softener might help. Drink plenty of water, and try to get regular physical activity.   Mild nausea or stomach discomfort - Taking the medicine with or after food can help with this. Nausea usually gets better with time.   Severe nausea, vomiting, or itchiness - If you have any of these problems, your doctor might be able to switch you to a different medicine.   Dry mouth   Feeling dizzy or sleepy, or having trouble thinking clearly   Vision problems   Being clumsy or falling down   Know the signs of an opioid overdose. Get help right away if you think that you or someone else took too much of an opioid medicine. Signs of an overdose are listed below.  Stay safe when stopping your opioid medicine:    When opioids are needed to treat acute pain, doctors usually try to prescribe them for only a short time. This usually means a few days or a week. They also prescribe the lowest dose possible to relieve pain.   Follow your doctor's instructions about how to stop taking your opioid once your pain has improved. This usually involves \"tapering,\" or reducing the dose gradually. If you stop an opioid suddenly, this can cause unpleasant symptoms like stomach ache, diarrhea, or shakes. This is called \"withdrawal.\" Tapering the dose can help prevent withdrawal.   When your pain gets better, get rid of any leftover medicines. Your doctor, nurse, or pharmacist can suggest ways to get rid of them. This might involve flushing them down the toilet or mixing them with something like dirt or cat litter, then putting the mixture in a sealed container in the trash. Some police stations and pharmacies also take leftover medicines.  What is naloxone? -- Naloxone is a medicine that reverses the effects of opioids. It can prevent death from an opioid overdose. Naloxone comes as an injection (shot), or as a spray that goes in the nose. Naloxone nasal " spray (brand name: Narcan) is available without a prescription.  If you or someone you know uses opioids, it's a good idea to keep naloxone with you. Make sure that you and your family and friends know how and when to use it.  When should I call for help? -- If you are taking an opioid, it's important to know when to get help. Signs of an opioid overdose include:   Extreme sleepiness   Slow breathing, or no breathing at all   Very small pupils (the black circles in the center of the eyes)   Very slow heartbeat  If you took too much of your opioid medicine or think that someone is having an opioid overdose:   If you have naloxone, give it immediately. Naloxone can save a person's life. But it needs to be given as soon as possible.   Call for an ambulance right away (in the [place] and [place], call 9-1-1).  Call your doctor or nurse if:   You are having side effects that bother you.   You have questions about how to take your medicine.   You are having trouble managing your pain.  All topics are updated as new evidence becomes available and our peer review process is complete.  This topic retrieved from [employer] on: May 15, 2024.  Topic 998670 Version 1.0  Release: 32.4.3 - C32.134  © 2024 UpToDate, Inc. and/or its affiliates. All rights reserved.  figure 1: Tips for medication safety     Tips to use your medicine safely include:  (A) Read labels so you know how to take your medicines.  (B) Have a routine for taking your medicines.  (C) Make sure you know what foods, drinks, and other medicines are safe for you.  (D) Store medicines in a safe place.  (E) Work with your health care team and ask questions if you have them.  Graphic 085642 Version 2.0  Consumer Information Use and Disclaimer   Disclaimer: This generalized information is a limited summary of diagnosis, treatment, and/or medication information. It is not meant to be comprehensive and should be used as a tool to help the user understand and/or assess potential  diagnostic and treatment options. It does NOT include all information about conditions, treatments, medications, side effects, or risks that may apply to a specific patient. It is not intended to be medical advice or a substitute for the medical advice, diagnosis, or treatment of a health care provider based on the health care provider's examination and assessment of a patient's specific and unique circumstances. Patients must speak with a health care provider for complete information about their health, medical questions, and treatment options, including any risks or benefits regarding use of medications. This information does not endorse any treatments or medications as safe, effective, or approved for treating a specific patient. UpToDate, Inc. and its affiliates disclaim any warranty or liability relating to this information or the use thereof.The use of this information is governed by the Terms of Use, available at https://www.woltersLiepin.comuwer.com/en/know/clinical-effectiveness-terms. 2024© UpToDate, Inc. and its affiliates and/or licensors. All rights reserved.  Copyright   © 2024 UpToDate, Inc. and/or its affiliates. All rights reserved.

## 2025-04-15 DIAGNOSIS — J30.1 NON-SEASONAL ALLERGIC RHINITIS DUE TO POLLEN: ICD-10-CM

## 2025-04-16 RX ORDER — MONTELUKAST SODIUM 10 MG/1
10 TABLET ORAL
Qty: 90 TABLET | Refills: 1 | Status: SHIPPED | OUTPATIENT
Start: 2025-04-16

## 2025-04-21 DIAGNOSIS — J30.1 NON-SEASONAL ALLERGIC RHINITIS DUE TO POLLEN: ICD-10-CM

## 2025-04-21 RX ORDER — LEVOCETIRIZINE DIHYDROCHLORIDE 5 MG/1
5 TABLET, FILM COATED ORAL EVERY MORNING
Qty: 90 TABLET | Refills: 1 | Status: SHIPPED | OUTPATIENT
Start: 2025-04-21

## 2025-05-05 ENCOUNTER — TELEPHONE (OUTPATIENT)
Age: 80
End: 2025-05-05

## 2025-05-05 DIAGNOSIS — E78.2 MIXED HYPERLIPIDEMIA: ICD-10-CM

## 2025-05-05 DIAGNOSIS — Z79.4 TYPE 2 DIABETES MELLITUS WITH STAGE 2 CHRONIC KIDNEY DISEASE, WITH LONG-TERM CURRENT USE OF INSULIN (HCC): Primary | ICD-10-CM

## 2025-05-05 DIAGNOSIS — N18.2 TYPE 2 DIABETES MELLITUS WITH STAGE 2 CHRONIC KIDNEY DISEASE, WITH LONG-TERM CURRENT USE OF INSULIN (HCC): Primary | ICD-10-CM

## 2025-05-05 DIAGNOSIS — E11.22 TYPE 2 DIABETES MELLITUS WITH STAGE 2 CHRONIC KIDNEY DISEASE, WITH LONG-TERM CURRENT USE OF INSULIN (HCC): Primary | ICD-10-CM

## 2025-05-05 NOTE — TELEPHONE ENCOUNTER
"Phone call from patient's spouse - patient scheduled for follow up on 5/28/25. Patient's spouse is requesting lab orders to be mailed to patient \"will have done at [facility]. Current orders in chart are from PCP.     Please contact patient to inform if labs will be needed.   "

## 2025-05-22 ENCOUNTER — RESULTS FOLLOW-UP (OUTPATIENT)
Dept: ENDOCRINOLOGY | Facility: CLINIC | Age: 80
End: 2025-05-22

## 2025-05-22 LAB
CHOLEST SERPL-MCNC: 111 MG/DL (ref 100–199)
CHOLEST/HDLC SERPL: 2.1 RATIO (ref 0–5)
EST. AVERAGE GLUCOSE BLD GHB EST-MCNC: 163 MG/DL
FRUCTOSAMINE SERPL-SCNC: 264 UMOL/L (ref 0–285)
HBA1C MFR BLD: 7.3 % (ref 4.8–5.6)
HDLC SERPL-MCNC: 53 MG/DL
LDLC SERPL CALC-MCNC: 33 MG/DL (ref 0–99)
SL AMB VLDL CHOLESTEROL CALC: 25 MG/DL (ref 5–40)
TRIGL SERPL-MCNC: 146 MG/DL (ref 0–149)

## 2025-05-28 ENCOUNTER — OFFICE VISIT (OUTPATIENT)
Dept: ENDOCRINOLOGY | Facility: CLINIC | Age: 80
End: 2025-05-28
Payer: MEDICARE

## 2025-05-28 VITALS
HEIGHT: 70 IN | WEIGHT: 282.4 LBS | DIASTOLIC BLOOD PRESSURE: 63 MMHG | SYSTOLIC BLOOD PRESSURE: 127 MMHG | BODY MASS INDEX: 40.43 KG/M2 | OXYGEN SATURATION: 97 % | HEART RATE: 62 BPM

## 2025-05-28 DIAGNOSIS — I25.10 CORONARY ARTERY DISEASE INVOLVING NATIVE CORONARY ARTERY OF NATIVE HEART WITHOUT ANGINA PECTORIS: ICD-10-CM

## 2025-05-28 DIAGNOSIS — E11.22 TYPE 2 DIABETES MELLITUS WITH STAGE 2 CHRONIC KIDNEY DISEASE, WITH LONG-TERM CURRENT USE OF INSULIN (HCC): Primary | ICD-10-CM

## 2025-05-28 DIAGNOSIS — E55.9 VITAMIN D DEFICIENCY: ICD-10-CM

## 2025-05-28 DIAGNOSIS — I11.0 HYPERTENSIVE HEART DISEASE WITH ACUTE COMBINED SYSTOLIC AND DIASTOLIC CONGESTIVE HEART FAILURE (HCC): ICD-10-CM

## 2025-05-28 DIAGNOSIS — E78.2 MIXED HYPERLIPIDEMIA: ICD-10-CM

## 2025-05-28 DIAGNOSIS — N18.2 TYPE 2 DIABETES MELLITUS WITH STAGE 2 CHRONIC KIDNEY DISEASE, WITH LONG-TERM CURRENT USE OF INSULIN (HCC): Primary | ICD-10-CM

## 2025-05-28 DIAGNOSIS — Z79.4 TYPE 2 DIABETES MELLITUS WITH STAGE 2 CHRONIC KIDNEY DISEASE, WITH LONG-TERM CURRENT USE OF INSULIN (HCC): Primary | ICD-10-CM

## 2025-05-28 DIAGNOSIS — I50.41 HYPERTENSIVE HEART DISEASE WITH ACUTE COMBINED SYSTOLIC AND DIASTOLIC CONGESTIVE HEART FAILURE (HCC): ICD-10-CM

## 2025-05-28 DIAGNOSIS — I10 PRIMARY HYPERTENSION: ICD-10-CM

## 2025-05-28 PROCEDURE — 99214 OFFICE O/P EST MOD 30 MIN: CPT | Performed by: INTERNAL MEDICINE

## 2025-05-28 PROCEDURE — G2211 COMPLEX E/M VISIT ADD ON: HCPCS | Performed by: INTERNAL MEDICINE

## 2025-05-28 PROCEDURE — 95251 CONT GLUC MNTR ANALYSIS I&R: CPT | Performed by: INTERNAL MEDICINE

## 2025-05-28 RX ORDER — INSULIN LISPRO 100 [IU]/ML
INJECTION, SUSPENSION SUBCUTANEOUS
Qty: 90 ML | Refills: 1 | Status: SHIPPED | OUTPATIENT
Start: 2025-05-28

## 2025-05-28 RX ORDER — METFORMIN HYDROCHLORIDE 750 MG/1
TABLET, EXTENDED RELEASE ORAL
Qty: 180 TABLET | Refills: 3 | Status: SHIPPED | OUTPATIENT
Start: 2025-05-28

## 2025-05-28 RX ORDER — DAPAGLIFLOZIN 10 MG/1
10 TABLET, FILM COATED ORAL DAILY
Qty: 90 TABLET | Refills: 1 | Status: SHIPPED | OUTPATIENT
Start: 2025-05-28

## 2025-05-28 NOTE — PROGRESS NOTES
Caleb Ventura 80 y.o. male MRN: [id number]    Encounter: [id number]  Assessment & Plan  1. Type 2 Diabetes Mellitus on long-term insulin therapy  2.  CKD-stable  3.  Obesity, BMI 40  - Average ,GMI 6.7%]  - A1c decreased from 8.5 to 7.3, fructosamine 264 (A1c 6-7).  - No hypoglycemic episodes in past 2 weeks, some readings in 80s.  - Continue metformin  mg BID, Farxiga 10 mg daily, Humalog 75/25 (46 units AM, 44 units PM). Reduce PM insulin to 40 units if noted to have frequent tight blood sugars or more frequent hypoglycemia. Prescriptions sent.   Continue CGM use   Follow up with [facility].    4. Hypertension.  - BP well-controlled.  - Continue current medications    5. Hyperlipidemia.  - Cholesterol improved.  - Continue Zetia, Crestor.    6. CAD, CHF.    7. Venostasis.  -consider compression stockings. Follow up with Dr. Bradley.    8. Vitamin D Deficiency.  - Last checked 2021.  -continue supplementation Include in next labs.    Follow-up: 3-4 months.    CC: diabetes mellitus     History of Present Illness  The patient is an 80-year-old male with type 2 diabetes mellitus, hypertension, hyperlipidemia, CAD, CHF, CKD, and obesity. He was last seen in October 2024.       His current medications include metformin  mg BID, Farxiga 10 mg daily, and Humalog 75/25 (46 units AM, 44 units PM).    The patient reports persistent lower extremity discoloration and occasional blister formation. He has not received wound care. His PCP recommended an endocrinology consult. He does not use compression socks  There are no plantar cuts or breaks. He has a scheduled podiatry appointment next week.   He has a history of a injury to the left big toe causing chronic left foot swelling    The patient had an eye exam at [facility] last Friday and was advised to consult a retina specialist for a right eye abnormality.   Asks about foods that would be good for breakfast; usually eats eggs, wheat  "toast, and pork roll.  Met dietician a long time ago.     He has experienced hypoglycemic episodes (BG 67) twice in the past 3 months, typically overnight and hwen he may have eaten less for dinner the night before,   His BG is often 150 at 4:30 AM and increases upon rising. He takes OTC vitamin D supplements.    His medications for cholesterol include Zetia and Crestor, and for blood pressure, he takes Aldactone and Entresto.       All other systems were reviewed and are negative.       Review of Systems    Historical Information   Past Medical History[1]  Past Surgical History[2]  Social History   Social History     Substance and Sexual Activity   Alcohol Use Yes    Alcohol/week: 4.0 standard drinks of alcohol    Types: 4 Cans of beer per week    Comment: daily     Social History     Substance and Sexual Activity   Drug Use No     Tobacco Use History[3]  Family History: Family History[4]    Meds/Allergies   Current Medications[5]  Allergies[6]    Objective   Vitals: Blood pressure 127/63, pulse 62, height 5' 10\" (1.778 m), weight 128 kg (282 lb 6.4 oz), SpO2 97%.    Physical Exam  Constitutional:       General: He is not in acute distress.     Appearance: He is well-developed. He is not diaphoretic.   HENT:      Head: Normocephalic and atraumatic.     Eyes:      Conjunctiva/sclera: Conjunctivae normal.       Cardiovascular:      Rate and Rhythm: Normal rate and regular rhythm.      Pulses: no weak pulses.           Dorsalis pedis pulses are 2+ on the right side and 2+ on the left side.      Heart sounds: Normal heart sounds. No murmur heard.  Pulmonary:      Effort: Pulmonary effort is normal. No respiratory distress.      Breath sounds: Normal breath sounds. No wheezing.   Abdominal:      General: There is no distension.      Palpations: Abdomen is soft.      Tenderness: There is no abdominal tenderness. There is no guarding.     Musculoskeletal:      Cervical back: Normal range of motion and neck supple.   Feet: "      Right foot:      Skin integrity: No ulcer, skin breakdown, erythema, warmth, callus or dry skin.      Left foot:      Skin integrity: No ulcer, skin breakdown, erythema, warmth, callus or dry skin.     Skin:     General: Skin is warm and dry.      Comments: Has discoloration, shiny skin bilaterally on the shins likely due to venous statuses     Neurological:      Mental Status: He is alert and oriented to person, place, and time.     Psychiatric:         Behavior: Behavior normal.         Thought Content: Thought content normal.       Diabetic Foot Exam    Patient's shoes and socks removed.    Right Foot/Ankle   Right Foot Inspection  Skin Exam: skin normal and skin intact. No dry skin, no warmth, no callus, no erythema, no maceration, no abnormal color, no pre-ulcer, no ulcer and no callus.     Toe Exam: ROM and strength within normal limits.     Sensory   Vibration: absent  Monofilament testing: absent    Vascular  Capillary refills: < 3 seconds  The right DP pulse is 2+.     Left Foot/Ankle  Left Foot Inspection  Skin Exam: skin normal and skin intact. No dry skin, no warmth, no erythema, no maceration, normal color, no pre-ulcer, no ulcer and no callus.     Toe Exam: ROM and strength within normal limits.     Sensory   Vibration: diminished  Monofilament testing: diminished    Vascular  Capillary refills: < 3 seconds  The left DP pulse is 2+.     Assign Risk Category  No deformity present  Loss of protective sensation  No weak pulses  Risk: 1    The history was obtained from the review of the chart, patient and family.    Lab Results:      Lab Results   Component Value Date    WBC 7.5 05/22/2023    HGB 15.5 05/22/2023    HCT 47.4 05/22/2023    MCV 90 05/22/2023     05/22/2023     Lab Results   Component Value Date    CREATININE 1.16 02/18/2025    BUN 23 02/18/2025    K 4.8 02/18/2025     02/18/2025    CO2 21 02/18/2025     Lab Results   Component Value Date    HGBA1C 7.3 (H) 05/21/2025  "    Component      Latest Ref Rng 9/30/2024 2/13/2025 2/18/2025 5/21/2025   Cholesterol      100 - 199 mg/dL 135    111    Triglycerides      0 - 149 mg/dL 104    146    HDL      >39 mg/dL 57    53    VLDL Cholesterol Alejandro      5 - 40 mg/dL 19    25    LDL Calculated      0 - 99 mg/dL 59    33    LDL/HDL RATIO      0.0 - 3.6 ratio 1.0       T. Chol/HDL Ratio      0.0 - 5.0 ratio    2.1    Hemoglobin A1C      4.8 - 5.6 %  8.1 !  8.5 (H)  7.3 (H)    eAG, EST AVG Glucose      mg/dL   197  163    FRUCTOSAMINE      0 - 285 umol/L   279  264    TSH, POC      0.450 - 4.500 uIU/mL   4.330        Legend:  ! Abnormal  (H) High    Imaging Studies:       Results Review Statement: No pertinent imaging studies reviewed.    Portions of the record may have been created with voice recognition software. Occasional wrong word or \"sound a like\" substitutions may have occurred due to the inherent limitations of voice recognition software. Read the chart carefully and recognize, using context, where substitutions have occurred.         [1]   Past Medical History:  Diagnosis Date    Arthritis     knees    Cancer (HCC)     prostate    Coronary artery disease     two coronary stents    Diabetes mellitus (HCC)     Hyperlipidemia 10/19/2018    Hypertension     Psoriasis     lower extremities and buttocks    Sleep apnea     Type 2 diabetes mellitus (HCC) 10/19/2018   [2]   Past Surgical History:  Procedure Laterality Date    A-V CARDIAC PACEMAKER INSERTION      CARDIAC DEFIBRILLATOR PLACEMENT      CATARACT EXTRACTION Bilateral     with iol's    COLONOSCOPY N/A 04/05/2016    Procedure: COLONOSCOPY snare polypectomy;  Surgeon: Dung España MD;  Location: [facility];  Service:     INSERTION PROSTATE RADIATION SEED      REPLACEMENT TOTAL KNEE Right     TRACHEOSTOMY      x 2  up to 5 years ago    UMBILICAL HERNIA REPAIR     [3]   Social History  Tobacco Use   Smoking Status Never    Passive exposure: Never   Smokeless Tobacco Never   [4] "   Family History  Problem Relation Name Age of Onset    Heart disease Mother      Cirrhosis Father      Schizophrenia Brother      No Known Problems Son      No Known Problems Son     [5]   Current Outpatient Medications   Medication Sig Dispense Refill    Ascorbic Acid (VITAMIN C) 1000 MG tablet Take 1,000 mg by mouth in the morning.      aspirin 81 MG tablet Take 81 mg by mouth in the morning.      B-D ULTRAFINE III SHORT PEN 31G X 8 MM MISC USE WITH INSULIN TWICE A  each 1    Blood Glucose Monitoring Suppl (OneTouch Verio) w/Device KIT Use to test blood sugar 3 times a day 1 kit 0    Blood Glucose Monitoring Suppl (OneTouch Verio) w/Device KIT Use to test blood sugar 3 times a day 1 kit 0    cholecalciferol (VITAMIN D3) 1,000 units tablet Take 2,000 Units by mouth in the morning.      Continuous Glucose Sensor (FreeStyle Brian 3 Plus Sensor) MISC Use 1 each every 15 (fifteen) days 6 each 2    dapagliflozin (Farxiga) 10 MG tablet TAKE 1 TABLET DAILY 90 tablet 1    docusate sodium (COLACE) 50 mg capsule Take 50 mg by mouth in the morning and 50 mg in the evening.      DULoxetine (CYMBALTA) 30 mg delayed release capsule Take 1 capsule (30 mg total) by mouth daily 90 capsule 1    Entresto  MG TABS       ezetimibe (ZETIA) 10 mg tablet       furosemide (LASIX) 40 mg tablet Take 40 mg by mouth in the morning. Twice a week on Mon & Thur--PRN.      Glucagon, rDNA, (Glucagon Emergency) 1 MG KIT Use glucagon kit if you have a low blood sugar and unconscious 1 each 0    HumaLOG Mix 75/25 KwikPen (75-25) 100 units/mL injection pen INJECT 46 UNITS UNDER THE SKIN BEFORE BREAKFAST AND 44 units before dinner. 90 mL 1    HYDROcodone-acetaminophen (NORCO) 7.5-325 mg per tablet Take 1 tablet by mouth 2 (two) times a day as needed for pain Max Daily Amount: 2 tablets Do not start before April 22, 2025. 60 tablet 0    levocetirizine (XYZAL) 5 MG tablet TAKE 1 TABLET EVERY MORNING 90 tablet 1    metFORMIN (GLUCOPHAGE-XR)  750 mg 24 hr tablet TAKE 1 TABLET TWICE A  tablet 3    metoprolol succinate (TOPROL-XL) 50 mg 24 hr tablet Take 50 mg by mouth in the morning.      montelukast (SINGULAIR) 10 mg tablet TAKE 1 TABLET DAILY AT BEDTIME 90 tablet 1    Multiple Vitamins-Minerals (MULTIVITAMIN ADULT PO) Take by mouth      omeprazole (PriLOSEC) 20 mg delayed release capsule Take 1 capsule (20 mg total) by mouth daily 30 capsule 11    OneTouch Verio test strip USE TO TEST BLOOD SUGAR THREE TIMES A  strip 3    rosuvastatin (CRESTOR) 40 MG tablet Take 40 mg by mouth in the morning. 1/2 pill at night.      sacubitril-valsartan (Entresto)  MG TABS Take 1 tablet by mouth in the morning and 1 tablet in the evening.      spironolactone (ALDACTONE) 25 mg tablet       traMADol (ULTRAM) 50 mg tablet       traZODone (DESYREL) 100 mg tablet TAKE 1 TABLET BY MOUTH ONCE DAILY AT BEDTIME AS NEEDED FOR SLEEP 30 tablet 0    traZODone (DESYREL) 100 mg tablet Take 1 tablet (100 mg total) by mouth daily at bedtime 90 tablet 1    Tremfya 100 MG/ML SOSY       Zinc 50 MG TABS Take by mouth       No current facility-administered medications for this visit.   [6]   Allergies  Allergen Reactions    Bee Venom Shortness Of Breath    Lidocaine Shortness Of Breath     Pt reports he was tested and does not have allergy to Lidocaine    Bee Pollen Other (See Comments)     edema  Edema

## 2025-05-28 NOTE — PATIENT INSTRUCTIONS
Continue current medications  If you notice tight blood sugars or frequent lows overnight, please decrease evening dose of insulin to 40 units  Follow up in 3-4 months       Follow up with  [facility] [phone] as well as podiatry

## 2025-06-12 ENCOUNTER — TELEPHONE (OUTPATIENT)
Age: 80
End: 2025-06-12

## 2025-06-12 NOTE — TELEPHONE ENCOUNTER
New Patient      Insurance   Current Insurance?   Insurance E-verified?     History   Reason for appointment/active symptoms?  prostate cancer      Has the patient had any previous Urologist(s)? [facility] 2025     Was the patient seen in the ED? no      Labs/Imaging(Including Out Of Network)?      Records Requested?   Records Visible in EPIC? yes     Personal history of cancer? prostate cancer      Appointment   Office location preference:    ?   Appointment Details   Date:  08/07  Time:  9:15am   Location:  [facility]   Provider:  Alfie  Does the appointment need further review?     Patient already diagnosed with prostate cancer, please review if sooner appt needed due to diagnosis

## 2025-07-08 ENCOUNTER — OFFICE VISIT (OUTPATIENT)
Dept: PAIN MEDICINE | Facility: CLINIC | Age: 80
End: 2025-07-08
Payer: MEDICARE

## 2025-07-08 DIAGNOSIS — M47.816 LUMBAR SPONDYLOSIS: ICD-10-CM

## 2025-07-08 DIAGNOSIS — G89.4 CHRONIC PAIN SYNDROME: ICD-10-CM

## 2025-07-08 DIAGNOSIS — Z79.891 LONG-TERM CURRENT USE OF OPIATE ANALGESIC: ICD-10-CM

## 2025-07-08 DIAGNOSIS — F11.20 UNCOMPLICATED OPIOID DEPENDENCE (HCC): ICD-10-CM

## 2025-07-08 DIAGNOSIS — M48.062 LUMBAR STENOSIS WITH NEUROGENIC CLAUDICATION: ICD-10-CM

## 2025-07-08 DIAGNOSIS — M51.16 INTERVERTEBRAL DISC DISORDER WITH RADICULOPATHY OF LUMBAR REGION: Primary | ICD-10-CM

## 2025-07-08 PROCEDURE — 99214 OFFICE O/P EST MOD 30 MIN: CPT

## 2025-07-08 RX ORDER — HYDROCODONE BITARTRATE AND ACETAMINOPHEN 7.5; 325 MG/1; MG/1
1 TABLET ORAL 2 TIMES DAILY PRN
Qty: 60 TABLET | Refills: 0 | Status: SHIPPED | OUTPATIENT
Start: 2025-07-08

## 2025-07-08 NOTE — PROGRESS NOTES
Name: Caleb Ventura      : 1945      MRN: [id number]  Encounter Provider: NASH Howell  Encounter Date: 2025   Encounter department: [facility]  :  Assessment & Plan  Intervertebral disc disorder with radiculopathy of lumbar region  Chronic pain syndrome  Lumbar spondylosis  Lumbar stenosis with neurogenic claudication  Long-term current use of opiate analgesic  Uncomplicated opioid dependence (HCC)    Orders:    HYDROcodone-acetaminophen (NORCO) 7.5-325 mg per tablet; Take 1 tablet by mouth 2 (two) times a day as needed for pain Max Daily Amount: 2 tablets    MM OF_Tramadol Definitive Test    MM OF_THC Definitive Test    MM OF_Temazepam Definitive    MM OF_Phencyclidine Definitive Test    MM OF_Oxymorphone Definitive Test    MM OF_Oxazepam Definitive    MM OF_Oxycodone Definitive Test - Oral Fluid    MM OF_Morphine Definitive    MM OF_Methylphenidate Definitive Test    MM OF_Methadone Definitive Test    MM OF_Meperidine Definitive Test    MM OF_MDMA Definitive Test    MM OF_Lorazepam Definitive    MM OF_Hydromorphone Definitive    MM OF_Hydrocodone Definitive    MM OF_Heroin Definitive Test    MM OF_Fentanyl Definitive Test    MM OF_Diazepam Definitive    MM OF_Codeine Definitive    MM OF_Cocaine Definitive Test    MM OF_Clonazepam Definitive    MM OF_Buprenorphine Definitive Test    MM OF_Amphetamine Definitive Test    MM OF_Alprazolam Definitive    His pain continues to be managed with taking Norco, therefore I will continue him on this medication as prescribed.  He does take this medication sparingly and 1 prescription will typically last him the full 12 weeks.  A 1 month prescription for Norco 7.5/325 mg was electronically sent to the patient's pharmacy with a do not fill date of today, 2025.  We will follow-up in 12 weeks for medication reevaluation and refills, or sooner if necessary.    An oral drug screen swab was collected at today's office visit. The  swab will be sent for confirmatory testing. The drug screen is medically necessary because the patient is either dependent on opioid medication or is being considered for opioid medication therapy and the results could impact ongoing or future treatment. The drug screen is to evaluate for the presences or absence of prescribed, non-prescribed, and/or illicit drugs/substances.   There are risks associated with opioid medications, including dependence, addiction and tolerance. The patient understands and agrees to use these medications only as prescribed. Potential side effects of the medications include, but are not limited to, constipation, drowsiness, addiction, impaired judgment and risk of fatal overdose if not taken as prescribed. The patient was warned against driving while taking sedation medications.  Sharing medications is a felony. At this point in time, the patient is showing no signs of addiction, abuse, diversion or suicidal ideation.  [place] Prescription Drug Monitoring Program report was reviewed and was appropriate      My impressions and treatment recommendations were discussed in detail with the patient who verbalized understanding and had no further questions.  Discharge instructions were provided. I personally saw and examined the patient and I agree with the above discussed plan of care.    History of Present Illness     Caleb Ventura is a 80 y.o. male  with a history of chronic pain secondary to low back pain, lumbar spondylosis, lumbar spinal stenosis with neurogenic claudication and bilateral hip pain.  He was last seen on 4/14/2025 where he was continued on Norco 7.5/325 mg.  He presents to the office with ongoing bilateral low back pain and pain that radiates into bilateral lower extremities, right worse than left that is worse in the morning and worse with activity such as walking.    Current pain medications include Norco 7.5/325 mg that he takes as needed, pretty sparingly. 1  prescription typically lasts him 2 to 3 months.     Opioid contract date: 2/5/25  Last UDS Date: 7/8/25  Last Dose:7/8/25    Review of Systems   Respiratory:  Negative for shortness of breath.    Cardiovascular:  Negative for chest pain.   Gastrointestinal:  Negative for constipation, diarrhea, nausea and vomiting.   Musculoskeletal:  Positive for back pain and gait problem. Negative for arthralgias, joint swelling and myalgias.        Bilateral leg pain   Skin:  Negative for rash.   Neurological:  Negative for dizziness, seizures and weakness.   All other systems reviewed and are negative.      Medical History Reviewed by provider this encounter:  Tobacco  Allergies  Meds  Problems  Med Hx  Surg Hx  Fam Hx     .       Objective   There were no vitals taken for this visit.     Pain Score:   8  Physical Exam  Constitutional: normal, well developed, well nourished, alert, in no distress and non-toxic and no overt pain behavior.  Eyes: anicteric  HEENT: grossly intact  Neck: supple, symmetric, trachea midline and no masses   Pulmonary: even and unlabored  Cardiovascular: No edema or pitting edema present  Skin: Normal without rashes or lesions and well hydrated  Psychiatric: Mood and affect appropriate  Neurologic: Cranial Nerves II-XII grossly intact  Musculoskeletal: antalgic and ambulates with cane

## 2025-07-08 NOTE — ASSESSMENT & PLAN NOTE
Orders:    HYDROcodone-acetaminophen (NORCO) 7.5-325 mg per tablet; Take 1 tablet by mouth 2 (two) times a day as needed for pain Max Daily Amount: 2 tablets    MM OF_Tramadol Definitive Test    MM OF_THC Definitive Test    MM OF_Temazepam Definitive    MM OF_Phencyclidine Definitive Test    MM OF_Oxymorphone Definitive Test    MM OF_Oxazepam Definitive    MM OF_Oxycodone Definitive Test - Oral Fluid    MM OF_Morphine Definitive    MM OF_Methylphenidate Definitive Test    MM OF_Methadone Definitive Test    MM OF_Meperidine Definitive Test    MM OF_MDMA Definitive Test    MM OF_Lorazepam Definitive    MM OF_Hydromorphone Definitive    MM OF_Hydrocodone Definitive    MM OF_Heroin Definitive Test    MM OF_Fentanyl Definitive Test    MM OF_Diazepam Definitive    MM OF_Codeine Definitive    MM OF_Cocaine Definitive Test    MM OF_Clonazepam Definitive    MM OF_Buprenorphine Definitive Test    MM OF_Amphetamine Definitive Test    MM OF_Alprazolam Definitive

## 2025-07-08 NOTE — PATIENT INSTRUCTIONS
"Patient Education     Taking opioids safely   The Basics   Written by the doctors and editors at [facility]   What are opioids? -- Opioids are a group of prescription medicines that relieve pain. They work by attaching to \"opioid receptors\" in the body and blocking pain signals.  Opioids are sometimes used when other types of pain medicine do not help enough. Opioids can be helpful for treating short-term, or \"acute,\" pain, like after surgery or an injury. They are also sometimes used to treat long-term, or \"chronic,\" pain, like for people with cancer. But they come with risks.  If your doctor prescribes an opioid medicine, it's important to understand the risks and know how to stay safe.  What are the risks of taking opioids? -- You should know that:   Opioids have side effects. Some are just bothersome, and some can be dangerous. For example, taking too much of an opioid is called an \"overdose.\" An overdose can cause serious problems and even death.   In some cases, taking opioids can lead to misuse. For example, people might take the medicine when they don't need it for pain. Or they might take more than they are supposed to. Sharing or selling opioids are other examples of misuse.   There is a risk of addiction. This is also called \"opioid use disorder.\"  If you take too much, or take opioids with alcohol or certain other drugs, it can cause serious harm. It can even cause death from overdose.  How do I stay safe? -- There are things you can do to stay safe if you need to take an opioid medicine (figure 1). These things help protect yourself and others.  Know your medicines:    Opioids come in different forms. \"Immediate-release\" medicines work quickly and last for a short time. \"Extended-release\" medicines work more slowly and last longer. Make sure that you know what type of opioid you have. Read the label and the information that comes with your prescription.   Follow your treatment plan carefully. Take only " "the dose your doctor prescribes, and only as often as they tell you to.   Never take opioids that were not prescribed to you.   Some opioids come combined with other medicines like acetaminophen or an \"NSAID\" (like ibuprofen). Do not take any extra NSAIDs or acetaminophen without talking to your doctor first.   Make sure that all of your doctors know every medicine you take, even those that are non-prescription. Some medicines can affect the way opioids work. Bring a complete list of all of your pain medicines and other medicines with you whenever you go to a doctor, nurse, dentist, or pharmacist.   Ask your doctor or pharmacist if it is safe to take your other medicines with your opioid medicine.  Use and store your medicine safely:    Do not drink alcohol while you are taking opioids.   Do not take opioids with medicines that make you sleepy, unless your doctor tells you to. Examples include:   \"Benzodiazepines\" like diazepam (sample brand name: Valium) or alprazolam (sample brand name: Xanax)   Gabapentin (sample brand name: Neurontin) or pregabalin (brand name: Lyrica)   Muscle relaxants like baclofen or cyclobenzaprine   Sleeping pills like zolpidem (sample brand name: Ambien)   Talk to your doctor about whether it is safe to drive. Opioids can make you feel tired or have trouble thinking clearly. If you are starting a new prescription or taking a higher dose, you might need to avoid things like driving, using dangerous machinery, or other activities that could be risky.   Store your opioids in a safe place, such as a locked cabinet. This prevents children, teens, or anyone else from getting to them.   Never share your opioids with other people.  Be aware of side effects:    Opioid medicines can cause side effects. There are often ways to prevent or treat these.   Call your doctor or nurse if you have side effects that bother you, such as:   Constipation - Your doctor or nurse might suggest that you take a " "laxative to prevent or treat constipation. If your bowel movements are hard and dry, a stool softener might help. Drink plenty of water, and try to get regular physical activity.   Mild nausea or stomach discomfort - Taking the medicine with or after food can help with this. Nausea usually gets better with time.   Severe nausea, vomiting, or itchiness - If you have any of these problems, your doctor might be able to switch you to a different medicine.   Dry mouth   Feeling dizzy or sleepy, or having trouble thinking clearly   Vision problems   Being clumsy or falling down   Know the signs of an opioid overdose. Get help right away if you think that you or someone else took too much of an opioid medicine. Signs of an overdose are listed below.  Stay safe when stopping your opioid medicine:    When opioids are needed to treat acute pain, doctors usually try to prescribe them for only a short time. This usually means a few days or a week. They also prescribe the lowest dose possible to relieve pain.   Follow your doctor's instructions about how to stop taking your opioid once your pain has improved. This usually involves \"tapering,\" or reducing the dose gradually. If you stop an opioid suddenly, this can cause unpleasant symptoms like stomach ache, diarrhea, or shakes. This is called \"withdrawal.\" Tapering the dose can help prevent withdrawal.   When your pain gets better, get rid of any leftover medicines. Your doctor, nurse, or pharmacist can suggest ways to get rid of them. This might involve flushing them down the toilet or mixing them with something like dirt or cat litter, then putting the mixture in a sealed container in the trash. Some police stations and pharmacies also take leftover medicines.  What is naloxone? -- Naloxone is a medicine that reverses the effects of opioids. It can prevent death from an opioid overdose. Naloxone comes as an injection (shot), or as a spray that goes in the nose. Naloxone nasal " spray (brand name: Narcan) is available without a prescription.  If you or someone you know uses opioids, it's a good idea to keep naloxone with you. Make sure that you and your family and friends know how and when to use it.  When should I call for help? -- If you are taking an opioid, it's important to know when to get help. Signs of an opioid overdose include:   Extreme sleepiness   Slow breathing, or no breathing at all   Very small pupils (the black circles in the center of the eyes)   Very slow heartbeat  If you took too much of your opioid medicine or think that someone is having an opioid overdose:   If you have naloxone, give it immediately. Naloxone can save a person's life. But it needs to be given as soon as possible.   Call for an ambulance right away (in the [place] and [place], call 9-1-1).  Call your doctor or nurse if:   You are having side effects that bother you.   You have questions about how to take your medicine.   You are having trouble managing your pain.  All topics are updated as new evidence becomes available and our peer review process is complete.  This topic retrieved from [employer] on: May 15, 2024.  Topic 245050 Version 1.0  Release: 32.4.3 - C32.134  © 2024 UpToDate, Inc. and/or its affiliates. All rights reserved.  figure 1: Tips for medication safety     Tips to use your medicine safely include:  (A) Read labels so you know how to take your medicines.  (B) Have a routine for taking your medicines.  (C) Make sure you know what foods, drinks, and other medicines are safe for you.  (D) Store medicines in a safe place.  (E) Work with your health care team and ask questions if you have them.  Graphic 892093 Version 2.0  Consumer Information Use and Disclaimer   Disclaimer: This generalized information is a limited summary of diagnosis, treatment, and/or medication information. It is not meant to be comprehensive and should be used as a tool to help the user understand and/or assess potential  diagnostic and treatment options. It does NOT include all information about conditions, treatments, medications, side effects, or risks that may apply to a specific patient. It is not intended to be medical advice or a substitute for the medical advice, diagnosis, or treatment of a health care provider based on the health care provider's examination and assessment of a patient's specific and unique circumstances. Patients must speak with a health care provider for complete information about their health, medical questions, and treatment options, including any risks or benefits regarding use of medications. This information does not endorse any treatments or medications as safe, effective, or approved for treating a specific patient. UpToDate, Inc. and its affiliates disclaim any warranty or liability relating to this information or the use thereof.The use of this information is governed by the Terms of Use, available at https://www.woltersBlacklaneuwer.com/en/know/clinical-effectiveness-terms. 2024© UpToDate, Inc. and its affiliates and/or licensors. All rights reserved.  Copyright   © 2024 UpToDate, Inc. and/or its affiliates. All rights reserved.

## 2025-07-10 NOTE — PROGRESS NOTES
Name: Caleb Ventura      : 1945      MRN: [id number]  Encounter Provider: Jamar Frye PA-C  Encounter Date: 2025   Encounter department: [facility]  :  Assessment & Plan  Prostate cancer (HCC)  Padmini 6 (3+3) 24% of tissue on the right 11 65gm PSA 7.2  Prior bicalutamide usage 2018 until he stopped 10/16/2019 due to breast side effects  PSA rise currently being followed for possible future ADT and repeat PSMA scan due to rising PSA  22 PSMA scan - moderate activity in right posterolateral and left lateral peripheral zone.   No desire for ADT at current time due to SE  Wants to hold on oncology f/u at current time to discuss alternative to ADT the may help with control of his prostate cancer   Ok with continued observation   -f/u Sep with PSA prior.  If stable then OK for f/u in 6 months with psa prior   -consider repeat PSMA scan if PSA rises and referral to oncology for medication other than ADT      Orders:    PSA Total, Diagnostic; Future        History of Present Illness   Caleb Ventura is a 80 y.o. male with prostate cancer [place] 6 (3+3) 24% of tissue on the right 11 65gm PSA 7.2.  Currently being monitored by by [facility] urology due to PSA rise after prior seed placement.  Was considering possible ADT injection if PSA continues to rise as well as a repeat PSMA scan if PSA félix above 10.   Notes a good apatite, no unexpected weight loss, has some back pain but currently dealing with spinal stenosis.          Radiology  21 Bone scan   No scintigraphic evidence for osseous metastasis.     22 PSMA PET CT  Mildly enlarged prostate with multiple radiation seeds. Tiny moderate   activity in right posterolateral and left lateral peripheral zone, suspicious   for tiny recurrent radiotracer avid prostatic malignancy. Clinical correlation   and follow-up are recommended.     Labs   04/15/09 PSA 3.5  11 Prostate bx  "Cancer Rt side, Padmini 3+3=6. 24% of tissue, 65 gm.  03/24/11 Seeds  06/25/11 PSA 0.8  11/30/11 PSA 0.7   12/28/11 Testosterone 143 script for Axiron 60 mg  01/25/12 PSA 0.49 Testosterone 1302  04/23/12 PSA 0.4  05/25/12 Testopel  06/18/12 PSA 0.6 Testosterone 832  10/17/12 PSA 0.6  11/20/14 Testosterone 259  04/25/13 PSA 0.22 Testosterone 196  10/24/13 PSA 0.25  04/14/14 PSA 0.14  10/06/14 PSA 0.3 No desire to treat low T  10/09/15 PSA 0.4  10/13/16 PSA 0.6  07/07/17 PSA 1.1  08/07/17 PSA 1.6 nl lucas  11/08/17 PSA 1.9 Grade I, No nodules   06/25/18 PSA 2.9  07/02/18 Bicalutamide 50mg   08/01/18 PSA 1.2 Creat 1.24, AST 24, ALT 33  11/01/18 PSA 1.6  03/05/19 PSA 1.4  05/30/19 PSA 0.9  10/16/19 PSA 1.5 Stopped bicalutamide  04/20/20 PSA 3.9  07/17/20 PSA 4.0  10/16/20 PSA 3.4  02/12/21 PSA 4.2  07/12/21 PSA 4.2 with 11.9%free   08/17/21 PSA 4.1   05/19/22 PSA 5.3   12/29/22 PSA 7.6  02/22/23 PSA 5.5  05/22/23 PSA 5.6  08/23/23 PSA 5.4  10/06/23 PSA 4.8  04/05/24 PSA 6.4  09/30/24 PSA 7.5  03/12/25 PSA 7.8      Past Medical History  Hypertension  Coronary disease  COPD  Bilateral vocal cord paralysis  MEHDI  Diabetes  Psoriasis  Cardiac defibrillator    Past  History  Prostate cancer [place] 6 (3+3) 24% of tissue on the right 1/13/11 65gm PSA 7.2  Seeds 86 Palladium 103 3/24/11  PSA recurrence. Bicalutamide 50mg 7/2/18 and stopped 06/2019. SE breast enlargement   ED, samples of Levitra, Cialis and Viagra. Can have sex weekly without meds.  Hypogonadism  Post micturition dribbling   Possible 2.9 cm left renal lesion on CT. However not seen on subsequent follow-up ultrasound 8/2021. Lesion also not evident on most recent PSMA PET scan     Past  surgical history   Seeds 86 Palladium 103 3/24/11    Prior Visits           Objective   /60 (BP Location: Left arm, Patient Position: Sitting, Cuff Size: Large)   Pulse 60   Ht 5' 10\" (1.778 m)   Wt 129 kg (284 lb)   SpO2 96%   BMI 40.75 kg/m²     Physical Exam  HENT: " "     Head: Normocephalic.   Pulmonary:      Effort: Pulmonary effort is normal.   Abdominal:      Tenderness: There is no right CVA tenderness or left CVA tenderness.     Skin:     General: Skin is warm.     Neurological:      Mental Status: He is alert.     Psychiatric:         Mood and Affect: Mood normal.           Results   No results found for: \"PSA\"  Lab Results   Component Value Date    CALCIUM 9.5 05/07/2024    K 4.8 02/18/2025    CO2 21 02/18/2025     02/18/2025    BUN 23 02/18/2025    CREATININE 1.16 02/18/2025     Lab Results   Component Value Date    WBC 7.5 05/22/2023    HGB 15.5 05/22/2023    HCT 47.4 05/22/2023    MCV 90 05/22/2023     05/22/2023       Office Urine Dip  No results found for this or any previous visit (from the past hour).        "

## 2025-07-10 NOTE — ASSESSMENT & PLAN NOTE
Padmini 6 (3+3) 24% of tissue on the right 1/13/11 65gm PSA 7.2  Prior bicalutamide usage 07/02/2018 until he stopped 10/16/2019 due to breast side effects  PSA rise currently being followed for possible future ADT and repeat PSMA scan due to rising PSA  06/22/22 PSMA scan - moderate activity in right posterolateral and left lateral peripheral zone.   No desire for ADT at current time due to SE  Wants to hold on oncology f/u at current time to discuss alternative to ADT the may help with control of his prostate cancer   Ok with continued observation   -f/u Sep with PSA prior.  If stable then OK for f/u in 6 months with psa prior   -consider repeat PSMA scan if PSA rises and referral to oncology for medication other than ADT      Orders:    PSA Total, Diagnostic; Future

## 2025-07-11 LAB
7AMINOCLONAZEPAM SAL QL CFM: NEGATIVE NG/ML
AMPHET SAL QL CFM: NEGATIVE NG/ML
BUPRENORPHINE SAL QL SCN: NEGATIVE NG/ML
CARBOXYTHC SAL QL CFM: NEGATIVE NG/ML
CCP IGG SERPL-ACNC: NEGATIVE NG/ML
COCAINE SAL QL CFM: NEGATIVE NG/ML
CODEINE SAL QL CFM: NEGATIVE NG/ML
EDDP SAL QL CFM: NEGATIVE NG/ML
HYDROCODONE SAL QL CFM: ABNORMAL NG/ML
HYDROCODONE SAL QL CFM: ABNORMAL NG/ML
HYDROMORPHONE SAL QL CFM: NEGATIVE NG/ML
LEUKEMIA MARKERS BLD-IMP: NEGATIVE NG/ML
M PROTEIN 3 UR ELPH-MCNC: NEGATIVE NG/ML
M TB TUBERC IGNF/MITOGEN IGNF CONTROL: NEGATIVE NG/ML
METHADONE SAL QL CFM: NEGATIVE NG/ML
MORPHINE SAL QL CFM: NEGATIVE NG/ML
MORPHINE SAL QL CFM: NEGATIVE NG/ML
OXYMORPHONE SAL QL CFM: NEGATIVE NG/ML
OXYMORPHONE SAL QL CFM: NEGATIVE NG/ML
PCP SAL QL CFM: NEGATIVE NG/ML
SL AMB 6-MAM (HEROIN METABOLITE) QUANTIFICATION: NEGATIVE NG/ML
SL AMB ALPRAZOLAM QUANTIFICATION: NEGATIVE NG/ML
SL AMB CLONAZEPAM QUANTIFICATION: NEGATIVE NG/ML
SL AMB DIAZEPAM QUANTIFICATION: NEGATIVE NG/ML
SL AMB FENTANYL QUANTIFICATION: NEGATIVE NG/ML
SL AMB MDMA QUANTIFICATION: NEGATIVE NG/ML
SL AMB N-DESMETHYL-TRAMADOL QUANTIFICATION SALIVA: ABNORMAL NG/ML
SL AMB NORBUPRENORPHINE QUANTIFICATION: NEGATIVE NG/ML
SL AMB NORDIAZEPAM QUANTIFICATION: NEGATIVE NG/ML
SL AMB NORFENTANYL QUANTIFICATION: NEGATIVE NG/ML
SL AMB NORHYDROCODONE QUANTIFICATION: NEGATIVE NG/ML
SL AMB NORHYDROCODONE QUANTIFICATION: NEGATIVE NG/ML
SL AMB NORMEPERIDINE QUANTIFICATION: NEGATIVE NG/ML
SL AMB NOROXYCODONE QUANTIFICATION: NEGATIVE NG/ML
SL AMB OXAZEPAM QUANTIFICATION: NEGATIVE NG/ML
SL AMB RITALINIC ACID QUANTIFICATION: NEGATIVE NG/ML
SL AMB TEMAZEPAM QUANTIFICATION: NEGATIVE NG/ML
SL AMB TEMAZEPAM QUANTIFICATION: NEGATIVE NG/ML
SL AMB TRAMADOL QUANTIFICATION: ABNORMAL NG/ML
SQUAMOUS #/AREA URNS HPF: NEGATIVE NG/ML

## 2025-07-14 ENCOUNTER — OFFICE VISIT (OUTPATIENT)
Dept: UROLOGY | Facility: CLINIC | Age: 80
End: 2025-07-14
Payer: MEDICARE

## 2025-07-14 VITALS
DIASTOLIC BLOOD PRESSURE: 60 MMHG | OXYGEN SATURATION: 96 % | SYSTOLIC BLOOD PRESSURE: 102 MMHG | HEART RATE: 60 BPM | HEIGHT: 70 IN | BODY MASS INDEX: 40.66 KG/M2 | WEIGHT: 284 LBS

## 2025-07-14 DIAGNOSIS — C61 PROSTATE CANCER (HCC): Primary | ICD-10-CM

## 2025-07-14 PROCEDURE — 99213 OFFICE O/P EST LOW 20 MIN: CPT | Performed by: PHYSICIAN ASSISTANT

## 2025-07-25 ENCOUNTER — TELEPHONE (OUTPATIENT)
Dept: FAMILY MEDICINE CLINIC | Facility: CLINIC | Age: 80
End: 2025-07-25

## 2025-07-25 ENCOUNTER — OFFICE VISIT (OUTPATIENT)
Dept: FAMILY MEDICINE CLINIC | Facility: CLINIC | Age: 80
End: 2025-07-25
Payer: MEDICARE

## 2025-07-25 VITALS
RESPIRATION RATE: 18 BRPM | SYSTOLIC BLOOD PRESSURE: 122 MMHG | TEMPERATURE: 99.3 F | HEIGHT: 70 IN | HEART RATE: 64 BPM | BODY MASS INDEX: 40.37 KG/M2 | WEIGHT: 282 LBS | DIASTOLIC BLOOD PRESSURE: 64 MMHG

## 2025-07-25 DIAGNOSIS — I49.5 TACHYCARDIA-BRADYCARDIA SYNDROME (HCC): ICD-10-CM

## 2025-07-25 DIAGNOSIS — N18.2 TYPE 2 DIABETES MELLITUS WITH STAGE 2 CHRONIC KIDNEY DISEASE, WITH LONG-TERM CURRENT USE OF INSULIN (HCC): ICD-10-CM

## 2025-07-25 DIAGNOSIS — Z79.4 TYPE 2 DIABETES MELLITUS WITH STAGE 2 CHRONIC KIDNEY DISEASE, WITH LONG-TERM CURRENT USE OF INSULIN (HCC): ICD-10-CM

## 2025-07-25 DIAGNOSIS — R23.8 SKIN IRRITATION: Primary | ICD-10-CM

## 2025-07-25 DIAGNOSIS — J44.9 CHRONIC OBSTRUCTIVE PULMONARY DISEASE, UNSPECIFIED COPD TYPE (HCC): ICD-10-CM

## 2025-07-25 DIAGNOSIS — E11.22 TYPE 2 DIABETES MELLITUS WITH STAGE 2 CHRONIC KIDNEY DISEASE, WITH LONG-TERM CURRENT USE OF INSULIN (HCC): ICD-10-CM

## 2025-07-25 PROCEDURE — 99214 OFFICE O/P EST MOD 30 MIN: CPT | Performed by: FAMILY MEDICINE

## 2025-07-25 PROCEDURE — G2211 COMPLEX E/M VISIT ADD ON: HCPCS | Performed by: FAMILY MEDICINE

## 2025-07-25 RX ORDER — CEPHALEXIN 500 MG/1
500 CAPSULE ORAL 3 TIMES DAILY
Qty: 30 CAPSULE | Refills: 0 | Status: SHIPPED | OUTPATIENT
Start: 2025-07-25 | End: 2025-08-04

## 2025-07-25 RX ORDER — MUPIROCIN 2 %
OINTMENT (GRAM) TOPICAL 3 TIMES DAILY
Qty: 50 G | Refills: 2 | Status: SHIPPED | OUTPATIENT
Start: 2025-07-25

## 2025-07-25 NOTE — ASSESSMENT & PLAN NOTE
Open blister with elevated infection risk due to venous stasis and DM2 and age/obesity  Local skin care  Wash daily  Bactroban  Po keflex  Watch for worsening s/sx of infection/cellulitis  Possibe wound care referral if no better  Orders:    cephalexin (KEFLEX) 500 mg capsule; Take 1 capsule (500 mg total) by mouth 3 (three) times a day for 10 days    mupirocin (BACTROBAN) 2 % ointment; Apply topically 3 (three) times a day To wounds of feet as directed

## 2025-07-25 NOTE — PROGRESS NOTES
"Name: Caleb Ventura      : 1945      MRN: [id number]  Encounter Provider: Bobo Jose DO  Encounter Date: 2025   Encounter department: [facility]  :  Assessment & Plan  Skin irritation  Open blister with elevated infection risk due to venous stasis and DM2 and age/obesity  Local skin care  Wash daily  Bactroban  Po keflex  Watch for worsening s/sx of infection/cellulitis  Possibe wound care referral if no better  Orders:    cephalexin (KEFLEX) 500 mg capsule; Take 1 capsule (500 mg total) by mouth 3 (three) times a day for 10 days    mupirocin (BACTROBAN) 2 % ointment; Apply topically 3 (three) times a day To wounds of feet as directed    Chronic obstructive pulmonary disease, unspecified COPD type (HCC)  unchanged       Tachycardia-bradycardia syndrome (HCC)  Stable, has ICD       Type 2 diabetes mellitus with stage 2 chronic kidney disease, with long-term current use of insulin (HCC)  Near goal  Lab Results   Component Value Date    HGBA1C 7.3 (H) 2025                   History of Present Illness   HPI  Left foot blisters  Noted 1w ago  Enlarging since then  No injury  Oozed clear  Tried otc topical abx  Bactrban  No fever  Uses cane as usual    Showers as usual  Not on any AC    7.3 A1c most recently    Review of Systems   Constitutional:  Negative for fever.   Cardiovascular:  Negative for palpitations.       Objective   /64   Pulse 64   Temp 99.3 °F (37.4 °C)   Resp 18   Ht 5' 10\" (1.778 m)   Wt 128 kg (282 lb)   BMI 40.46 kg/m²      Physical Exam  Vitals and nursing note reviewed.   Constitutional:       General: He is not in acute distress.     Appearance: He is well-developed. He is obese. He is not ill-appearing.   HENT:      Head: Normocephalic.      Nose: Nose normal.      Mouth/Throat:      Mouth: Mucous membranes are moist.     Eyes:      General: No scleral icterus.     Conjunctiva/sclera: Conjunctivae normal.       Cardiovascular:      Rate and " Rhythm: Normal rate and regular rhythm.   Pulmonary:      Effort: Pulmonary effort is normal. No respiratory distress.      Breath sounds: No wheezing or rales.   Abdominal:      Palpations: Abdomen is soft.     Musculoskeletal:         General: No deformity.      Cervical back: Neck supple.     Skin:     General: Skin is warm and dry.      Coloration: Skin is not pale.      Findings: Erythema present.      Comments: Open blister left foot dorsum and mid tibia  No overt cellulitis     Neurological:      Mental Status: He is alert.     Psychiatric:         Mood and Affect: Mood normal.         Behavior: Behavior normal.         Thought Content: Thought content normal.

## 2025-08-06 ENCOUNTER — RESULTS FOLLOW-UP (OUTPATIENT)
Dept: ENDOCRINOLOGY | Facility: CLINIC | Age: 80
End: 2025-08-06

## 2025-08-06 LAB
25(OH)D3+25(OH)D2 SERPL-MCNC: 52.4 NG/ML (ref 30–100)
ALBUMIN SERPL-MCNC: 4.1 G/DL (ref 3.8–4.8)
ALBUMIN/CREAT UR: 92 MG/G CREAT (ref 0–29)
ALP SERPL-CCNC: 73 IU/L (ref 44–121)
ALT SERPL-CCNC: 13 IU/L (ref 0–44)
AST SERPL-CCNC: 18 IU/L (ref 0–40)
BASOPHILS # BLD AUTO: 0.1 X10E3/UL (ref 0–0.2)
BASOPHILS NFR BLD AUTO: 1 %
BILIRUB SERPL-MCNC: 0.4 MG/DL (ref 0–1.2)
BUN SERPL-MCNC: 17 MG/DL (ref 8–27)
BUN SERPL-MCNC: 17 MG/DL (ref 8–27)
BUN/CREAT SERPL: 15 (ref 10–24)
BUN/CREAT SERPL: 15 (ref 10–24)
CALCIUM SERPL-MCNC: 9.1 MG/DL (ref 8.6–10.2)
CALCIUM SERPL-MCNC: 9.2 MG/DL (ref 8.6–10.2)
CHLORIDE SERPL-SCNC: 101 MMOL/L (ref 96–106)
CHLORIDE SERPL-SCNC: 101 MMOL/L (ref 96–106)
CHOLEST SERPL-MCNC: 106 MG/DL (ref 100–199)
CO2 SERPL-SCNC: 20 MMOL/L (ref 20–29)
CO2 SERPL-SCNC: 20 MMOL/L (ref 20–29)
CREAT SERPL-MCNC: 1.13 MG/DL (ref 0.76–1.27)
CREAT SERPL-MCNC: 1.14 MG/DL (ref 0.76–1.27)
CREAT UR-MCNC: 68.6 MG/DL
EGFR: 65 ML/MIN/1.73
EGFR: 66 ML/MIN/1.73
EOSINOPHIL # BLD AUTO: 0.3 X10E3/UL (ref 0–0.4)
EOSINOPHIL NFR BLD AUTO: 5 %
ERYTHROCYTE [DISTWIDTH] IN BLOOD BY AUTOMATED COUNT: 13.7 % (ref 11.6–15.4)
EST. AVERAGE GLUCOSE BLD GHB EST-MCNC: 166 MG/DL
GLOBULIN SER-MCNC: 2.3 G/DL (ref 1.5–4.5)
GLUCOSE SERPL-MCNC: 148 MG/DL (ref 70–99)
GLUCOSE SERPL-MCNC: 149 MG/DL (ref 70–99)
HBA1C MFR BLD: 7.4 % (ref 4.8–5.6)
HCT VFR BLD AUTO: 44.1 % (ref 37.5–51)
HDLC SERPL-MCNC: 47 MG/DL
HGB BLD-MCNC: 14.3 G/DL (ref 13–17.7)
IMM GRANULOCYTES # BLD: 0 X10E3/UL (ref 0–0.1)
IMM GRANULOCYTES NFR BLD: 0 %
LDLC SERPL CALC-MCNC: 34 MG/DL (ref 0–99)
LDLC/HDLC SERPL: 0.7 RATIO (ref 0–3.6)
LYMPHOCYTES # BLD AUTO: 2.2 X10E3/UL (ref 0.7–3.1)
LYMPHOCYTES NFR BLD AUTO: 32 %
MCH RBC QN AUTO: 29.7 PG (ref 26.6–33)
MCHC RBC AUTO-ENTMCNC: 32.4 G/DL (ref 31.5–35.7)
MCV RBC AUTO: 92 FL (ref 79–97)
MICROALBUMIN UR-MCNC: 63.4 UG/ML
MICRODELETION SYND BLD/T FISH: NORMAL
MONOCYTES # BLD AUTO: 0.8 X10E3/UL (ref 0.1–0.9)
MONOCYTES NFR BLD AUTO: 11 %
NEUTROPHILS # BLD AUTO: 3.5 X10E3/UL (ref 1.4–7)
NEUTROPHILS NFR BLD AUTO: 51 %
PLATELET # BLD AUTO: 204 X10E3/UL (ref 150–450)
POTASSIUM SERPL-SCNC: 5 MMOL/L (ref 3.5–5.2)
POTASSIUM SERPL-SCNC: 5 MMOL/L (ref 3.5–5.2)
PROT SERPL-MCNC: 6.4 G/DL (ref 6–8.5)
RBC # BLD AUTO: 4.82 X10E6/UL (ref 4.14–5.8)
SL AMB VLDL CHOLESTEROL CALC: 25 MG/DL (ref 5–40)
SODIUM SERPL-SCNC: 138 MMOL/L (ref 134–144)
SODIUM SERPL-SCNC: 139 MMOL/L (ref 134–144)
TRIGL SERPL-MCNC: 145 MG/DL (ref 0–149)
WBC # BLD AUTO: 6.8 X10E3/UL (ref 3.4–10.8)

## 2025-08-14 ENCOUNTER — OFFICE VISIT (OUTPATIENT)
Dept: FAMILY MEDICINE CLINIC | Facility: CLINIC | Age: 80
End: 2025-08-14
Payer: MEDICARE

## 2025-08-14 ENCOUNTER — TELEPHONE (OUTPATIENT)
Dept: ADMINISTRATIVE | Facility: OTHER | Age: 80
End: 2025-08-14

## 2025-08-14 PROBLEM — D17.0 LIPOMA OF NECK: Status: ACTIVE | Noted: 2025-08-14
